# Patient Record
Sex: MALE | Race: WHITE | Employment: OTHER | URBAN - METROPOLITAN AREA
[De-identification: names, ages, dates, MRNs, and addresses within clinical notes are randomized per-mention and may not be internally consistent; named-entity substitution may affect disease eponyms.]

---

## 2017-06-12 ENCOUNTER — OFFICE VISIT (OUTPATIENT)
Dept: ORTHOPEDIC SURGERY | Age: 67
End: 2017-06-12

## 2017-06-12 VITALS
DIASTOLIC BLOOD PRESSURE: 81 MMHG | BODY MASS INDEX: 37.11 KG/M2 | SYSTOLIC BLOOD PRESSURE: 138 MMHG | HEART RATE: 74 BPM | HEIGHT: 73 IN | WEIGHT: 279.98 LBS

## 2017-06-12 DIAGNOSIS — M25.551 HIP PAIN, RIGHT: Primary | ICD-10-CM

## 2017-06-12 DIAGNOSIS — M76.31 ILIOTIBIAL BAND SYNDROME OF RIGHT SIDE: ICD-10-CM

## 2017-06-12 PROCEDURE — 99213 OFFICE O/P EST LOW 20 MIN: CPT | Performed by: ORTHOPAEDIC SURGERY

## 2017-06-12 PROCEDURE — 73502 X-RAY EXAM HIP UNI 2-3 VIEWS: CPT | Performed by: ORTHOPAEDIC SURGERY

## 2017-06-12 RX ORDER — MELOXICAM 15 MG/1
15 TABLET ORAL DAILY
Qty: 30 TABLET | Refills: 3 | Status: SHIPPED | OUTPATIENT
Start: 2017-06-12 | End: 2017-06-12 | Stop reason: SDUPTHER

## 2017-06-12 RX ORDER — MELOXICAM 15 MG/1
15 TABLET ORAL DAILY
Qty: 90 TABLET | Refills: 3 | Status: ON HOLD | OUTPATIENT
Start: 2017-06-12 | End: 2018-06-06 | Stop reason: ALTCHOICE

## 2018-04-10 ENCOUNTER — OFFICE VISIT (OUTPATIENT)
Dept: ORTHOPEDIC SURGERY | Age: 68
End: 2018-04-10

## 2018-04-10 VITALS
HEIGHT: 73 IN | SYSTOLIC BLOOD PRESSURE: 127 MMHG | DIASTOLIC BLOOD PRESSURE: 70 MMHG | WEIGHT: 279.98 LBS | HEART RATE: 69 BPM | BODY MASS INDEX: 37.11 KG/M2

## 2018-04-10 DIAGNOSIS — M54.16 LUMBAR RADICULITIS: ICD-10-CM

## 2018-04-10 DIAGNOSIS — R29.898 WEAKNESS OF BOTH LOWER EXTREMITIES: ICD-10-CM

## 2018-04-10 DIAGNOSIS — G95.9 MYELOPATHY (HCC): ICD-10-CM

## 2018-04-10 DIAGNOSIS — M51.36 DDD (DEGENERATIVE DISC DISEASE), LUMBAR: Primary | ICD-10-CM

## 2018-04-10 PROCEDURE — G8427 DOCREV CUR MEDS BY ELIG CLIN: HCPCS | Performed by: PHYSICIAN ASSISTANT

## 2018-04-10 PROCEDURE — G8417 CALC BMI ABV UP PARAM F/U: HCPCS | Performed by: PHYSICIAN ASSISTANT

## 2018-04-10 PROCEDURE — 1036F TOBACCO NON-USER: CPT | Performed by: PHYSICIAN ASSISTANT

## 2018-04-10 PROCEDURE — 1123F ACP DISCUSS/DSCN MKR DOCD: CPT | Performed by: PHYSICIAN ASSISTANT

## 2018-04-10 PROCEDURE — 3017F COLORECTAL CA SCREEN DOC REV: CPT | Performed by: PHYSICIAN ASSISTANT

## 2018-04-10 PROCEDURE — 99215 OFFICE O/P EST HI 40 MIN: CPT | Performed by: PHYSICIAN ASSISTANT

## 2018-04-10 PROCEDURE — 4040F PNEUMOC VAC/ADMIN/RCVD: CPT | Performed by: PHYSICIAN ASSISTANT

## 2018-04-10 RX ORDER — METHYLPREDNISOLONE 4 MG/1
TABLET ORAL
Qty: 1 KIT | Refills: 0 | Status: SHIPPED | OUTPATIENT
Start: 2018-04-10 | End: 2018-04-16

## 2018-04-10 RX ORDER — GABAPENTIN 300 MG/1
CAPSULE ORAL
Qty: 90 CAPSULE | Refills: 0 | Status: SHIPPED | OUTPATIENT
Start: 2018-04-10 | End: 2018-05-24

## 2018-04-11 ENCOUNTER — TELEPHONE (OUTPATIENT)
Dept: ORTHOPEDIC SURGERY | Age: 68
End: 2018-04-11

## 2018-04-17 ENCOUNTER — HOSPITAL ENCOUNTER (OUTPATIENT)
Dept: MRI IMAGING | Age: 68
Discharge: HOME | End: 2018-04-18

## 2018-04-17 ENCOUNTER — HOSPITAL ENCOUNTER (OUTPATIENT)
Dept: MRI IMAGING | Age: 68
Discharge: HOME | End: 2018-04-17

## 2018-04-17 DIAGNOSIS — G95.9 MYELOPATHY (HCC): ICD-10-CM

## 2018-04-17 DIAGNOSIS — R29.898 WEAKNESS OF BOTH LOWER EXTREMITIES: ICD-10-CM

## 2018-04-17 DIAGNOSIS — M51.36 DDD (DEGENERATIVE DISC DISEASE), LUMBAR: ICD-10-CM

## 2018-04-17 DIAGNOSIS — M54.16 LUMBAR RADICULITIS: ICD-10-CM

## 2018-04-19 ENCOUNTER — HOSPITAL ENCOUNTER (OUTPATIENT)
Dept: MRI IMAGING | Age: 68
Discharge: HOME | End: 2018-04-19

## 2018-04-19 DIAGNOSIS — G95.9 MYELOPATHY (HCC): ICD-10-CM

## 2018-04-19 DIAGNOSIS — R29.898 WEAKNESS OF BOTH LOWER EXTREMITIES: ICD-10-CM

## 2018-04-19 DIAGNOSIS — M51.36 DDD (DEGENERATIVE DISC DISEASE), LUMBAR: ICD-10-CM

## 2018-04-19 DIAGNOSIS — M54.16 LUMBAR RADICULITIS: ICD-10-CM

## 2018-04-23 ENCOUNTER — OFFICE VISIT (OUTPATIENT)
Dept: ORTHOPEDIC SURGERY | Age: 68
End: 2018-04-23

## 2018-04-23 VITALS
DIASTOLIC BLOOD PRESSURE: 76 MMHG | HEART RATE: 77 BPM | HEIGHT: 73 IN | BODY MASS INDEX: 37.11 KG/M2 | SYSTOLIC BLOOD PRESSURE: 140 MMHG | WEIGHT: 279.98 LBS

## 2018-04-23 DIAGNOSIS — M51.36 DEGENERATION OF LUMBAR INTERVERTEBRAL DISC: Primary | ICD-10-CM

## 2018-04-23 PROCEDURE — 1036F TOBACCO NON-USER: CPT | Performed by: PHYSICAL MEDICINE & REHABILITATION

## 2018-04-23 PROCEDURE — 4040F PNEUMOC VAC/ADMIN/RCVD: CPT | Performed by: PHYSICAL MEDICINE & REHABILITATION

## 2018-04-23 PROCEDURE — G8427 DOCREV CUR MEDS BY ELIG CLIN: HCPCS | Performed by: PHYSICAL MEDICINE & REHABILITATION

## 2018-04-23 PROCEDURE — 99214 OFFICE O/P EST MOD 30 MIN: CPT | Performed by: PHYSICAL MEDICINE & REHABILITATION

## 2018-04-23 PROCEDURE — 1123F ACP DISCUSS/DSCN MKR DOCD: CPT | Performed by: PHYSICAL MEDICINE & REHABILITATION

## 2018-04-23 PROCEDURE — G8417 CALC BMI ABV UP PARAM F/U: HCPCS | Performed by: PHYSICAL MEDICINE & REHABILITATION

## 2018-04-23 PROCEDURE — 3017F COLORECTAL CA SCREEN DOC REV: CPT | Performed by: PHYSICAL MEDICINE & REHABILITATION

## 2018-04-23 RX ORDER — HYDROCODONE BITARTRATE AND ACETAMINOPHEN 5; 325 MG/1; MG/1
TABLET ORAL
Qty: 20 TABLET | Refills: 0 | Status: SHIPPED | OUTPATIENT
Start: 2018-04-23 | End: 2018-04-30

## 2018-04-25 ENCOUNTER — TELEPHONE (OUTPATIENT)
Dept: ORTHOPEDIC SURGERY | Age: 68
End: 2018-04-25

## 2018-05-01 ENCOUNTER — HOSPITAL ENCOUNTER (OUTPATIENT)
Dept: PAIN MANAGEMENT | Age: 68
Discharge: HOME | End: 2018-05-01
Attending: PHYSICAL MEDICINE & REHABILITATION | Admitting: PHYSICAL MEDICINE & REHABILITATION

## 2018-05-01 ENCOUNTER — HOSPITAL ENCOUNTER (OUTPATIENT)
Dept: PHYSICAL THERAPY | Age: 68
Discharge: HOME | End: 2018-05-31
Attending: PHYSICIAN ASSISTANT | Admitting: PHYSICIAN ASSISTANT

## 2018-05-01 VITALS
HEART RATE: 60 BPM | SYSTOLIC BLOOD PRESSURE: 151 MMHG | TEMPERATURE: 97.1 F | BODY MASS INDEX: 34.33 KG/M2 | OXYGEN SATURATION: 99 % | HEIGHT: 73 IN | DIASTOLIC BLOOD PRESSURE: 68 MMHG | WEIGHT: 259 LBS | RESPIRATION RATE: 15 BRPM

## 2018-05-01 LAB
GLUCOSE BLD-MCNC: 184 MG/DL (ref 70–99)
PERFORMED ON: ABNORMAL

## 2018-05-01 ASSESSMENT — ACTIVITIES OF DAILY LIVING (ADL): EFFECT OF PAIN ON DAILY ACTIVITIES: STANDING UP AND WALKING

## 2018-05-01 ASSESSMENT — PAIN DESCRIPTION - DESCRIPTORS: DESCRIPTORS: SHARP

## 2018-05-01 ASSESSMENT — PAIN - FUNCTIONAL ASSESSMENT
PAIN_FUNCTIONAL_ASSESSMENT: 0-10
PAIN_FUNCTIONAL_ASSESSMENT: 0-10

## 2018-05-14 ENCOUNTER — OFFICE VISIT (OUTPATIENT)
Dept: ORTHOPEDIC SURGERY | Age: 68
End: 2018-05-14

## 2018-05-14 VITALS
WEIGHT: 259.04 LBS | BODY MASS INDEX: 34.33 KG/M2 | HEART RATE: 74 BPM | HEIGHT: 73 IN | DIASTOLIC BLOOD PRESSURE: 89 MMHG | SYSTOLIC BLOOD PRESSURE: 145 MMHG

## 2018-05-14 DIAGNOSIS — M51.36 DDD (DEGENERATIVE DISC DISEASE), LUMBAR: ICD-10-CM

## 2018-05-14 DIAGNOSIS — R29.898 WEAKNESS OF BOTH LOWER EXTREMITIES: ICD-10-CM

## 2018-05-14 DIAGNOSIS — M48.062 SPINAL STENOSIS OF LUMBAR REGION WITH NEUROGENIC CLAUDICATION: Primary | ICD-10-CM

## 2018-05-14 DIAGNOSIS — M54.16 LUMBAR RADICULITIS: ICD-10-CM

## 2018-05-14 PROCEDURE — 1123F ACP DISCUSS/DSCN MKR DOCD: CPT | Performed by: PHYSICIAN ASSISTANT

## 2018-05-14 PROCEDURE — 99214 OFFICE O/P EST MOD 30 MIN: CPT | Performed by: PHYSICIAN ASSISTANT

## 2018-05-14 PROCEDURE — 4040F PNEUMOC VAC/ADMIN/RCVD: CPT | Performed by: PHYSICIAN ASSISTANT

## 2018-05-14 PROCEDURE — 3017F COLORECTAL CA SCREEN DOC REV: CPT | Performed by: PHYSICIAN ASSISTANT

## 2018-05-14 PROCEDURE — G8417 CALC BMI ABV UP PARAM F/U: HCPCS | Performed by: PHYSICIAN ASSISTANT

## 2018-05-14 PROCEDURE — 1036F TOBACCO NON-USER: CPT | Performed by: PHYSICIAN ASSISTANT

## 2018-05-14 PROCEDURE — G8427 DOCREV CUR MEDS BY ELIG CLIN: HCPCS | Performed by: PHYSICIAN ASSISTANT

## 2018-05-14 RX ORDER — HYDROCODONE BITARTRATE AND ACETAMINOPHEN 7.5; 325 MG/1; MG/1
TABLET ORAL
Qty: 21 TABLET | Refills: 0 | Status: SHIPPED | OUTPATIENT
Start: 2018-05-14 | End: 2018-05-21

## 2018-05-14 RX ORDER — PREGABALIN 75 MG/1
CAPSULE ORAL
Qty: 60 CAPSULE | Refills: 0 | Status: SHIPPED | OUTPATIENT
Start: 2018-05-14 | End: 2018-05-24

## 2018-05-21 ENCOUNTER — TELEPHONE (OUTPATIENT)
Dept: ORTHOPEDIC SURGERY | Age: 68
End: 2018-05-21

## 2018-05-22 ENCOUNTER — PRE-EVALUATION (OUTPATIENT)
Dept: ORTHOPEDIC SURGERY | Age: 68
End: 2018-05-22

## 2018-05-22 ENCOUNTER — OFFICE VISIT (OUTPATIENT)
Dept: ORTHOPEDIC SURGERY | Age: 68
End: 2018-05-22

## 2018-05-22 VITALS
HEIGHT: 73 IN | HEART RATE: 78 BPM | BODY MASS INDEX: 34.33 KG/M2 | WEIGHT: 259.04 LBS | SYSTOLIC BLOOD PRESSURE: 132 MMHG | DIASTOLIC BLOOD PRESSURE: 88 MMHG

## 2018-05-22 DIAGNOSIS — M48.062 SPINAL STENOSIS, LUMBAR REGION, WITH NEUROGENIC CLAUDICATION: Primary | ICD-10-CM

## 2018-05-22 DIAGNOSIS — M48.062 LUMBAR STENOSIS WITH NEUROGENIC CLAUDICATION: ICD-10-CM

## 2018-05-22 DIAGNOSIS — M54.16 LUMBAR RADICULOPATHY: ICD-10-CM

## 2018-05-22 DIAGNOSIS — R29.898 LEG WEAKNESS, BILATERAL: ICD-10-CM

## 2018-05-22 DIAGNOSIS — M48.062 SPINAL STENOSIS OF LUMBAR REGION WITH NEUROGENIC CLAUDICATION: Primary | ICD-10-CM

## 2018-05-22 PROCEDURE — 3017F COLORECTAL CA SCREEN DOC REV: CPT | Performed by: ORTHOPAEDIC SURGERY

## 2018-05-22 PROCEDURE — G8427 DOCREV CUR MEDS BY ELIG CLIN: HCPCS | Performed by: ORTHOPAEDIC SURGERY

## 2018-05-22 PROCEDURE — APPNB30 APP NON BILLABLE TIME 0-30 MINS: Performed by: PHYSICIAN ASSISTANT

## 2018-05-22 PROCEDURE — 99214 OFFICE O/P EST MOD 30 MIN: CPT | Performed by: ORTHOPAEDIC SURGERY

## 2018-05-22 PROCEDURE — G8417 CALC BMI ABV UP PARAM F/U: HCPCS | Performed by: ORTHOPAEDIC SURGERY

## 2018-05-24 ENCOUNTER — PAT TELEPHONE (OUTPATIENT)
Dept: PREADMISSION TESTING | Age: 68
End: 2018-05-24

## 2018-05-24 VITALS — WEIGHT: 259.04 LBS | HEIGHT: 72 IN | BODY MASS INDEX: 35.09 KG/M2

## 2018-05-25 ENCOUNTER — TELEPHONE (OUTPATIENT)
Dept: ORTHOPEDIC SURGERY | Age: 68
End: 2018-05-25

## 2018-06-06 PROBLEM — M48.00 SPINAL STENOSIS: Status: ACTIVE | Noted: 2018-06-06

## 2018-06-07 ENCOUNTER — TELEPHONE (OUTPATIENT)
Dept: ORTHOPEDIC SURGERY | Age: 68
End: 2018-06-07

## 2018-06-11 ENCOUNTER — HOSPITAL ENCOUNTER (OUTPATIENT)
Dept: VASCULAR LAB | Age: 68
Discharge: HOME | End: 2018-06-12
Attending: ORTHOPAEDIC SURGERY | Admitting: NURSE PRACTITIONER

## 2018-06-11 ENCOUNTER — TELEPHONE (OUTPATIENT)
Dept: ORTHOPEDIC SURGERY | Age: 68
End: 2018-06-11

## 2018-06-13 ENCOUNTER — TELEPHONE (OUTPATIENT)
Dept: ORTHOPEDIC SURGERY | Age: 68
End: 2018-06-13

## 2018-06-19 ENCOUNTER — OFFICE VISIT (OUTPATIENT)
Dept: ORTHOPEDIC SURGERY | Age: 68
End: 2018-06-19

## 2018-06-19 VITALS
HEIGHT: 73 IN | SYSTOLIC BLOOD PRESSURE: 128 MMHG | HEART RATE: 76 BPM | BODY MASS INDEX: 33.66 KG/M2 | WEIGHT: 253.97 LBS | DIASTOLIC BLOOD PRESSURE: 80 MMHG

## 2018-06-19 DIAGNOSIS — M48.062 SPINAL STENOSIS OF LUMBAR REGION WITH NEUROGENIC CLAUDICATION: Primary | ICD-10-CM

## 2018-06-19 PROCEDURE — 99024 POSTOP FOLLOW-UP VISIT: CPT | Performed by: ORTHOPAEDIC SURGERY

## 2018-07-16 ENCOUNTER — TELEPHONE (OUTPATIENT)
Dept: ORTHOPEDIC SURGERY | Age: 68
End: 2018-07-16

## 2018-07-17 ENCOUNTER — OFFICE VISIT (OUTPATIENT)
Dept: ORTHOPEDIC SURGERY | Age: 68
End: 2018-07-17

## 2018-07-17 VITALS
SYSTOLIC BLOOD PRESSURE: 169 MMHG | HEART RATE: 79 BPM | DIASTOLIC BLOOD PRESSURE: 85 MMHG | BODY MASS INDEX: 34.46 KG/M2 | WEIGHT: 260 LBS | HEIGHT: 73 IN

## 2018-07-17 DIAGNOSIS — M48.062 SPINAL STENOSIS OF LUMBAR REGION WITH NEUROGENIC CLAUDICATION: Primary | ICD-10-CM

## 2018-07-17 PROCEDURE — 99024 POSTOP FOLLOW-UP VISIT: CPT | Performed by: ORTHOPAEDIC SURGERY

## 2018-07-17 NOTE — TELEPHONE ENCOUNTER
Patient called upset and demanding to speak to Dr Emily Wong or Azucena Du now regarding his increased pain    He states no one called him back yesterday  Azucena Du is going to call patient back- but states she was in clinic all day yesterday and today-will call him after clinic    Patient can be reached at [phone]

## 2018-08-13 ENCOUNTER — TELEPHONE (OUTPATIENT)
Dept: ORTHOPEDIC SURGERY | Age: 68
End: 2018-08-13

## 2018-08-13 DIAGNOSIS — Z98.890 S/P LUMBAR LAMINECTOMY: Primary | ICD-10-CM

## 2018-08-13 NOTE — TELEPHONE ENCOUNTER
Patient calling stating that he needs to be seen in the office for increased pain. HE states that he would like tomorrow at 2:40. I advised that the schedule was full and I could schedule the next available appointment. Patient declined to schedule stating we \"have open spots that are held for things like this and for people like him\". I again advised I could schedule next available and I would put on wait-list and send a message to see if anything sooner that I may have missed. Patient does not wish to schedule with Brianda Car either. Patient asked that a message be sent to Dr Chaitanya Feliz without any interference from \"the staff\". He does not want anyone making decisions for the doctor. Dr Chaitanya Feliz knows the patient and his case and knows that he will see him tomorrow at 2:40. Patient advised no appointment was made and to wait for a call from the staff.      Please call  [phone]

## 2018-08-13 NOTE — TELEPHONE ENCOUNTER
Spoke with patient and he states that his pain is a little different from surgery. He is now having lbp and right leg pain. He said that they pain is different than before surgery. This is more nerve pain. He checked with his PCP and he does not think that it is blood clot related. I let him know that we can have him see Colleen Mccainaugusto on Wednesday if needed. He said you mentioned a new MRI when he was seen in clinic but I did not see this in the notes. Please advise. He continues on his Eloquis for bloodclot.

## 2018-08-14 NOTE — TELEPHONE ENCOUNTER
Spoke with patient and let him know that Dr. Dominguez  would like to order a MRI. I put in the orders for [facility]. I will call him once the MRI is approved.

## 2018-08-20 ENCOUNTER — TELEPHONE (OUTPATIENT)
Dept: ORTHOPEDIC SURGERY | Age: 68
End: 2018-08-20

## 2018-08-20 NOTE — TELEPHONE ENCOUNTER
Called patient to let them know of MRI/CT SCAN approval. Micha Dumont has been faxed auth# and demographic information. Patient was instructed to call the central scheduling department for a follow-up appointment after test is scheduled.

## 2018-08-27 ENCOUNTER — TELEPHONE (OUTPATIENT)
Dept: ORTHOPEDIC SURGERY | Age: 68
End: 2018-08-27

## 2018-08-28 ENCOUNTER — TELEPHONE (OUTPATIENT)
Dept: ORTHOPEDIC SURGERY | Age: 68
End: 2018-08-28

## 2018-08-28 DIAGNOSIS — Z98.890 S/P LUMBAR LAMINECTOMY: Primary | ICD-10-CM

## 2018-09-05 ENCOUNTER — HOSPITAL ENCOUNTER (OUTPATIENT)
Dept: MRI IMAGING | Age: 68
Discharge: HOME | End: 2018-09-05
Payer: MEDICARE

## 2018-09-05 DIAGNOSIS — Z98.890 S/P LUMBAR LAMINECTOMY: ICD-10-CM

## 2018-09-05 PROCEDURE — 72158 MRI LUMBAR SPINE W/O & W/DYE: CPT

## 2018-09-05 PROCEDURE — 6360000004 HC RX CONTRAST MEDICATION: Performed by: ORTHOPAEDIC SURGERY

## 2018-09-05 PROCEDURE — A9579 GAD-BASE MR CONTRAST NOS,1ML: HCPCS | Performed by: ORTHOPAEDIC SURGERY

## 2018-09-05 RX ADMIN — GADOTERIDOL 23 ML: 279.3 INJECTION, SOLUTION INTRAVENOUS at 16:15

## 2018-09-06 ENCOUNTER — TELEPHONE (OUTPATIENT)
Dept: ORTHOPEDIC SURGERY | Age: 68
End: 2018-09-06

## 2018-09-06 ENCOUNTER — HOSPITAL ENCOUNTER (OUTPATIENT)
Age: 68
Discharge: HOME | End: 2018-09-06
Payer: MEDICARE

## 2018-09-06 DIAGNOSIS — Z98.890 S/P LUMBAR LAMINECTOMY: Primary | ICD-10-CM

## 2018-09-06 DIAGNOSIS — Z98.890 S/P LUMBAR LAMINECTOMY: ICD-10-CM

## 2018-09-06 LAB
C-REACTIVE PROTEIN: 2.4 MG/L (ref 0–5.1)
SEDIMENTATION RATE, ERYTHROCYTE: 10 MM/HR (ref 0–20)

## 2018-09-06 PROCEDURE — 85652 RBC SED RATE AUTOMATED: CPT

## 2018-09-06 PROCEDURE — 36415 COLL VENOUS BLD VENIPUNCTURE: CPT

## 2018-09-06 PROCEDURE — 86140 C-REACTIVE PROTEIN: CPT

## 2018-09-06 NOTE — TELEPHONE ENCOUNTER
PATIENT CALLED IN TO INFORM AMANUEL HE HAD HIS LAB WORK DONE AT [facility] - HE STATED HE DID NOT NEED A CALL BACK BUT JUST WANTED TO LET AMANUEL KNOW.

## 2018-09-07 ENCOUNTER — TELEPHONE (OUTPATIENT)
Dept: ORTHOPEDIC SURGERY | Age: 68
End: 2018-09-07

## 2018-09-07 NOTE — TELEPHONE ENCOUNTER
----- Message from Jaimie Meredith MD sent at 9/7/2018  9:17 AM EDT -----  Blood tests are normal. Infection very unlikely. See us in office in 1 to 2 weeks.

## 2018-09-12 ENCOUNTER — HOSPITAL ENCOUNTER (OUTPATIENT)
Dept: VASCULAR LAB | Age: 68
Discharge: HOME | End: 2018-09-12
Payer: MEDICARE

## 2018-09-12 PROCEDURE — 93971 EXTREMITY STUDY: CPT

## 2018-09-13 ENCOUNTER — TELEPHONE (OUTPATIENT)
Dept: ORTHOPEDIC SURGERY | Age: 68
End: 2018-09-13

## 2018-09-13 ENCOUNTER — HOSPITAL ENCOUNTER (OUTPATIENT)
Age: 68
Discharge: HOME | End: 2018-09-13
Payer: MEDICARE

## 2018-09-13 DIAGNOSIS — I82.409 POSTOPERATIVE ACUTE DEEP VEIN THROMBOSIS (DVT) OF LOWER EXTREMITY, SUBSEQUENT ENCOUNTER (HCC): ICD-10-CM

## 2018-09-13 DIAGNOSIS — Z98.890 S/P LAMINECTOMY: ICD-10-CM

## 2018-09-13 DIAGNOSIS — Z98.890 S/P LAMINECTOMY: Primary | ICD-10-CM

## 2018-09-13 DIAGNOSIS — T81.72XD POSTOPERATIVE ACUTE DEEP VEIN THROMBOSIS (DVT) OF LOWER EXTREMITY, SUBSEQUENT ENCOUNTER (HCC): ICD-10-CM

## 2018-09-13 LAB
C-REACTIVE PROTEIN: 2.5 MG/L (ref 0–5.1)
SEDIMENTATION RATE, ERYTHROCYTE: 6 MM/HR (ref 0–20)

## 2018-09-13 PROCEDURE — 36415 COLL VENOUS BLD VENIPUNCTURE: CPT

## 2018-09-13 PROCEDURE — 85652 RBC SED RATE AUTOMATED: CPT

## 2018-09-13 PROCEDURE — 86140 C-REACTIVE PROTEIN: CPT

## 2018-09-14 ENCOUNTER — TELEPHONE (OUTPATIENT)
Dept: ORTHOPEDIC SURGERY | Age: 68
End: 2018-09-14

## 2018-09-14 NOTE — TELEPHONE ENCOUNTER
----- Message from Kimberlee Moser MD sent at 9/14/2018 10:03 AM EDT -----  Blood tests normal. INfection very unlikely. See me in the office in 1-3 weeks.  thanks

## 2018-09-17 ENCOUNTER — OFFICE VISIT (OUTPATIENT)
Dept: ORTHOPEDIC SURGERY | Age: 68
End: 2018-09-17

## 2018-09-17 VITALS — WEIGHT: 260 LBS | BODY MASS INDEX: 34.46 KG/M2 | HEIGHT: 73 IN

## 2018-09-17 DIAGNOSIS — M48.062 SPINAL STENOSIS OF LUMBAR REGION WITH NEUROGENIC CLAUDICATION: Primary | ICD-10-CM

## 2018-09-17 PROCEDURE — 3017F COLORECTAL CA SCREEN DOC REV: CPT | Performed by: ORTHOPAEDIC SURGERY

## 2018-09-17 PROCEDURE — 99213 OFFICE O/P EST LOW 20 MIN: CPT | Performed by: ORTHOPAEDIC SURGERY

## 2018-09-17 PROCEDURE — 1123F ACP DISCUSS/DSCN MKR DOCD: CPT | Performed by: ORTHOPAEDIC SURGERY

## 2018-09-17 PROCEDURE — G8427 DOCREV CUR MEDS BY ELIG CLIN: HCPCS | Performed by: ORTHOPAEDIC SURGERY

## 2018-09-17 PROCEDURE — 4040F PNEUMOC VAC/ADMIN/RCVD: CPT | Performed by: ORTHOPAEDIC SURGERY

## 2018-09-17 PROCEDURE — G8417 CALC BMI ABV UP PARAM F/U: HCPCS | Performed by: ORTHOPAEDIC SURGERY

## 2018-09-17 PROCEDURE — 1101F PT FALLS ASSESS-DOCD LE1/YR: CPT | Performed by: ORTHOPAEDIC SURGERY

## 2018-09-17 PROCEDURE — 1036F TOBACCO NON-USER: CPT | Performed by: ORTHOPAEDIC SURGERY

## 2018-09-18 ENCOUNTER — TELEPHONE (OUTPATIENT)
Dept: ORTHOPEDIC SURGERY | Age: 68
End: 2018-09-18

## 2018-09-24 NOTE — H&P
HISTORY AND PHYSICAL/PRE-SEDATION ASSESSMENT    Patient:  Jane Saleem   :  1950  Medical Record No.:  [id number]   Date:  18  Physician:  Xiao Mcclellan M.D. Facility: [facility]     Nursing History and Physical reviewed and agreed upon. Additional findings:    Allergies:  No known allergies    Home Medications:    Prior to Admission medications    Medication Sig Start Date End Date Taking? Authorizing Provider   docusate sodium (COLACE) 100 MG capsule Take 1 capsule by mouth 2 times daily as needed for Constipation 18   Eboni Alcazar MD   Mirabegron ER (MYRBETRIQ) 50 MG TB24 Take 1 tablet by mouth daily    Historical Provider, MD Davis-Mihir Afri-Nettle-Saw Palmet (SAW PALMETTO COMPLEX PO) Take 2 tablets by mouth 2 times daily     Historical Provider, MD   aspirin 81 MG tablet Take 81 mg by mouth daily. Historical Provider, MD   lithium 300 MG tablet Take 900 mg by mouth nightly     Historical Provider, MD   metFORMIN (GLUCOPHAGE) 500 MG tablet Take 1,000 mg by mouth 2 times daily (with meals)     Historical Provider, MD   traZODone (DESYREL) 100 MG tablet Take 300 mg by mouth nightly     Historical Provider, MD   doxazosin (CARDURA) 2 MG tablet Take 2 mg by mouth Takes 3 times weekly    Historical Provider, MD       Vitals: Stable     PHYSICAL EXAM:  HENT: Airway patent and reviewed  Cardiovascular: Normal rate, regular rhythm, normal heart sounds. Pulmonary/Chest: No wheezes. No rhonchi. No rales. Abdominal: Soft. Bowel sounds are normal. No distension. MALLAMPATI:           []   I. Complete visualization of the soft palate           [x]   II. Complete visualization of the uvula            []   III. Visualization of only the base of the uvula           []   IV. Soft palate is not visible     ASA CLASS:         []   I. Normal, healthy adult           [x]   II.  Mild systemic disease            []   III.   Severe systemic disease      Sedation

## 2018-09-25 ENCOUNTER — TELEPHONE (OUTPATIENT)
Dept: ORTHOPEDIC SURGERY | Age: 68
End: 2018-09-25

## 2018-09-25 ENCOUNTER — HOSPITAL ENCOUNTER (OUTPATIENT)
Age: 68
Setting detail: OUTPATIENT SURGERY
Discharge: HOME | End: 2018-09-25
Attending: PHYSICAL MEDICINE & REHABILITATION | Admitting: PHYSICAL MEDICINE & REHABILITATION
Payer: MEDICARE

## 2018-09-25 VITALS
TEMPERATURE: 98 F | HEART RATE: 64 BPM | OXYGEN SATURATION: 97 % | RESPIRATION RATE: 16 BRPM | SYSTOLIC BLOOD PRESSURE: 147 MMHG | WEIGHT: 250 LBS | BODY MASS INDEX: 33.13 KG/M2 | DIASTOLIC BLOOD PRESSURE: 82 MMHG | HEIGHT: 73 IN

## 2018-09-25 LAB
GLUCOSE BLD-MCNC: 146 MG/DL (ref 70–99)
PERFORMED ON: ABNORMAL

## 2018-09-25 PROCEDURE — 7100000010 HC PHASE II RECOVERY - FIRST 15 MIN: Performed by: PHYSICAL MEDICINE & REHABILITATION

## 2018-09-25 PROCEDURE — 3600000002 HC SURGERY LEVEL 2 BASE: Performed by: PHYSICAL MEDICINE & REHABILITATION

## 2018-09-25 PROCEDURE — 2709999900 HC NON-CHARGEABLE SUPPLY: Performed by: PHYSICAL MEDICINE & REHABILITATION

## 2018-09-25 PROCEDURE — 2500000003 HC RX 250 WO HCPCS: Performed by: PHYSICAL MEDICINE & REHABILITATION

## 2018-09-25 RX ORDER — MULTIVIT WITH MINERALS/LUTEIN
1000 TABLET ORAL DAILY
COMMUNITY
End: 2020-06-01

## 2018-09-25 RX ORDER — BUPIVACAINE HYDROCHLORIDE 7.5 MG/ML
INJECTION, SOLUTION EPIDURAL; RETROBULBAR PRN
Status: DISCONTINUED | OUTPATIENT
Start: 2018-09-25 | End: 2018-09-25 | Stop reason: HOSPADM

## 2018-09-25 RX ORDER — GLIMEPIRIDE 2 MG/1
2 TABLET ORAL
Status: ON HOLD | COMMUNITY
End: 2022-06-08 | Stop reason: CLARIF

## 2018-09-25 RX ORDER — THIAMINE MONONITRATE (VIT B1) 100 MG
100 TABLET ORAL DAILY
COMMUNITY
End: 2020-06-01

## 2018-09-25 RX ORDER — VITAMIN E 268 MG
400 CAPSULE ORAL DAILY
COMMUNITY
End: 2020-07-11

## 2018-09-25 ASSESSMENT — PAIN - FUNCTIONAL ASSESSMENT: PAIN_FUNCTIONAL_ASSESSMENT: 0-10

## 2018-09-25 ASSESSMENT — PAIN DESCRIPTION - DESCRIPTORS: DESCRIPTORS: SHARP

## 2018-09-25 ASSESSMENT — PAIN SCALES - GENERAL: PAINLEVEL_OUTOF10: 0

## 2018-09-26 NOTE — OP NOTE
Patient:  Connor Buckner Record #:  [id number]   Date:    9-25-18  Physician:  Farrah Shane M.D. Facility: [facility]     Pre-op diagnosis:  Lumbar facet pain syndrome, facet arthropathy, lumbar spondylosis  Post-op diagnosis:  same  Procedure: Bilateral L3, L4, and L5 medial branch #1 blocks with fluoroscopic guidance  Anesthesia: none  Procedure Note:    The patient was admitted through pre-op and written consent was obtained. The patient was advised of the risks and benefits of the procedure, including but not limited to the following: bleeding, pain, infection, temporary paralysis, nerve damage and spinal headache. The patient was given the opportunity to ask questions. There were no contraindications for this procedure. The appropriate area was prepped and draped in a sterile fashion. Landmarks were identified and marked. Three 25G spinal needle were directed to the right L3, L4, and L5 medial medial branches using fluoroscopic guidance with ideal needle tip confirmed by multiple views. There were no signs of intravascular or intrathecal injection. 0.5cc of 0.75% marcaine were injected at each site. There were no complications and the patient tolerated the procedure well. The patient was transferred to the recovery area and monitored. Discharge instructions were given. The patient is to contact me for any post-procedure concerns. The patient is to follow up as scheduled. The same procedure was repeated on the contralateral side. Pain diary was discussed and provided.     Farrah Shane MD

## 2018-10-01 ENCOUNTER — OFFICE VISIT (OUTPATIENT)
Dept: ORTHOPEDIC SURGERY | Age: 68
End: 2018-10-01
Payer: MEDICARE

## 2018-10-01 VITALS
SYSTOLIC BLOOD PRESSURE: 144 MMHG | DIASTOLIC BLOOD PRESSURE: 75 MMHG | HEART RATE: 74 BPM | WEIGHT: 260 LBS | BODY MASS INDEX: 34.46 KG/M2 | HEIGHT: 73 IN

## 2018-10-01 DIAGNOSIS — Z98.890 S/P LUMBAR LAMINECTOMY: ICD-10-CM

## 2018-10-01 DIAGNOSIS — M48.062 LUMBAR STENOSIS WITH NEUROGENIC CLAUDICATION: ICD-10-CM

## 2018-10-01 DIAGNOSIS — G95.9 MYELOPATHY (HCC): ICD-10-CM

## 2018-10-01 DIAGNOSIS — M48.062 SPINAL STENOSIS OF LUMBAR REGION WITH NEUROGENIC CLAUDICATION: Primary | ICD-10-CM

## 2018-10-01 DIAGNOSIS — M51.36 DEGENERATION OF LUMBAR INTERVERTEBRAL DISC: ICD-10-CM

## 2018-10-01 PROCEDURE — G8427 DOCREV CUR MEDS BY ELIG CLIN: HCPCS | Performed by: PHYSICAL MEDICINE & REHABILITATION

## 2018-10-01 PROCEDURE — 1036F TOBACCO NON-USER: CPT | Performed by: PHYSICAL MEDICINE & REHABILITATION

## 2018-10-01 PROCEDURE — 1123F ACP DISCUSS/DSCN MKR DOCD: CPT | Performed by: PHYSICAL MEDICINE & REHABILITATION

## 2018-10-01 PROCEDURE — 3017F COLORECTAL CA SCREEN DOC REV: CPT | Performed by: PHYSICAL MEDICINE & REHABILITATION

## 2018-10-01 PROCEDURE — G8417 CALC BMI ABV UP PARAM F/U: HCPCS | Performed by: PHYSICAL MEDICINE & REHABILITATION

## 2018-10-01 PROCEDURE — 99213 OFFICE O/P EST LOW 20 MIN: CPT | Performed by: PHYSICAL MEDICINE & REHABILITATION

## 2018-10-01 PROCEDURE — G8484 FLU IMMUNIZE NO ADMIN: HCPCS | Performed by: PHYSICAL MEDICINE & REHABILITATION

## 2018-10-01 PROCEDURE — 1101F PT FALLS ASSESS-DOCD LE1/YR: CPT | Performed by: PHYSICAL MEDICINE & REHABILITATION

## 2018-10-01 PROCEDURE — 4040F PNEUMOC VAC/ADMIN/RCVD: CPT | Performed by: PHYSICAL MEDICINE & REHABILITATION

## 2018-10-10 ENCOUNTER — TELEPHONE (OUTPATIENT)
Dept: ORTHOPEDIC SURGERY | Age: 68
End: 2018-10-10

## 2018-10-16 ENCOUNTER — HOSPITAL ENCOUNTER (OUTPATIENT)
Age: 68
Setting detail: OUTPATIENT SURGERY
Discharge: HOME | End: 2018-10-16
Attending: PHYSICAL MEDICINE & REHABILITATION | Admitting: PHYSICAL MEDICINE & REHABILITATION
Payer: MEDICARE

## 2018-10-16 VITALS
BODY MASS INDEX: 34.46 KG/M2 | OXYGEN SATURATION: 100 % | HEIGHT: 73 IN | TEMPERATURE: 97.1 F | DIASTOLIC BLOOD PRESSURE: 79 MMHG | RESPIRATION RATE: 16 BRPM | HEART RATE: 58 BPM | SYSTOLIC BLOOD PRESSURE: 139 MMHG | WEIGHT: 260 LBS

## 2018-10-16 LAB
GLUCOSE BLD-MCNC: 137 MG/DL (ref 70–99)
PERFORMED ON: ABNORMAL

## 2018-10-16 PROCEDURE — 2709999900 HC NON-CHARGEABLE SUPPLY: Performed by: PHYSICAL MEDICINE & REHABILITATION

## 2018-10-16 PROCEDURE — 3600000002 HC SURGERY LEVEL 2 BASE: Performed by: PHYSICAL MEDICINE & REHABILITATION

## 2018-10-16 PROCEDURE — 7100000010 HC PHASE II RECOVERY - FIRST 15 MIN: Performed by: PHYSICAL MEDICINE & REHABILITATION

## 2018-10-16 PROCEDURE — 2500000003 HC RX 250 WO HCPCS: Performed by: PHYSICAL MEDICINE & REHABILITATION

## 2018-10-16 RX ORDER — BUPIVACAINE HYDROCHLORIDE 7.5 MG/ML
INJECTION, SOLUTION EPIDURAL; RETROBULBAR PRN
Status: DISCONTINUED | OUTPATIENT
Start: 2018-10-16 | End: 2018-10-16 | Stop reason: HOSPADM

## 2018-10-16 ASSESSMENT — PAIN DESCRIPTION - DESCRIPTORS: DESCRIPTORS: ACHING;SHARP;NUMBNESS

## 2018-10-16 ASSESSMENT — PAIN SCALES - GENERAL: PAINLEVEL_OUTOF10: 0

## 2018-10-16 ASSESSMENT — PAIN - FUNCTIONAL ASSESSMENT: PAIN_FUNCTIONAL_ASSESSMENT: 0-10

## 2018-10-24 ENCOUNTER — OFFICE VISIT (OUTPATIENT)
Dept: ORTHOPEDIC SURGERY | Age: 68
End: 2018-10-24
Payer: MEDICARE

## 2018-10-24 VITALS
HEART RATE: 76 BPM | WEIGHT: 259.92 LBS | HEIGHT: 73 IN | DIASTOLIC BLOOD PRESSURE: 80 MMHG | SYSTOLIC BLOOD PRESSURE: 128 MMHG | BODY MASS INDEX: 34.45 KG/M2

## 2018-10-24 DIAGNOSIS — Z98.890 S/P LUMBAR LAMINECTOMY: ICD-10-CM

## 2018-10-24 DIAGNOSIS — M48.062 SPINAL STENOSIS OF LUMBAR REGION WITH NEUROGENIC CLAUDICATION: Primary | ICD-10-CM

## 2018-10-24 DIAGNOSIS — G95.9 MYELOPATHY (HCC): ICD-10-CM

## 2018-10-24 DIAGNOSIS — R29.898 WEAKNESS OF BOTH LOWER EXTREMITIES: ICD-10-CM

## 2018-10-24 DIAGNOSIS — M51.36 DDD (DEGENERATIVE DISC DISEASE), LUMBAR: ICD-10-CM

## 2018-10-24 PROCEDURE — 1036F TOBACCO NON-USER: CPT | Performed by: PHYSICAL MEDICINE & REHABILITATION

## 2018-10-24 PROCEDURE — 1101F PT FALLS ASSESS-DOCD LE1/YR: CPT | Performed by: PHYSICAL MEDICINE & REHABILITATION

## 2018-10-24 PROCEDURE — 3017F COLORECTAL CA SCREEN DOC REV: CPT | Performed by: PHYSICAL MEDICINE & REHABILITATION

## 2018-10-24 PROCEDURE — 99212 OFFICE O/P EST SF 10 MIN: CPT | Performed by: PHYSICAL MEDICINE & REHABILITATION

## 2018-10-24 PROCEDURE — G8427 DOCREV CUR MEDS BY ELIG CLIN: HCPCS | Performed by: PHYSICAL MEDICINE & REHABILITATION

## 2018-10-24 PROCEDURE — 1123F ACP DISCUSS/DSCN MKR DOCD: CPT | Performed by: PHYSICAL MEDICINE & REHABILITATION

## 2018-10-24 PROCEDURE — G8484 FLU IMMUNIZE NO ADMIN: HCPCS | Performed by: PHYSICAL MEDICINE & REHABILITATION

## 2018-10-24 PROCEDURE — G8417 CALC BMI ABV UP PARAM F/U: HCPCS | Performed by: PHYSICAL MEDICINE & REHABILITATION

## 2018-10-24 PROCEDURE — 4040F PNEUMOC VAC/ADMIN/RCVD: CPT | Performed by: PHYSICAL MEDICINE & REHABILITATION

## 2018-10-24 NOTE — LETTER
[address] [facility] [address] and Sports Medicine    Please Schedule the following with: Dr. Ge Farrar    Date:  10/24/18     Patient: Suly Chavis     YOB: 1950    Patient Home Phone: [phone] (home)    Diagnosis: Lumbar spondylosis without radiculopathy M47.816    []LT     []RT     [x]MALLORY     []Midline    Levels: Bilateral L3, L4, L5 radiofrequency neurotomy  []Cervical YONAS 93447, 27968  []L-MBB 45911, 85190  []SI Joint 57934   []C-FACET 19630, 93759, 55890  []L-FACET K0991090, 58542  []Interlaminar YONAS 37085     []HIP 51870    []C-MBB 89705, 30310, 20091  []Transforaminal YONAS [id number]  [x]Neurotomy 68405, [id number], 52057    Attending Physician: Suly Truong    Injection Schedule for: At: [facility] #:  Second Insurance:                 Pre-cert #:    Comments: IV sedation  5/1/18 Bilateral L3-4 TX YONAS  9/25/18 Bilateral L3, L4, and L5 MBB #1  10/16/18 Bilateral L3, L4, and L5 MBB #2    [] Blood Thinner:                 []Diabetic           []Antibiotic:               []Glaucoma:    [] Current Open Wounds, Lacerations or Sores     Allergies:    Allergies   Allergen Reactions    No Known Allergies        Past Medical History:   Diagnosis Date    Anxiety     Arthritis     Asthma     [facility]     Colitis     Depression     Diabetes ([facility]     Diabetes mellitus ([facility]     Glaucoma     H/O bladder problems     High blood pressure     History of kidney problems     IBS (irritable bowel syndrome)     Liver disease     hx of elevated LFT's    Obesity     Psychiatric problem           Current Outpatient Prescriptions:     Omega-3 Fatty Acids (FISH OIL PO), Take by mouth, Disp: , Rfl:     verapamil (CALAN SR) 120 MG extended release tablet, Take 120 mg by mouth nightly, Disp: , Rfl:     vitamin B-1 (THIAMINE) 100 MG tablet, Take 100 mg by mouth daily, Disp: , Rfl:

## 2018-10-24 NOTE — PROGRESS NOTES
9/5/18 lumbar MRI    Impression   1. 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 2. Residual spinal canal stenosis, moderate at L2-3 and L3-4 and mild at L1-2   and L4-5.   3. Multilevel neural foraminal narrowing as detailed above and greatest   involving the L4 neural foramina where it is severe bilaterally.      Recent normal serial sed rate and CRP    Impression:    Lumbar spondylosis with positive lumbar medial branch blocks ×    Plan:    Bilateral L3 4 5 SANJEEV De Anda

## 2018-11-07 ENCOUNTER — TELEPHONE (OUTPATIENT)
Dept: ORTHOPEDIC SURGERY | Age: 68
End: 2018-11-07

## 2018-11-07 NOTE — TELEPHONE ENCOUNTER
DOS   11/12/2018  CPT   12704  83634     [id number]    NPR  DX   M47.816  OP SX AUTH  [id number]  VALID   10/31/2018 - 12/15/2018  BILATERAL  LEVELS   L3  L4   L5   PROCEDURE   RFA NEUROTOMY  DR. Negin MARCUS  [facility]  INSURANCE:   [facility]

## 2018-11-12 ENCOUNTER — HOSPITAL ENCOUNTER (OUTPATIENT)
Age: 68
Setting detail: OUTPATIENT SURGERY
Discharge: HOME | End: 2018-11-12
Attending: PHYSICAL MEDICINE & REHABILITATION | Admitting: PHYSICAL MEDICINE & REHABILITATION
Payer: MEDICARE

## 2018-11-12 VITALS
DIASTOLIC BLOOD PRESSURE: 76 MMHG | HEIGHT: 73 IN | RESPIRATION RATE: 16 BRPM | OXYGEN SATURATION: 97 % | HEART RATE: 61 BPM | WEIGHT: 260 LBS | BODY MASS INDEX: 34.46 KG/M2 | TEMPERATURE: 97.4 F | SYSTOLIC BLOOD PRESSURE: 128 MMHG

## 2018-11-12 LAB
GLUCOSE BLD-MCNC: 159 MG/DL (ref 70–99)
PERFORMED ON: ABNORMAL

## 2018-11-12 PROCEDURE — 6360000002 HC RX W HCPCS: Performed by: PHYSICAL MEDICINE & REHABILITATION

## 2018-11-12 PROCEDURE — 7100000010 HC PHASE II RECOVERY - FIRST 15 MIN: Performed by: PHYSICAL MEDICINE & REHABILITATION

## 2018-11-12 PROCEDURE — 99152 MOD SED SAME PHYS/QHP 5/>YRS: CPT | Performed by: PHYSICAL MEDICINE & REHABILITATION

## 2018-11-12 PROCEDURE — 7100000011 HC PHASE II RECOVERY - ADDTL 15 MIN: Performed by: PHYSICAL MEDICINE & REHABILITATION

## 2018-11-12 PROCEDURE — 2709999900 HC NON-CHARGEABLE SUPPLY: Performed by: PHYSICAL MEDICINE & REHABILITATION

## 2018-11-12 PROCEDURE — 3600000002 HC SURGERY LEVEL 2 BASE: Performed by: PHYSICAL MEDICINE & REHABILITATION

## 2018-11-12 PROCEDURE — 2500000003 HC RX 250 WO HCPCS: Performed by: PHYSICAL MEDICINE & REHABILITATION

## 2018-11-12 PROCEDURE — 2580000003 HC RX 258: Performed by: PHYSICAL MEDICINE & REHABILITATION

## 2018-11-12 PROCEDURE — 3600000012 HC SURGERY LEVEL 2 ADDTL 15MIN: Performed by: PHYSICAL MEDICINE & REHABILITATION

## 2018-11-12 RX ORDER — MIDAZOLAM HYDROCHLORIDE 1 MG/ML
INJECTION INTRAMUSCULAR; INTRAVENOUS PRN
Status: DISCONTINUED | OUTPATIENT
Start: 2018-11-12 | End: 2018-11-12 | Stop reason: HOSPADM

## 2018-11-12 RX ORDER — SODIUM CHLORIDE, SODIUM LACTATE, POTASSIUM CHLORIDE, CALCIUM CHLORIDE 600; 310; 30; 20 MG/100ML; MG/100ML; MG/100ML; MG/100ML
INJECTION, SOLUTION INTRAVENOUS CONTINUOUS
Status: DISCONTINUED | OUTPATIENT
Start: 2018-11-12 | End: 2018-11-12 | Stop reason: HOSPADM

## 2018-11-12 RX ORDER — FENTANYL CITRATE 50 UG/ML
INJECTION, SOLUTION INTRAMUSCULAR; INTRAVENOUS PRN
Status: DISCONTINUED | OUTPATIENT
Start: 2018-11-12 | End: 2018-11-12 | Stop reason: HOSPADM

## 2018-11-12 RX ORDER — LIDOCAINE HYDROCHLORIDE 20 MG/ML
INJECTION, SOLUTION EPIDURAL; INFILTRATION; INTRACAUDAL; PERINEURAL PRN
Status: DISCONTINUED | OUTPATIENT
Start: 2018-11-12 | End: 2018-11-12 | Stop reason: HOSPADM

## 2018-11-12 RX ORDER — MIDAZOLAM HYDROCHLORIDE 1 MG/ML
INJECTION INTRAMUSCULAR; INTRAVENOUS
Status: DISCONTINUED
Start: 2018-11-12 | End: 2018-11-12 | Stop reason: HOSPADM

## 2018-11-12 RX ADMIN — SODIUM CHLORIDE, POTASSIUM CHLORIDE, SODIUM LACTATE AND CALCIUM CHLORIDE: 600; 310; 30; 20 INJECTION, SOLUTION INTRAVENOUS at 09:28

## 2018-11-12 RX ADMIN — LIDOCAINE HYDROCHLORIDE 0.1 ML: 10 INJECTION, SOLUTION EPIDURAL; INFILTRATION; INTRACAUDAL; PERINEURAL at 09:27

## 2018-11-12 ASSESSMENT — PAIN - FUNCTIONAL ASSESSMENT: PAIN_FUNCTIONAL_ASSESSMENT: 0-10

## 2018-11-12 ASSESSMENT — PAIN SCALES - GENERAL
PAINLEVEL_OUTOF10: 0
PAINLEVEL_OUTOF10: 0

## 2018-11-12 ASSESSMENT — PAIN DESCRIPTION - DESCRIPTORS: DESCRIPTORS: SHARP;CONSTANT

## 2018-11-12 NOTE — H&P
HISTORY AND PHYSICAL/PRE-SEDATION ASSESSMENT    Patient:  Rekha Grissom   :  1950  Medical Record No.:  [id number]   Date:  2018  Physician:  Tiny Manuel M.D. Facility: [facility]     Nursing History and Physical reviewed and agreed upon. Additional findings:    Allergies:  No known allergies    Home Medications:    Prior to Admission medications    Medication Sig Start Date End Date Taking? Authorizing Provider   verapamil (CALAN SR) 120 MG extended release tablet Take 120 mg by mouth nightly   Yes Historical Provider, MD   vitamin B-1 (THIAMINE) 100 MG tablet Take 100 mg by mouth daily   Yes Historical Provider, MD   Ascorbic Acid (VITAMIN C) 1000 MG tablet Take 1,000 mg by mouth daily   Yes Historical Provider, MD   glimepiride (AMARYL) 2 MG tablet Take 2 mg by mouth every morning (before breakfast)   Yes Historical Provider, MD   Mirabegron ER (MYRBETRIQ) 50 MG TB24 Take 1 tablet by mouth daily   Yes Historical Provider, MD   Zn-Pyg Afri-Nettle-Saw Palmet (SAW PALMETTO COMPLEX PO) Take 2 tablets by mouth 2 times daily    Yes Historical Provider, MD   lithium 300 MG tablet Take 900 mg by mouth nightly    Yes Historical Provider, MD   metFORMIN (GLUCOPHAGE) 500 MG tablet Take 1,000 mg by mouth 2 times daily (with meals)    Yes Historical Provider, MD   traZODone (DESYREL) 100 MG tablet Take 300 mg by mouth nightly    Yes Historical Provider, MD   doxazosin (CARDURA) 2 MG tablet Take 2 mg by mouth Takes 3 times weekly   Yes Historical Provider, MD   Omega-3 Fatty Acids (FISH OIL PO) Take by mouth    Historical Provider, MD   vitamin E 400 UNIT capsule Take 400 Units by mouth daily    Historical Provider, MD   aspirin 81 MG tablet Take 81 mg by mouth daily. Historical Provider, MD       Vitals: Stable     PHYSICAL EXAM:  HENT: Airway patent and reviewed  Cardiovascular: Normal rate, regular rhythm, normal heart sounds. Pulmonary/Chest: No wheezes.  No

## 2018-11-12 NOTE — POST SEDATION
Sedation Post Procedure Note    Patient Name: Alec Abdi   YOB: 1950  Room/Bed: EG OR/NONE  Medical Record Number: [id number]  Date: 2018   Time: 10:01 AM         POST SEDATION ASSESSMENT      Patient:  Refugio Huddleston   :  1950  Medical Record No.:  [id number]   Date:  2018  Physician:  Chelsie Leyva M.D.       Patient location: Recovery  Level of consciousness: Awake, Alert, Oriented  Pain Control: Good  Respiration: Adequate  Post-op assessment: No sedation complications    Last Vitals:   Vitals:    18 0953   BP: 139/74   Pulse: 62   Resp: 14   Temp:    SpO2: 99%     Post-op Vitals: Stable    F Eli De Anda  10:01 AM

## 2018-12-03 ENCOUNTER — OFFICE VISIT (OUTPATIENT)
Dept: ORTHOPEDIC SURGERY | Age: 68
End: 2018-12-03
Payer: MEDICARE

## 2018-12-03 VITALS
HEIGHT: 73 IN | BODY MASS INDEX: 34.45 KG/M2 | HEART RATE: 73 BPM | SYSTOLIC BLOOD PRESSURE: 131 MMHG | DIASTOLIC BLOOD PRESSURE: 74 MMHG | WEIGHT: 259.92 LBS

## 2018-12-03 DIAGNOSIS — G95.9 MYELOPATHY (HCC): ICD-10-CM

## 2018-12-03 DIAGNOSIS — M51.36 DDD (DEGENERATIVE DISC DISEASE), LUMBAR: ICD-10-CM

## 2018-12-03 DIAGNOSIS — Z98.890 S/P LUMBAR LAMINECTOMY: ICD-10-CM

## 2018-12-03 DIAGNOSIS — R29.898 WEAKNESS OF BOTH LOWER EXTREMITIES: ICD-10-CM

## 2018-12-03 DIAGNOSIS — M48.062 SPINAL STENOSIS OF LUMBAR REGION WITH NEUROGENIC CLAUDICATION: Primary | ICD-10-CM

## 2018-12-03 PROCEDURE — G8417 CALC BMI ABV UP PARAM F/U: HCPCS | Performed by: PHYSICAL MEDICINE & REHABILITATION

## 2018-12-03 PROCEDURE — 1123F ACP DISCUSS/DSCN MKR DOCD: CPT | Performed by: PHYSICAL MEDICINE & REHABILITATION

## 2018-12-03 PROCEDURE — 1101F PT FALLS ASSESS-DOCD LE1/YR: CPT | Performed by: PHYSICAL MEDICINE & REHABILITATION

## 2018-12-03 PROCEDURE — 96372 THER/PROPH/DIAG INJ SC/IM: CPT | Performed by: PHYSICAL MEDICINE & REHABILITATION

## 2018-12-03 PROCEDURE — 4040F PNEUMOC VAC/ADMIN/RCVD: CPT | Performed by: PHYSICAL MEDICINE & REHABILITATION

## 2018-12-03 PROCEDURE — 3017F COLORECTAL CA SCREEN DOC REV: CPT | Performed by: PHYSICAL MEDICINE & REHABILITATION

## 2018-12-03 PROCEDURE — G8484 FLU IMMUNIZE NO ADMIN: HCPCS | Performed by: PHYSICAL MEDICINE & REHABILITATION

## 2018-12-03 PROCEDURE — 1036F TOBACCO NON-USER: CPT | Performed by: PHYSICAL MEDICINE & REHABILITATION

## 2018-12-03 PROCEDURE — G8427 DOCREV CUR MEDS BY ELIG CLIN: HCPCS | Performed by: PHYSICAL MEDICINE & REHABILITATION

## 2018-12-03 PROCEDURE — 99213 OFFICE O/P EST LOW 20 MIN: CPT | Performed by: PHYSICAL MEDICINE & REHABILITATION

## 2018-12-03 RX ORDER — METHYLPREDNISOLONE 4 MG/1
TABLET ORAL
Qty: 1 KIT | Refills: 0 | Status: SHIPPED | OUTPATIENT
Start: 2018-12-03 | End: 2018-12-09

## 2018-12-03 RX ORDER — KETOROLAC TROMETHAMINE 10 MG/1
10 TABLET, FILM COATED ORAL EVERY 6 HOURS PRN
Qty: 20 TABLET | Refills: 0 | Status: SHIPPED | OUTPATIENT
Start: 2018-12-03 | End: 2020-06-01

## 2018-12-03 NOTE — PROGRESS NOTES
Toradol 60 mg IM (NDC# 84495-938-88) given into the Left gluteus pam, patient tolerated procedure well.    Lot [id number]  Exp 6/20

## 2018-12-10 PROBLEM — M47.817 LUMBOSACRAL SPONDYLOSIS WITHOUT MYELOPATHY: Status: ACTIVE | Noted: 2018-12-10

## 2018-12-10 PROBLEM — M51.37 DEGENERATION OF LUMBAR OR LUMBOSACRAL INTERVERTEBRAL DISC: Status: ACTIVE | Noted: 2018-12-10

## 2018-12-10 PROBLEM — M51.37 DEGENERATION OF LUMBAR OR LUMBOSACRAL INTERVERTEBRAL DISC: Chronic | Status: ACTIVE | Noted: 2018-12-10

## 2018-12-10 PROBLEM — M51.26 DISPLACEMENT OF LUMBAR INTERVERTEBRAL DISC WITHOUT MYELOPATHY: Chronic | Status: ACTIVE | Noted: 2018-12-10

## 2018-12-10 PROBLEM — M47.817 LUMBOSACRAL SPONDYLOSIS WITHOUT MYELOPATHY: Chronic | Status: ACTIVE | Noted: 2018-12-10

## 2018-12-10 PROBLEM — M51.26 DISPLACEMENT OF LUMBAR INTERVERTEBRAL DISC WITHOUT MYELOPATHY: Status: ACTIVE | Noted: 2018-12-10

## 2020-03-11 ENCOUNTER — TELEPHONE (OUTPATIENT)
Dept: ORTHOPEDIC SURGERY | Age: 70
End: 2020-03-11

## 2020-06-01 NOTE — PROGRESS NOTES
Obstructive Sleep Apnea (WALKER) Screening     Patient:  Braden Parish    YOB: 1950      Medical Record #:  [id number]                     Date:  6/1/2020     1. Are you a loud and/or regular snorer? []  Yes       [x] No    2. Have you been observed to gasp or stop breathing during sleep? []  Yes       [x] No    3. Do you feel tired or groggy upon awakening or do you awaken with a headache?           []  Yes       [x] No    4. Are you often tired or fatigued during the wake time hours? []  Yes       [x] No    5. Do you fall asleep sitting, reading, watching TV or driving? []  Yes       [x] No    6. Do you often have problems with memory or concentration? []  Yes       [x] No    If patient's WALKER score if greater than or equal to 3, they are considered high risk for WALKER. An anesthesia provider will evaluate the patient and develop a plan of care the day of surgery. Note:  If the patient's BMI is more than 35 kg m¯² , has neck circumference > 40 cm, and/or high blood pressure the risk is greater (© American Sleep Apnea Association, 2006).

## 2020-06-03 ENCOUNTER — OFFICE VISIT (OUTPATIENT)
Dept: PRIMARY CARE CLINIC | Age: 70
End: 2020-06-03

## 2020-06-05 LAB
SARS-COV-2: NOT DETECTED
SOURCE: NORMAL

## 2020-06-09 ENCOUNTER — ANESTHESIA EVENT (OUTPATIENT)
Dept: OPERATING ROOM | Age: 70
End: 2020-06-09
Payer: MEDICARE

## 2020-06-09 ENCOUNTER — ANESTHESIA (OUTPATIENT)
Dept: OPERATING ROOM | Age: 70
End: 2020-06-09
Payer: MEDICARE

## 2020-06-09 ENCOUNTER — HOSPITAL ENCOUNTER (OUTPATIENT)
Age: 70
Setting detail: OBSERVATION
Discharge: HOME | End: 2020-06-10
Attending: UROLOGY | Admitting: UROLOGY
Payer: MEDICARE

## 2020-06-09 VITALS
SYSTOLIC BLOOD PRESSURE: 87 MMHG | RESPIRATION RATE: 29 BRPM | OXYGEN SATURATION: 100 % | TEMPERATURE: 98.6 F | DIASTOLIC BLOOD PRESSURE: 59 MMHG

## 2020-06-09 PROBLEM — R33.9 URINARY RETENTION: Status: ACTIVE | Noted: 2020-06-09

## 2020-06-09 LAB
BACTERIA: ABNORMAL /HPF
BILIRUBIN URINE: NEGATIVE
BLOOD, URINE: ABNORMAL
CLARITY: ABNORMAL
COLOR: YELLOW
GLUCOSE BLD-MCNC: 226 MG/DL (ref 70–99)
GLUCOSE BLD-MCNC: 246 MG/DL (ref 70–99)
GLUCOSE BLD-MCNC: 370 MG/DL (ref 70–99)
GLUCOSE URINE: NEGATIVE MG/DL
HCT VFR BLD CALC: 40.9 % (ref 40.5–52.5)
HEMOGLOBIN: 13.8 G/DL (ref 13.5–17.5)
KETONES, URINE: NEGATIVE MG/DL
LEUKOCYTE ESTERASE, URINE: ABNORMAL
MCH RBC QN AUTO: 32.4 PG (ref 26–34)
MCHC RBC AUTO-ENTMCNC: 33.8 G/DL (ref 31–36)
MCV RBC AUTO: 95.7 FL (ref 80–100)
MICROSCOPIC EXAMINATION: YES
NITRITE, URINE: NEGATIVE
PDW BLD-RTO: 12.6 % (ref 12.4–15.4)
PERFORMED ON: ABNORMAL
PH UA: 6.5 (ref 5–8)
PLATELET # BLD: 299 K/UL (ref 135–450)
PMV BLD AUTO: 7.5 FL (ref 5–10.5)
PROTEIN UA: ABNORMAL MG/DL
RBC # BLD: 4.28 M/UL (ref 4.2–5.9)
RBC UA: ABNORMAL /HPF (ref 0–4)
SPECIFIC GRAVITY UA: 1.01 (ref 1–1.03)
URINE TYPE: ABNORMAL
UROBILINOGEN, URINE: 0.2 E.U./DL
WBC # BLD: 9.5 K/UL (ref 4–11)
WBC UA: >100 /HPF (ref 0–5)

## 2020-06-09 PROCEDURE — 3700000000 HC ANESTHESIA ATTENDED CARE: Performed by: UROLOGY

## 2020-06-09 PROCEDURE — 87186 SC STD MICRODIL/AGAR DIL: CPT

## 2020-06-09 PROCEDURE — 6360000002 HC RX W HCPCS: Performed by: ANESTHESIOLOGY

## 2020-06-09 PROCEDURE — 88342 IMHCHEM/IMCYTCHM 1ST ANTB: CPT

## 2020-06-09 PROCEDURE — 83036 HEMOGLOBIN GLYCOSYLATED A1C: CPT

## 2020-06-09 PROCEDURE — 2580000003 HC RX 258: Performed by: UROLOGY

## 2020-06-09 PROCEDURE — 87086 URINE CULTURE/COLONY COUNT: CPT

## 2020-06-09 PROCEDURE — G0378 HOSPITAL OBSERVATION PER HR: HCPCS

## 2020-06-09 PROCEDURE — 6360000002 HC RX W HCPCS: Performed by: NURSE ANESTHETIST, CERTIFIED REGISTERED

## 2020-06-09 PROCEDURE — 87077 CULTURE AEROBIC IDENTIFY: CPT

## 2020-06-09 PROCEDURE — 6370000000 HC RX 637 (ALT 250 FOR IP): Performed by: UROLOGY

## 2020-06-09 PROCEDURE — 6360000002 HC RX W HCPCS: Performed by: UROLOGY

## 2020-06-09 PROCEDURE — 85027 COMPLETE CBC AUTOMATED: CPT

## 2020-06-09 PROCEDURE — 3600000014 HC SURGERY LEVEL 4 ADDTL 15MIN: Performed by: UROLOGY

## 2020-06-09 PROCEDURE — 3700000001 HC ADD 15 MINUTES (ANESTHESIA): Performed by: UROLOGY

## 2020-06-09 PROCEDURE — 2720000010 HC SURG SUPPLY STERILE: Performed by: UROLOGY

## 2020-06-09 PROCEDURE — 88305 TISSUE EXAM BY PATHOLOGIST: CPT

## 2020-06-09 PROCEDURE — 81001 URINALYSIS AUTO W/SCOPE: CPT

## 2020-06-09 PROCEDURE — 2709999900 HC NON-CHARGEABLE SUPPLY: Performed by: UROLOGY

## 2020-06-09 PROCEDURE — 2500000003 HC RX 250 WO HCPCS: Performed by: NURSE ANESTHETIST, CERTIFIED REGISTERED

## 2020-06-09 PROCEDURE — 3600000004 HC SURGERY LEVEL 4 BASE: Performed by: UROLOGY

## 2020-06-09 PROCEDURE — 7100000000 HC PACU RECOVERY - FIRST 15 MIN: Performed by: UROLOGY

## 2020-06-09 PROCEDURE — 7100000001 HC PACU RECOVERY - ADDTL 15 MIN: Performed by: UROLOGY

## 2020-06-09 PROCEDURE — 2580000003 HC RX 258: Performed by: ANESTHESIOLOGY

## 2020-06-09 RX ORDER — SODIUM CHLORIDE 9 MG/ML
INJECTION, SOLUTION INTRAVENOUS CONTINUOUS
Status: DISCONTINUED | OUTPATIENT
Start: 2020-06-09 | End: 2020-06-10 | Stop reason: HOSPADM

## 2020-06-09 RX ORDER — NICOTINE POLACRILEX 4 MG
15 LOZENGE BUCCAL PRN
Status: DISCONTINUED | OUTPATIENT
Start: 2020-06-09 | End: 2020-06-10 | Stop reason: HOSPADM

## 2020-06-09 RX ORDER — LIDOCAINE HYDROCHLORIDE 10 MG/ML
0.3 INJECTION, SOLUTION EPIDURAL; INFILTRATION; INTRACAUDAL; PERINEURAL
Status: DISCONTINUED | OUTPATIENT
Start: 2020-06-09 | End: 2020-06-09 | Stop reason: HOSPADM

## 2020-06-09 RX ORDER — MORPHINE SULFATE 4 MG/ML
4 INJECTION, SOLUTION INTRAMUSCULAR; INTRAVENOUS
Status: DISCONTINUED | OUTPATIENT
Start: 2020-06-09 | End: 2020-06-10 | Stop reason: HOSPADM

## 2020-06-09 RX ORDER — DEXTROSE MONOHYDRATE 50 MG/ML
100 INJECTION, SOLUTION INTRAVENOUS PRN
Status: DISCONTINUED | OUTPATIENT
Start: 2020-06-09 | End: 2020-06-10 | Stop reason: HOSPADM

## 2020-06-09 RX ORDER — MORPHINE SULFATE 2 MG/ML
2 INJECTION, SOLUTION INTRAMUSCULAR; INTRAVENOUS EVERY 5 MIN PRN
Status: DISCONTINUED | OUTPATIENT
Start: 2020-06-09 | End: 2020-06-09 | Stop reason: HOSPADM

## 2020-06-09 RX ORDER — HYDRALAZINE HYDROCHLORIDE 20 MG/ML
5 INJECTION INTRAMUSCULAR; INTRAVENOUS EVERY 10 MIN PRN
Status: DISCONTINUED | OUTPATIENT
Start: 2020-06-09 | End: 2020-06-09 | Stop reason: HOSPADM

## 2020-06-09 RX ORDER — VERAPAMIL HYDROCHLORIDE 240 MG/1
120 TABLET, FILM COATED, EXTENDED RELEASE ORAL NIGHTLY
Status: DISCONTINUED | OUTPATIENT
Start: 2020-06-09 | End: 2020-06-10 | Stop reason: HOSPADM

## 2020-06-09 RX ORDER — LITHIUM CARBONATE 150 MG/1
900 CAPSULE ORAL NIGHTLY
Status: DISCONTINUED | OUTPATIENT
Start: 2020-06-09 | End: 2020-06-10 | Stop reason: HOSPADM

## 2020-06-09 RX ORDER — FENTANYL CITRATE 50 UG/ML
INJECTION, SOLUTION INTRAMUSCULAR; INTRAVENOUS PRN
Status: DISCONTINUED | OUTPATIENT
Start: 2020-06-09 | End: 2020-06-09 | Stop reason: SDUPTHER

## 2020-06-09 RX ORDER — DEXAMETHASONE SODIUM PHOSPHATE 10 MG/ML
INJECTION INTRAMUSCULAR; INTRAVENOUS PRN
Status: DISCONTINUED | OUTPATIENT
Start: 2020-06-09 | End: 2020-06-09 | Stop reason: SDUPTHER

## 2020-06-09 RX ORDER — ONDANSETRON 2 MG/ML
4 INJECTION INTRAMUSCULAR; INTRAVENOUS
Status: COMPLETED | OUTPATIENT
Start: 2020-06-09 | End: 2020-06-09

## 2020-06-09 RX ORDER — MAGNESIUM HYDROXIDE 1200 MG/15ML
LIQUID ORAL CONTINUOUS PRN
Status: COMPLETED | OUTPATIENT
Start: 2020-06-09 | End: 2020-06-09

## 2020-06-09 RX ORDER — DEXTROSE MONOHYDRATE 25 G/50ML
12.5 INJECTION, SOLUTION INTRAVENOUS PRN
Status: DISCONTINUED | OUTPATIENT
Start: 2020-06-09 | End: 2020-06-10 | Stop reason: HOSPADM

## 2020-06-09 RX ORDER — OXYCODONE HYDROCHLORIDE AND ACETAMINOPHEN 5; 325 MG/1; MG/1
1 TABLET ORAL PRN
Status: DISCONTINUED | OUTPATIENT
Start: 2020-06-09 | End: 2020-06-09 | Stop reason: HOSPADM

## 2020-06-09 RX ORDER — POLYETHYLENE GLYCOL 3350 17 G/17G
17 POWDER, FOR SOLUTION ORAL DAILY PRN
Status: DISCONTINUED | OUTPATIENT
Start: 2020-06-09 | End: 2020-06-10 | Stop reason: HOSPADM

## 2020-06-09 RX ORDER — PROPOFOL 10 MG/ML
INJECTION, EMULSION INTRAVENOUS PRN
Status: DISCONTINUED | OUTPATIENT
Start: 2020-06-09 | End: 2020-06-09 | Stop reason: SDUPTHER

## 2020-06-09 RX ORDER — OXYCODONE HYDROCHLORIDE AND ACETAMINOPHEN 5; 325 MG/1; MG/1
2 TABLET ORAL PRN
Status: DISCONTINUED | OUTPATIENT
Start: 2020-06-09 | End: 2020-06-09 | Stop reason: HOSPADM

## 2020-06-09 RX ORDER — SODIUM CHLORIDE 0.9 % (FLUSH) 0.9 %
10 SYRINGE (ML) INJECTION EVERY 12 HOURS SCHEDULED
Status: DISCONTINUED | OUTPATIENT
Start: 2020-06-09 | End: 2020-06-10 | Stop reason: HOSPADM

## 2020-06-09 RX ORDER — SODIUM CHLORIDE 0.9 % (FLUSH) 0.9 %
10 SYRINGE (ML) INJECTION PRN
Status: DISCONTINUED | OUTPATIENT
Start: 2020-06-09 | End: 2020-06-10 | Stop reason: HOSPADM

## 2020-06-09 RX ORDER — PROMETHAZINE HYDROCHLORIDE 25 MG/ML
6.25 INJECTION, SOLUTION INTRAMUSCULAR; INTRAVENOUS
Status: DISCONTINUED | OUTPATIENT
Start: 2020-06-09 | End: 2020-06-09 | Stop reason: HOSPADM

## 2020-06-09 RX ORDER — TRAZODONE HYDROCHLORIDE 50 MG/1
300 TABLET ORAL NIGHTLY
Status: DISCONTINUED | OUTPATIENT
Start: 2020-06-09 | End: 2020-06-10 | Stop reason: HOSPADM

## 2020-06-09 RX ORDER — DOXAZOSIN 2 MG/1
2 TABLET ORAL DAILY
Status: DISCONTINUED | OUTPATIENT
Start: 2020-06-09 | End: 2020-06-10 | Stop reason: HOSPADM

## 2020-06-09 RX ORDER — ASPIRIN 81 MG/1
81 TABLET ORAL DAILY
Status: DISCONTINUED | OUTPATIENT
Start: 2020-06-10 | End: 2020-06-10 | Stop reason: HOSPADM

## 2020-06-09 RX ORDER — MIDAZOLAM HYDROCHLORIDE 1 MG/ML
INJECTION INTRAMUSCULAR; INTRAVENOUS PRN
Status: DISCONTINUED | OUTPATIENT
Start: 2020-06-09 | End: 2020-06-09 | Stop reason: SDUPTHER

## 2020-06-09 RX ORDER — ROCURONIUM BROMIDE 10 MG/ML
INJECTION, SOLUTION INTRAVENOUS PRN
Status: DISCONTINUED | OUTPATIENT
Start: 2020-06-09 | End: 2020-06-09 | Stop reason: SDUPTHER

## 2020-06-09 RX ORDER — MORPHINE SULFATE 2 MG/ML
2 INJECTION, SOLUTION INTRAMUSCULAR; INTRAVENOUS
Status: DISCONTINUED | OUTPATIENT
Start: 2020-06-09 | End: 2020-06-10 | Stop reason: HOSPADM

## 2020-06-09 RX ORDER — DIPHENHYDRAMINE HYDROCHLORIDE 50 MG/ML
12.5 INJECTION INTRAMUSCULAR; INTRAVENOUS
Status: DISCONTINUED | OUTPATIENT
Start: 2020-06-09 | End: 2020-06-09 | Stop reason: HOSPADM

## 2020-06-09 RX ORDER — LABETALOL HYDROCHLORIDE 5 MG/ML
5 INJECTION, SOLUTION INTRAVENOUS EVERY 10 MIN PRN
Status: DISCONTINUED | OUTPATIENT
Start: 2020-06-09 | End: 2020-06-09 | Stop reason: HOSPADM

## 2020-06-09 RX ORDER — SODIUM CHLORIDE, SODIUM LACTATE, POTASSIUM CHLORIDE, CALCIUM CHLORIDE 600; 310; 30; 20 MG/100ML; MG/100ML; MG/100ML; MG/100ML
INJECTION, SOLUTION INTRAVENOUS CONTINUOUS
Status: DISCONTINUED | OUTPATIENT
Start: 2020-06-09 | End: 2020-06-09

## 2020-06-09 RX ORDER — OXYCODONE HYDROCHLORIDE 5 MG/1
5 TABLET ORAL EVERY 4 HOURS PRN
Status: DISCONTINUED | OUTPATIENT
Start: 2020-06-09 | End: 2020-06-10 | Stop reason: HOSPADM

## 2020-06-09 RX ORDER — LIDOCAINE HYDROCHLORIDE 20 MG/ML
INJECTION, SOLUTION INFILTRATION; PERINEURAL PRN
Status: DISCONTINUED | OUTPATIENT
Start: 2020-06-09 | End: 2020-06-09 | Stop reason: SDUPTHER

## 2020-06-09 RX ORDER — NICOTINE POLACRILEX 4 MG
15 LOZENGE BUCCAL PRN
Status: DISCONTINUED | OUTPATIENT
Start: 2020-06-09 | End: 2020-06-10 | Stop reason: SDUPTHER

## 2020-06-09 RX ORDER — ACETAMINOPHEN 325 MG/1
650 TABLET ORAL EVERY 4 HOURS PRN
Status: DISCONTINUED | OUTPATIENT
Start: 2020-06-09 | End: 2020-06-10 | Stop reason: HOSPADM

## 2020-06-09 RX ORDER — MORPHINE SULFATE 2 MG/ML
1 INJECTION, SOLUTION INTRAMUSCULAR; INTRAVENOUS EVERY 5 MIN PRN
Status: DISCONTINUED | OUTPATIENT
Start: 2020-06-09 | End: 2020-06-09 | Stop reason: HOSPADM

## 2020-06-09 RX ORDER — ATROPA BELLADONNA AND OPIUM 16.2; 6 MG/1; MG/1
SUPPOSITORY RECTAL
Status: DISPENSED
Start: 2020-06-09 | End: 2020-06-10

## 2020-06-09 RX ORDER — SODIUM CHLORIDE 0.9 % (FLUSH) 0.9 %
10 SYRINGE (ML) INJECTION PRN
Status: DISCONTINUED | OUTPATIENT
Start: 2020-06-09 | End: 2020-06-09 | Stop reason: HOSPADM

## 2020-06-09 RX ORDER — ATROPA BELLADONNA AND OPIUM 16.2; 3 MG/1; MG/1
30 SUPPOSITORY RECTAL EVERY 8 HOURS PRN
Status: DISCONTINUED | OUTPATIENT
Start: 2020-06-09 | End: 2020-06-10 | Stop reason: HOSPADM

## 2020-06-09 RX ORDER — DEXTROSE MONOHYDRATE 25 G/50ML
12.5 INJECTION, SOLUTION INTRAVENOUS PRN
Status: DISCONTINUED | OUTPATIENT
Start: 2020-06-09 | End: 2020-06-10 | Stop reason: SDUPTHER

## 2020-06-09 RX ORDER — ATROPA BELLADONNA AND OPIUM 16.2; 6 MG/1; MG/1
SUPPOSITORY RECTAL PRN
Status: DISCONTINUED | OUTPATIENT
Start: 2020-06-09 | End: 2020-06-09 | Stop reason: HOSPADM

## 2020-06-09 RX ORDER — GLIMEPIRIDE 2 MG/1
2 TABLET ORAL
Status: DISCONTINUED | OUTPATIENT
Start: 2020-06-10 | End: 2020-06-10 | Stop reason: HOSPADM

## 2020-06-09 RX ORDER — SODIUM CHLORIDE 0.9 % (FLUSH) 0.9 %
10 SYRINGE (ML) INJECTION EVERY 12 HOURS SCHEDULED
Status: DISCONTINUED | OUTPATIENT
Start: 2020-06-09 | End: 2020-06-09 | Stop reason: HOSPADM

## 2020-06-09 RX ORDER — DEXTROSE MONOHYDRATE 50 MG/ML
100 INJECTION, SOLUTION INTRAVENOUS PRN
Status: DISCONTINUED | OUTPATIENT
Start: 2020-06-09 | End: 2020-06-10 | Stop reason: SDUPTHER

## 2020-06-09 RX ADMIN — METFORMIN HYDROCHLORIDE 1000 MG: 500 TABLET ORAL at 18:27

## 2020-06-09 RX ADMIN — ROCURONIUM BROMIDE 20 MG: 10 SOLUTION INTRAVENOUS at 12:51

## 2020-06-09 RX ADMIN — INSULIN LISPRO 5 UNITS: 100 INJECTION, SOLUTION INTRAVENOUS; SUBCUTANEOUS at 21:00

## 2020-06-09 RX ADMIN — TRAZODONE HYDROCHLORIDE 300 MG: 50 TABLET ORAL at 20:55

## 2020-06-09 RX ADMIN — DEXAMETHASONE SODIUM PHOSPHATE 10 MG: 10 INJECTION INTRAMUSCULAR; INTRAVENOUS at 13:12

## 2020-06-09 RX ADMIN — VERAPAMIL HYDROCHLORIDE 120 MG: 240 TABLET, FILM COATED, EXTENDED RELEASE ORAL at 20:55

## 2020-06-09 RX ADMIN — SODIUM CHLORIDE, POTASSIUM CHLORIDE, SODIUM LACTATE AND CALCIUM CHLORIDE: 600; 310; 30; 20 INJECTION, SOLUTION INTRAVENOUS at 11:39

## 2020-06-09 RX ADMIN — DOXAZOSIN 2 MG: 2 TABLET ORAL at 18:27

## 2020-06-09 RX ADMIN — FENTANYL CITRATE 50 MCG: 50 INJECTION, SOLUTION INTRAMUSCULAR; INTRAVENOUS at 12:51

## 2020-06-09 RX ADMIN — SODIUM CHLORIDE, POTASSIUM CHLORIDE, SODIUM LACTATE AND CALCIUM CHLORIDE: 600; 310; 30; 20 INJECTION, SOLUTION INTRAVENOUS at 13:28

## 2020-06-09 RX ADMIN — FENTANYL CITRATE 100 MCG: 50 INJECTION, SOLUTION INTRAMUSCULAR; INTRAVENOUS at 12:49

## 2020-06-09 RX ADMIN — MIDAZOLAM HYDROCHLORIDE 2 MG: 2 INJECTION, SOLUTION INTRAMUSCULAR; INTRAVENOUS at 12:49

## 2020-06-09 RX ADMIN — FENTANYL CITRATE 100 MCG: 50 INJECTION, SOLUTION INTRAMUSCULAR; INTRAVENOUS at 12:47

## 2020-06-09 RX ADMIN — CEFAZOLIN SODIUM 2 G: 10 INJECTION, POWDER, FOR SOLUTION INTRAVENOUS at 20:55

## 2020-06-09 RX ADMIN — CEFAZOLIN SODIUM 2 G: 10 INJECTION, POWDER, FOR SOLUTION INTRAVENOUS at 12:47

## 2020-06-09 RX ADMIN — SODIUM CHLORIDE: 9 INJECTION, SOLUTION INTRAVENOUS at 17:20

## 2020-06-09 RX ADMIN — PROPOFOL 200 MG: 10 INJECTION, EMULSION INTRAVENOUS at 12:51

## 2020-06-09 RX ADMIN — ONDANSETRON 4 MG: 2 INJECTION INTRAMUSCULAR; INTRAVENOUS at 13:12

## 2020-06-09 RX ADMIN — HYDROMORPHONE HYDROCHLORIDE 0.5 MG: 1 INJECTION, SOLUTION INTRAMUSCULAR; INTRAVENOUS; SUBCUTANEOUS at 14:25

## 2020-06-09 RX ADMIN — LITHIUM CARBONATE 900 MG: 150 CAPSULE, GELATIN COATED ORAL at 20:55

## 2020-06-09 RX ADMIN — LIDOCAINE HYDROCHLORIDE 3 ML: 20 INJECTION, SOLUTION INFILTRATION; PERINEURAL at 12:51

## 2020-06-09 RX ADMIN — SODIUM CHLORIDE: 9 INJECTION, SOLUTION INTRAVENOUS at 20:55

## 2020-06-09 RX ADMIN — MIDAZOLAM HYDROCHLORIDE 2 MG: 2 INJECTION, SOLUTION INTRAMUSCULAR; INTRAVENOUS at 12:44

## 2020-06-09 ASSESSMENT — PULMONARY FUNCTION TESTS
PIF_VALUE: 14
PIF_VALUE: 14
PIF_VALUE: 4
PIF_VALUE: 14
PIF_VALUE: 14
PIF_VALUE: 1
PIF_VALUE: 15
PIF_VALUE: 14
PIF_VALUE: 15
PIF_VALUE: 1
PIF_VALUE: 14
PIF_VALUE: 15
PIF_VALUE: 14
PIF_VALUE: 14
PIF_VALUE: 1
PIF_VALUE: 13
PIF_VALUE: 13
PIF_VALUE: 14
PIF_VALUE: 8
PIF_VALUE: 14
PIF_VALUE: 12
PIF_VALUE: 15
PIF_VALUE: 0
PIF_VALUE: 0
PIF_VALUE: 14
PIF_VALUE: 2
PIF_VALUE: 15
PIF_VALUE: 12
PIF_VALUE: 0
PIF_VALUE: 14
PIF_VALUE: 14
PIF_VALUE: 13
PIF_VALUE: 14
PIF_VALUE: 15
PIF_VALUE: 0
PIF_VALUE: 0
PIF_VALUE: 13
PIF_VALUE: 14
PIF_VALUE: 13
PIF_VALUE: 14
PIF_VALUE: 15
PIF_VALUE: 22
PIF_VALUE: 14
PIF_VALUE: 1
PIF_VALUE: 7
PIF_VALUE: 21

## 2020-06-09 ASSESSMENT — PAIN DESCRIPTION - DESCRIPTORS: DESCRIPTORS: CONSTANT;DISCOMFORT

## 2020-06-09 ASSESSMENT — PAIN SCALES - GENERAL
PAINLEVEL_OUTOF10: 7
PAINLEVEL_OUTOF10: 4

## 2020-06-09 ASSESSMENT — PAIN - FUNCTIONAL ASSESSMENT: PAIN_FUNCTIONAL_ASSESSMENT: 0-10

## 2020-06-09 NOTE — ANESTHESIA PRE PROCEDURE
Department of Anesthesiology  Preprocedure Note       Name:  Marine Lopez   Age:  71 y.o.  :  1950                                          MRN:  [id number]         Date:  2020      Surgeon: Ursula Petersen):  Dickson Mitchell MD    Procedure: Procedure(s):  CYSTOSCOPY, TRANSURETHRAL RESECTION OF PROSTATE    Medications prior to admission:   Prior to Admission medications    Medication Sig Start Date End Date Taking? Authorizing Provider   lithium 300 MG tablet Take 900 mg by mouth nightly    Yes Historical Provider, MD   traZODone (DESYREL) 100 MG tablet Take 300 mg by mouth nightly    Yes Historical Provider, MD   Omega-3 Fatty Acids (FISH OIL PO) Take by mouth    Historical Provider, MD   verapamil (CALAN SR) 120 MG extended release tablet Take 120 mg by mouth nightly    Historical Provider, MD   vitamin E 400 UNIT capsule Take 400 Units by mouth daily    Historical Provider, MD   glimepiride (AMARYL) 2 MG tablet Take 2 mg by mouth every morning (before breakfast)    Historical Provider, MD   aspirin 81 MG tablet Take 81 mg by mouth daily.     Historical Provider, MD   metFORMIN (GLUCOPHAGE) 500 MG tablet Take 1,000 mg by mouth 2 times daily (with meals)     Historical Provider, MD   doxazosin (CARDURA) 2 MG tablet Take 2 mg by mouth Takes 3 times weekly; takes at night    Historical Provider, MD       Current medications:    Current Facility-Administered Medications   Medication Dose Route Frequency Provider Last Rate Last Dose    ceFAZolin (ANCEF) 2 g in dextrose 5 % 100 mL IVPB  2 g Intravenous On Call to 500 Magda Fleming MD        lactated ringers infusion   Intravenous Continuous Derrick Lea MD        sodium chloride flush 0.9 % injection 10 mL  10 mL Intravenous 2 times per day Derrick Lea MD        sodium chloride flush 0.9 % injection 10 mL  10 mL Intravenous PRN Derrick Lea MD        lidocaine PF 1 % injection 0.3 mL  0.3 mL Intradermal Once PRN Derrick Lea MD ABGs: No results found for: PHART, PO2ART, HVW6JFH, XFA6VUK, BEART, Y6KEBEKF     Type & Screen (If Applicable):  Lab Results   Component Value Date    LABABO O 10/13/2010    LABRH Positive 10/13/2010       Drug/Infectious Status (If Applicable):  No results found for: HIV, HEPCAB    COVID-19 Screening (If Applicable):   Lab Results   Component Value Date    COVID19 Not Detected 06/03/2020         Anesthesia Evaluation   no history of anesthetic complications:   Airway: Mallampati: III  TM distance: <3 FB   Neck ROM: limited  Mouth opening: < 3 FB and > = 3 FB Dental: normal exam         Pulmonary:   (+) asthma:                            Cardiovascular:    (+) hypertension:,                   Neuro/Psych:   (+) neuromuscular disease:, psychiatric history:depression/anxiety             GI/Hepatic/Renal:   (+) liver disease:,           Endo/Other:    (+) Diabetes, : arthritis: OA., .                 Abdominal:           Vascular:                                        Anesthesia Plan      general     ASA 3     (Pt agrees to risks, benefits and alternatives of GETA. Questions answered. Willing to proceed with plan. Expect difficult airway)  Induction: intravenous. Anesthetic plan and risks discussed with patient and spouse.                       Terrell Osullivan MD   6/9/2020

## 2020-06-09 NOTE — OP NOTE
[address] [facility]                 EllisDaryl kelly                                OPERATIVE REPORT    PATIENT NAME: Michel Villegas                    :        1950  MED REC NO:   [id number]                          ROOM:  ACCOUNT NO:   [de-identified]                           ADMIT DATE: 2020  PROVIDER:     Francy Schaefer. Landen Ambrocio MD    DATE OF PROCEDURE:  2020    LOCATION:  [facility] 12:  1. Urinary retention. 2.  BPH. POSTOPERATIVE DIAGNOSES:  1. Urinary retention. 2.  BPH. OPERATION PERFORMED:  Cystoscopy and transurethral resection of the  prostate. SURGEON:  Francy Schaefer. Landen Ambrocio MD    INDICATION FOR THE PROCEDURE:  The patient is a 70-year-old male who had  overactive bladder and had Botox. He went into urinary retention. He  does have an enlarged prostate, bilobar hypertrophy. The risks and  benefits of cystoscopy and transurethral resection of the prostate were  discussed with the patient and he agreed to proceed. DESCRIPTION OF PROCEDURE:  The patient had preoperative antibiotics,  general anesthesia. A 21-Central African rigid cystoscope was inserted into the  patient's urethra. The patient has bilateral hypertrophy. I then  placed a 24-Central African continuous flow catheter and the bipolar loop  resectoscope. I then resected both lobes. I did not go distal of the  verumontanum. I started on the right lobe going clockwise from the 6  o'clock up to 12 o'clock position. Made sure there was good hemostasis  and then resected left lobe starting at 6 o'clock position going  counterclockwise. I resected it for an hour. Made sure there was good  hemostasis. Used an My 1% evacuator to get all the chips out. These  were sent for pathologic analysis. Placed a 24-Central African three-way  catheter and given B and O suppository. Started on continuous bladder  irrigation and he went to the recovery room.   He will

## 2020-06-10 ENCOUNTER — HOSPITAL ENCOUNTER (OUTPATIENT)
Age: 70
Discharge: HOME | End: 2020-06-10
Attending: UROLOGY
Payer: MEDICARE

## 2020-06-10 VITALS
TEMPERATURE: 97.8 F | WEIGHT: 240 LBS | RESPIRATION RATE: 16 BRPM | HEIGHT: 73 IN | HEART RATE: 74 BPM | DIASTOLIC BLOOD PRESSURE: 58 MMHG | OXYGEN SATURATION: 97 % | BODY MASS INDEX: 31.81 KG/M2 | SYSTOLIC BLOOD PRESSURE: 126 MMHG

## 2020-06-10 LAB
ANION GAP SERPL CALCULATED.3IONS-SCNC: 10 MMOL/L (ref 3–16)
BUN BLDV-MCNC: 17 MG/DL (ref 7–20)
CALCIUM SERPL-MCNC: 9.2 MG/DL (ref 8.3–10.6)
CHLORIDE BLD-SCNC: 104 MMOL/L (ref 99–110)
CO2: 22 MMOL/L (ref 21–32)
CREAT SERPL-MCNC: 0.9 MG/DL (ref 0.8–1.3)
ESTIMATED AVERAGE GLUCOSE: 151.3 MG/DL
GFR AFRICAN AMERICAN: >60
GFR NON-AFRICAN AMERICAN: >60
GLUCOSE BLD-MCNC: 269 MG/DL (ref 70–99)
GLUCOSE BLD-MCNC: 273 MG/DL (ref 70–99)
GLUCOSE BLD-MCNC: 311 MG/DL (ref 70–99)
HBA1C MFR BLD: 6.9 %
HCT VFR BLD CALC: 37.8 % (ref 40.5–52.5)
HEMOGLOBIN: 12.9 G/DL (ref 13.5–17.5)
MCH RBC QN AUTO: 32.7 PG (ref 26–34)
MCHC RBC AUTO-ENTMCNC: 34.1 G/DL (ref 31–36)
MCV RBC AUTO: 95.9 FL (ref 80–100)
PDW BLD-RTO: 12.4 % (ref 12.4–15.4)
PERFORMED ON: ABNORMAL
PERFORMED ON: ABNORMAL
PLATELET # BLD: 290 K/UL (ref 135–450)
PMV BLD AUTO: 8 FL (ref 5–10.5)
POTASSIUM REFLEX MAGNESIUM: 4.3 MMOL/L (ref 3.5–5.1)
RBC # BLD: 3.94 M/UL (ref 4.2–5.9)
SODIUM BLD-SCNC: 136 MMOL/L (ref 136–145)
WBC # BLD: 12.8 K/UL (ref 4–11)

## 2020-06-10 PROCEDURE — 80048 BASIC METABOLIC PNL TOTAL CA: CPT

## 2020-06-10 PROCEDURE — G0378 HOSPITAL OBSERVATION PER HR: HCPCS

## 2020-06-10 PROCEDURE — 36415 COLL VENOUS BLD VENIPUNCTURE: CPT

## 2020-06-10 PROCEDURE — 6370000000 HC RX 637 (ALT 250 FOR IP): Performed by: UROLOGY

## 2020-06-10 PROCEDURE — 6360000002 HC RX W HCPCS: Performed by: UROLOGY

## 2020-06-10 PROCEDURE — 85027 COMPLETE CBC AUTOMATED: CPT

## 2020-06-10 PROCEDURE — 96372 THER/PROPH/DIAG INJ SC/IM: CPT

## 2020-06-10 PROCEDURE — 2580000003 HC RX 258: Performed by: UROLOGY

## 2020-06-10 RX ORDER — CEPHALEXIN 500 MG/1
500 CAPSULE ORAL 2 TIMES DAILY
Qty: 6 CAPSULE | Refills: 0 | Status: SHIPPED | OUTPATIENT
Start: 2020-06-10 | End: 2020-06-13

## 2020-06-10 RX ORDER — TRAMADOL HYDROCHLORIDE 50 MG/1
50 TABLET ORAL EVERY 6 HOURS PRN
Qty: 12 TABLET | Refills: 0 | Status: SHIPPED | OUTPATIENT
Start: 2020-06-10 | End: 2020-06-13

## 2020-06-10 RX ADMIN — ENOXAPARIN SODIUM 40 MG: 40 INJECTION SUBCUTANEOUS at 09:08

## 2020-06-10 RX ADMIN — Medication 10 ML: at 09:09

## 2020-06-10 RX ADMIN — GLIMEPIRIDE 2 MG: 2 TABLET ORAL at 06:23

## 2020-06-10 RX ADMIN — METFORMIN HYDROCHLORIDE 1000 MG: 500 TABLET ORAL at 09:08

## 2020-06-10 RX ADMIN — ASPIRIN 81 MG: 81 TABLET ORAL at 11:32

## 2020-06-10 RX ADMIN — CEFAZOLIN SODIUM 2 G: 10 INJECTION, POWDER, FOR SOLUTION INTRAVENOUS at 04:38

## 2020-06-10 RX ADMIN — DOXAZOSIN 2 MG: 2 TABLET ORAL at 09:08

## 2020-06-10 RX ADMIN — INSULIN LISPRO 6 UNITS: 100 INJECTION, SOLUTION INTRAVENOUS; SUBCUTANEOUS at 14:34

## 2020-06-10 RX ADMIN — INSULIN LISPRO 6 UNITS: 100 INJECTION, SOLUTION INTRAVENOUS; SUBCUTANEOUS at 09:10

## 2020-06-10 NOTE — DISCHARGE INSTR - COC
Independent  [facility] Delivery   whole    Wound Care Documentation and Therapy:  Wound 06/09/20 Buttocks (Active)   Wound Pressure Stage  2 6/9/2020  8:07 PM   Dressing/Treatment Moisturizing cream 6/9/2020  8:07 PM   Wound Cleansed Rinsed/Irrigated with saline 6/9/2020  8:07 PM   Wound Length (cm) 1 cm 6/9/2020  8:07 PM   Wound Width (cm) 1 cm 6/9/2020  8:07 PM   Wound Surface Area (cm^2) 1 cm^2 6/9/2020  8:07 PM   Wound Assessment White 6/9/2020  8:07 PM   Drainage Amount Small 6/9/2020  8:07 PM   Drainage Description Serous 6/9/2020  8:07 PM   Odor None 6/9/2020  8:07 PM   Margins Defined edges; Attached edges 6/9/2020  8:07 PM   Brenda-wound Assessment Red 6/9/2020  8:07 PM   Number of days: 0       Wound 06/09/20 Buttocks Right (Active)   Wound Pressure Stage  2 6/9/2020  8:07 PM   Dressing/Treatment Moisture barrier 6/9/2020  8:07 PM   Wound Cleansed Rinsed/Irrigated with saline 6/9/2020  8:07 PM   Wound Length (cm) 0.3 cm 6/9/2020  8:07 PM   Wound Width (cm) 0.2 cm 6/9/2020  8:07 PM   Wound Surface Area (cm^2) 0.06 cm^2 6/9/2020  8:07 PM   Wound Assessment White 6/9/2020  8:07 PM   Drainage Amount Scant 6/9/2020  8:07 PM   Drainage Description Serous 6/9/2020  8:07 PM   Odor None 6/9/2020  8:07 PM   Margins Attached edges; Defined edges 6/9/2020  8:07 PM   Brenda-wound Assessment Red 6/9/2020  8:07 PM   Number of days: 0        Elimination:  Continence:   · Bowel: Yes  · Bladder: Yes  Urinary Catheter: Insertion Date: 6/10/2020   Colostomy/Ileostomy/Ileal Conduit: No       Date of Last BM:     Intake/Output Summary (Last 24 hours) at 6/10/2020 0957  Last data filed at 6/10/2020 3226  Gross per 24 hour   Intake 6247 ml   Output 7210 ml   Net -963 ml     I/O last 3 completed shifts: In: 8756 [P.O.:3680; I.V.:2167; IV Piggyback:200]  Out: 5323 [[facility]:4330; Blood:10]    Safety Concerns:      At Risk for Falls    Impairments/Disabilities:      None    Nutrition Therapy:  Current Nutrition Therapy:   - Oral Diet:  Carb Potential (if transferring to Rehab): {Prognosis:1385792943}    Recommended Labs or Other Treatments After Discharge: ***  Recommended Follow-up, Labs or Other Treatments After Discharge:    ***             Physician Certification: I certify the above information and transfer of Monda Qualia  is necessary for the continuing treatment of the diagnosis listed and that he requires {Admit to Appropriate Level of Care:87599} for {GREATER/LESS:818011921} 30 days.      Update Admission H&P: {CHP DME Changes in [facility]:61950}    PHYSICIAN SIGNATURE:  Geneva Paul

## 2020-06-10 NOTE — PROGRESS NOTES
Pt urinated <100 and bladder scanned 392mls. Pt urinated 60mls and bladder scanned 541mls. [place] SEGURA notified and new orders to place vasquez cath and pt to go home with f/c. Vasquez cath placed and tolerated well.

## 2020-06-10 NOTE — PLAN OF CARE
Problem: Nutrition  Goal: Optimal nutrition therapy  Outcome: Ongoing  Note: Nutrition Problem: Increased nutrient needs  Intervention: Food and/or Nutrient Delivery: Continue current diet  Nutritional Goals: Pt will have po intakes 50% or greater this admission

## 2020-06-10 NOTE — PROGRESS NOTES
Pt a/o. VSS. Assessment complete as charted. [place]apleton cath removed, pt has not urinated yet. Instructed pt to call after voiding for bladder scan. Repositioned for comfort. Call light in reach. Denies needs.

## 2020-06-11 LAB
ORGANISM: ABNORMAL
URINE CULTURE, ROUTINE: ABNORMAL

## 2020-07-11 ENCOUNTER — HOSPITAL ENCOUNTER (INPATIENT)
Age: 70
LOS: 6 days | Discharge: HOME HEALTH | DRG: 698 | End: 2020-07-17
Attending: INTERNAL MEDICINE | Admitting: INTERNAL MEDICINE
Payer: MEDICARE

## 2020-07-11 ENCOUNTER — APPOINTMENT (OUTPATIENT)
Dept: CT IMAGING | Age: 70
DRG: 698 | End: 2020-07-11
Payer: MEDICARE

## 2020-07-11 PROBLEM — Z90.79 S/P TURP: Chronic | Status: ACTIVE | Noted: 2020-07-11

## 2020-07-11 PROBLEM — N12 PYELONEPHRITIS: Status: ACTIVE | Noted: 2020-07-11

## 2020-07-11 PROBLEM — I10 ESSENTIAL HYPERTENSION: Chronic | Status: ACTIVE | Noted: 2020-07-11

## 2020-07-11 PROBLEM — E11.9 TYPE 2 DIABETES MELLITUS (HCC): Chronic | Status: ACTIVE | Noted: 2020-07-11

## 2020-07-11 PROBLEM — G93.41 METABOLIC ENCEPHALOPATHY: Status: ACTIVE | Noted: 2020-07-11

## 2020-07-11 PROBLEM — N40.1 PROSTATE HYPERPLASIA WITH URINARY OBSTRUCTION: Chronic | Status: ACTIVE | Noted: 2020-07-11

## 2020-07-11 PROBLEM — N13.8 PROSTATE HYPERPLASIA WITH URINARY OBSTRUCTION: Chronic | Status: ACTIVE | Noted: 2020-07-11

## 2020-07-11 LAB
A/G RATIO: 1.3 (ref 1.1–2.2)
ALBUMIN SERPL-MCNC: 4.5 G/DL (ref 3.4–5)
ALP BLD-CCNC: 93 U/L (ref 40–129)
ALT SERPL-CCNC: 28 U/L (ref 10–40)
AMMONIA: 20 UMOL/L (ref 16–60)
AMORPHOUS: ABNORMAL /HPF
ANION GAP SERPL CALCULATED.3IONS-SCNC: 14 MMOL/L (ref 3–16)
AST SERPL-CCNC: 20 U/L (ref 15–37)
BACTERIA: ABNORMAL /HPF
BASOPHILS ABSOLUTE: 0.1 K/UL (ref 0–0.2)
BASOPHILS RELATIVE PERCENT: 0.5 %
BILIRUB SERPL-MCNC: 0.6 MG/DL (ref 0–1)
BILIRUBIN URINE: NEGATIVE
BLOOD, URINE: ABNORMAL
BUN BLDV-MCNC: 11 MG/DL (ref 7–20)
CALCIUM SERPL-MCNC: 9.8 MG/DL (ref 8.3–10.6)
CHLORIDE BLD-SCNC: 99 MMOL/L (ref 99–110)
CLARITY: ABNORMAL
CO2: 24 MMOL/L (ref 21–32)
COLOR: YELLOW
CREAT SERPL-MCNC: 0.9 MG/DL (ref 0.8–1.3)
EOSINOPHILS ABSOLUTE: 0 K/UL (ref 0–0.6)
EOSINOPHILS RELATIVE PERCENT: 0.1 %
GFR AFRICAN AMERICAN: >60
GFR NON-AFRICAN AMERICAN: >60
GLOBULIN: 3.4 G/DL
GLUCOSE BLD-MCNC: 198 MG/DL (ref 70–99)
GLUCOSE BLD-MCNC: 281 MG/DL (ref 70–99)
GLUCOSE URINE: 250 MG/DL
HCT VFR BLD CALC: 40.8 % (ref 40.5–52.5)
HEMOGLOBIN: 13.8 G/DL (ref 13.5–17.5)
KETONES, URINE: ABNORMAL MG/DL
LACTIC ACID, SEPSIS: 2.8 MMOL/L (ref 0.4–1.9)
LACTIC ACID, SEPSIS: 2.9 MMOL/L (ref 0.4–1.9)
LEUKOCYTE ESTERASE, URINE: ABNORMAL
LIPASE: 34 U/L (ref 13–60)
LITHIUM DOSE AMOUNT: NORMAL
LITHIUM LEVEL: 0.7 MMOL/L (ref 0.6–1.2)
LYMPHOCYTES ABSOLUTE: 0.9 K/UL (ref 1–5.1)
LYMPHOCYTES RELATIVE PERCENT: 4.1 %
MCH RBC QN AUTO: 32.5 PG (ref 26–34)
MCHC RBC AUTO-ENTMCNC: 33.8 G/DL (ref 31–36)
MCV RBC AUTO: 96.2 FL (ref 80–100)
MICROSCOPIC EXAMINATION: YES
MONOCYTES ABSOLUTE: 0.9 K/UL (ref 0–1.3)
MONOCYTES RELATIVE PERCENT: 4.2 %
NEUTROPHILS ABSOLUTE: 20 K/UL (ref 1.7–7.7)
NEUTROPHILS RELATIVE PERCENT: 91.1 %
NITRITE, URINE: NEGATIVE
PDW BLD-RTO: 12.7 % (ref 12.4–15.4)
PERFORMED ON: ABNORMAL
PH UA: 6.5 (ref 5–8)
PLATELET # BLD: 313 K/UL (ref 135–450)
PMV BLD AUTO: 7.3 FL (ref 5–10.5)
POTASSIUM SERPL-SCNC: 4.3 MMOL/L (ref 3.5–5.1)
PROCALCITONIN: 0.24 NG/ML (ref 0–0.15)
PROTEIN UA: ABNORMAL MG/DL
RBC # BLD: 4.24 M/UL (ref 4.2–5.9)
RBC UA: ABNORMAL /HPF (ref 0–4)
REASON FOR REJECTION: NORMAL
REASON FOR REJECTION: NORMAL
REJECTED TEST: NORMAL
REJECTED TEST: NORMAL
SODIUM BLD-SCNC: 137 MMOL/L (ref 136–145)
SPECIFIC GRAVITY UA: 1.01 (ref 1–1.03)
SPECIMEN STATUS: NORMAL
TOTAL PROTEIN: 7.9 G/DL (ref 6.4–8.2)
TROPONIN: <0.01 NG/ML
URINE TYPE: ABNORMAL
UROBILINOGEN, URINE: 0.2 E.U./DL
WBC # BLD: 21.9 K/UL (ref 4–11)
WBC UA: >100 /HPF (ref 0–5)

## 2020-07-11 PROCEDURE — 82140 ASSAY OF AMMONIA: CPT

## 2020-07-11 PROCEDURE — 6370000000 HC RX 637 (ALT 250 FOR IP): Performed by: PHYSICIAN ASSISTANT

## 2020-07-11 PROCEDURE — 87077 CULTURE AEROBIC IDENTIFY: CPT

## 2020-07-11 PROCEDURE — 83605 ASSAY OF LACTIC ACID: CPT

## 2020-07-11 PROCEDURE — 6360000002 HC RX W HCPCS: Performed by: PHYSICIAN ASSISTANT

## 2020-07-11 PROCEDURE — 80053 COMPREHEN METABOLIC PANEL: CPT

## 2020-07-11 PROCEDURE — 2580000003 HC RX 258: Performed by: PHYSICIAN ASSISTANT

## 2020-07-11 PROCEDURE — 84484 ASSAY OF TROPONIN QUANT: CPT

## 2020-07-11 PROCEDURE — 93005 ELECTROCARDIOGRAM TRACING: CPT | Performed by: PHYSICIAN ASSISTANT

## 2020-07-11 PROCEDURE — 74177 CT ABD & PELVIS W/CONTRAST: CPT

## 2020-07-11 PROCEDURE — 6360000004 HC RX CONTRAST MEDICATION: Performed by: PHYSICIAN ASSISTANT

## 2020-07-11 PROCEDURE — 80175 DRUG SCREEN QUAN LAMOTRIGINE: CPT

## 2020-07-11 PROCEDURE — 80178 ASSAY OF LITHIUM: CPT

## 2020-07-11 PROCEDURE — 84145 PROCALCITONIN (PCT): CPT

## 2020-07-11 PROCEDURE — 87086 URINE CULTURE/COLONY COUNT: CPT

## 2020-07-11 PROCEDURE — 96365 THER/PROPH/DIAG IV INF INIT: CPT

## 2020-07-11 PROCEDURE — 83690 ASSAY OF LIPASE: CPT

## 2020-07-11 PROCEDURE — 36415 COLL VENOUS BLD VENIPUNCTURE: CPT

## 2020-07-11 PROCEDURE — 96375 TX/PRO/DX INJ NEW DRUG ADDON: CPT

## 2020-07-11 PROCEDURE — 70450 CT HEAD/BRAIN W/O DYE: CPT

## 2020-07-11 PROCEDURE — 94761 N-INVAS EAR/PLS OXIMETRY MLT: CPT

## 2020-07-11 PROCEDURE — 87186 SC STD MICRODIL/AGAR DIL: CPT

## 2020-07-11 PROCEDURE — 99285 EMERGENCY DEPT VISIT HI MDM: CPT

## 2020-07-11 PROCEDURE — 2580000003 HC RX 258: Performed by: INTERNAL MEDICINE

## 2020-07-11 PROCEDURE — 85025 COMPLETE CBC W/AUTO DIFF WBC: CPT

## 2020-07-11 PROCEDURE — 87040 BLOOD CULTURE FOR BACTERIA: CPT

## 2020-07-11 PROCEDURE — 2060000000 HC ICU INTERMEDIATE R&B

## 2020-07-11 PROCEDURE — 81001 URINALYSIS AUTO W/SCOPE: CPT

## 2020-07-11 RX ORDER — NICOTINE POLACRILEX 4 MG
15 LOZENGE BUCCAL PRN
Status: DISCONTINUED | OUTPATIENT
Start: 2020-07-11 | End: 2020-07-17 | Stop reason: HOSPADM

## 2020-07-11 RX ORDER — LORAZEPAM 2 MG/ML
INJECTION INTRAMUSCULAR
Status: DISPENSED
Start: 2020-07-11 | End: 2020-07-12

## 2020-07-11 RX ORDER — SODIUM CHLORIDE, SODIUM LACTATE, POTASSIUM CHLORIDE, CALCIUM CHLORIDE 600; 310; 30; 20 MG/100ML; MG/100ML; MG/100ML; MG/100ML
INJECTION, SOLUTION INTRAVENOUS CONTINUOUS
Status: DISPENSED | OUTPATIENT
Start: 2020-07-11 | End: 2020-07-12

## 2020-07-11 RX ORDER — LORAZEPAM 2 MG/ML
1 INJECTION INTRAMUSCULAR ONCE
Status: COMPLETED | OUTPATIENT
Start: 2020-07-11 | End: 2020-07-11

## 2020-07-11 RX ORDER — SODIUM CHLORIDE 0.9 % (FLUSH) 0.9 %
10 SYRINGE (ML) INJECTION PRN
Status: DISCONTINUED | OUTPATIENT
Start: 2020-07-11 | End: 2020-07-17 | Stop reason: HOSPADM

## 2020-07-11 RX ORDER — ACETAMINOPHEN 500 MG
TABLET ORAL
Status: DISPENSED
Start: 2020-07-11 | End: 2020-07-12

## 2020-07-11 RX ORDER — 0.9 % SODIUM CHLORIDE 0.9 %
1000 INTRAVENOUS SOLUTION INTRAVENOUS ONCE
Status: DISCONTINUED | OUTPATIENT
Start: 2020-07-11 | End: 2020-07-11

## 2020-07-11 RX ORDER — ACETAMINOPHEN 650 MG/1
650 SUPPOSITORY RECTAL EVERY 4 HOURS PRN
Status: DISCONTINUED | OUTPATIENT
Start: 2020-07-11 | End: 2020-07-17 | Stop reason: HOSPADM

## 2020-07-11 RX ORDER — LITHIUM CARBONATE 150 MG/1
600 CAPSULE ORAL 2 TIMES DAILY WITH MEALS
Status: DISCONTINUED | OUTPATIENT
Start: 2020-07-12 | End: 2020-07-17 | Stop reason: HOSPADM

## 2020-07-11 RX ORDER — TAMSULOSIN HYDROCHLORIDE 0.4 MG/1
0.4 CAPSULE ORAL NIGHTLY
Status: DISCONTINUED | OUTPATIENT
Start: 2020-07-12 | End: 2020-07-17 | Stop reason: HOSPADM

## 2020-07-11 RX ORDER — POTASSIUM CHLORIDE 20 MEQ/1
40 TABLET, EXTENDED RELEASE ORAL PRN
Status: DISCONTINUED | OUTPATIENT
Start: 2020-07-11 | End: 2020-07-17 | Stop reason: HOSPADM

## 2020-07-11 RX ORDER — SODIUM CHLORIDE 9 MG/ML
INJECTION, SOLUTION INTRAVENOUS
Status: DISPENSED
Start: 2020-07-11 | End: 2020-07-12

## 2020-07-11 RX ORDER — 0.9 % SODIUM CHLORIDE 0.9 %
30 INTRAVENOUS SOLUTION INTRAVENOUS ONCE
Status: COMPLETED | OUTPATIENT
Start: 2020-07-11 | End: 2020-07-11

## 2020-07-11 RX ORDER — PROMETHAZINE HYDROCHLORIDE 25 MG/1
12.5 TABLET ORAL EVERY 4 HOURS PRN
Status: DISCONTINUED | OUTPATIENT
Start: 2020-07-11 | End: 2020-07-17 | Stop reason: HOSPADM

## 2020-07-11 RX ORDER — DEXTROSE MONOHYDRATE 25 G/50ML
12.5 INJECTION, SOLUTION INTRAVENOUS PRN
Status: DISCONTINUED | OUTPATIENT
Start: 2020-07-11 | End: 2020-07-17 | Stop reason: HOSPADM

## 2020-07-11 RX ORDER — MORPHINE SULFATE 4 MG/ML
4 INJECTION, SOLUTION INTRAMUSCULAR; INTRAVENOUS ONCE
Status: COMPLETED | OUTPATIENT
Start: 2020-07-11 | End: 2020-07-11

## 2020-07-11 RX ORDER — LOSARTAN POTASSIUM 50 MG/1
TABLET ORAL
Status: ON HOLD | COMMUNITY
End: 2022-06-08 | Stop reason: CLARIF

## 2020-07-11 RX ORDER — KETOROLAC TROMETHAMINE 30 MG/ML
15 INJECTION, SOLUTION INTRAMUSCULAR; INTRAVENOUS EVERY 4 HOURS PRN
Status: DISPENSED | OUTPATIENT
Start: 2020-07-11 | End: 2020-07-16

## 2020-07-11 RX ORDER — ONDANSETRON 4 MG/1
4 TABLET, ORALLY DISINTEGRATING ORAL EVERY 4 HOURS PRN
Status: DISCONTINUED | OUTPATIENT
Start: 2020-07-11 | End: 2020-07-17 | Stop reason: HOSPADM

## 2020-07-11 RX ORDER — KETOROLAC TROMETHAMINE 30 MG/ML
30 INJECTION, SOLUTION INTRAMUSCULAR; INTRAVENOUS ONCE
Status: COMPLETED | OUTPATIENT
Start: 2020-07-11 | End: 2020-07-11

## 2020-07-11 RX ORDER — ACETAMINOPHEN 650 MG/1
650 SUPPOSITORY RECTAL ONCE
Status: COMPLETED | OUTPATIENT
Start: 2020-07-11 | End: 2020-07-11

## 2020-07-11 RX ORDER — DEXTROSE MONOHYDRATE 50 MG/ML
100 INJECTION, SOLUTION INTRAVENOUS PRN
Status: DISCONTINUED | OUTPATIENT
Start: 2020-07-11 | End: 2020-07-17 | Stop reason: HOSPADM

## 2020-07-11 RX ORDER — ACETAMINOPHEN 160 MG/5ML
650 SOLUTION ORAL EVERY 4 HOURS PRN
Status: DISCONTINUED | OUTPATIENT
Start: 2020-07-11 | End: 2020-07-17 | Stop reason: HOSPADM

## 2020-07-11 RX ORDER — ONDANSETRON 2 MG/ML
4 INJECTION INTRAMUSCULAR; INTRAVENOUS EVERY 4 HOURS PRN
Status: DISCONTINUED | OUTPATIENT
Start: 2020-07-11 | End: 2020-07-17 | Stop reason: HOSPADM

## 2020-07-11 RX ORDER — INSULIN GLARGINE 100 [IU]/ML
0.25 INJECTION, SOLUTION SUBCUTANEOUS NIGHTLY
Status: DISCONTINUED | OUTPATIENT
Start: 2020-07-11 | End: 2020-07-17 | Stop reason: HOSPADM

## 2020-07-11 RX ORDER — MAGNESIUM SULFATE 1 G/100ML
1 INJECTION INTRAVENOUS PRN
Status: DISCONTINUED | OUTPATIENT
Start: 2020-07-11 | End: 2020-07-17 | Stop reason: HOSPADM

## 2020-07-11 RX ORDER — PROMETHAZINE HYDROCHLORIDE 25 MG/ML
12.5 INJECTION, SOLUTION INTRAMUSCULAR; INTRAVENOUS EVERY 4 HOURS PRN
Status: DISCONTINUED | OUTPATIENT
Start: 2020-07-11 | End: 2020-07-17 | Stop reason: HOSPADM

## 2020-07-11 RX ORDER — OXYBUTYNIN CHLORIDE 5 MG/1
TABLET ORAL
Status: ON HOLD | COMMUNITY
Start: 2020-05-04 | End: 2022-06-08 | Stop reason: CLARIF

## 2020-07-11 RX ORDER — LITHIUM CARBONATE 300 MG/1
300 CAPSULE ORAL
Status: ON HOLD | COMMUNITY
Start: 2020-05-04 | End: 2022-06-08 | Stop reason: CLARIF

## 2020-07-11 RX ORDER — POTASSIUM CHLORIDE 7.45 MG/ML
10 INJECTION INTRAVENOUS PRN
Status: DISCONTINUED | OUTPATIENT
Start: 2020-07-11 | End: 2020-07-17 | Stop reason: HOSPADM

## 2020-07-11 RX ORDER — ONDANSETRON 2 MG/ML
4 INJECTION INTRAMUSCULAR; INTRAVENOUS ONCE
Status: COMPLETED | OUTPATIENT
Start: 2020-07-11 | End: 2020-07-11

## 2020-07-11 RX ORDER — LAMOTRIGINE 25 MG/1
TABLET ORAL
Status: ON HOLD | COMMUNITY
Start: 2020-06-03 | End: 2022-06-08 | Stop reason: CLARIF

## 2020-07-11 RX ORDER — PROMETHAZINE HYDROCHLORIDE 6.25 MG/5ML
12.5 SYRUP ORAL EVERY 4 HOURS PRN
Status: DISCONTINUED | OUTPATIENT
Start: 2020-07-11 | End: 2020-07-17 | Stop reason: HOSPADM

## 2020-07-11 RX ORDER — ACETAMINOPHEN 325 MG/1
650 TABLET ORAL EVERY 4 HOURS PRN
Status: DISCONTINUED | OUTPATIENT
Start: 2020-07-11 | End: 2020-07-17 | Stop reason: HOSPADM

## 2020-07-11 RX ORDER — TAMSULOSIN HYDROCHLORIDE 0.4 MG/1
CAPSULE ORAL
Status: ON HOLD | COMMUNITY
Start: 2020-06-01 | End: 2022-06-08 | Stop reason: CLARIF

## 2020-07-11 RX ORDER — ACETAMINOPHEN 500 MG
1000 TABLET ORAL ONCE
Status: DISCONTINUED | OUTPATIENT
Start: 2020-07-11 | End: 2020-07-11

## 2020-07-11 RX ADMIN — IOPAMIDOL 75 ML: 755 INJECTION, SOLUTION INTRAVENOUS at 19:25

## 2020-07-11 RX ADMIN — SODIUM CHLORIDE 3267 ML: 9 INJECTION, SOLUTION INTRAVENOUS at 16:46

## 2020-07-11 RX ADMIN — MORPHINE SULFATE 4 MG: 4 INJECTION, SOLUTION INTRAMUSCULAR; INTRAVENOUS at 16:47

## 2020-07-11 RX ADMIN — LORAZEPAM 1 MG: 2 INJECTION, SOLUTION INTRAMUSCULAR; INTRAVENOUS at 19:14

## 2020-07-11 RX ADMIN — ONDANSETRON 4 MG: 2 INJECTION INTRAMUSCULAR; INTRAVENOUS at 16:46

## 2020-07-11 RX ADMIN — ACETAMINOPHEN 650 MG: 650 SUPPOSITORY RECTAL at 17:20

## 2020-07-11 RX ADMIN — LORAZEPAM 1 MG: 2 INJECTION, SOLUTION INTRAMUSCULAR; INTRAVENOUS at 18:46

## 2020-07-11 RX ADMIN — KETOROLAC TROMETHAMINE 30 MG: 30 INJECTION, SOLUTION INTRAMUSCULAR at 19:27

## 2020-07-11 RX ADMIN — SODIUM CHLORIDE, POTASSIUM CHLORIDE, SODIUM LACTATE AND CALCIUM CHLORIDE: 600; 310; 30; 20 INJECTION, SOLUTION INTRAVENOUS at 22:30

## 2020-07-11 RX ADMIN — CEFTRIAXONE SODIUM 1 G: 1 INJECTION, POWDER, FOR SOLUTION INTRAMUSCULAR; INTRAVENOUS at 19:31

## 2020-07-11 ASSESSMENT — PAIN DESCRIPTION - LOCATION
LOCATION: GROIN
LOCATION: GROIN

## 2020-07-11 ASSESSMENT — PAIN SCALES - WONG BAKER: WONGBAKER_NUMERICALRESPONSE: 8

## 2020-07-11 ASSESSMENT — PAIN SCALES - GENERAL
PAINLEVEL_OUTOF10: 10
PAINLEVEL_OUTOF10: 10

## 2020-07-11 ASSESSMENT — PAIN DESCRIPTION - ORIENTATION: ORIENTATION: LEFT

## 2020-07-11 ASSESSMENT — PAIN DESCRIPTION - PAIN TYPE: TYPE: ACUTE PAIN

## 2020-07-11 NOTE — ED PROVIDER NOTES
[facility] Emergency Department    CHIEF COMPLAINT  Groin Pain (Pt reports having 3 surgeries in the last 7 weeks by urology group.  )      SHARED SERVICE VISIT  Evaluated by LUMA. My supervising physician was available for consultation. HISTORY OF PRESENT ILLNESS  Hugh Moore is a 71 y.o. male who presents to the ED complaining of groin pain and Reiger's. Patient brought in by wife today for evaluation. Has had several recent urologic process including Botox injections into the bladder as well as a recent TURP. This was done approximately a month ago. Over the past week patient has been self catheterizing. Developed some chills and Reiger's several days ago. No fevers at home. States that he is having some lower abdominal pain and pressure. Nauseous without vomiting. No diarrhea or constipation. Denies headaches, lightheadedness, dizziness or confusion. No chest pain shortness of breath. Pain appears to be more left-sided. Without obvious aggravating or relieving factors. Fairly constant. No pain in the testicle. No other complaints, modifying factors or associated symptoms. Nursing notes reviewed.    Past Medical History:   Diagnosis Date    Anxiety     Arthritis     Asthma     Bipolar 1 disorder ([facility]     Colitis     Depression     Diabetes ([facility]     Diabetes mellitus ([facility] [facility]     Glaucoma     H/O bladder problems     High blood pressure     History of kidney problems     IBS (irritable bowel syndrome)     Liver disease     hx of elevated LFT's    Obesity     Psychiatric problem      Past Surgical History:   Procedure Laterality Date    APPENDECTOMY      BACK SURGERY      CERVICAL FUSION      COLONOSCOPY      EYE SURGERY      HIP SURGERY      JOINT REPLACEMENT Right     THR    OTHER SURGICAL HISTORY  06/06/2018     right L4-5 hemilaminotomy, partial facetectomy, foraminotomy, reexploration and excision of synovial csyt,  Bilateral L2-3 Attends Scientologist service: Not on file     Active member of club or organization: Not on file     Attends meetings of clubs or organizations: Not on file     Relationship status: Not on file    Intimate partner violence     Fear of current or ex partner: Not on file     Emotionally abused: Not on file     Physically abused: Not on file     Forced sexual activity: Not on file   Other Topics Concern    Not on file   Social History Narrative    Not on file     Current Facility-Administered Medications   Medication Dose Route Frequency Provider Last Rate Last Dose    ondansetron (ZOFRAN) injection 4 mg  4 mg Intravenous Once [place], [place]        morphine (PF) injection 4 mg  4 mg Intravenous Once [place], [place]        0.9 % sodium chloride bolus  1,000 mL Intravenous Once [place], [place]         Current Outpatient Medications   Medication Sig Dispense Refill    Omega-3 Fatty Acids (FISH OIL PO) Take by mouth      verapamil (CALAN SR) 120 MG extended release tablet Take 120 mg by mouth nightly      vitamin E 400 UNIT capsule Take 400 Units by mouth daily      glimepiride (AMARYL) 2 MG tablet Take 2 mg by mouth every morning (before breakfast)      aspirin 81 MG tablet Take 81 mg by mouth daily.  lithium 300 MG tablet Take 900 mg by mouth nightly       metFORMIN (GLUCOPHAGE) 500 MG tablet Take 1,000 mg by mouth 2 times daily (with meals)       traZODone (DESYREL) 100 MG tablet Take 300 mg by mouth nightly       doxazosin (CARDURA) 2 MG tablet Take 2 mg by mouth Takes 3 times weekly; takes at night       Allergies   Allergen Reactions    No Known Allergies        REVIEW OF SYSTEMS  10 systems reviewed, pertinent positives per HPI otherwise noted to be negative    PHYSICAL EXAM  /83   Pulse 75   Temp 98.1 °F (36.7 °C) (Oral)   Resp 20   Ht 6' 1\" (1.854 m)   Wt 240 lb (108.9 kg)   SpO2 98%   BMI 31.66 kg/m²   GENERAL APPEARANCE: Awake and alert. Cooperative.   No acute distress. HEAD: Normocephalic. Atraumatic. EYES: PERRL. EOM's grossly intact. ENT: Mucous membranes are moist.   NECK: Supple. HEART: RRR. No murmurs. LUNGS: Respirations unlabored. CTAB. Good air exchange. Speaking comfortably in full sentences. ABDOMEN: Soft. Non-distended. Left-sided abdominal tenderness without rigidity, r. No guarding or rebound. Negative Balderas's, McBurney's and Rovsing's. No fluid waves or ascites. No hernias or masses. Bowel sounds normal in all quadrants. Moderate left CVA tenderness. EXTREMITIES: No peripheral edema. Moves all extremities equally. All extremities neurovascularly intact. SKIN: Warm and dry. No acute rashes. NEUROLOGICAL: Alert and oriented. CN's 2-12 intact. No gross facial drooping. Strength 5/5, sensation intact. PSYCHIATRIC: Normal mood and affect. RADIOLOGY  Ct Head Wo Contrast    Result Date: 7/11/2020  EXAMINATION: CT OF THE HEAD WITHOUT CONTRAST  7/11/2020 7:00 pm TECHNIQUE: CT of the head was performed without the administration of intravenous contrast. Dose modulation, iterative reconstruction, and/or weight based adjustment of the mA/kV was utilized to reduce the radiation dose to as low as reasonably achievable. Limited due to streak artifact from dental hardware as well as motion artifact. COMPARISON: None. HISTORY: ORDERING SYSTEM PROVIDED HISTORY: [facility] TECHNOLOGIST PROVIDED HISTORY: Reason for exam:->ams Has a \"code stroke\" or \"stroke alert\" been called? ->No Reason for Exam: ams,septic Acuity: Acute Type of Exam: Initial FINDINGS: BRAIN/VENTRICLES: There is no acute intracranial hemorrhage, mass effect or midline shift. No abnormal extra-axial fluid collection. There is subtle loss of the gray-white matter differentiation within the left frontal parietal region measuring approximately 22 x 11 mm (best seen on series 603, image 47). Gray-white matter differentiation is otherwise grossly maintained.   There is no evidence of had worsening rigors and and became confused. Repeat vitals now showing tachycardia with temperature up to 105 degrees. Was not able to swallow Tylenol so was given a suppository. Additional fluids added to total of 30 mL/kg bolus. CBC with a leukocytosis of 21.9 with left shift. No bands. Lactic acid 2.8. Blood cultures x2 pending. Stapleton catheter inserted showing urinalysis with over 100 white blood cells and 4+ bacteria. Cultures pending. Rocephin initiated. Pro-Cole 0.24. Troponin was less than 0.01. Lipase normal.  CMP without evidence for renal failure or other significant abnormalities. Ammonia level was 20. Lithium 0.7. Lamictal levels pending. Patient was given dose of Toradol. Temperature has begun to improve. Patient still remains rigorous and fidgeting. Does not appear consistent with clonus to suggest serotonin syndrome. Suspecting altered mental status from urosepsis. Head CT was slightly limited secondary to given patient's motion despite 2 mg of Ativan still slightly tremulous. CT abdomen pelvis without significant acute pathology. Findings did appear to be consistent with cystitis. Suggesting repositioning of Stapleton catheter which was completed. Is temperature continued to improve tachycardia has now resolved and altered mental status also improving. Repeat lactic now 2.9. Blood pressure has remained stable. Discussed recommendations for admission with hospital medicine and orders have been placed. A discussion was had with  and Mrs. Cruz regarding flank pain, ED findings, recommendations for admission. Risk management discussed and shared decision making had with patient and/or surrogate. All questions were answered. Patient and family in agreement. CRITICAL CARE TIME  65 minutes of critical care time spent not including separately billable procedures.     MDM  Results for orders placed or performed during the hospital encounter of 07/11/20   Urinalysis, reflex to microscopic   Result Value Ref Range    Color, UA Yellow Straw/Yellow    Clarity, UA CLOUDY (A) Clear    Glucose, Ur 250 (A) Negative mg/dL    Bilirubin Urine Negative Negative    Ketones, Urine TRACE (A) Negative mg/dL    Specific Gravity, UA 1.015 1.005 - 1.030    Blood, Urine SMALL (A) Negative    pH, UA 6.5 5.0 - 8.0    Protein, UA TRACE (A) Negative mg/dL    Urobilinogen, Urine 0.2 <2.0 E.U./dL    Nitrite, Urine Negative Negative    Leukocyte Esterase, Urine SMALL (A) Negative    Microscopic Examination YES     Urine Type NotGiven    CBC auto differential   Result Value Ref Range    WBC 21.9 (H) 4.0 - 11.0 K/uL    RBC 4.24 4.20 - 5.90 M/uL    Hemoglobin 13.8 13.5 - 17.5 g/dL    Hematocrit 40.8 40.5 - 52.5 %    MCV 96.2 80.0 - 100.0 fL    MCH 32.5 26.0 - 34.0 pg    MCHC 33.8 31.0 - 36.0 g/dL    RDW 12.7 12.4 - 15.4 %    Platelets 960 827 - 868 K/uL    MPV 7.3 5.0 - 10.5 fL    Neutrophils % 91.1 %    Lymphocytes % 4.1 %    Monocytes % 4.2 %    Eosinophils % 0.1 %    Basophils % 0.5 %    Neutrophils Absolute 20.0 (H) 1.7 - 7.7 K/uL    Lymphocytes Absolute 0.9 (L) 1.0 - 5.1 K/uL    Monocytes Absolute 0.9 0.0 - 1.3 K/uL    Eosinophils Absolute 0.0 0.0 - 0.6 K/uL    Basophils Absolute 0.1 0.0 - 0.2 K/uL   Lactate, Sepsis   Result Value Ref Range    Lactic Acid, Sepsis 2.9 (H) 0.4 - 1.9 mmol/L   Sample possible blood bank testing   Result Value Ref Range    Specimen Status POPPY    Lithium level   Result Value Ref Range    Lithium Lvl 0.7 0.6 - 1.2 mmol/L    Lithium Dose Amount Unknown    Ammonia   Result Value Ref Range    Ammonia 20 16 - 60 umol/L   SPECIMEN REJECTION   Result Value Ref Range    Rejected Test cmp lipas lacds     Reason for Rejection see below    SPECIMEN REJECTION   Result Value Ref Range    Rejected Test lacds     Reason for Rejection see below    Comprehensive Metabolic Panel   Result Value Ref Range    Sodium 137 136 - 145 mmol/L    Potassium 4.3 3.5 - 5.1 mmol/L    Chloride 99 99 - 110 mmol/L CO2 24 21 - 32 mmol/L    Anion Gap 14 3 - 16    Glucose 281 (H) 70 - 99 mg/dL    BUN 11 7 - 20 mg/dL    CREATININE 0.9 0.8 - 1.3 mg/dL    GFR Non-African American >60 >60    GFR African American >60 >60    Calcium 9.8 8.3 - 10.6 mg/dL    Total Protein 7.9 6.4 - 8.2 g/dL    Alb 4.5 3.4 - 5.0 g/dL    Albumin/Globulin Ratio 1.3 1.1 - 2.2    Total Bilirubin 0.6 0.0 - 1.0 mg/dL    Alkaline Phosphatase 93 40 - 129 U/L    ALT 28 10 - 40 U/L    AST 20 15 - 37 U/L    Globulin 3.4 g/dL   Lipase   Result Value Ref Range    Lipase 34.0 13.0 - 60.0 U/L   Lactate, Sepsis   Result Value Ref Range    Lactic Acid, Sepsis 2.8 (H) 0.4 - 1.9 mmol/L   Microscopic Urinalysis   Result Value Ref Range    WBC, UA >100 (A) 0 - 5 /HPF    RBC, UA see below (A) 0 - 4 /HPF    Bacteria, UA 4+ (A) None Seen /HPF    Amorphous, UA 2+ /HPF   Troponin   Result Value Ref Range    Troponin <0.01 <0.01 ng/mL   Procalcitonin   Result Value Ref Range    Procalcitonin 0.24 (H) 0.00 - 0.15 ng/mL   Lactic Acid, Plasma   Result Value Ref Range    Lactic Acid 2.1 (H) 0.4 - 2.0 mmol/L   Lactic Acid, Plasma   Result Value Ref Range    Lactic Acid 2.5 (H) 0.4 - 2.0 mmol/L   Lactic Acid, Plasma   Result Value Ref Range    Lactic Acid 2.5 (H) 0.4 - 2.0 mmol/L   CBC Auto Differential   Result Value Ref Range    WBC 43.4 (HH) 4.0 - 11.0 K/uL    RBC 3.99 (L) 4.20 - 5.90 M/uL    Hemoglobin 12.8 (L) 13.5 - 17.5 g/dL    Hematocrit 38.4 (L) 40.5 - 52.5 %    MCV 96.2 80.0 - 100.0 fL    MCH 32.0 26.0 - 34.0 pg    MCHC 33.2 31.0 - 36.0 g/dL    RDW 13.2 12.4 - 15.4 %    Platelets 182 341 - 617 K/uL    MPV 7.4 5.0 - 10.5 fL    PLATELET SLIDE REVIEW Adequate     SLIDE REVIEW see below     Neutrophils % 90.7 %    Lymphocytes % 2.6 %    Monocytes % 6.5 %    Eosinophils % 0.0 %    Basophils % 0.2 %    Neutrophils Absolute 39.4 (H) 1.7 - 7.7 K/uL    Lymphocytes Absolute 1.1 1.0 - 5.1 K/uL    Monocytes Absolute 2.8 (H) 0.0 - 1.3 K/uL    Eosinophils Absolute 0.0 0.0 - 0.6 K/uL Basophils Absolute 0.1 0.0 - 0.2 K/uL   Comprehensive Metabolic Panel   Result Value Ref Range    Sodium 139 136 - 145 mmol/L    Potassium 4.4 3.5 - 5.1 mmol/L    Chloride 105 99 - 110 mmol/L    CO2 23 21 - 32 mmol/L    Anion Gap 11 3 - 16    Glucose 252 (H) 70 - 99 mg/dL    BUN 12 7 - 20 mg/dL    CREATININE 0.9 0.8 - 1.3 mg/dL    GFR Non-African American >60 >60    GFR African American >60 >60    Calcium 9.3 8.3 - 10.6 mg/dL    Total Protein 6.8 6.4 - 8.2 g/dL    Alb 3.6 3.4 - 5.0 g/dL    Albumin/Globulin Ratio 1.1 1.1 - 2.2    Total Bilirubin 0.7 0.0 - 1.0 mg/dL    Alkaline Phosphatase 64 40 - 129 U/L    ALT 23 10 - 40 U/L    AST 16 15 - 37 U/L    Globulin 3.2 g/dL   Magnesium   Result Value Ref Range    Magnesium 1.60 (L) 1.80 - 2.40 mg/dL   Phosphorus   Result Value Ref Range    Phosphorus 3.1 2.5 - 4.9 mg/dL   Protime-INR   Result Value Ref Range    Protime 14.9 (H) 10.0 - 13.2 sec    INR 1.28 (H) 0.86 - 1.14   POCT Glucose   Result Value Ref Range    POC Glucose 198 (H) 70 - 99 mg/dl    Performed on [facility]    POCT Glucose   Result Value Ref Range    POC Glucose 227 (H) 70 - 99 mg/dl    Performed on [facility]    POCT Glucose   Result Value Ref Range    POC Glucose 236 (H) 70 - 99 mg/dl    Performed on [facility]    POCT Glucose   Result Value Ref Range    POC Glucose 217 (H) 70 - 99 mg/dl    Performed on [facility]    EKG 12 Lead   Result Value Ref Range    Ventricular Rate 106 BPM    Atrial Rate 106 BPM    P-R Interval 222 ms    QRS Duration 86 ms    Q-T Interval 272 ms    QTc Calculation (Bazett) 361 ms    P Axis 68 degrees    R Axis 83 degrees    T Axis 59 degrees    Diagnosis       Sinus tachycardia with 1st degree A-V blockNonspecific ST and T wave abnormalityAbnormal ECGWhen compared with ECG of 13-OCT-2010 12:28,KS interval has increasedVent. rate has increased BY  41 BPMST now depressed in Lateral leadsNonspecific T wave abnormality now evident in Lateral leads     I spoke with Dr. Sai Wei.  We thoroughly discussed the history, physical exam, laboratory and imaging studies, as well as, emergency department course. Based upon that discussion, we've decided to admit Hemal Samano to the hospital for further observation, evaluation, and treatment. Final Impression  1. Septicemia ([address]    2. Acute pyelonephritis    3. Confusion associated with infection      Blood pressure (!) 149/83, pulse 79, temperature 98.7 °F (37.1 °C), resp. rate 22, height 6' 1\" (1.854 m), weight 247 lb 2.2 oz (112.1 kg), SpO2 (!) 89 %. DISPOSITION  Patient was admitted to the hospital in stable condition.           Katina Molina  07/12/20 1115

## 2020-07-11 NOTE — ED NOTES
Pt c/o left groin pain since last night. Triage note states multiple procedures recently with [facility]. Pt unable to verbalized exactly WHEN or WHAT procedure/surgery was done. Pt alert and oriented to self, place and time. But, appears off still with mentation. Patient identified as a positive fall risk on the ED triage fall screening. Patient placed in fall precautions which includes:  yellow fall risk bracelet on wrist, yellow socks on feet, \"Be Safe\" sign placed on patient's door, and bed alarm placed under patient/alarm turned on. Patient instructed on importance of not getting out of bed or ambulating without assistance for safety.           Charu Bronson RN  07/11/20 [id number]

## 2020-07-11 NOTE — ED NOTES
Skin assessment completed with ST ELIZA GRESHAM and Yodit STOVER upon placing [address]  07/11/20 Isauro Velasco

## 2020-07-11 NOTE — ED NOTES
Johnnye Lanes ICU RN to bedside to assist in setting up [facility]. No cooling blankets available at this time.       Mikhail Johnson RN  07/11/20 [id number]

## 2020-07-12 ENCOUNTER — APPOINTMENT (OUTPATIENT)
Dept: ULTRASOUND IMAGING | Age: 70
DRG: 698 | End: 2020-07-12
Payer: MEDICARE

## 2020-07-12 ENCOUNTER — APPOINTMENT (OUTPATIENT)
Dept: GENERAL RADIOLOGY | Age: 70
DRG: 698 | End: 2020-07-12
Payer: MEDICARE

## 2020-07-12 ENCOUNTER — APPOINTMENT (OUTPATIENT)
Dept: CT IMAGING | Age: 70
DRG: 698 | End: 2020-07-12
Payer: MEDICARE

## 2020-07-12 PROBLEM — N45.3 ACUTE EPIDIDYMO-ORCHITIS: Status: ACTIVE | Noted: 2020-07-12

## 2020-07-12 PROBLEM — A41.9 SEPSIS (HCC): Status: ACTIVE | Noted: 2020-07-12

## 2020-07-12 LAB
A/G RATIO: 1.1 (ref 1.1–2.2)
ALBUMIN SERPL-MCNC: 3.6 G/DL (ref 3.4–5)
ALP BLD-CCNC: 64 U/L (ref 40–129)
ALT SERPL-CCNC: 23 U/L (ref 10–40)
ANION GAP SERPL CALCULATED.3IONS-SCNC: 11 MMOL/L (ref 3–16)
AST SERPL-CCNC: 16 U/L (ref 15–37)
BASOPHILS ABSOLUTE: 0.1 K/UL (ref 0–0.2)
BASOPHILS RELATIVE PERCENT: 0.2 %
BILIRUB SERPL-MCNC: 0.7 MG/DL (ref 0–1)
BUN BLDV-MCNC: 12 MG/DL (ref 7–20)
CALCIUM SERPL-MCNC: 9.3 MG/DL (ref 8.3–10.6)
CHLORIDE BLD-SCNC: 105 MMOL/L (ref 99–110)
CO2: 23 MMOL/L (ref 21–32)
CREAT SERPL-MCNC: 0.9 MG/DL (ref 0.8–1.3)
EKG ATRIAL RATE: 106 BPM
EKG DIAGNOSIS: NORMAL
EKG P AXIS: 68 DEGREES
EKG P-R INTERVAL: 222 MS
EKG Q-T INTERVAL: 272 MS
EKG QRS DURATION: 86 MS
EKG QTC CALCULATION (BAZETT): 361 MS
EKG R AXIS: 83 DEGREES
EKG T AXIS: 59 DEGREES
EKG VENTRICULAR RATE: 106 BPM
EOSINOPHILS ABSOLUTE: 0 K/UL (ref 0–0.6)
EOSINOPHILS RELATIVE PERCENT: 0 %
GFR AFRICAN AMERICAN: >60
GFR NON-AFRICAN AMERICAN: >60
GLOBULIN: 3.2 G/DL
GLUCOSE BLD-MCNC: 175 MG/DL (ref 70–99)
GLUCOSE BLD-MCNC: 191 MG/DL (ref 70–99)
GLUCOSE BLD-MCNC: 217 MG/DL (ref 70–99)
GLUCOSE BLD-MCNC: 227 MG/DL (ref 70–99)
GLUCOSE BLD-MCNC: 236 MG/DL (ref 70–99)
GLUCOSE BLD-MCNC: 252 MG/DL (ref 70–99)
GLUCOSE BLD-MCNC: 266 MG/DL (ref 70–99)
HCT VFR BLD CALC: 38.4 % (ref 40.5–52.5)
HEMOGLOBIN: 12.8 G/DL (ref 13.5–17.5)
INR BLD: 1.28 (ref 0.86–1.14)
LACTIC ACID: 2.1 MMOL/L (ref 0.4–2)
LACTIC ACID: 2.5 MMOL/L (ref 0.4–2)
LACTIC ACID: 2.5 MMOL/L (ref 0.4–2)
LYMPHOCYTES ABSOLUTE: 1.1 K/UL (ref 1–5.1)
LYMPHOCYTES RELATIVE PERCENT: 2.6 %
MAGNESIUM: 1.6 MG/DL (ref 1.8–2.4)
MCH RBC QN AUTO: 32 PG (ref 26–34)
MCHC RBC AUTO-ENTMCNC: 33.2 G/DL (ref 31–36)
MCV RBC AUTO: 96.2 FL (ref 80–100)
MONOCYTES ABSOLUTE: 2.8 K/UL (ref 0–1.3)
MONOCYTES RELATIVE PERCENT: 6.5 %
NEUTROPHILS ABSOLUTE: 39.4 K/UL (ref 1.7–7.7)
NEUTROPHILS RELATIVE PERCENT: 90.7 %
PDW BLD-RTO: 13.2 % (ref 12.4–15.4)
PERFORMED ON: ABNORMAL
PHOSPHORUS: 3.1 MG/DL (ref 2.5–4.9)
PLATELET # BLD: 282 K/UL (ref 135–450)
PLATELET SLIDE REVIEW: ADEQUATE
PMV BLD AUTO: 7.4 FL (ref 5–10.5)
POTASSIUM SERPL-SCNC: 4.4 MMOL/L (ref 3.5–5.1)
PROTHROMBIN TIME: 14.9 SEC (ref 10–13.2)
RBC # BLD: 3.99 M/UL (ref 4.2–5.9)
SLIDE REVIEW: ABNORMAL
SODIUM BLD-SCNC: 139 MMOL/L (ref 136–145)
TOTAL PROTEIN: 6.8 G/DL (ref 6.4–8.2)
WBC # BLD: 43.4 K/UL (ref 4–11)

## 2020-07-12 PROCEDURE — 85610 PROTHROMBIN TIME: CPT

## 2020-07-12 PROCEDURE — 2580000003 HC RX 258: Performed by: INTERNAL MEDICINE

## 2020-07-12 PROCEDURE — 72192 CT PELVIS W/O DYE: CPT

## 2020-07-12 PROCEDURE — 71045 X-RAY EXAM CHEST 1 VIEW: CPT

## 2020-07-12 PROCEDURE — 93010 ELECTROCARDIOGRAM REPORT: CPT | Performed by: INTERNAL MEDICINE

## 2020-07-12 PROCEDURE — 2060000000 HC ICU INTERMEDIATE R&B

## 2020-07-12 PROCEDURE — 83735 ASSAY OF MAGNESIUM: CPT

## 2020-07-12 PROCEDURE — 6360000002 HC RX W HCPCS: Performed by: INTERNAL MEDICINE

## 2020-07-12 PROCEDURE — 36415 COLL VENOUS BLD VENIPUNCTURE: CPT

## 2020-07-12 PROCEDURE — 99223 1ST HOSP IP/OBS HIGH 75: CPT | Performed by: INTERNAL MEDICINE

## 2020-07-12 PROCEDURE — 85025 COMPLETE CBC W/AUTO DIFF WBC: CPT

## 2020-07-12 PROCEDURE — 6370000000 HC RX 637 (ALT 250 FOR IP): Performed by: INTERNAL MEDICINE

## 2020-07-12 PROCEDURE — 76870 US EXAM SCROTUM: CPT

## 2020-07-12 PROCEDURE — 80053 COMPREHEN METABOLIC PANEL: CPT

## 2020-07-12 PROCEDURE — 84100 ASSAY OF PHOSPHORUS: CPT

## 2020-07-12 PROCEDURE — 83605 ASSAY OF LACTIC ACID: CPT

## 2020-07-12 PROCEDURE — 83036 HEMOGLOBIN GLYCOSYLATED A1C: CPT

## 2020-07-12 PROCEDURE — 87040 BLOOD CULTURE FOR BACTERIA: CPT

## 2020-07-12 PROCEDURE — 2580000003 HC RX 258

## 2020-07-12 PROCEDURE — 87449 NOS EACH ORGANISM AG IA: CPT

## 2020-07-12 RX ORDER — OXYCODONE HYDROCHLORIDE AND ACETAMINOPHEN 5; 325 MG/1; MG/1
1 TABLET ORAL EVERY 6 HOURS PRN
Status: DISCONTINUED | OUTPATIENT
Start: 2020-07-12 | End: 2020-07-17 | Stop reason: HOSPADM

## 2020-07-12 RX ORDER — MIDAZOLAM HYDROCHLORIDE 5 MG/ML
2 INJECTION INTRAMUSCULAR; INTRAVENOUS ONCE
Status: DISCONTINUED | OUTPATIENT
Start: 2020-07-12 | End: 2020-07-17 | Stop reason: HOSPADM

## 2020-07-12 RX ORDER — HALOPERIDOL 5 MG/ML
10 INJECTION INTRAMUSCULAR ONCE
Status: COMPLETED | OUTPATIENT
Start: 2020-07-12 | End: 2020-07-12

## 2020-07-12 RX ORDER — 0.9 % SODIUM CHLORIDE 0.9 %
30 INTRAVENOUS SOLUTION INTRAVENOUS ONCE
Status: COMPLETED | OUTPATIENT
Start: 2020-07-12 | End: 2020-07-12

## 2020-07-12 RX ORDER — SODIUM CHLORIDE 9 MG/ML
INJECTION, SOLUTION INTRAVENOUS
Status: DISPENSED
Start: 2020-07-12 | End: 2020-07-12

## 2020-07-12 RX ORDER — OXYCODONE HYDROCHLORIDE AND ACETAMINOPHEN 5; 325 MG/1; MG/1
2 TABLET ORAL EVERY 4 HOURS PRN
Status: DISCONTINUED | OUTPATIENT
Start: 2020-07-12 | End: 2020-07-17 | Stop reason: HOSPADM

## 2020-07-12 RX ORDER — SODIUM CHLORIDE 9 MG/ML
INJECTION, SOLUTION INTRAVENOUS
Status: COMPLETED
Start: 2020-07-12 | End: 2020-07-12

## 2020-07-12 RX ORDER — SODIUM CHLORIDE 9 MG/ML
INJECTION, SOLUTION INTRAVENOUS CONTINUOUS
Status: DISCONTINUED | OUTPATIENT
Start: 2020-07-12 | End: 2020-07-15

## 2020-07-12 RX ADMIN — VANCOMYCIN HYDROCHLORIDE 1.5 G: 10 INJECTION, POWDER, LYOPHILIZED, FOR SOLUTION INTRAVENOUS at 15:08

## 2020-07-12 RX ADMIN — INSULIN LISPRO 4 UNITS: 100 INJECTION, SOLUTION INTRAVENOUS; SUBCUTANEOUS at 10:09

## 2020-07-12 RX ADMIN — LITHIUM CARBONATE 600 MG: 150 CAPSULE, GELATIN COATED ORAL at 10:08

## 2020-07-12 RX ADMIN — SODIUM CHLORIDE: 9 INJECTION, SOLUTION INTRAVENOUS at 18:22

## 2020-07-12 RX ADMIN — HALOPERIDOL LACTATE 10 MG: 5 INJECTION, SOLUTION INTRAMUSCULAR at 12:44

## 2020-07-12 RX ADMIN — CEFTRIAXONE SODIUM 1 G: 1 INJECTION, POWDER, FOR SOLUTION INTRAMUSCULAR; INTRAVENOUS at 05:24

## 2020-07-12 RX ADMIN — TAMSULOSIN HYDROCHLORIDE 0.4 MG: 0.4 CAPSULE ORAL at 20:23

## 2020-07-12 RX ADMIN — PIPERACILLIN AND TAZOBACTAM 3.38 G: 3; .375 INJECTION, POWDER, LYOPHILIZED, FOR SOLUTION INTRAVENOUS at 22:58

## 2020-07-12 RX ADMIN — ACETAMINOPHEN 650 MG: 650 SUPPOSITORY RECTAL at 23:53

## 2020-07-12 RX ADMIN — PIPERACILLIN AND TAZOBACTAM 3.38 G: 3; .375 INJECTION, POWDER, LYOPHILIZED, FOR SOLUTION INTRAVENOUS at 15:09

## 2020-07-12 RX ADMIN — OXYCODONE HYDROCHLORIDE AND ACETAMINOPHEN 2 TABLET: 5; 325 TABLET ORAL at 22:58

## 2020-07-12 RX ADMIN — INSULIN GLARGINE 27 UNITS: 100 INJECTION, SOLUTION SUBCUTANEOUS at 00:53

## 2020-07-12 RX ADMIN — SODIUM CHLORIDE 3363 ML: 9 INJECTION, SOLUTION INTRAVENOUS at 12:51

## 2020-07-12 RX ADMIN — INSULIN LISPRO 2 UNITS: 100 INJECTION, SOLUTION INTRAVENOUS; SUBCUTANEOUS at 12:26

## 2020-07-12 RX ADMIN — INSULIN LISPRO 4 UNITS: 100 INJECTION, SOLUTION INTRAVENOUS; SUBCUTANEOUS at 05:16

## 2020-07-12 RX ADMIN — SODIUM CHLORIDE, POTASSIUM CHLORIDE, SODIUM LACTATE AND CALCIUM CHLORIDE: 600; 310; 30; 20 INJECTION, SOLUTION INTRAVENOUS at 04:38

## 2020-07-12 RX ADMIN — ENOXAPARIN SODIUM 40 MG: 40 INJECTION SUBCUTANEOUS at 10:08

## 2020-07-12 RX ADMIN — INSULIN GLARGINE 27 UNITS: 100 INJECTION, SOLUTION SUBCUTANEOUS at 20:19

## 2020-07-12 RX ADMIN — SODIUM CHLORIDE 250 ML: 9 INJECTION, SOLUTION INTRAVENOUS at 15:10

## 2020-07-12 RX ADMIN — Medication 10 ML: at 10:08

## 2020-07-12 RX ADMIN — MAGNESIUM SULFATE HEPTAHYDRATE 1 G: 1 INJECTION, SOLUTION INTRAVENOUS at 05:16

## 2020-07-12 RX ADMIN — INSULIN LISPRO 6 UNITS: 100 INJECTION, SOLUTION INTRAVENOUS; SUBCUTANEOUS at 20:23

## 2020-07-12 RX ADMIN — LITHIUM CARBONATE 600 MG: 150 CAPSULE, GELATIN COATED ORAL at 21:09

## 2020-07-12 RX ADMIN — MAGNESIUM SULFATE HEPTAHYDRATE 1 G: 1 INJECTION, SOLUTION INTRAVENOUS at 06:17

## 2020-07-12 RX ADMIN — KETOROLAC TROMETHAMINE 15 MG: 30 INJECTION, SOLUTION INTRAMUSCULAR at 12:49

## 2020-07-12 ASSESSMENT — PAIN SCALES - GENERAL
PAINLEVEL_OUTOF10: 10
PAINLEVEL_OUTOF10: 9
PAINLEVEL_OUTOF10: 10
PAINLEVEL_OUTOF10: 9
PAINLEVEL_OUTOF10: 10
PAINLEVEL_OUTOF10: 0

## 2020-07-12 ASSESSMENT — PAIN DESCRIPTION - LOCATION: LOCATION: GROIN

## 2020-07-12 ASSESSMENT — PAIN DESCRIPTION - PAIN TYPE: TYPE: ACUTE PAIN

## 2020-07-12 ASSESSMENT — PAIN SCALES - WONG BAKER: WONGBAKER_NUMERICALRESPONSE: 0

## 2020-07-12 ASSESSMENT — PAIN DESCRIPTION - ORIENTATION: ORIENTATION: LEFT

## 2020-07-12 NOTE — PROGRESS NOTES
Patient admitted to room 440 from ED. Patient oriented to room, bed alarm in place, patient aware of placement and reason. Telemetry box  in place. Bed locked, in lowest position, side rails up 2/4, call light within reach. Will continue to monitor.

## 2020-07-12 NOTE — ED NOTES
to bedside to assess patient. Wishes to remove patient from [facility]. Pt removed.       Jean Marie Garcia RN  07/11/20 2034

## 2020-07-12 NOTE — CONSULTS
Pharmacy to Dose Vancomycin    Dx: sepsis  Goal trough = > 10 mcg/mL  adjusted dosing wt = 92.7 kg  Estimated Creatinine Clearance: 102 mL/min (based on SCr of 0.9 mg/dL). Vancomycin 1500mg IVPB q12h    Vancomycin trough prior to 4th dose (7/14 0100).     Yue IbarraD 07/12/20 1:10 PM

## 2020-07-12 NOTE — PROGRESS NOTES
4 Eyes Skin Assessment     The patient is being assess for  Admission    I agree that 2 RN's have performed a thorough Head to Toe Skin Assessment on the patient. ALL assessment sites listed below have been assessed. Areas assessed by both nurses:   [x]   Head, Face, and Ears   [x]   Shoulders, Back, and Chest  [x]   Arms, Elbows, and Hands   [x]   Coccyx, Sacrum, and Ischum  [x]   Legs, Feet, and Heels        Does the Patient have Skin Breakdown?   Yes a wound was noted on the Admission Assessment and an WOUND LDA was Initiated documentation include the Brenda-wound, Wound Assessment, Measurements, Dressing Treatment, Drainage, and Color\",         Dano Prevention initiated:  Yes   Wound Care Orders initiated:  NA      WOC nurse consulted for Pressure Injury (Stage 3,4, Unstageable, DTI, NWPT, and Complex wounds):  NA      Primary Nurse eSignature: Electronically signed by Leslie Weeks RN on 7/12/2020 at 1:31 AM      **SHARE this note so that the co-signing nurse is able to place an eSignature**    Co-signer eSignature: Electronically signed by Abdoul Avilez RN on 7/12/20 at 2:49 AM EDT

## 2020-07-12 NOTE — ED NOTES

## 2020-07-12 NOTE — PROGRESS NOTES
Cirilo Ward MD   Patient: Miguel A Lopez   0'\"   7/12/20 12:05 PM   [phone] Hospital or Facility: [facility] From: [facility], [facility] RE: Marcos Rolle 1950 RM: 440 please round or call about 440 soon. pt is hollering out. disoriented. silly. chronic back pain and left groin pain. wife has called multiple times. pts lactic is 2.5 and ivf ordered to stop at 0900. pt is ordered npo and bedrest. pt wants up and is a diabetic. please review and advise.  ty Need Callback: NEEDS CALLBACK  Unread

## 2020-07-12 NOTE — PLAN OF CARE
Problem: Pain:  Goal: Pain level will decrease  Description: Pain level will decrease  7/12/2020 0846 by Mannie Dennis RN  Outcome: Ongoing  7/12/2020 0846 by Mannie Dennis RN  Outcome: Ongoing  Goal: Control of acute pain  Description: Control of acute pain  7/12/2020 0846 by Mannie Dennis RN  Outcome: Ongoing  7/12/2020 0846 by Mannie Dennis RN  Outcome: Ongoing  Goal: Control of chronic pain  Description: Control of chronic pain  7/12/2020 0846 by Mannie Dennis RN  Outcome: Ongoing  7/12/2020 0846 by Mannie Dennis RN  Outcome: Ongoing     Problem: Pain:  Goal: Control of acute pain  Description: Control of acute pain  7/12/2020 0846 by Mannie Dennis RN  Outcome: Ongoing  7/12/2020 0846 by Mannie Dennis RN  Outcome: Ongoing     Problem: Pain:  Goal: Control of chronic pain  Description: Control of chronic pain  7/12/2020 0846 by Mannie Dennis RN  Outcome: Ongoing  7/12/2020 0846 by Mannie Dennis RN  Outcome: Ongoing     Problem: Pain:  Goal: Pain level will decrease  Description: Pain level will decrease  7/12/2020 0846 by Mannie Dennis RN  Outcome: Ongoing  7/12/2020 0846 by Mannie Dennis RN  Outcome: Ongoing  Goal: Control of acute pain  Description: Control of acute pain  7/12/2020 0846 by Mannie Dennis RN  Outcome: Ongoing  7/12/2020 0846 by Mannie Dennis RN  Outcome: Ongoing  Goal: Control of chronic pain  Description: Control of chronic pain  7/12/2020 0846 by Mannie Dennis RN  Outcome: Ongoing  7/12/2020 0846 by Mannie Dennis RN  Outcome: Ongoing

## 2020-07-12 NOTE — CONSULTS
Infectious Diseases Inpatient Consult Note      Reason for Consult:  High fevers, sepsis     Requesting Physician:  Jese Aldana     Primary Care Physician:  Yolanda Vega MD    History Obtained From:  Pt and Medical records     CHIEF COMPLAINT:     Chief Complaint   Patient presents with    Groin Pain     Pt reports having 3 surgeries in the last 7 weeks by urology group. Fevers chills and urinary retention     HISTORY OF PRESENT ILLNESS:  71 y.o. man underwent TURP by Dr. Luann Gonzalez on 6/9 for urinary retention and last week had to self catheterize and prior to the surgery had botox injection to the bladder as well. Now presents with groin pain and fevers and chills T max at 105 on admit with encephalopathy and WBC elevation with lactic acidosis urine cx with Enterococcus already. We are consulted for recommendations. Urine cx from  6/9 positive for Serratia noted.          Past Medical History:    Past Medical History:   Diagnosis Date    Anxiety     Arthritis     Asthma     Bipolar 1 disorder ([facility] [address] [address]     Colitis     Depression     Diabetes ([facility]     Diabetes mellitus ([facility] [address] [facility]     Glaucoma     H/O bladder problems     High blood pressure     History of kidney problems     IBS (irritable bowel syndrome)     Liver disease     hx of elevated LFT's    Obesity     Psychiatric problem        Past Surgical History:    Past Surgical History:   Procedure Laterality Date    APPENDECTOMY      BACK SURGERY      CERVICAL FUSION      COLONOSCOPY      EYE SURGERY      HIP SURGERY      JOINT REPLACEMENT Right     THR    OTHER SURGICAL HISTORY  06/06/2018     right L4-5 hemilaminotomy, partial facetectomy, foraminotomy, reexploration and excision of synovial csyt,  Bilateral L2-3 and L3-4 hemilaminotomy, partial facetectomy, foraminotomy    [place] NERVOUS SYSTEM SURGERY UNLISTED Bilateral 11/12/2018    BILATERAL LUMBAR THREE, LUMBAR FOUR, LUMBAR FIVE RADIOFREQUENCY ABLATION SITE CONFIRMED BY FLUOROSCOPY performed by Lynda Mota MD at [facility] DX/THER SBST INTRLMNR CRV/THRC W/IMG GDN Bilateral 9/25/2018    BILATERAL LUMBAR THREE, LUMBAR FOUR, LUMBAR FIVE MEDIAL BRANCH BLOCK SITE CONFIRMED BY FLUOROSCOPY performed by Lynda Mota MD at [facility] DX/THER SBST INTRLMNR CRV/THRC W/IMG GDN Bilateral 10/16/2018    BILATERAL LUMBAR THREE, FOUR, FIVE MEDIAL BRANCH BLOCK SITE CONFIRMED BY FLUOROSCOPY performed by Lynda Mota MD at [address] TURP N/A 6/9/2020    CYSTOSCOPY, TRANSURETHRAL RESECTION OF PROSTATE performed by Roe Kendrick MD at [facility] AT [place] [facility]       Current Medications:    Outpatient Medications Marked as Taking for the 7/11/20 encounter [facility] Encounter)   Medication Sig Dispense Refill    lithium 300 MG capsule TK 2 CS PO BID      lamoTRIgine (LAMICTAL) 25 MG tablet       tamsulosin (FLOMAX) 0.4 MG capsule       oxybutynin (DITROPAN) 5 MG tablet TK 1 T PO  BID      Insulin Degludec 100 UNIT/ML SOPN Inject 10 Units into the skin nightly      verapamil (CALAN SR) 120 MG extended release tablet Take 120 mg by mouth nightly      glimepiride (AMARYL) 2 MG tablet Take 2 mg by mouth every morning (before breakfast)      metFORMIN (GLUCOPHAGE) 500 MG tablet Take 1,000 mg by mouth 2 times daily (with meals)       traZODone (DESYREL) 100 MG tablet Take 300 mg by mouth nightly       doxazosin (CARDURA) 2 MG tablet Take 2 mg by mouth Takes 3 times weekly; takes at night         Allergies:  No known allergies    Immunizations :   Immunization History   Administered Date(s) Administered    Influenza Vaccine, unspecified formulation 10/31/2014, 10/28/2015, 10/04/2016, 10/12/2017    Pneumococcal Conjugate 13-valent ([id number]) 10/28/2015    Zoster Live (Zostavax) 11/01/2014         Social History:    Social History     Tobacco Use    Smoking status: Never Smoker    Smokeless tobacco: Never Used   Substance Use Topics    Alcohol use: No    Drug use: No     Social History     Tobacco Use   Smoking Status Never Smoker   Smokeless Tobacco Never Used      Family History   Problem Relation Age of Onset    Alcohol Abuse Sister     Coronary Art Dis Mother     Obesity Mother     Osteoarthritis Mother     Parkinsonism Mother     Coronary Art Dis Father     Obesity Father          REVIEW OF SYSTEMS:    No fever / chills / sweats. No weight loss. No visual change, eye pain, eye discharge. No oral lesion, sore throat, dysphagia. Denies cough / sputum/Sob   Denies chest pain, palpitations/ dizziness  Denies nausea/ vomiting/abdominal pain/diarrhea. Denies dysuria or change in urinary function. Denies joint swelling or pain. No myalgia, arthralgia. No rashes, skin lesions   Denies focal weakness, sensory change or other neurologic symptoms  No lymph node swelling or tenderness.     Fevers chills and confusion     PHYSICAL EXAM:      Vitals:  T max  105   /72   Pulse 86   Temp 99.6 °F (37.6 °C)   Resp 18   Ht 6' 1\" (1.854 m)   Wt 247 lb 2.2 oz (112.1 kg)   SpO2 97%   BMI 32.61 kg/m²     General Appearance: alert,in some acute distress, + pallor, no icterus slow to respond, Toxic++  Skin: warm and dry, no rash or erythema  Head: normocephalic and atraumatic  Eyes: pupils equal, round, and reactive to light, conjunctivae normal  ENT: tympanic membrane, external ear and ear canal normal bilaterally, nose without deformity, nasal mucosa and turbinates normal without polyps  Neck: supple and non-tender without mass, no thyromegaly  no cervical lymphadenopathy  Pulmonary/Chest: clear to auscultation bilaterally- no wheezes, rales or rhonchi, normal air movement, no respiratory distress  Cardiovascular: Tachy+  normal S1 and S2, no murmurs, rubs, clicks, or gallops, no carotid bruits  Abdomen: soft, non-tender, non-distended, normal bowel sounds, no masses or organomegaly  Extremities: no cyanosis, clubbing or edema  Musculoskeletal: normal range of motion, no joint swelling, deformity or tenderness  Neurologic: reflexes normal and symmetric, no cranial nerve deficitl  Lines:   Stap[place] in place        DATA:    Lab Results   Component Value Date    WBC 43.4 (HH) 07/12/2020    HGB 12.8 (L) 07/12/2020    HCT 38.4 (L) 07/12/2020    MCV 96.2 07/12/2020     07/12/2020     Lab Results   Component Value Date    CREATININE 0.9 07/12/2020    BUN 12 07/12/2020     07/12/2020    K 4.4 07/12/2020     07/12/2020    CO2 23 07/12/2020       Hepatic Function Panel:   Lab Results   Component Value Date    ALKPHOS 64 07/12/2020    ALT 23 07/12/2020    AST 16 07/12/2020    PROT 6.8 07/12/2020    BILITOT 0.7 07/12/2020    LABALBU 3.6 07/12/2020     UA:  Lab Results   Component Value Date    COLORU Yellow 07/11/2020    CLARITYU CLOUDY 07/11/2020    GLUCOSEU 250 07/11/2020    GLUCOSEU NEGATIVE 10/13/2010    BILIRUBINUR Negative 07/11/2020    BILIRUBINUR NEGATIVE 10/13/2010    KETUA TRACE 07/11/2020    SPECGRAV 1.015 07/11/2020    BLOODU SMALL 07/11/2020    PHUR 6.5 07/11/2020    PROTEINU TRACE 07/11/2020    UROBILINOGEN 0.2 07/11/2020    NITRU Negative 07/11/2020    LEUKOCYTESUR SMALL 07/11/2020    LABMICR YES 07/11/2020    URINETYPE NotGiven 07/11/2020      Urine Microscopic:   Lab Results   Component Value Date    LABCAST 1-3 Hyaline 10/13/2010    BACTERIA 4+ 07/11/2020    WBCUA >100 07/11/2020    RBCUA see below 07/11/2020    EPIU 3-5 10/13/2010         Scheduled Meds:   piperacillin-tazobactam  3.375 g Intravenous Q8H    midazolam  2 mg Intramuscular Once    vancomycin (VANCOCIN) intermittent dosing (placeholder)   Other RX Placeholder    vancomycin  1,500 mg Intravenous Q12H    lithium  600 mg Oral BID WC    tamsulosin  0.4 mg Oral Nightly    enoxaparin  40 mg Subcutaneous Daily    insulin glargine  0.25 Units/kg Subcutaneous Nightly    insulin lispro  0.08 Units/kg Subcutaneous TID WC    insulin lispro  0-12 Suggest correlation with urinalysis. 3. No acute intrathoracic findings. 4. Mild cardiomegaly and coronary artery disease. CT HEAD WO CONTRAST   Preliminary Result   Overall exam is limited due to significant motion artifact. There is a   subtle 22 x 11 mm area of gray-white matter differentiation in the left   frontal parietal region raising suspicion for an age-indeterminate infarct. No prior exams are available for comparison. If the patient is able to lie   still, a repeat CT of the head should be considered. Results were called by Dr. Onur Sampson. Aaliyah Noe MD to Jarret Adam on 7/11/2020   at 19:51.         [address]    (Results Pending)       All pertinent images and reports for the current Hospitalization were reviewed by me. IMPRESSION:    Patient Active Problem List   Diagnosis    Enlarged lymph nodes    Spinal stenosis    Degeneration of lumbar or lumbosacral intervertebral disc    Lumbosacral spondylosis without myelopathy    Displacement of lumbar intervertebral disc without myelopathy    Urinary retention    Pyelonephritis    S/P TURP    Metabolic encephalopathy    Type 2 diabetes mellitus ([address]    Essential hypertension    Prostate hyperplasia with urinary obstruction    Sepsis ([address]    Bipolar 1 disorder (HCC)    Asthma    Acute epididymo-orchitis     Sepsis  High fevers  WBC elevation  Lactic acidosis    TRACT infection   Recent TURP and now with urinary issues  UA abnormal   Urine cx Enterococcus likely the culprit   Blood cx in process   Bi polar disorder     Presentation concerning for bacteremia and and await cx and await sensitvities on the Enterococcus     Labs, Microbiology, Radiology and pertinent results from current hospitalization and care every where were reviewed by me as a part of the consultation. PLAN :  1. Cont IV Vancomycin x 1.5 gm Q 12 HRS will cover Enterococcus well   2. D/c Zosyn combination risk for NEVIN   3.  Will add IV Cefepime pending full Blood cx and urine cx results to cover GNR   4. If only Enterococcus this would still be the primary culprit for the fevers  5. Watch for complications   6. Prostate bIopsy now indicating cancer on the path       Discussed with patient/Family and Nursing   Risk of Complications/Morbidity: High      · Illness(es)/ Infection present that pose threat to bodily function. · There is potential for severe exacerbation of infection/side effects of treatment. · Therapy requires intensive monitoring for antimicrobial agent toxicity. Thanks for allowing me to participate in your patient's care please call me with any questions or concerns.     Dr. Jon Pereira MD  [address] [place] [address] Physician  Phone: [phone]   Fax : [phone]

## 2020-07-12 NOTE — PROGRESS NOTES
86   Temp 99.6 °F (37.6 °C)   Resp 18   Ht 6' 1\" (1.854 m)   Wt 247 lb 2.2 oz (112.1 kg)   SpO2 97%   BMI 32.61 kg/m²   General appearance: awake and interactive, no distress, obese body habitus  Eyes: negative findings: conjunctivae and sclerae normal, corneas clear, PERTL  Throat: buccal mucosa pink and moist, oropharynx patent and clear  Neck: No JVD, no carotid bruit, trachea midline, and thyroid not enlarged,   Lungs: BS symmetrically equal, vesicular, no wheezes or rales  Heart: regular rate and rhythm, S1, S2 normal, no murmur, click, rub or gallop  Abdomen: soft, non-tender; bowel sounds normal; no masses, no organomegaly  Extremities: atraumatic, no cyanosis or peripheral edema  MSK: no major deformities of the spine or major joints, no effusions or tenderness,   Skin: warm, dry, no jaundice, no rash, no wounds or ulcers  Lymph nodes: no Cervical, supraclavicular, and axillary adenopathy  Neurologic: Cranial nerves 3-12: normal; no dysarthria sensory: normal; Motor:grossly normal; Coordination: grossly normal  Psych -alert but confused, his speech is impoverished and incoherent, recent memory and insight are significantly impaired      LABS:  Recent Labs     07/11/20  1646 07/12/20 0416   WBC 21.9* 43.4*   HGB 13.8 12.8*   HCT 40.8 38.4*    282                                                                    Recent Labs     07/11/20  1745 07/12/20 0416    139   K 4.3 4.4   CL 99 105   CO2 24 23   BUN 11 12   CREATININE 0.9 0.9   GLUCOSE 281* 252*     Recent Labs     07/11/20  1745 07/12/20  0416   AST 20 16   ALT 28 23   BILITOT 0.6 0.7   ALKPHOS 93 64     Recent Labs     07/11/20 1745   TROPONINI <0.01     No results for input(s): BNP in the last 72 hours. No results for input(s): CHOL, HDL in the last 72 hours.     Invalid input(s): LDLCALCU  Recent Labs     07/12/20 0416   INR 1.28*       Assessment & Plan:    Patient Active Problem List:     Enlarged lymph nodes     Spinal stenosis     Degeneration of lumbar or lumbosacral intervertebral disc     Lumbosacral spondylosis without myelopathy     Displacement of lumbar intervertebral disc without myelopathy     Urinary retention     Pyelonephritis     S/P TURP     Metabolic encephalopathy     Type 2 diabetes mellitus (HCC)     Essential hypertension     Prostate hyperplasia with urinary obstruction     Sepsis ([facility] [address]     Bipolar 1 disorder ([facility] [address]     Asthma        The patient and / or the family were informed of the results of any tests, a time was given to answer questions, a plan was proposed and they agreed with plan. Disposition: Home in 2 to 3 days  Full Code    1. Repeat IV fluid bolus and blood culture, broaden antibiotic coverage to Zosyn and vancomycin, stat chest x-ray, urine antigen test,   2. Haldol 10 mg IM stat, PRN IM midazolam if emergent sedation is deemed necessary, continue the lithium  3. Advance to ADA diet as tolerated subcutaneous insulin regimen for blood sugar target 140-180  4. Permissive hypertension pending MRI brain to better elucidate possibility of acute CVA  5.  Stat testicular US, ID  and urology consult invited    Samaria Kim MD  7/12/2020

## 2020-07-12 NOTE — H&P
Hospital Medicine History & Physical      Patient:  Jacob Bueno  :   1950  MRN:   [id number]  Date of Service: 20    CHIEF COMPLAINT:  Generally ill    HISTORY OF PRESENT ILLNESS:    Jacob Bueno is a 71 y.o. male. He underwent TURP 20 and cystoscopic botox injections of the bladder for spasticity. Follows with Dr. Loida Gutierrez. Has been generally doing well except has had to self-catheterize at home for bladder retention for roughly the past week. Last night he began to feel vaguely ill. Throughout the day today he felt increasingly ill and developed focal pain in the left groin. This prompted his trip to the ER. In the ER he spiked a significant fever to 105 F. He abruptly became quite confused and encephalopathic. Review of Systems:  All pertinent positives and negatives are as noted in the HPI section. All other systems were reviewed and are negative.     Past Medical History:   Diagnosis Date    Anxiety     Arthritis     Asthma     Bipolar 1 disorder ([facility]     Colitis     Depression     Diabetes ([facility]     Diabetes mellitus ([facility]     Glaucoma     H/O bladder problems     High blood pressure     History of kidney problems     IBS (irritable bowel syndrome)     Liver disease     hx of elevated LFT's    Obesity     Psychiatric problem        Past Surgical History:   Procedure Laterality Date    APPENDECTOMY      BACK SURGERY      CERVICAL FUSION      COLONOSCOPY      EYE SURGERY      HIP SURGERY      JOINT REPLACEMENT Right     THR    OTHER SURGICAL HISTORY  2018     right L4-5 hemilaminotomy, partial facetectomy, foraminotomy, reexploration and excision of synovial csyt,  Bilateral L2-3 and L3-4 hemilaminotomy, partial facetectomy, foraminotomy    [facility] UNLIST[facility] Bilateral 2018    BILATERAL LUMBAR THREE, LUMBAR FOUR, LUMBAR FIVE RADIOFREQUENCY ABLATION SITE CONFIRMED BY FLUOROSCOPY performed by Gita Chanel MD at [facility]    MN NJX DX/THER SBST INTRLMNR CRV/THRC W/IMG GDN Bilateral 9/25/2018    BILATERAL LUMBAR THREE, LUMBAR FOUR, LUMBAR FIVE MEDIAL BRANCH BLOCK SITE CONFIRMED BY FLUOROSCOPY performed by Conrado Haines MD at [facility] DX/THER SBST INTRLMNR CRV/THRC W/IMG GDN Bilateral 10/16/2018    BILATERAL LUMBAR THREE, FOUR, FIVE MEDIAL BRANCH BLOCK SITE CONFIRMED BY FLUOROSCOPY performed by Conrado Haines MD at [address] TURP N/A 6/9/2020    CYSTOSCOPY, TRANSURETHRAL RESECTION OF PROSTATE performed by Estefania Dias MD at [facility]         Prior to Admission medications    Medication Sig Start Date End Date Taking? Authorizing Provider   lithium 300 MG capsule TK 2 CS PO BID 5/4/20  Yes Historical Provider, MD   lamoTRIgine (LAMICTAL) 25 MG tablet  6/3/20  Yes Historical Provider, MD   tamsulosin (FLOMAX) 0.4 MG capsule  6/1/20  Yes Historical Provider, MD   oxybutynin (DITROPAN) 5 MG tablet TK 1 T PO  BID 5/4/20  Yes Historical Provider, MD   Insulin Degludec 100 UNIT/ML SOPN Inject 10 Units into the skin nightly 10/31/19  Yes Historical Provider, MD   verapamil (CALAN SR) 120 MG extended release tablet Take 120 mg by mouth nightly   Yes Historical Provider, MD   glimepiride (AMARYL) 2 MG tablet Take 2 mg by mouth every morning (before breakfast)   Yes Historical Provider, MD   metFORMIN (GLUCOPHAGE) 500 MG tablet Take 1,000 mg by mouth 2 times daily (with meals)    Yes Historical Provider, MD   traZODone (DESYREL) 100 MG tablet Take 300 mg by mouth nightly    Yes Historical Provider, MD   doxazosin (CARDURA) 2 MG tablet Take 2 mg by mouth Takes 3 times weekly; takes at night   Yes Historical Provider, MD   losartan (COZAAR) 50 MG tablet Take by mouth    Historical Provider, MD       Allergies:   No known allergies    Social:   reports that he has never smoked. He has never used smokeless tobacco.   reports no history of alcohol use.   Social History     Substance and Sexual Activity   Drug Use No       Family History   Problem Relation Age of Onset    Alcohol Abuse Sister     Coronary Art Dis Mother     Obesity Mother     Osteoarthritis Mother     Parkinsonism Mother     Coronary Art Dis Father     Obesity Father        PHYSICAL EXAM:  I performed this physical examination. Vitals:  Patient Vitals for the past 24 hrs:   BP Temp Temp src Pulse Resp SpO2 Height Weight   07/11/20 2023 (!) 145/64 101.9 °F (38.8 °C) CORE 101 (!) 32 99 % -- --   07/1950 (!) 149/79 102.8 °F (39.3 °C) CORE 106 (!) 36 97 % -- --   07/11/20 1931 (!) 149/77 -- -- 104 (!) 37 95 % -- --   07/11/20 1929 (!) 143/99 -- -- 108 29 97 % -- --   07/11/20 1832 (!) 144/65 -- -- 103 23 95 % -- --   07/11/20 1817 (!) 177/91 -- -- 106 24 94 % -- --   07/11/20 1725 (!) 114/92 105 °F (40.6 °C) CORE 105 26 96 % -- --   07/11/20 1643 -- 103.2 °F (39.6 °C) Oral -- -- -- -- --   07/11/20 1512 138/83 98.1 °F (36.7 °C) Oral 75 20 98 % 6' 1\" (1.854 m) 240 lb (108.9 kg)       Intake/Output Summary (Last 24 hours) at 7/11/2020 2051  Last data filed at 7/11/2020 2008  Gross per 24 hour   Intake 3050 ml   Output --   Net 3050 ml     Room air    GEN:  Appearance:  Age appropriate male. He does appear acutely ill. Having rigors. .    Level of Consciousness:  Alert . Orientation:  Self only. Attempts to answer other orientation questions but gets stuck. HEENT: Sclera anicteric.  no conjunctival chemosis. moist mucus membranes. no specific or diagnostic oral lesions. Mallampati 3 with crowded oropharynx    NECK:  no signs of meningismus. Jugular veins not discernible. Carotid pulses  2+.  no cervical lymphadenopathy. no thyromegaly. CV:  regular rhythm. normal S1 & S2.    no murmur. no rub.  no gallop. PULM:  Chest excursion is symmetric. Breath sounds are vesicular. Adventitious sounds:  none    AB:  Abdominal shape is obese. Bowel sounds are absent.     Generally soft to palpation  Suprapubic tenderness is present. No involuntary guarding. Suprapubic rebound guarding. RECTAL:   :  New vasquez in situ.  10mL balloon. EXTR:  Skin is warm. Capillary refill brisk. no specific or pathognomic rash. no clubbing. no pitting edema. no active wound or ulcer. Arctic sun cooling device has been applied. I requested that this be removed. LABS:  Lab Results   Component Value Date    WBC 21.9 (H) 07/11/2020    HGB 13.8 07/11/2020    HCT 40.8 07/11/2020    MCV 96.2 07/11/2020     07/11/2020     Lab Results   Component Value Date    CREATININE 0.9 07/11/2020    BUN 11 07/11/2020     07/11/2020    K 4.3 07/11/2020    CL 99 07/11/2020    CO2 24 07/11/2020     Lab Results   Component Value Date    ALT 28 07/11/2020    AST 20 07/11/2020    ALKPHOS 93 07/11/2020    BILITOT 0.6 07/11/2020     Lab Results   Component Value Date    TROPONINI <0.01 07/11/2020     No results for input(s): PHART, RSV1SGD, PO2ART in the last 72 hours. IMAGING:  Ct Head Wo Contrast    Result Date: 7/11/2020  EXAMINATION: CT OF THE HEAD WITHOUT CONTRAST  7/11/2020 7:00 pm TECHNIQUE: CT of the head was performed without the administration of intravenous contrast. Dose modulation, iterative reconstruction, and/or weight based adjustment of the mA/kV was utilized to reduce the radiation dose to as low as reasonably achievable. Limited due to streak artifact from dental hardware as well as motion artifact. COMPARISON: None. HISTORY: ORDERING SYSTEM PROVIDED HISTORY: ams TECHNOLOGIST PROVIDED HISTORY: Reason for exam:->ams Has a \"code stroke\" or \"stroke alert\" been called? ->No Reason for Exam: ams,septic Acuity: Acute Type of Exam: Initial FINDINGS: BRAIN/VENTRICLES: There is no acute intracranial hemorrhage, mass effect or midline shift. No abnormal extra-axial fluid collection.   There is subtle loss of the gray-white matter differentiation within the left frontal parietal region measuring approximately 22 x 11 mm (best seen on series 603, image 47). Gray-white matter differentiation is otherwise grossly maintained. There is no evidence of hydrocephalus. ORBITS: The visualized portion of the orbits demonstrate no acute abnormality. SINUSES: The visualized paranasal sinuses and mastoid air cells demonstrate no acute abnormality. SOFT TISSUES/SKULL:  No acute abnormality of the visualized skull or soft tissues. Overall exam is limited due to significant motion artifact. There is a subtle 22 x 11 mm area of gray-white matter differentiation in the left frontal parietal region raising suspicion for an age-indeterminate infarct. No prior exams are available for comparison. If the patient is able to lie still, a repeat CT of the head should be considered. Results were called by Dr. Jimbo Carnes. Sravanthi Alvarado MD to Abdiel Thompson on 7/11/2020 at 19:51. Ct Chest Abdomen Pelvis W Contrast    Result Date: 7/11/2020  EXAMINATION: CT OF THE CHEST, ABDOMEN, AND PELVIS WITH CONTRAST 7/11/2020 7:11 pm TECHNIQUE: CT of the chest, abdomen and pelvis was performed with the administration of intravenous contrast. Multiplanar reformatted images are provided for review. Dose modulation, iterative reconstruction, and/or weight based adjustment of the mA/kV was utilized to reduce the radiation dose to as low as reasonably achievable. COMPARISON: 11/27/2012 HISTORY: ORDERING SYSTEM PROVIDED HISTORY: abd pain, ams, TECHNOLOGIST PROVIDED HISTORY: Reason for exam:->abd pain, ams, Additional Contrast?->None Reason for Exam: patient was unresponsive Acuity: Unknown Type of Exam: Unknown FINDINGS: Chest: Mediastinum: There is no mediastinal adenopathy. There are coronary artery calcifications. The heart is mildly enlarged. The thoracic aorta is normal in caliber. Moderate atherosclerotic plaque. Lungs/pleura: Motion limited evaluation of the lung parenchyma. No evidence of focal consolidation. No pleural effusion or pneumothorax.  Soft Tissues/Bones: No acute or aggressive osseous lesions. Flowing anterior disc osteophyte complexes in the thoracic spine suggests diffuse idiopathic skeletal hyperostosis. Abdomen/Pelvis: Organs: Evaluation of the upper abdomen is limited secondary to metallic streak artifact and respiratory motion. The liver, spleen, pancreas, kidneys and adrenal glands are without acute findings. The gallbladder is present without radiopaque cholelithiasis. There is excreted contrast in the renal collecting systems. Moderate bilateral perinephric stranding is nonspecific. GI/Bowel: There is no mechanical bowel obstruction. The appendix is not definitely identified, but there are no secondary signs of appendicitis in the right lower quadrant. Pelvis: The urinary bladder is nondistended and the bladder wall is thickened. A Stapleton catheter is present. The Stapleton catheter bulb is seen in the region of the prostate. This is of uncertain clinical significance as the patient is status post recent TURP. No abnormal pelvic fluid collection or lymphadenopathy. There is a small fat-containing left inguinal hernia. Unchanged prominence of the seminal vesicles. Peritoneum/Retroperitoneum: Moderate atherosclerotic plaque. No retroperitoneal or mesenteric lymphadenopathy. Bones/Soft Tissues: No acute or aggressive osseous lesions. Status post right total hip arthroplasty. Intrathecal electrodes are noted in the region of the lower thoracic spine. 1. Stapleton catheter bulb is seen in the region of the prostate. As the patient is status post recent TURP, this is of uncertain clinical significance. Clinical correlation is recommended with repositioning as indicated. 2. Circumferential bladder wall thickening. This may be related to nondistention, inflammation, bacterial cystitis or an infiltrative process. Suggest correlation with urinalysis. 3. No acute intrathoracic findings. 4. Mild cardiomegaly and coronary artery disease.        I directly reviewed all recent imaging studies as well as pertinent prior studies. EKG:  New and pertinent prior tracings were directly reviewed. My interpretation is as follows:  Sinus tachycardia. Mild 1st degree AV block. Active Hospital Problems    Diagnosis Date Noted    Pyelonephritis [N12] 07/11/2020    S/P TURP [Z90.79] 30/48/4478    Metabolic encephalopathy [N03.71] 07/11/2020    Type 2 diabetes mellitus ([facility] [E11.9] 07/11/2020    Essential hypertension [I10] 07/11/2020    Prostate hyperplasia with urinary obstruction [N40.1, N13.8] 07/11/2020       ASSESSMENT & PLAN  Acute cystitis and pyelonephritis, borderline sepsis  -  Received ~3300 mL NS as a bolus in the ER. Continue with LR @ 150mL/hr overnight.  -  Received ceftriaxone 1g IV. Loaded with an additional 1g now (total of 2g) and continue 1g q12h. -  Most recent urine cultures grew serratia. On the basis of the resistance pattern, if the patient deteriorates, a change to cefepime, zosyn, a carbapenem, or fluoroquinolone are options. -  Avoid external and internal cooling of endogenous hyperthermia (fever). Multiple antipyretics made available. Likely WALKER  -  Keep HOB elevated. O2 prn. Type 2 DM  -  Hold all oral antidiabetic agents. Start s.c. Insulin regimen based on weight. DVT prophylaxis: SCDs, lovenox  Code Status:  Full  Disposition:  Inpatient.     Wilder Gottlieb MD

## 2020-07-13 LAB
A/G RATIO: 1 (ref 1.1–2.2)
ALBUMIN SERPL-MCNC: 2.9 G/DL (ref 3.4–5)
ALP BLD-CCNC: 75 U/L (ref 40–129)
ALT SERPL-CCNC: 28 U/L (ref 10–40)
ANION GAP SERPL CALCULATED.3IONS-SCNC: 9 MMOL/L (ref 3–16)
AST SERPL-CCNC: 25 U/L (ref 15–37)
BASOPHILS ABSOLUTE: 0.1 K/UL (ref 0–0.2)
BASOPHILS RELATIVE PERCENT: 0.2 %
BILIRUB SERPL-MCNC: 0.7 MG/DL (ref 0–1)
BUN BLDV-MCNC: 9 MG/DL (ref 7–20)
CALCIUM SERPL-MCNC: 8.5 MG/DL (ref 8.3–10.6)
CHLORIDE BLD-SCNC: 108 MMOL/L (ref 99–110)
CO2: 21 MMOL/L (ref 21–32)
CREAT SERPL-MCNC: 0.8 MG/DL (ref 0.8–1.3)
EOSINOPHILS ABSOLUTE: 0.1 K/UL (ref 0–0.6)
EOSINOPHILS RELATIVE PERCENT: 0.2 %
ESTIMATED AVERAGE GLUCOSE: 154.2 MG/DL
GFR AFRICAN AMERICAN: >60
GFR NON-AFRICAN AMERICAN: >60
GLOBULIN: 2.8 G/DL
GLUCOSE BLD-MCNC: 184 MG/DL (ref 70–99)
GLUCOSE BLD-MCNC: 197 MG/DL (ref 70–99)
GLUCOSE BLD-MCNC: 216 MG/DL (ref 70–99)
GLUCOSE BLD-MCNC: 219 MG/DL (ref 70–99)
GLUCOSE BLD-MCNC: 255 MG/DL (ref 70–99)
HBA1C MFR BLD: 7 %
HCT VFR BLD CALC: 33.4 % (ref 40.5–52.5)
HEMOGLOBIN: 11.1 G/DL (ref 13.5–17.5)
INR BLD: 1.41 (ref 0.86–1.14)
L. PNEUMOPHILA SEROGP 1 UR AG: NORMAL
LACTIC ACID: 1 MMOL/L (ref 0.4–2)
LYMPHOCYTES ABSOLUTE: 1.2 K/UL (ref 1–5.1)
LYMPHOCYTES RELATIVE PERCENT: 4.3 %
MAGNESIUM: 2 MG/DL (ref 1.8–2.4)
MCH RBC QN AUTO: 31.9 PG (ref 26–34)
MCHC RBC AUTO-ENTMCNC: 33.3 G/DL (ref 31–36)
MCV RBC AUTO: 96 FL (ref 80–100)
MONOCYTES ABSOLUTE: 1.1 K/UL (ref 0–1.3)
MONOCYTES RELATIVE PERCENT: 4 %
NEUTROPHILS ABSOLUTE: 26.2 K/UL (ref 1.7–7.7)
NEUTROPHILS RELATIVE PERCENT: 91.3 %
ORGANISM: ABNORMAL
PDW BLD-RTO: 13.4 % (ref 12.4–15.4)
PERFORMED ON: ABNORMAL
PHOSPHORUS: 2.2 MG/DL (ref 2.5–4.9)
PLATELET # BLD: 233 K/UL (ref 135–450)
PMV BLD AUTO: 7.5 FL (ref 5–10.5)
POTASSIUM SERPL-SCNC: 3.9 MMOL/L (ref 3.5–5.1)
PROTHROMBIN TIME: 16.4 SEC (ref 10–13.2)
RBC # BLD: 3.48 M/UL (ref 4.2–5.9)
SODIUM BLD-SCNC: 138 MMOL/L (ref 136–145)
STREP PNEUMONIAE ANTIGEN, URINE: NORMAL
TOTAL PROTEIN: 5.7 G/DL (ref 6.4–8.2)
TSH SERPL DL<=0.05 MIU/L-ACNC: 1.64 UIU/ML (ref 0.27–4.2)
URINE CULTURE, ROUTINE: ABNORMAL
VITAMIN B-12: 737 PG/ML (ref 211–911)
WBC # BLD: 28.6 K/UL (ref 4–11)

## 2020-07-13 PROCEDURE — 86780 TREPONEMA PALLIDUM: CPT

## 2020-07-13 PROCEDURE — 6360000002 HC RX W HCPCS: Performed by: INTERNAL MEDICINE

## 2020-07-13 PROCEDURE — 2060000000 HC ICU INTERMEDIATE R&B

## 2020-07-13 PROCEDURE — 85610 PROTHROMBIN TIME: CPT

## 2020-07-13 PROCEDURE — 84443 ASSAY THYROID STIM HORMONE: CPT

## 2020-07-13 PROCEDURE — 87390 HIV-1 AG IA: CPT

## 2020-07-13 PROCEDURE — 80053 COMPREHEN METABOLIC PANEL: CPT

## 2020-07-13 PROCEDURE — 6370000000 HC RX 637 (ALT 250 FOR IP): Performed by: INTERNAL MEDICINE

## 2020-07-13 PROCEDURE — 36415 COLL VENOUS BLD VENIPUNCTURE: CPT

## 2020-07-13 PROCEDURE — 2580000003 HC RX 258: Performed by: INTERNAL MEDICINE

## 2020-07-13 PROCEDURE — 85025 COMPLETE CBC W/AUTO DIFF WBC: CPT

## 2020-07-13 PROCEDURE — 82607 VITAMIN B-12: CPT

## 2020-07-13 PROCEDURE — 86702 HIV-2 ANTIBODY: CPT

## 2020-07-13 PROCEDURE — 83605 ASSAY OF LACTIC ACID: CPT

## 2020-07-13 PROCEDURE — 86701 HIV-1ANTIBODY: CPT

## 2020-07-13 PROCEDURE — 84100 ASSAY OF PHOSPHORUS: CPT

## 2020-07-13 PROCEDURE — 83735 ASSAY OF MAGNESIUM: CPT

## 2020-07-13 RX ORDER — OXYCODONE HCL 10 MG/1
10 TABLET, FILM COATED, EXTENDED RELEASE ORAL EVERY 12 HOURS SCHEDULED
Status: DISCONTINUED | OUTPATIENT
Start: 2020-07-13 | End: 2020-07-15

## 2020-07-13 RX ORDER — THIAMINE HYDROCHLORIDE 100 MG/ML
200 INJECTION, SOLUTION INTRAMUSCULAR; INTRAVENOUS EVERY 8 HOURS
Status: DISCONTINUED | OUTPATIENT
Start: 2020-07-13 | End: 2020-07-13 | Stop reason: SDUPTHER

## 2020-07-13 RX ADMIN — CEFEPIME 2 G: 2 INJECTION, POWDER, FOR SOLUTION INTRAVENOUS at 13:21

## 2020-07-13 RX ADMIN — KETOROLAC TROMETHAMINE 15 MG: 30 INJECTION, SOLUTION INTRAMUSCULAR at 11:45

## 2020-07-13 RX ADMIN — THIAMINE HYDROCHLORIDE 200 MG: 100 INJECTION, SOLUTION INTRAMUSCULAR; INTRAVENOUS at 22:05

## 2020-07-13 RX ADMIN — THIAMINE HYDROCHLORIDE 200 MG: 100 INJECTION, SOLUTION INTRAMUSCULAR; INTRAVENOUS at 15:23

## 2020-07-13 RX ADMIN — VANCOMYCIN HYDROCHLORIDE 1.5 G: 10 INJECTION, POWDER, LYOPHILIZED, FOR SOLUTION INTRAVENOUS at 03:11

## 2020-07-13 RX ADMIN — LITHIUM CARBONATE 600 MG: 150 CAPSULE, GELATIN COATED ORAL at 17:40

## 2020-07-13 RX ADMIN — PIPERACILLIN AND TAZOBACTAM 3.38 G: 3; .375 INJECTION, POWDER, LYOPHILIZED, FOR SOLUTION INTRAVENOUS at 06:17

## 2020-07-13 RX ADMIN — INSULIN LISPRO 2 UNITS: 100 INJECTION, SOLUTION INTRAVENOUS; SUBCUTANEOUS at 08:59

## 2020-07-13 RX ADMIN — INSULIN GLARGINE 27 UNITS: 100 INJECTION, SOLUTION SUBCUTANEOUS at 20:48

## 2020-07-13 RX ADMIN — SODIUM CHLORIDE: 9 INJECTION, SOLUTION INTRAVENOUS at 08:58

## 2020-07-13 RX ADMIN — INSULIN LISPRO 6 UNITS: 100 INJECTION, SOLUTION INTRAVENOUS; SUBCUTANEOUS at 12:28

## 2020-07-13 RX ADMIN — INSULIN LISPRO 9 UNITS: 100 INJECTION, SOLUTION INTRAVENOUS; SUBCUTANEOUS at 12:28

## 2020-07-13 RX ADMIN — INSULIN LISPRO 9 UNITS: 100 INJECTION, SOLUTION INTRAVENOUS; SUBCUTANEOUS at 17:40

## 2020-07-13 RX ADMIN — SODIUM CHLORIDE: 9 INJECTION, SOLUTION INTRAVENOUS at 17:39

## 2020-07-13 RX ADMIN — ENOXAPARIN SODIUM 40 MG: 40 INJECTION SUBCUTANEOUS at 08:59

## 2020-07-13 RX ADMIN — INSULIN LISPRO 1 UNITS: 100 INJECTION, SOLUTION INTRAVENOUS; SUBCUTANEOUS at 20:49

## 2020-07-13 RX ADMIN — OXYCODONE HYDROCHLORIDE 10 MG: 10 TABLET, FILM COATED, EXTENDED RELEASE ORAL at 11:45

## 2020-07-13 RX ADMIN — TAMSULOSIN HYDROCHLORIDE 0.4 MG: 0.4 CAPSULE ORAL at 20:48

## 2020-07-13 RX ADMIN — VANCOMYCIN HYDROCHLORIDE 1.5 G: 10 INJECTION, POWDER, LYOPHILIZED, FOR SOLUTION INTRAVENOUS at 16:08

## 2020-07-13 RX ADMIN — KETOROLAC TROMETHAMINE 15 MG: 30 INJECTION, SOLUTION INTRAMUSCULAR at 20:48

## 2020-07-13 RX ADMIN — CEFEPIME 2 G: 2 INJECTION, POWDER, FOR SOLUTION INTRAVENOUS at 23:31

## 2020-07-13 RX ADMIN — INSULIN LISPRO 4 UNITS: 100 INJECTION, SOLUTION INTRAVENOUS; SUBCUTANEOUS at 17:40

## 2020-07-13 RX ADMIN — SODIUM CHLORIDE: 9 INJECTION, SOLUTION INTRAVENOUS at 01:43

## 2020-07-13 RX ADMIN — OXYCODONE HYDROCHLORIDE 10 MG: 10 TABLET, FILM COATED, EXTENDED RELEASE ORAL at 20:48

## 2020-07-13 RX ADMIN — LITHIUM CARBONATE 600 MG: 150 CAPSULE, GELATIN COATED ORAL at 08:58

## 2020-07-13 ASSESSMENT — PAIN SCALES - GENERAL
PAINLEVEL_OUTOF10: 3
PAINLEVEL_OUTOF10: 0
PAINLEVEL_OUTOF10: 8
PAINLEVEL_OUTOF10: 7
PAINLEVEL_OUTOF10: 7

## 2020-07-13 NOTE — PROGRESS NOTES
7/12/20 @ 20:15 left msg for Urology consult with [facility]. Dr. Emilee khalil.  Giancarlo Masters

## 2020-07-13 NOTE — PROGRESS NOTES
2105: Ultrasound completed. 2200: Radiologist requested to talk to MD about results. 2212: Dr. Wesley Pritchett spoke to radiologist. Concerns for small gas pockets in the scrotal skin. Order for stat CT pelvis. 2244: Dr. Wesley Pritchett reviewed CT and did not see \"anything indicative of kaleb gangrene\" No new orders at this time.

## 2020-07-13 NOTE — CONSULTS
Urology Consult Note      Reason for Consult:  orchitis    History:   Pt is a 72 yo male admitted for orchitis. He is s/p TURP last month and was discharged home with his vasquez after failed voiding trial.  He presented with high fevers and left testicle pain. Yesterday he developed altered mental status. WBC had jumped from 21 to 43. He was changed to vancomycin and zosyn. WBC down this morning to 28. Scrotal u/s showed extensive left sided scrotal edema and a question of gas in the scrotum. CT pelvis showed no evidence of necrotizing infection, but suggested the vasquez balloon was in the prostatic urethra. His bladder was not distended. Vasquez was changed on admission. He is shaky this morning and c/o left sided testicular pain. Meds: see med rec  Family History, Social History, Review of Systems:  Reviewed and agreed to as per chart    Exam:    Vitals:  /71   Pulse 79   Temp 98.2 °F (36.8 °C) (Oral)   Resp 18   Ht 6' 1\" (1.854 m)   Wt 261 lb 11 oz (118.7 kg)   SpO2 95%   BMI 34.53 kg/m²   Temp  Av °F (37.8 °C)  Min: 98.2 °F (36.8 °C)  Max: 103.1 °F (39.5 °C)    Intake/Output Summary (Last 24 hours) at 2020 0854  Last data filed at 2020 0510  Gross per 24 hour   Intake 4653 ml   Output 1210 ml   Net 3443 ml       Physical:    Well developed, well nourished in no acute distress   Mood indicates no abnormalities. Pt doesnt appear depressed   Orientated to time and place   Neck is supple, trachea is midline   Respiratory effort is normal   Cardiovascular show no extremity swelling   Abdomen no masses or hernias are palpated, there is no tenderness. Liver and Spleen appear normal.   Skin show no abnormal lesions   Lymph nodes are not palpated in the inguinal, neck, or axillary area.      Male :   Penis appears mildly swollen normal and circumcised   Urethral meatus is normal in size and location   16F temperature probe vasquez in place   I manipulated his vasquez and pushed it further back in   Clear yellow urine in bag   Right testicle appear normal in size and location   Left side of testicle is tender, swollen, and firm.   No fluctuant area or crepitus   Skin is erythematous      Labs:  WBC:    Lab Results   Component Value Date    WBC 28.6 07/13/2020     Hemoglobin/Hematocrit:    Lab Results   Component Value Date    HGB 11.1 07/13/2020    HCT 33.4 07/13/2020     BMP:    Lab Results   Component Value Date     07/13/2020    K 3.9 07/13/2020    K 4.3 06/10/2020     07/13/2020    CO2 21 07/13/2020    BUN 9 07/13/2020    LABALBU 2.9 07/13/2020    CREATININE 0.8 07/13/2020    CALCIUM 8.5 07/13/2020    GFRAA >60 07/13/2020    GFRAA >60 10/21/2010    LABGLOM >60 07/13/2020     PT/INR:    Lab Results   Component Value Date    PROTIME 16.4 07/13/2020    INR 1.41 07/13/2020     PTT:    Lab Results   Component Value Date    APTT 29.6 10/13/2010   [APTT    Urinalysis:     Imaging:    EXAMINATION:    ULTRASOUND OF THE SCROTUM/TESTICLES WITH COLOR DOPPLER FLOW EVALUATION         7/12/2020         COMPARISON:    None.         HISTORY:    ORDERING SYSTEM PROVIDED HISTORY: pain and swelling    TECHNOLOGIST PROVIDED HISTORY:    Reason for exam:->pain and swelling         Initial encounter         FINDINGS:         Measurements:         Right testicle: 2.6 x 2.3 x 2.6 cm         Left testicle: 4.5 x 2.8 x 2.2 cm              Right:         Grey scale:  The right testicle demonstrates normal homogeneous echotexture    without focal lesion.  No evidence of testicular microlithiasis.         Doppler Evaluation:  There is normal arterial and venous Doppler flow within    the testicle.         Scrotal Sac:  Extensive subcutaneous edema and skin thickening.  Small    hydrocele.         Epididymis:  No definite acute abnormality but otherwise is suboptimally    visualized.              Left:         Grey scale:  The left testicle demonstrates normal homogeneous echotexture without focal lesion.  No evidence of testicular microlithiasis.         Doppler Evaluation:  There is likely normal arterial and venous Doppler flow    within the testicle.  However, Doppler images are slightly suboptimal due to    overlying edema and positioning of the left testis.         Scrotal Sac:  Moderate complex hydrocele with internal septations.  Again    noted is extensive subcutaneous edema and skin thickening.  There is    shadowing on a few of the images of the left scrotal sac.         Epididymis:  No acute abnormality.              Impression    1. Extensive subcutaneous edema and skin thickening involving the scrotum. Questionable shadowing is noted on a couple of the images of the left scrotal    sac and gas cannot be excluded.  Recommend dedicated CT of the pelvis for    further evaluation. 2. There is probable normal Doppler flow within left testis but difficult to    appreciate on the images provided due to positioning of the left testis and    overlying edema.  If there is concern for torsion recommend a follow-up    ultrasound if and when acute issues arise. 3. Small right hydrocele with a moderate complex left hydrocele with    septations. Critical results were called by Dr. Solomon Delarosa. Qi Degroot MD to Dr. Villavicencio Adjutant on    7/12/2020 at 22:11.                EXAMINATION:    CT OF THE PELVIS WITHOUT CONTRAST 7/12/2020 10:36 pm         TECHNIQUE:    CT of the pelvis was performed without the administration of intravenous    contrast.  Multiplanar reformatted images are provided for review.  Dose    modulation, iterative reconstruction, and/or weight based adjustment of the    mA/kV was utilized to reduce the radiation dose to as low as reasonably    achievable.         COMPARISON:    CT chest/abdomen/pelvis 7/11/2020         HISTORY:    ORDERING SYSTEM PROVIDED HISTORY: Sepsis thought to originate from urinary    tract.  Does have left groin pain and scrotal edema.  U/S concerning for possible small gas pockets in the scrotal skin.  Possible Charanjit    TECHNOLOGIST PROVIDED HISTORY:    Reason for exam:->Sepsis thought to originate from urinary tract.  Does have    left groin pain and scrotal edema.  U/S concerning for possible small gas    pockets in the scrotal skin.  Possible Charanjit    Additional Contrast?->None    Reason for Exam: Sepsis thought to originate from urinary tract.  Does have    left groin pain and scrotal edema.  U/S concerning for possible small gas    pockets in the scrotal skin.  Possible Charanjit    Acuity: Acute    Type of Exam: Initial         FINDINGS:    INTRAPELVIC CONTENTS: Distal abdominal aortic and iliac atherosclerosis with    no aneurysm formation.  There is mild circumferential thickening of the    distal sigmoid colon and rectum.  Mild retroperitoneal edema.  No ascites. No free peritoneal/retroperitoneal air.  No abscess.  No significant enlarged    iliac chain lymph nodes.  A Stapleton catheter is present at the base of the    bladder with decompression.  There is a small internal gas focus. Nonspecific bladder wall thickening.  The Stapleton catheter balloon may be    partially inflated within the proximal prosthetic urethra.         BODY WALL/MUSCULATURE:  No significant enlarged inguinal lymph nodes.  The    scrotum is not completely imaged on the exam.  There is no perineal gas foci    or air-fluid level.  There are edematous changes present.  The body wall    musculature demonstrates no acute abnormality.         OSSEOUS STRUCTURES: Lower lumbar degenerative disc and joint disease.  The    bilateral sacroiliac joints demonstrate osteoarthritis and partial ankylosis. The left hip demonstrates normal alignment with mild osteoarthritis.  Prior    right hip arthroplasty.  No acute fracture.              Impression    1. Stapleton catheter in place.  The proximal portion of the balloon may be    inflated within the proximal portion of the prostatic urethra.

## 2020-07-14 PROBLEM — T40.2X5A CONSTIPATION DUE TO OPIOID THERAPY: Status: ACTIVE | Noted: 2020-07-14

## 2020-07-14 PROBLEM — K59.03 CONSTIPATION DUE TO OPIOID THERAPY: Status: ACTIVE | Noted: 2020-07-14

## 2020-07-14 LAB
A/G RATIO: 0.9 (ref 1.1–2.2)
ALBUMIN SERPL-MCNC: 2.9 G/DL (ref 3.4–5)
ALP BLD-CCNC: 96 U/L (ref 40–129)
ALT SERPL-CCNC: 22 U/L (ref 10–40)
ANION GAP SERPL CALCULATED.3IONS-SCNC: 11 MMOL/L (ref 3–16)
AST SERPL-CCNC: 16 U/L (ref 15–37)
BASOPHILS ABSOLUTE: 0.1 K/UL (ref 0–0.2)
BASOPHILS RELATIVE PERCENT: 0.5 %
BILIRUB SERPL-MCNC: 0.7 MG/DL (ref 0–1)
BUN BLDV-MCNC: 7 MG/DL (ref 7–20)
CALCIUM SERPL-MCNC: 9 MG/DL (ref 8.3–10.6)
CHLORIDE BLD-SCNC: 109 MMOL/L (ref 99–110)
CO2: 18 MMOL/L (ref 21–32)
CREAT SERPL-MCNC: 0.6 MG/DL (ref 0.8–1.3)
EOSINOPHILS ABSOLUTE: 0.2 K/UL (ref 0–0.6)
EOSINOPHILS RELATIVE PERCENT: 0.8 %
GFR AFRICAN AMERICAN: >60
GFR NON-AFRICAN AMERICAN: >60
GLOBULIN: 3.4 G/DL
GLUCOSE BLD-MCNC: 172 MG/DL (ref 70–99)
GLUCOSE BLD-MCNC: 172 MG/DL (ref 70–99)
GLUCOSE BLD-MCNC: 180 MG/DL (ref 70–99)
GLUCOSE BLD-MCNC: 183 MG/DL (ref 70–99)
GLUCOSE BLD-MCNC: 191 MG/DL (ref 70–99)
HCT VFR BLD CALC: 35.2 % (ref 40.5–52.5)
HEMOGLOBIN: 11.3 G/DL (ref 13.5–17.5)
INR BLD: 1.21 (ref 0.86–1.14)
LAMOTRIGINE LEVEL: <0.9 UG/ML (ref 2.5–15)
LITHIUM DOSE AMOUNT: NORMAL
LITHIUM LEVEL: 0.7 MMOL/L (ref 0.6–1.2)
LYMPHOCYTES ABSOLUTE: 1.5 K/UL (ref 1–5.1)
LYMPHOCYTES RELATIVE PERCENT: 5.3 %
MAGNESIUM: 2.1 MG/DL (ref 1.8–2.4)
MCH RBC QN AUTO: 31 PG (ref 26–34)
MCHC RBC AUTO-ENTMCNC: 32.2 G/DL (ref 31–36)
MCV RBC AUTO: 96.3 FL (ref 80–100)
MONOCYTES ABSOLUTE: 0.8 K/UL (ref 0–1.3)
MONOCYTES RELATIVE PERCENT: 3 %
NEUTROPHILS ABSOLUTE: 25.1 K/UL (ref 1.7–7.7)
NEUTROPHILS RELATIVE PERCENT: 90.4 %
PDW BLD-RTO: 13.4 % (ref 12.4–15.4)
PERFORMED ON: ABNORMAL
PHOSPHORUS: 1.9 MG/DL (ref 2.5–4.9)
PLATELET # BLD: 243 K/UL (ref 135–450)
PMV BLD AUTO: 7.7 FL (ref 5–10.5)
POTASSIUM SERPL-SCNC: 4 MMOL/L (ref 3.5–5.1)
PROTHROMBIN TIME: 14.1 SEC (ref 10–13.2)
RBC # BLD: 3.65 M/UL (ref 4.2–5.9)
SODIUM BLD-SCNC: 138 MMOL/L (ref 136–145)
TOTAL PROTEIN: 6.3 G/DL (ref 6.4–8.2)
VANCOMYCIN TROUGH: 8 UG/ML (ref 10–20)
WBC # BLD: 27.8 K/UL (ref 4–11)

## 2020-07-14 PROCEDURE — 97110 THERAPEUTIC EXERCISES: CPT

## 2020-07-14 PROCEDURE — 80178 ASSAY OF LITHIUM: CPT

## 2020-07-14 PROCEDURE — 80053 COMPREHEN METABOLIC PANEL: CPT

## 2020-07-14 PROCEDURE — 85610 PROTHROMBIN TIME: CPT

## 2020-07-14 PROCEDURE — 2060000000 HC ICU INTERMEDIATE R&B

## 2020-07-14 PROCEDURE — 97166 OT EVAL MOD COMPLEX 45 MIN: CPT

## 2020-07-14 PROCEDURE — 2580000003 HC RX 258: Performed by: INTERNAL MEDICINE

## 2020-07-14 PROCEDURE — 84100 ASSAY OF PHOSPHORUS: CPT

## 2020-07-14 PROCEDURE — 99232 SBSQ HOSP IP/OBS MODERATE 35: CPT | Performed by: INTERNAL MEDICINE

## 2020-07-14 PROCEDURE — 80202 ASSAY OF VANCOMYCIN: CPT

## 2020-07-14 PROCEDURE — 6360000002 HC RX W HCPCS: Performed by: INTERNAL MEDICINE

## 2020-07-14 PROCEDURE — 6370000000 HC RX 637 (ALT 250 FOR IP): Performed by: INTERNAL MEDICINE

## 2020-07-14 PROCEDURE — 97116 GAIT TRAINING THERAPY: CPT

## 2020-07-14 PROCEDURE — 85025 COMPLETE CBC W/AUTO DIFF WBC: CPT

## 2020-07-14 PROCEDURE — 97162 PT EVAL MOD COMPLEX 30 MIN: CPT

## 2020-07-14 PROCEDURE — 83735 ASSAY OF MAGNESIUM: CPT

## 2020-07-14 RX ORDER — POLYETHYLENE GLYCOL 3350 17 G/17G
17 POWDER, FOR SOLUTION ORAL 2 TIMES DAILY
Status: DISCONTINUED | OUTPATIENT
Start: 2020-07-14 | End: 2020-07-17 | Stop reason: HOSPADM

## 2020-07-14 RX ORDER — MAGNESIUM CARB/ALUMINUM HYDROX 105-160MG
30 TABLET,CHEWABLE ORAL DAILY
Status: DISPENSED | OUTPATIENT
Start: 2020-07-14 | End: 2020-07-17

## 2020-07-14 RX ADMIN — OXYCODONE HYDROCHLORIDE 10 MG: 10 TABLET, FILM COATED, EXTENDED RELEASE ORAL at 08:57

## 2020-07-14 RX ADMIN — OXYCODONE HYDROCHLORIDE 10 MG: 10 TABLET, FILM COATED, EXTENDED RELEASE ORAL at 21:53

## 2020-07-14 RX ADMIN — AMPICILLIN SODIUM 2 G: 2 INJECTION, POWDER, FOR SOLUTION INTRAMUSCULAR; INTRAVENOUS at 17:44

## 2020-07-14 RX ADMIN — LITHIUM CARBONATE 600 MG: 150 CAPSULE, GELATIN COATED ORAL at 08:57

## 2020-07-14 RX ADMIN — THIAMINE HYDROCHLORIDE 200 MG: 100 INJECTION, SOLUTION INTRAMUSCULAR; INTRAVENOUS at 21:53

## 2020-07-14 RX ADMIN — INSULIN LISPRO 2 UNITS: 100 INJECTION, SOLUTION INTRAVENOUS; SUBCUTANEOUS at 17:52

## 2020-07-14 RX ADMIN — MAGNESIUM CITRATE 296 ML: 1.75 LIQUID ORAL at 16:06

## 2020-07-14 RX ADMIN — INSULIN LISPRO 9 UNITS: 100 INJECTION, SOLUTION INTRAVENOUS; SUBCUTANEOUS at 17:52

## 2020-07-14 RX ADMIN — INSULIN LISPRO 2 UNITS: 100 INJECTION, SOLUTION INTRAVENOUS; SUBCUTANEOUS at 11:57

## 2020-07-14 RX ADMIN — VANCOMYCIN HYDROCHLORIDE 1.5 G: 10 INJECTION, POWDER, LYOPHILIZED, FOR SOLUTION INTRAVENOUS at 03:37

## 2020-07-14 RX ADMIN — POLYETHYLENE GLYCOL 3350 17 G: 17 POWDER, FOR SOLUTION ORAL at 16:07

## 2020-07-14 RX ADMIN — CEFEPIME 2 G: 2 INJECTION, POWDER, FOR SOLUTION INTRAVENOUS at 11:45

## 2020-07-14 RX ADMIN — INSULIN LISPRO 9 UNITS: 100 INJECTION, SOLUTION INTRAVENOUS; SUBCUTANEOUS at 09:05

## 2020-07-14 RX ADMIN — METHYLNALTREXONE BROMIDE 12 MG: 12 INJECTION, SOLUTION SUBCUTANEOUS at 16:06

## 2020-07-14 RX ADMIN — LITHIUM CARBONATE 600 MG: 150 CAPSULE, GELATIN COATED ORAL at 17:51

## 2020-07-14 RX ADMIN — ENOXAPARIN SODIUM 40 MG: 40 INJECTION SUBCUTANEOUS at 08:57

## 2020-07-14 RX ADMIN — MINERAL OIL 30 ML: 471.95 OIL ORAL at 16:07

## 2020-07-14 RX ADMIN — INSULIN LISPRO 2 UNITS: 100 INJECTION, SOLUTION INTRAVENOUS; SUBCUTANEOUS at 09:05

## 2020-07-14 RX ADMIN — THIAMINE HYDROCHLORIDE 200 MG: 100 INJECTION, SOLUTION INTRAMUSCULAR; INTRAVENOUS at 05:40

## 2020-07-14 RX ADMIN — SODIUM CHLORIDE: 9 INJECTION, SOLUTION INTRAVENOUS at 21:54

## 2020-07-14 RX ADMIN — TAMSULOSIN HYDROCHLORIDE 0.4 MG: 0.4 CAPSULE ORAL at 21:53

## 2020-07-14 RX ADMIN — SODIUM CHLORIDE: 9 INJECTION, SOLUTION INTRAVENOUS at 08:57

## 2020-07-14 RX ADMIN — INSULIN GLARGINE 27 UNITS: 100 INJECTION, SOLUTION SUBCUTANEOUS at 21:54

## 2020-07-14 RX ADMIN — INSULIN LISPRO 9 UNITS: 100 INJECTION, SOLUTION INTRAVENOUS; SUBCUTANEOUS at 11:57

## 2020-07-14 RX ADMIN — SODIUM CHLORIDE: 9 INJECTION, SOLUTION INTRAVENOUS at 01:55

## 2020-07-14 RX ADMIN — INSULIN LISPRO 1 UNITS: 100 INJECTION, SOLUTION INTRAVENOUS; SUBCUTANEOUS at 21:54

## 2020-07-14 ASSESSMENT — PAIN SCALES - GENERAL: PAINLEVEL_OUTOF10: 5

## 2020-07-14 NOTE — FLOWSHEET NOTE
07/14/20 0853   Vital Signs   Temp 97.9 °F (36.6 °C)   Temp Source Oral   Pulse 80   Heart Rate Source Monitor   Resp 20   BP (!) 149/80   BP Location Left upper arm   BP Upper/Lower Upper   MAP (mmHg) 103   Patient Position Semi fowlers   Level of Consciousness 0   MEWS Score 1   Patient Currently in Pain Denies   Pain Assessment   Pain Assessment 0-10   Oxygen Therapy   SpO2 96 %   O2 Device None (Room air)

## 2020-07-14 NOTE — DISCHARGE INSTR - COC
RESECTION OF PROSTATE performed by Moira Cowan MD at [facility] 1       Immunization History:   Immunization History   Administered Date(s) Administered    Influenza Vaccine, unspecified formulation 10/31/2014, 10/28/2015, 10/04/2016, 10/12/2017    Pneumococcal Conjugate 13-valent (Eryn Doheny) 10/28/2015    Zoster Live (Zostavax) 11/01/2014       Active Problems:  Patient Active Problem List   Diagnosis Code    Enlarged lymph nodes R59.9    Spinal stenosis M48.00    Degeneration of lumbar or lumbosacral intervertebral disc M51.37    Lumbosacral spondylosis without myelopathy M47.817    Displacement of lumbar intervertebral disc without myelopathy M51.26    Urinary retention R33.9    Pyelonephritis N12    S/P TURP X51.15    Metabolic encephalopathy Q97.50    Type 2 diabetes mellitus ([facility] [address] E11.9    Essential hypertension I10    Prostate hyperplasia with urinary obstruction N40.1, N13.8    Sepsis ([facility] [address] A41.9    Bipolar 1 disorder ([facility] [address] F31.9    Asthma J45.909    Acute epididymo-orchitis N45.3    Constipation due to opioid therapy K59.03, T40.2X5A       Isolation/Infection:   Isolation          No Isolation        Patient Infection Status     None to display          Nurse Assessment:  Last Vital Signs: BP (!) 163/80   Pulse 76   Temp 99.1 °F (37.3 °C) (Oral)   Resp 20   Ht 6' 1\" (1.854 m)   Wt 269 lb 13.5 oz (122.4 kg)   SpO2 97%   BMI 35.60 kg/m²     Last documented pain score (0-10 scale): Pain Level: 0  Last Weight:   Wt Readings from Last 1 Encounters:   07/14/20 269 lb 13.5 oz (122.4 kg)     Mental Status:  {IP PT MENTAL STATUS:20030}    IV Access:  {[facility] IV ACCESS:265314674}    Nursing Mobility/ADLs:  Walking   {[facility] DME XHVX:357864792}  Transfer  {[facility] DME UIJN:321586618}  Bathing  {[facility] DME WNKT:319982605}  Dressing  {[facility] DME EPIP:565473700}  Toileting  {[facility] DME WMDA:954679248}  Feeding  {[facility] DME EQOC:431531022}  Med Admin  {ANGEL PHIPPS NQDK:442703459}  Med Delivery   {[facility] MED Good    Condition at Discharge: Stable    Rehab Potential (if transferring to Rehab): Good    Recommended Labs or Other Treatments After Discharge:   Recommended Follow-up, Labs or Other Treatments After Discharge:     F/u with PCP after discharge from SNF   Skilled Nursing/PT/OT/Nurse Aide            Physician Certification: I certify the above information and transfer of Ty Olsen  is necessary for the continuing treatment of the diagnosis listed and that he requires Home Care  for less 30 days.      Update Admission H&P: No change in H&P    PHYSICIAN SIGNATURE:  Electronically signed by Navid Lord MD on 7/17/20 at 3:16 PM EDT

## 2020-07-14 NOTE — PROGRESS NOTES
ID Follow-up NOTE    CC:   Complicated UTI  Antibiotics: Vancomycin, Cefepime    Admit Date: 2020  Hospital Day: 4    Subjective:     Patient feels good, no complaint. Taking po     Objective:     Patient Vitals for the past 8 hrs:   BP Temp Temp src Pulse Resp SpO2 Weight   20 1150 (!) 158/83 -- -- 77 20 96 % --   20 0853 (!) 149/80 97.9 °F (36.6 °C) Oral 80 20 96 % --   20 0359 -- -- -- -- -- -- 269 lb 13.5 oz (122.4 kg)     I/O last 3 completed shifts: In: 7954.2 [P.O.:5180; I.V.:2574.2; IV Piggyback:200]  Out: 9250 [Urine:9250]  I/O this shift:  In: 150 [P.O.:150]  Out: 1600 [Urine:1600]    EXAM:  GENERAL: No apparent distress.   RA  HEENT: Membranes moist, no oral lesion  NECK:  Supple, no lymphadenopathy  LUNGS: Clear b/l, no rales, no dullness  CARDIAC: RRR, no murmur appreciated  ABD:  + BS, soft / NT - no suprapubic tenderness, no CVAT  EXT:  No rash, no edema, no lesions  NEURO: No focal neurologic findings  PSYCH: Orientation, sensorium, mood normal  LINES:  Peripheral iv       Data Review:  Lab Results   Component Value Date    WBC 27.8 (H) 2020    HGB 11.3 (L) 2020    HCT 35.2 (L) 2020    MCV 96.3 2020     2020     Lab Results   Component Value Date    CREATININE 0.6 (L) 2020    BUN 7 2020     2020    K 4.0 2020     2020    CO2 18 (L) 2020       Hepatic Function Panel:   Lab Results   Component Value Date    ALKPHOS 96 2020    ALT 22 2020    AST 16 2020    PROT 6.3 2020    BILITOT 0.7 2020    LABALBU 2.9 2020       MICRO:  7/11,12 BC no growth to date   Urine cult E faecalis   Antibiotic  Interpretation  RODRÍGUEZ    ampicillin  Sensitive  <=2  mcg/mL    ciprofloxacin  Sensitive  1  mcg/mL    levofloxacin  Sensitive  1  mcg/mL    nitrofurantoin  Sensitive  <=16  mcg/mL    tetracycline  Sensitive  <=1  mcg/mL    vancomycin  Sensitive  1  mcg/mL      IMAGIN/12 Pelvic CT:  1. Stapleton catheter in place.  The proximal portion of the balloon may be   inflated within the proximal portion of the prostatic urethra.  This could be   repositioned to see if this improves the patient's pain. 2. The entire scrotum is not included on the imaging. Daina Eric is no evidence   of peritoneal necrotizing infection or abscess.    3. Stable mild rectosigmoid wall thickening which may relate to edema or   acute inflammation.  No obstruction, perforation, or abscess      Scheduled Meds:   oxyCODONE  10 mg Oral 2 times per day    cefepime  2 g Intravenous Q12H    thiamine (VITAMIN B1) IVPB  200 mg Intravenous Q8H    midazolam  2 mg Intramuscular Once    vancomycin (VANCOCIN) intermittent dosing (placeholder)   Other RX Placeholder    vancomycin  1,500 mg Intravenous Q12H    lithium  600 mg Oral BID WC    tamsulosin  0.4 mg Oral Nightly    enoxaparin  40 mg Subcutaneous Daily    insulin glargine  0.25 Units/kg Subcutaneous Nightly    insulin lispro  0.08 Units/kg Subcutaneous TID WC    insulin lispro  0-12 Units Subcutaneous TID WC    insulin lispro  0-6 Units Subcutaneous Nightly       Continuous Infusions:   sodium chloride 175 mL/hr at 07/14/20 0857    dextrose         PRN Meds:  oxyCODONE-acetaminophen **OR** oxyCODONE-acetaminophen, acetaminophen, acetaminophen, acetaminophen, ondansetron, ondansetron, promethazine, promethazine, promethazine, sodium chloride flush, potassium chloride **OR** potassium alternative oral replacement **OR** potassium chloride, magnesium sulfate, ketorolac, glucose, dextrose, glucagon (rDNA), dextrose      Assessment:     Obesity   BPH / urinary retention - s/p TURP 6/9    Fever - resolved   Leukocytosis - still significantly elevated, WBC down from admission  Encephalopathy - resolved  Enterococcal UTI    Plan:     Change to ampicillin  D/c vancomycin, zosyn    At discharge on po amoxacillin 500 tid x 10 days    f/u (Dr Yasemin Bonilla in 2 weeks)    Discussed

## 2020-07-14 NOTE — CARE COORDINATION
CASE MANAGEMENT INITIAL ASSESSMENT      Reviewed chart and completed assessment  With: patient and wife   Explained Case Management role/services. Primary contact information: Regina Hernandez [phone]    Admit date/status: 7/11/20 Inpatient  Diagnosis: pyelonephritis   Is this a Readmission?: no    Insurance: [employer]  Precert required for SNF - Y        3 night stay required -  N    Living arrangements, Adls, care needs, prior to admission: lives in a house with his wife     Transportation: patient     Durable Medical Equipment at home: Walker_X_Cane_X_RTS__ BSC__Shower Chair__  02__ HHN__ CPAP__  BiPap__  Hospital Bed__ W/C___ Other__________    Services in the home and/or outpatient, prior to admission: none    Dialysis Facility (if applicable)   · Name:  · Address:  · Dialysis Schedule:  · Phone:  · Fax:    PT/OT recs: [facility] Exemption Notification (HEN): not initiated     Barriers to discharge: none    Plan/comments: Patient is normally independent at home. Discussed rec's of IPR. Patient and wife are agreeable with this and would like a referral to be made to ARU. Placed call to CATY ALCOCER [facility] with ARU and message left with referral and patient info.     ECOC on chart for MD signature

## 2020-07-14 NOTE — PROGRESS NOTES
Urology Progress Note      Subjective:   Pt states he is feeling somewhat better  Asking to go home    Vitals:  BP (!) 149/80   Pulse 80   Temp 97.9 °F (36.6 °C) (Oral)   Resp 20   Ht 6' 1\" (1.854 m)   Wt 269 lb 13.5 oz (122.4 kg)   SpO2 96%   BMI 35.60 kg/m²   Temp  Av.2 °F (37.3 °C)  Min: 97.9 °F (36.6 °C)  Max: 100.9 °F (38.3 °C)    Intake/Output Summary (Last 24 hours) at 2020 0959  Last data filed at 2020 0831  Gross per 24 hour   Intake 7504.17 ml   Output 7750 ml   Net -245.83 ml       Exam:   Left testicle still firm  Erythema improved  Vasquez with clear urine    Labs:  WBC:    Lab Results   Component Value Date    WBC 27.8 2020     Hemoglobin/Hematocrit:    Lab Results   Component Value Date    HGB 11.3 2020    HCT 35.2 2020     BMP:    Lab Results   Component Value Date     2020    K 4.0 2020    K 4.3 06/10/2020     2020    CO2 18 2020    BUN 7 2020    LABALBU 2.9 2020    CREATININE 0.6 2020    CALCIUM 9.0 2020    GFRAA >60 2020    GFRAA >60 10/21/2010    LABGLOM >60 2020     PT/INR:    Lab Results   Component Value Date    PROTIME 14.1 2020    INR 1.21 2020     PTT:    Lab Results   Component Value Date    APTT 29.6 10/13/2010   [APTT    Urinalysis:     Urine Culture:  enterococcus    Blood Culture:  NGTD    Antibiotic Therapy:  vanc/zosyn    Imaging:       Impression/Plan:   Cultures as above  Pt to be discharged with vasquez in place  F/u with Dr. Shira Connelly in 2 weeks       Andreas MARSHALL Silva

## 2020-07-14 NOTE — PROGRESS NOTES
Physical Therapy    Facility/Department: [facility] C4 PCU  Initial Assessment and treatment    NAME: To Calderon  : 1950  MRN: [id number]    Date of Service: 2020    Discharge Recommendations:  IP Rehab   PT Equipment Recommendations  Equipment Needed: (defer)    Assessment   Body structures, Functions, Activity limitations: Decreased functional mobility ; Decreased endurance;Decreased ROM; Decreased balance;Decreased strength;Decreased posture;Decreased cognition  Assessment: Pt referred for PT evalaution during current hospital stay with a diagnosis of pyelonephritis. Pt has a history of falls and is currently functioning bleow baseline, CGA for bed mobility and transfers, Ros for gait with RW and guidance of walker x 35 ft. Pt also with poor safety awareness and poor cognition, and would benefit from skilled acute PT to address deficits. Recommend IP rehab at [place] from acute setting to regain strngth and function. Treatment Diagnosis: decreased indep with mobility  Prognosis: Good  Decision Making: Medium Complexity  PT Education: Goals;Gait Training;PT Role;Plan of Care;Home Exercise Program;Transfer Training;Functional Mobility Training;Disease Specific Education  Patient Education: pt needs reinforcement  Barriers to Learning: no  REQUIRES PT FOLLOW UP: Yes  Activity Tolerance  Activity Tolerance: Patient limited by fatigue;Patient limited by cognitive status; Patient limited by endurance       Patient Diagnosis(es): The primary encounter diagnosis was Septicemia ([address] Diagnoses of Acute pyelonephritis and Confusion associated with infection were also pertinent to this visit. has a past medical history of Anxiety, Arthritis, Asthma, Bipolar 1 disorder ([address] Colitis, Depression, Diabetes ([address] Diabetes mellitus ([address] Glaucoma, H/O bladder problems, High blood pressure, History of kidney problems, IBS (irritable bowel syndrome), Liver disease, Obesity, and Psychiatric problem.    has a past surgical history that includes back surgery; hip surgery; Eye surgery; Colonoscopy; cervical fusion; joint replacement (Right); other surgical history (06/06/2018); pr njx dx/ther sbst intrlmnr crv/thrc w/img gdn (Bilateral, 9/25/2018); pr njx dx/ther sbst intrlmnr crv/thrc w/img gdn (Bilateral, 10/16/2018); pr nervous system surgery unlisted (Bilateral, 11/12/2018); Appendectomy; and TURP (N/A, 6/9/2020). Restrictions  Restrictions/Precautions  Restrictions/Precautions: General Precautions, Fall Risk  Position Activity Restriction  Other position/activity restrictions: Up with assist, vasquez catheter,  Vision/Hearing  Vision: Impaired  Vision Exceptions: Wears glasses at all times  Hearing: Within functional limits     Subjective  General  Chart Reviewed: Yes  Patient assessed for rehabilitation services?: Yes  Response To Previous Treatment: Not applicable  Family / Caregiver Present: Yes(wife)  Referring Practitioner: Zena Macias MD  Referral Date : 07/14/20  Follows Commands: Within Functional Limits(pt very lethargic, falling asleep intermittently)  General Comment  Comments: Pt resting in bed upon entry, RN cleared pt for therapy  Subjective  Subjective: Pt agreeable to PT  Pain Screening  Patient Currently in Pain: Denies  Vital Signs  Patient Currently in Pain: Denies  Pre Treatment Pain Screening  Intervention List: Patient able to continue with treatment    Orientation  Orientation  Overall Orientation Status: Impaired  Orientation Level: Oriented to person;Disoriented to place; Disoriented to situation;Disoriented to time  Social/Functional History  Social/Functional History  Lives With: Spouse(works part-time)  Type of Home: House  Home Layout: One level  Home Access: Stairs to enter without rails  Entrance Stairs - Number of Steps: 1  Bathroom Shower/Tub: Tub/Shower unit, Curtain  Bathroom Toilet: Handicap height  Bathroom Accessibility: Not accessible  Home Equipment: Lift chair, 4 wheeled walker, Chris Cane(adjustable bed)  Receives Help From: Family(RN visits after surgery June 9, 2020)  ADL Assistance: Needs assistance  Bath: Minimal assistance  Dressing: Minimal assistance  Ambulation Assistance: Independent(with cane)  Transfer Assistance: Independent  Active : Yes  Occupation: Retired  Type of occupation: [employer]  Additional Comments: per wife h/o falls, has to call \"[phone]\" to get pt up, pt supposed to use BiPAP for sleep apnea, but poor tolerance for face mask         Objective          AROM RLE (degrees)  RLE AROM: WFL  AROM LLE (degrees)  LLE AROM : WFL  Strength RLE  Strength RLE: WFL  Comment: grossly4/5 throughout  Strength LLE  Strength LLE: WFL  Comment: grossly4/5 throughout        Bed mobility  Scooting: Contact guard assistance  Transfers  Sit to Stand: Contact guard assistance  Stand to sit: Contact guard assistance  Bed to Chair: Contact guard assistance;Minimal assistance  Ambulation  Ambulation?: Yes  Ambulation 1  Surface: level tile  Device: Rolling Walker  Assistance: Minimal assistance  Quality of Gait: shuffling steps, very unsteady, decreased knee extension with weight bearing, forward flexed, assist needed to maneuver walker, slow jeanne and pace  Distance: 35 ft     Balance  Posture: Fair  Sitting - Static: Fair  Sitting - Dynamic: Fair  Standing - Static: Fair;-  Standing - Dynamic: Fair;-  Exercises  Quad Sets: x 10 B  Heelslides: x 10 B  Gluteal Sets: x 10 B  Hip Flexion: x 10 B  Knee Long Arc Quad: x 10 B  Ankle Pumps: x 20 B     Plan   Plan  Times per week: 3-5  Current Treatment Recommendations: Strengthening, Home Exercise Program, ROM, Balance Training, Endurance Training, Functional Mobility Training, Transfer Training, Gait Training  Safety Devices  Type of devices:  All fall risk precautions in place, Left in chair, Call light within reach, Chair alarm in place, Gait belt, Patient at risk for falls, Nurse notified      [address] without Stair Climbing Raw Score : 15 (07/14/20 1639)  AM-PAC Inpatient without Stair Climbing T-Scale Score : 43.03 (07/14/20 1639)  Mobility Inpatient CMS 0-100% Score: 47.43 (07/14/20 1639)  Mobility Inpatient without Stair CMS G-Code Modifier : CK (07/14/20 1639)       Goals  Short term goals  Time Frame for Short term goals: 7/20/20 unless noted  Short term goal 1: Pt will perform bed mobility with supervision by 7/19/20  Short term goal 2: Pt will perform transfers with SBA  Short term goal 3: Pt will ambulate 60 ft with RW and CGA  Patient Goals   Patient goals : \"to go home\"       Therapy Time   Individual Concurrent Group Co-treatment   Time In 1438         Time Out 1518         Minutes 40         Timed Code Treatment Minutes: 30 Minutes    If pt is discharged prior to next therapy session, this note will serve as discharge summary.     Missy Martini, PT

## 2020-07-14 NOTE — PROGRESS NOTES
Occupational Therapy   Occupational Therapy Initial Assessment  Date: 2020   Patient Name: To Calderon  MRN: [id number]     : 1950    Date of Service: 2020    Discharge Recommendations:  IP Rehab   Barriers to home discharge:   [x] Steps to access home entry or bed/bath:   [x] No rail with steps to access home entry or bed/bath   [x] Reported available assist at home upon discharge limited   [x] Other: Frequent falls         Assessment   Performance deficits / Impairments: Decreased functional mobility ; Decreased cognition;Decreased ADL status; Decreased strength;Decreased safe awareness  Assessment: pt normally independent with basic ADL's & functional mobility with cane or 4 WW, now requiring min/mod assist to safely manage RW in room & max assist with LE self care, falls asleep mid conversation & AROM exercises; pt to benefit from skilled OT services while in acute care setting  OT Education: OT Role;Plan of Care;Precautions  Patient Education: AROM exercises for edema reduction, importance of being up OOB for increasing strength & better sleep  REQUIRES OT FOLLOW UP: Yes  Activity Tolerance  Activity Tolerance: Patient Tolerated treatment well  Safety Devices  Safety Devices in place: Yes  Type of devices: Call light within reach; Chair alarm in place; Left in chair;Nurse notified           Patient Diagnosis(es): The primary encounter diagnosis was Septicemia ([facility] [address] Diagnoses of Acute pyelonephritis and Confusion associated with infection were also pertinent to this visit. has a past medical history of Anxiety, Arthritis, Asthma, Bipolar 1 disorder ([facility] [address] Colitis, Depression, Diabetes ([facility] [address] Diabetes mellitus ([facility] [address] Glaucoma, H/O bladder problems, High blood pressure, History of kidney problems, IBS (irritable bowel syndrome), Liver disease, Obesity, and Psychiatric problem. has a past surgical history that includes back surgery; hip surgery; Eye surgery;  Colonoscopy; cervical fusion; joint replacement (Right); other surgical history (06/06/2018); pr njx dx/ther sbst intrlmnr crv/thrc w/img gdn (Bilateral, 9/25/2018); pr njx dx/ther sbst intrlmnr crv/thrc w/img gdn (Bilateral, 10/16/2018); pr nervous system surgery unlisted (Bilateral, 11/12/2018); Appendectomy; and TURP (N/A, 6/9/2020). Restrictions  Restrictions/Precautions  Restrictions/Precautions: General Precautions, Fall Risk  Position Activity Restriction  Other position/activity restrictions:  Up with assist, vasquez catheter,    Subjective   General  Chart Reviewed: Yes  Patient assessed for rehabilitation services?: Yes  Additional Pertinent Hx: back surgery with falls,  Family / Caregiver Present: Yes(wife)  Referring Practitioner: Dr. Julian Alexander  Diagnosis: pyelonephritis; s/p TURP 6-09-20  General Comment  Comments: RN cleared pt for OT eval; pt resting in bed, pt & wife agreeable to therapy  Patient Currently in Pain: Denies    Social/Functional History  Social/Functional History  Lives With: Spouse(works part-time)  Type of Home: House  Home Layout: One level  Home Access: Stairs to enter without rails  Entrance Stairs - Number of Steps: 1  Bathroom Shower/Tub: Tub/Shower unit, Curtain  Bathroom Toilet: Handicap height  Bathroom Accessibility: Not accessible  Home Equipment: Lift chair, 4 wheeled walker, Reacher, Cane(adjustable bed)  Receives Help From: Family(RN visits after surgery June 9, 2020)  ADL Assistance: Needs assistance  Bath: Minimal assistance  Dressing: Minimal assistance  Ambulation Assistance: Independent(with cane)  Transfer Assistance: Independent  Active : Yes  Occupation: Retired  Type of occupation: sales  Additional Comments: per wife h/o falls, has to call \"911\" to get pt up, pt supposed to use BiPAP for sleep apnea, but poor tolerance for face mask       Objective   Vision: Impaired  Vision Exceptions: Wears glasses at all times  Hearing: Within functional limits    Orientation  Overall Orientation Status: Impaired(word finding deficits)  Orientation Level: Oriented to time;Disoriented to place; Disoriented to situation  Observation/Palpation  Edema: mild edema BUE  Balance  Sitting Balance: Supervision  Standing Balance: Minimal assistance(with RW)  Standing Balance  Activity: walking in room with RW  Comment: mod cues & assist at times to manage RW  ADL  LE Dressing: Maximum assistance  Toileting: Dependent/Total(vasquez catheter)    RUE Tone: Normotonic  LUE Tone: Normotonic     Bed mobility  Supine to Sit: Minimal assistance(to Left)  Sit to Supine: Unable to assess(Left up in chair upon exiting, pt & wife agreeable)  Transfers  Sit to stand: Contact guard assistance  Stand to sit: Minimal assistance  Transfer Comments: mod cues for safety     Vision - Basic Assessment  Prior Vision: Wears glasses all the time     Cognition  Overall Cognitive Status: Exceptions  Arousal/Alertness: Inconsistent responses to stimuli(pt with significant changes in level of alertness (lethargic, with poor short term memory to euphoric)  Following Commands:  Follows one step commands with repetition  Attention Span: Difficulty attending to directions  Memory: Decreased short term memory;Decreased recall of recent events;Decreased recall of precautions  Safety Judgement: Decreased awareness of need for safety  Insights: Not aware of deficits  Initiation: Requires cues for some  Sequencing: Requires cues for some       Type of ROM/Therapeutic Exercise: AROM(seated in chair) (Max verbal cues to stay on task)  Hand flex/ext: x 15   Reps  Wrist flex/ext:  X 10 Reps  Elbow flex/ext:  x   10 Reps  Forearm sup/pron:  x  10  Reps  Shld flex/ext:  x  10  Reps    LUE General AROM: limited at end ranges due to edema digits  RUE General AROM: limited at end ranges due to edema digits     Plan   Plan  Times per week: 3-5x/ week  Current Treatment Recommendations: Strengthening, Functional Mobility Training, Safety Education & Training, Positioning, Endurance Training, Self-Care / ADL           AM-PAC Score        AM-PAC Inpatient Daily Activity Raw Score: 13 (07/14/20 1615)  AM-PAC Inpatient ADL T-Scale Score : 32.03 (07/14/20 1615)  ADL Inpatient CMS 0-100% Score: 63.03 (07/14/20 1615)  ADL Inpatient CMS G-Code Modifier : CL (07/14/20 1615)    Goals  Short term goals  Time Frame for Short term goals: 1 week(7-21-20)  Short term goal 1: SBA with bathroom mobility with LRAD (Least Restrictive Assistive Device) by 7-21-20  Short term goal 2: SBA standing ADL's for 5 minutes  Short term goal 3: Modified Independent with functional/toilet transfers by 7-21-20  Short term goal 4: tolerate 15-20 reps BUE strengthening exercises  Short term goal 5: min assist with LE dressing with AE  Patient Goals   Patient goals : go home       Therapy Time   Individual Concurrent Group Co-treatment   Time In [address]         Time Out 1540         Minutes [address] [facility], [place]

## 2020-07-14 NOTE — CARE COORDINATION
[facility]    Active with [facility]3 Manuel Edward RN, BSN CTN  [facility] [phone]

## 2020-07-15 LAB
A/G RATIO: 0.8 (ref 1.1–2.2)
ALBUMIN SERPL-MCNC: 2.9 G/DL (ref 3.4–5)
ALP BLD-CCNC: 157 U/L (ref 40–129)
ALT SERPL-CCNC: 19 U/L (ref 10–40)
ANION GAP SERPL CALCULATED.3IONS-SCNC: 10 MMOL/L (ref 3–16)
AST SERPL-CCNC: 14 U/L (ref 15–37)
BASOPHILS ABSOLUTE: 0.1 K/UL (ref 0–0.2)
BASOPHILS RELATIVE PERCENT: 0.3 %
BILIRUB SERPL-MCNC: 0.7 MG/DL (ref 0–1)
BLOOD CULTURE, ROUTINE: NORMAL
BUN BLDV-MCNC: 4 MG/DL (ref 7–20)
CALCIUM SERPL-MCNC: 9.4 MG/DL (ref 8.3–10.6)
CHLORIDE BLD-SCNC: 111 MMOL/L (ref 99–110)
CO2: 22 MMOL/L (ref 21–32)
CREAT SERPL-MCNC: 0.6 MG/DL (ref 0.8–1.3)
CULTURE, BLOOD 2: NORMAL
EOSINOPHILS ABSOLUTE: 0.3 K/UL (ref 0–0.6)
EOSINOPHILS RELATIVE PERCENT: 1.5 %
GFR AFRICAN AMERICAN: >60
GFR NON-AFRICAN AMERICAN: >60
GLOBULIN: 3.7 G/DL
GLUCOSE BLD-MCNC: 121 MG/DL (ref 70–99)
GLUCOSE BLD-MCNC: 129 MG/DL (ref 70–99)
GLUCOSE BLD-MCNC: 210 MG/DL (ref 70–99)
GLUCOSE BLD-MCNC: 219 MG/DL (ref 70–99)
GLUCOSE BLD-MCNC: 249 MG/DL (ref 70–99)
HCT VFR BLD CALC: 36.3 % (ref 40.5–52.5)
HEMOGLOBIN: 11.9 G/DL (ref 13.5–17.5)
INR BLD: 1.2 (ref 0.86–1.14)
LYMPHOCYTES ABSOLUTE: 1.2 K/UL (ref 1–5.1)
LYMPHOCYTES RELATIVE PERCENT: 5.5 %
MAGNESIUM: 2.1 MG/DL (ref 1.8–2.4)
MCH RBC QN AUTO: 31.2 PG (ref 26–34)
MCHC RBC AUTO-ENTMCNC: 32.9 G/DL (ref 31–36)
MCV RBC AUTO: 95 FL (ref 80–100)
MONOCYTES ABSOLUTE: 1.1 K/UL (ref 0–1.3)
MONOCYTES RELATIVE PERCENT: 5 %
NEUTROPHILS ABSOLUTE: 18.7 K/UL (ref 1.7–7.7)
NEUTROPHILS RELATIVE PERCENT: 87.7 %
PDW BLD-RTO: 13.2 % (ref 12.4–15.4)
PERFORMED ON: ABNORMAL
PHOSPHORUS: 3 MG/DL (ref 2.5–4.9)
PLATELET # BLD: 280 K/UL (ref 135–450)
PMV BLD AUTO: 7.9 FL (ref 5–10.5)
POTASSIUM SERPL-SCNC: 3.8 MMOL/L (ref 3.5–5.1)
PROTHROMBIN TIME: 14 SEC (ref 10–13.2)
RBC # BLD: 3.83 M/UL (ref 4.2–5.9)
SODIUM BLD-SCNC: 143 MMOL/L (ref 136–145)
TOTAL PROTEIN: 6.6 G/DL (ref 6.4–8.2)
WBC # BLD: 21.3 K/UL (ref 4–11)

## 2020-07-15 PROCEDURE — 6360000002 HC RX W HCPCS: Performed by: INTERNAL MEDICINE

## 2020-07-15 PROCEDURE — 6370000000 HC RX 637 (ALT 250 FOR IP): Performed by: INTERNAL MEDICINE

## 2020-07-15 PROCEDURE — 85610 PROTHROMBIN TIME: CPT

## 2020-07-15 PROCEDURE — 2700000000 HC OXYGEN THERAPY PER DAY

## 2020-07-15 PROCEDURE — 2580000003 HC RX 258: Performed by: INTERNAL MEDICINE

## 2020-07-15 PROCEDURE — 6370000000 HC RX 637 (ALT 250 FOR IP): Performed by: NURSE PRACTITIONER

## 2020-07-15 PROCEDURE — 2060000000 HC ICU INTERMEDIATE R&B

## 2020-07-15 PROCEDURE — 80053 COMPREHEN METABOLIC PANEL: CPT

## 2020-07-15 PROCEDURE — 83735 ASSAY OF MAGNESIUM: CPT

## 2020-07-15 PROCEDURE — 97116 GAIT TRAINING THERAPY: CPT

## 2020-07-15 PROCEDURE — 94761 N-INVAS EAR/PLS OXIMETRY MLT: CPT

## 2020-07-15 PROCEDURE — 85025 COMPLETE CBC W/AUTO DIFF WBC: CPT

## 2020-07-15 PROCEDURE — 97530 THERAPEUTIC ACTIVITIES: CPT

## 2020-07-15 PROCEDURE — 97110 THERAPEUTIC EXERCISES: CPT

## 2020-07-15 PROCEDURE — 84100 ASSAY OF PHOSPHORUS: CPT

## 2020-07-15 PROCEDURE — 36415 COLL VENOUS BLD VENIPUNCTURE: CPT

## 2020-07-15 RX ORDER — TRAZODONE HYDROCHLORIDE 50 MG/1
300 TABLET ORAL NIGHTLY
Status: DISCONTINUED | OUTPATIENT
Start: 2020-07-15 | End: 2020-07-17 | Stop reason: HOSPADM

## 2020-07-15 RX ADMIN — AMPICILLIN SODIUM 2 G: 2 INJECTION, POWDER, FOR SOLUTION INTRAMUSCULAR; INTRAVENOUS at 23:29

## 2020-07-15 RX ADMIN — AMPICILLIN SODIUM 2 G: 2 INJECTION, POWDER, FOR SOLUTION INTRAMUSCULAR; INTRAVENOUS at 05:49

## 2020-07-15 RX ADMIN — TRAZODONE HYDROCHLORIDE 300 MG: 50 TABLET ORAL at 22:32

## 2020-07-15 RX ADMIN — THIAMINE HYDROCHLORIDE 200 MG: 100 INJECTION, SOLUTION INTRAMUSCULAR; INTRAVENOUS at 05:15

## 2020-07-15 RX ADMIN — OXYCODONE HYDROCHLORIDE 10 MG: 10 TABLET, FILM COATED, EXTENDED RELEASE ORAL at 08:47

## 2020-07-15 RX ADMIN — Medication 10 ML: at 20:25

## 2020-07-15 RX ADMIN — MAGNESIUM HYDROXIDE 15 ML: 2400 SUSPENSION ORAL at 20:24

## 2020-07-15 RX ADMIN — AMPICILLIN SODIUM 2 G: 2 INJECTION, POWDER, FOR SOLUTION INTRAMUSCULAR; INTRAVENOUS at 18:17

## 2020-07-15 RX ADMIN — INSULIN LISPRO 4 UNITS: 100 INJECTION, SOLUTION INTRAVENOUS; SUBCUTANEOUS at 13:12

## 2020-07-15 RX ADMIN — LITHIUM CARBONATE 600 MG: 150 CAPSULE, GELATIN COATED ORAL at 18:16

## 2020-07-15 RX ADMIN — THIAMINE HYDROCHLORIDE 200 MG: 100 INJECTION, SOLUTION INTRAMUSCULAR; INTRAVENOUS at 20:26

## 2020-07-15 RX ADMIN — MINERAL OIL 30 ML: 471.95 OIL ORAL at 08:47

## 2020-07-15 RX ADMIN — INSULIN LISPRO 4 UNITS: 100 INJECTION, SOLUTION INTRAVENOUS; SUBCUTANEOUS at 08:50

## 2020-07-15 RX ADMIN — INSULIN LISPRO 9 UNITS: 100 INJECTION, SOLUTION INTRAVENOUS; SUBCUTANEOUS at 08:50

## 2020-07-15 RX ADMIN — TAMSULOSIN HYDROCHLORIDE 0.4 MG: 0.4 CAPSULE ORAL at 20:25

## 2020-07-15 RX ADMIN — POLYETHYLENE GLYCOL 3350 17 G: 17 POWDER, FOR SOLUTION ORAL at 20:24

## 2020-07-15 RX ADMIN — INSULIN GLARGINE 27 UNITS: 100 INJECTION, SOLUTION SUBCUTANEOUS at 20:26

## 2020-07-15 RX ADMIN — SODIUM CHLORIDE: 9 INJECTION, SOLUTION INTRAVENOUS at 04:25

## 2020-07-15 RX ADMIN — INSULIN LISPRO 9 UNITS: 100 INJECTION, SOLUTION INTRAVENOUS; SUBCUTANEOUS at 13:12

## 2020-07-15 RX ADMIN — ENOXAPARIN SODIUM 40 MG: 40 INJECTION SUBCUTANEOUS at 08:47

## 2020-07-15 RX ADMIN — AMPICILLIN SODIUM 2 G: 2 INJECTION, POWDER, FOR SOLUTION INTRAMUSCULAR; INTRAVENOUS at 00:06

## 2020-07-15 RX ADMIN — AMPICILLIN SODIUM 2 G: 2 INJECTION, POWDER, FOR SOLUTION INTRAMUSCULAR; INTRAVENOUS at 13:03

## 2020-07-15 ASSESSMENT — PAIN SCALES - GENERAL
PAINLEVEL_OUTOF10: 0
PAINLEVEL_OUTOF10: 0
PAINLEVEL_OUTOF10: 7
PAINLEVEL_OUTOF10: 0

## 2020-07-15 ASSESSMENT — PAIN SCALES - WONG BAKER
WONGBAKER_NUMERICALRESPONSE: 0
WONGBAKER_NUMERICALRESPONSE: 2
WONGBAKER_NUMERICALRESPONSE: 0

## 2020-07-15 NOTE — CONSULTS
Patient: Fatuma Mclain  [id number]  Date: 7/15/2020      Chief Complaint: weakness    History of Present Illness/Hospital Course:  Mr. Vahid Pelaez is a 71year old male admitted 7/11/2020 after developing groin pain and malaise after in the context of relatively recent TURP and cystoscopic botox injections. He became febrile and encephalopathic in the ED. Evaluation revealed left sided scrotal edema with possible gas. Urology and ID were consulted. He is on IV ampicillin. With therapy he generally requires CGA-Ros.      has a past medical history of Anxiety, Arthritis, Asthma, Bipolar 1 disorder ([address] Colitis, Depression, Diabetes ([address] Diabetes mellitus ([address] Glaucoma, H/O bladder problems, High blood pressure, History of kidney problems, IBS (irritable bowel syndrome), Liver disease, Obesity, and Psychiatric problem. has a past surgical history that includes back surgery; hip surgery; Eye surgery; Colonoscopy; cervical fusion; joint replacement (Right); other surgical history (06/06/2018); pr njx dx/ther sbst intrlmnr crv/thrc w/img gdn (Bilateral, 9/25/2018); pr njx dx/ther sbst intrlmnr crv/thrc w/img gdn (Bilateral, 10/16/2018); pr nervous system surgery unlisted (Bilateral, 11/12/2018); Appendectomy; and TURP (N/A, 6/9/2020). reports that he has never smoked. He has never used smokeless tobacco. He reports that he does not drink alcohol or use drugs. family history includes Alcohol Abuse in his sister; Coronary Art Dis in his father and mother; Obesity in his father and mother; Osteoarthritis in his mother; Parkinsonism in his mother. REVIEW OF SYSTEMS:   CONSTITUTIONAL: negative for fevers, chills, diaphoresis, activity change, appetite change, fatigue, night sweats and unexpected weight change.    EYES: negative for blurred vision, eye discharge, visual disturbance and icterus  HEENT: negative for hearing loss, tinnitus, ear drainage, sinus pressure, nasal congestion, epistaxis and snoring  RESPIRATORY: Negative for hemoptysis, cough, sputum production  CARDIOVASCULAR: negative for chest pain, palpitations, exertional chest pressure/discomfort, edema, syncope  GASTROINTESTINAL: negative for nausea, vomiting, diarrhea, constipation, blood in stool and abdominal pain  GENITOURINARY: negative for frequency, dysuria, urinary incontinence, decreased urine volume, and hematuria  HEMATOLOGIC/LYMPHATIC: negative for easy bruising, bleeding and lymphadenopathy  ALLERGIC/IMMUNOLOGIC: negative for recurrent infections, angioedema, anaphylaxis and drug reactions  ENDOCRINE: negative for weight changes and diabetic symptoms including polyuria, polydipsia and polyphagia  MUSCULOSKELETAL: negative for pain, joint swelling, decreased range of motion and muscle weakness  NEUROLOGICAL: negative for headaches, slurred speech, unilateral weakness  PSYCHIATRIC/BEHAVIORAL: negative for hallucinations, behavioral problems, confusion and agitation. Physical Examination:  Vitals:   Patient Vitals for the past 24 hrs:   BP Temp Temp src Pulse Resp SpO2 Weight   07/15/20 0951 (!) 155/92 98.2 °F (36.8 °C) Oral 92 24 97 % --   07/15/20 0550 -- -- -- -- -- -- 260 lb 12.9 oz (118.3 kg)   07/15/20 0521 (!) 162/81 99.1 °F (37.3 °C) Oral 79 20 96 % --   07/15/20 0014 (!) 162/83 98 °F (36.7 °C) Oral 64 20 96 % --   07/14/20 2037 (!) 177/83 100 °F (37.8 °C) Oral 79 20 97 % --   07/14/20 1613 (!) 163/80 99.1 °F (37.3 °C) Oral 76 20 97 % --   07/14/20 1150 (!) 158/83 -- -- 77 20 96 % --     Mood: Labile  Const: Tearful  ENT: Oral mucosa moist  Eyes: No discharge or injection  CV: Regular rate and rhythm, no murmur rub or gallop noted  Resp: Lungs clear to auscultation bilaterally, no rales wheezes or ronchi  GI: Soft, nontender, nondistended. Normal bowel sounds. Neuro: Alert, oriented  Skin: No lesions or rashes noted. MSK: No joint abnormalities noted.        Lab Results   Component Value Date    WBC 21.3 (H) 07/15/2020 HGB 11.9 (L) 07/15/2020    HCT 36.3 (L) 07/15/2020    MCV 95.0 07/15/2020     07/15/2020     Lab Results   Component Value Date    INR 1.20 (H) 07/15/2020    INR 1.21 (H) 07/14/2020    INR 1.41 (H) 07/13/2020    PROTIME 14.0 (H) 07/15/2020    PROTIME 14.1 (H) 07/14/2020    PROTIME 16.4 (H) 07/13/2020     Lab Results   Component Value Date    CREATININE 0.6 (L) 07/15/2020    BUN 4 (L) 07/15/2020     07/15/2020    K 3.8 07/15/2020     (H) 07/15/2020    CO2 22 07/15/2020     Lab Results   Component Value Date    ALT 19 07/15/2020    AST 14 (L) 07/15/2020    ALKPHOS 157 (H) 07/15/2020    BILITOT 0.7 07/15/2020     Impression    1. Stapleton catheter in place.  The proximal portion of the balloon may be    inflated within the proximal portion of the prostatic urethra.  This could be    repositioned to see if this improves the patient's pain. 2. The entire scrotum is not included on the imaging. Charlene Kail is no evidence    of peritoneal necrotizing infection or abscess. 3. Stable mild rectosigmoid wall thickening which may relate to edema or    acute inflammation.  No obstruction, perforation, or abscess. Assessment:  1. Sepsis   2. Metabolic encephalopathy   3. Pyelonephritis   4. Epididymo-orchitis     Recommendations:  Patient with new functional deficits and ongoing medical complexity. Demonstrates ability to tolerate 3 hours therapy/day. Discussed w/ spouse who is relatively insistent on inpatient rehab as opposed to skilled facility. I mentioned that insurance approval was not guaranteed; she reports that she spoke to [place]" at [employer] and was assured that inpatient rehab would be approved. Will start precert and follow. Thank you for this consult. Please contact me with any questions or concerns. Taras Harvey MD 7/15/2020, 10:21 AM

## 2020-07-15 NOTE — PLAN OF CARE
Problem: Falls - Risk of:  Goal: Will remain free from falls  Description: Will remain free from falls  Outcome: Ongoing     Problem: Falls - Risk of:  Goal: Absence of physical injury  Description: Absence of physical injury  Outcome: Ongoing     Problem: Pain:  Goal: Pain level will decrease  Description: Pain level will decrease  Outcome: Ongoing     Problem: Injury - Risk of, Physical Injury:  Goal: Will remain free from falls  Description: Will remain free from falls  Outcome: Ongoing

## 2020-07-15 NOTE — PROGRESS NOTES
Physical Therapy  Facility/Department: [facility] C4 PCU  Daily Treatment Note  NAME: Michel Haines  : 1950  MRN: [id number]    Date of Service: 7/15/2020    Discharge Recommendations:  IP Rehab   PT Equipment Recommendations  Equipment Needed: No(defer to facility; if d/c home, pt should be encouraged to use his 4WW for household and community distances)    Assessment   Assessment: Pt is able to ambulate 120' CGA with RW today showing improved activity tolerance, however he remains with gait deviations related to groin pain and generalized weakness which put him at risk for future falls. Pt appears to be somewhat self-limiting with regards to bed mobility and ADL's. Pt repeatedly states that he wants to go home, however he is receptive to this therapist's education regarding benefits of IPR to improve strength and reduce fall risk. Continue to recommend IPR given pt's demonstrated gait deviations, lack of 24/7 assist at home, and history of falls. PT Education: Goals;Gait Training;PT Role;Plan of Care;Home Exercise Program;Transfer Training;Functional Mobility Training;Disease Specific Education  Patient Education: Pt verbalizes understanding. Therapist discussed basic format and benefits of IPR; pt states he is \"open to discussion\" about placement. REQUIRES PT FOLLOW UP: Yes  Activity Tolerance  Activity Tolerance: Patient Tolerated treatment well;Patient limited by pain; Patient limited by endurance     Patient Diagnosis(es): The primary encounter diagnosis was Septicemia ([facility] [address] Diagnoses of Acute pyelonephritis and Confusion associated with infection were also pertinent to this visit. has a past medical history of Anxiety, Arthritis, Asthma, Bipolar 1 disorder ([address] Colitis, Depression, Diabetes ([address] Diabetes mellitus ([facility] [address] Glaucoma, H/O bladder problems, High blood pressure, History of kidney problems, IBS (irritable bowel syndrome), Liver disease, Obesity, and Psychiatric problem.    has a past surgical history that includes back surgery; hip surgery; Eye surgery; Colonoscopy; cervical fusion; joint replacement (Right); other surgical history (06/06/2018); pr njx dx/ther sbst intrlmnr crv/thrc w/img gdn (Bilateral, 9/25/2018); pr njx dx/ther sbst intrlmnr crv/thrc w/img gdn (Bilateral, 10/16/2018); pr nervous system surgery unlisted (Bilateral, 11/12/2018); Appendectomy; and TURP (N/A, 6/9/2020). Restrictions  Restrictions/Precautions  Restrictions/Precautions: General Precautions, Fall Risk  Position Activity Restriction  Other position/activity restrictions: Up with assist, vasquez catheter, IV     Subjective   General  Chart Reviewed: Yes  Response To Previous Treatment: Patient with no complaints from previous session. Family / Caregiver Present: No  Subjective  Subjective: Pt agreeable to PT  General Comment  Comments: Pt resting in bed upon entry, RN cleared pt for therapy  Pain Screening  Patient Currently in Pain: Denies  Vital Signs  Patient Currently in Pain: Denies            Objective   Bed mobility  Supine to Sit: Minimal assistance(Pt with difficulty rolling, HHA provided)  Sit to Supine: Stand by assistance  Comment: Sup<>sit with HOB elevated, use of HR, and additional time to complete     Transfers  Sit to Stand: Stand by assistance  Stand to sit: Stand by assistance  Comment: sit<>stand at EOB with walker. Cues needed for hand placement. Ambulation  Ambulation?: Yes  Ambulation 1  Surface: level tile  Device: Rolling Walker  Assistance: Contact guard assistance  Quality of Gait: Pt amb with rapid jeanne, using short shuffling steps (significantly reduced step length/height). Pt amb behind walker LIZETT and requires regular cuing to assume upright posture. Gait Deviations: Decreased step length;Decreased step height  Distance: 120'  Comments: Pt responds to cuing for increased foot clearance and step height, however jeanne remained fast resulting in unsafe ambulation speed. Exercises  Hip Flexion: x10 bilat with education to complete full ROM  Knee Long Arc Quad: x10 bilat  Ankle Pumps: x15 bilat                        AM-PAC Score  AM-PAC Inpatient Mobility Raw Score : 17 (07/15/20 1248)  AM-PAC Inpatient T-Scale Score : 42.13 (07/15/20 1248)  Mobility Inpatient CMS 0-100% Score: 50.57 (07/15/20 1248)  Mobility Inpatient CMS G-Code Modifier : CK (07/15/20 1248)          Goals  Short term goals  Time Frame for Short term goals: 7/20/20 unless noted  Short term goal 1: Pt will perform bed mobility with supervision by 7/19/20  Short term goal 2: Pt will perform transfers with SBA - GOAL MET 7/15, Upgraded: transfer with Supervision  Short term goal 3: Pt will ambulate 60 ft with RW and CGA - GOAL MET 7/15; Upgraded: Amb 150' with RW and Supervision  Short term goal 4: New goal added 7/15: Pt will asc/desc 1 step with RW Supervision. Goal added because pt expresses desire to d/c home. Patient Goals   Patient goals : \"to go home\"    Plan    Plan  Times per week: 3-5  Current Treatment Recommendations: Strengthening, Home Exercise Program, ROM, Balance Training, Endurance Training, Functional Mobility Training, Transfer Training, Gait Training  Safety Devices  Type of devices:  All fall risk precautions in place, Call light within reach, Gait belt, Patient at risk for falls, Left in bed, Bed alarm in place     Therapy Time   Individual Concurrent Group Co-treatment   Time In 1021         Time Out 1051         Minutes 30         Timed Code Treatment Minutes: 30 Minutes       Heriberto Sanchez, PT, DPT

## 2020-07-15 NOTE — CARE COORDINATION
Care Coordination, Acute Rehab     After review, this patient is felt to be:      []  Appropriate for Acute Inpatient Rehab    [x]  Appropriate for Acute Inpatient Rehab Pending Insurance Authorization    []  Not appropriate for Acute Inpatient Rehab    []  Not appropriate at this time, however evaluation ongoing        Precert initiated with [facility] at this time for ARU admission. Ref#: [id number]. Will notify DCP with further updates.  Thank you for the referral. Electronically signed by Tom Freeman RN on 7/15/2020 at 12:26 PM

## 2020-07-15 NOTE — PROGRESS NOTES
Occupational Therapy  Facility/Department: [facility] C4 PCU  Daily Treatment Note  NAME: Erik Plunkett  : 1950  MRN: [id number]    Date of Service: 7/15/2020    Discharge Recommendations:  IP Rehab       Assessment   Performance deficits / Impairments: Decreased functional mobility ; Decreased cognition;Decreased ADL status; Decreased strength;Decreased safe awareness    Assessment: Pt agreeable to therapy. Pt continues to be confused and demo'd change in affect throughout session. Pt required min-CGA for transfers. Pt continues to need encouragement to complete all mobility and transfers. pt required cues to complete BUE exercises for increased endurance and strength. Cont with POC. Prognosis: Good  OT Education: OT Role;Plan of Care;Precautions  Patient Education: AROM exercises for edema reduction, importance of being up OOB for increasing strength & better sleep  Activity Tolerance  Activity Tolerance: Patient Tolerated treatment well;Patient limited by fatigue  Safety Devices  Safety Devices in place: Yes  Type of devices: Call light within reach; Chair alarm in place; Left in chair;Nurse notified         Patient Diagnosis(es): The primary encounter diagnosis was Septicemia ([address] Diagnoses of Acute pyelonephritis and Confusion associated with infection were also pertinent to this visit. has a past medical history of Anxiety, Arthritis, Asthma, Bipolar 1 disorder ([address] Colitis, Depression, Diabetes ([address] Diabetes mellitus ([address] Glaucoma, H/O bladder problems, High blood pressure, History of kidney problems, IBS (irritable bowel syndrome), Liver disease, Obesity, and Psychiatric problem. has a past surgical history that includes back surgery; hip surgery; Eye surgery;  Colonoscopy; cervical fusion; joint replacement (Right); other surgical history (2018); pr njx dx/ther sbst intrlmnr crv/thrc w/img gdn (Bilateral, 2018); pr njx dx/ther sbst intrlmnr crv/thrc w/img gdn (Bilateral, 10/16/2018); pr nervous system surgery unlisted (Bilateral, 11/12/2018); Appendectomy; and TURP (N/A, 6/9/2020). Restrictions  Restrictions/Precautions  Restrictions/Precautions: General Precautions, Fall Risk  Position Activity Restriction  Other position/activity restrictions: Up with assist, vasquez catheter, IV     Subjective   General  Chart Reviewed: Yes  Patient assessed for rehabilitation services?: Yes  Additional Pertinent Hx: back surgery with falls,  Family / Caregiver Present: Yes  Referring Practitioner: Dr. Aly Michaud  Diagnosis: pyelonephritis; s/p TURP 6-09-20  Subjective  Subjective: Pt agreeable to therapy, tearful throughout session but easily redirected  General Comment  Comments: RN approved therapy  Vital Signs  Patient Currently in Pain: Denies     Orientation   within functional limits    Objective                   Transfers  Stand Pivot Transfers: Stand by assistance  Sit to stand: Contact guard assistance  Stand to sit: Minimal assistance                       Cognition  Overall Cognitive Status: Exceptions  Arousal/Alertness: Inconsistent responses to stimuli  Following Commands:  Follows one step commands with repetition  Attention Span: Difficulty attending to directions  Memory: Decreased short term memory;Decreased recall of recent events;Decreased recall of precautions  Safety Judgement: Decreased awareness of need for safety  Insights: Not aware of deficits  Initiation: Requires cues for some  Sequencing: Requires cues for some                    Type of ROM/Therapeutic Exercise  Type of ROM/Therapeutic Exercise: AROM  Exercises  Shoulder Flexion: x10  Elbow Flexion: x10  Elbow Extension: x10  Supination: x10  Pronation: x10  Wrist Flexion: x10  Wrist Extension: x10  Grasp/Release: x10                    Plan   Plan  Times per week: 3-5x/ week  Current Treatment Recommendations: Strengthening, Functional Mobility Training, Safety Education & Training, Positioning, Endurance Training, Self-Care / ADL    AM-PAC Score        AM-PAC Inpatient Daily Activity Raw Score: 13 (07/15/20 1507)  AM-PAC Inpatient ADL T-Scale Score : 32.03 (07/15/20 1507)  ADL Inpatient CMS 0-100% Score: 63.03 (07/15/20 1507)  ADL Inpatient CMS G-Code Modifier : CL (07/15/20 1507)    Goals  Short term goals  Time Frame for Short term goals: 1 week(7-21-20)  Short term goal 1: SBA with bathroom mobility with LRAD (Least Restrictive Assistive Device) by 7-21-20  Short term goal 2: SBA standing ADL's for 5 minutes  Short term goal 3: Modified Independent with functional/toilet transfers by 7-21-20  Short term goal 4: tolerate 15-20 reps BUE strengthening exercises  Short term goal 5: min assist with LE dressing with AE  Patient Goals   Patient goals : go home       Therapy Time   Individual Concurrent Group Co-treatment   Time In 1135         Time Out 1200         Minutes 25         Timed Code Treatment Minutes: 25 Minutes       Jeferson Rajan, OTR/L    If pt is unable to be seen after this session, please let this note serve as discharge summary. Please see case management note for discharge disposition. Thank you.

## 2020-07-15 NOTE — PROGRESS NOTES
Hospitalist Progress Note  Admit Date: 7/11/2020       Overnight Events: None    CC: F/U for sepsis, pyelonephritis, orchitis    Interval History: No recurrence of fever, he is less confused but he is still not eating well, no torsion evident on US, enterococci on urine culture    Review of Systems - he is feeling better and his pain is adequately controlled, he denies shortness of breath, cough, chest pain, hemoptysis    Scheduled Medications:    ampicillin IV  2 g Intravenous 4 times per day    magnesium hydroxide  15 mL Oral BID    polyethylene glycol  17 g Oral BID    mineral oil  30 mL Oral Daily    oxyCODONE  10 mg Oral 2 times per day    thiamine (VITAMIN B1) IVPB  200 mg Intravenous Q8H    midazolam  2 mg Intramuscular Once    lithium  600 mg Oral BID WC    tamsulosin  0.4 mg Oral Nightly    enoxaparin  40 mg Subcutaneous Daily    insulin glargine  0.25 Units/kg Subcutaneous Nightly    insulin lispro  0.08 Units/kg Subcutaneous TID WC    insulin lispro  0-12 Units Subcutaneous TID WC    insulin lispro  0-6 Units Subcutaneous Nightly      PRN Medications: oxyCODONE-acetaminophen **OR** oxyCODONE-acetaminophen, acetaminophen, acetaminophen, acetaminophen, ondansetron, ondansetron, promethazine, promethazine, promethazine, sodium chloride flush, potassium chloride **OR** potassium alternative oral replacement **OR** potassium chloride, magnesium sulfate, ketorolac, glucose, dextrose, glucagon (rDNA), dextrose  Diet: DIET CARB CONTROL;     Continuous Infusions:   sodium chloride 175 mL/hr at 07/14/20 2154    dextrose         PHYSICAL EXAM:  BP (!) 163/80   Pulse 76   Temp 99.1 °F (37.3 °C) (Oral)   Resp 20   Ht 6' 1\" (1.854 m)   Wt 269 lb 13.5 oz (122.4 kg)   SpO2 97%   BMI 35.60 kg/m²   Recent Labs     07/13/20  2005 07/14/20  0733 07/14/20  1109 07/14/20  1625 07/14/20 2043   POCGLU 197* 191* 172* 180* 183*       Intake/Output Summary (Last 24 hours) at 7/15/2020 0004  Last data filed at 7/14/2020 2359  Gross per 24 hour   Intake 1110 ml   Output 9650 ml   Net -8540 ml       BP (!) 163/80   Pulse 76   Temp 99.1 °F (37.3 °C) (Oral)   Resp 20   Ht 6' 1\" (1.854 m)   Wt 269 lb 13.5 oz (122.4 kg)   SpO2 97%   BMI 35.60 kg/m²   General appearance: awake and interactive, no distress, obese body habitus  Eyes: negative findings: conjunctivae and sclerae normal, corneas clear, PERTL  Throat: buccal mucosa pink and moist, oropharynx patent and clear  Neck: No JVD, no carotid bruit, trachea midline, and thyroid not enlarged,   Lungs: BS symmetrically equal, vesicular but shallow, crackles heard form left lung base, no wheezes  Heart: regular rate and rhythm, S1, S2 normal, no murmur, click, rub or gallop  Abdomen: soft, non-tender; bowel sounds normal; no masses, no organomegaly  Extremities: atraumatic, no cyanosis or peripheral edema  MSK: no major deformities of the spine or major joints, no effusions or tenderness,   Skin: warm, dry, no jaundice, no rash, no wounds or ulcers  Lymph nodes: no Cervical, supraclavicular, and axillary adenopathy  Neurologic: Cranial nerves 3-12: normal; no dysarthria sensory: normal; Motor:grossly normal; Coordination: grossly normal  Psych - alert and oriented, his speech is coherent but his recent memory is significantly impaired, his insight is adequate, no active hallucinations      LABS:  Recent Labs     07/12/20 0416 07/13/20 0447 07/14/20  0245   WBC 43.4* 28.6* 27.8*   HGB 12.8* 11.1* 11.3*   HCT 38.4* 33.4* 35.2*    233 243                                                                    Recent Labs     07/12/20 0416 07/13/20 0447 07/14/20  0245    138 138   K 4.4 3.9 4.0    108 109   CO2 23 21 18*   BUN 12 9 7   CREATININE 0.9 0.8 0.6*   GLUCOSE 252* 216* 172*     Recent Labs     07/12/20 0416 07/13/20 0447 07/14/20  0245   AST 16 25 16   ALT 23 28 22   BILITOT 0.7 0.7 0.7   ALKPHOS 64 75 96     No results for input(s): TROPONINI

## 2020-07-15 NOTE — PROGRESS NOTES
Physician Progress Note      Carlo Murillo  CSN #:                  [id number]  :                       1950  ADMIT DATE:       2020 3:42 PM  [address] [place] DATE:  RESPONDING  PROVIDER #:        Layton Dos Santos MD          QUERY TEXT:    Pt admitted with UTI. Pt noted to have chronic indwelling urinary catheter   and recent TURP. If possible, please document in the progress notes and   discharge summary if you are evaluating and/or treating any of the following: The medical record reflects the following:  Risk Factors: recent TURP (2020), chronic vasquez after failed voiding   trials  Clinical Indicators: WBC 43, 4 + bacteria and positive leuko esterase per UA  Treatment: IV Vancomycin and Zosyn, ID Consult, Urology Consult, UA and urine   culture    Thank you,  Estephania Nicole RN, CDS  [phone]  Options provided:  -- UTI due to chronic indwelling urinary catheter  -- UTI due to recent TURP (2020)  -- UTI not due to indwelling urinary catheter  -- Refer to Clinical Documentation Reviewer    PROVIDER RESPONSE TEXT:    This patient?s UTI is due to the chronic Vasquez catheter.     Query created by: Hector Moyer on 2020 1:23 PM      Electronically signed by:  Layton Dos Santos MD 7/15/2020 10:22 AM

## 2020-07-16 LAB
A/G RATIO: 1 (ref 1.1–2.2)
ALBUMIN SERPL-MCNC: 3.4 G/DL (ref 3.4–5)
ALP BLD-CCNC: 138 U/L (ref 40–129)
ALT SERPL-CCNC: 16 U/L (ref 10–40)
ANION GAP SERPL CALCULATED.3IONS-SCNC: 11 MMOL/L (ref 3–16)
AST SERPL-CCNC: 13 U/L (ref 15–37)
BASOPHILS ABSOLUTE: 0.1 K/UL (ref 0–0.2)
BASOPHILS RELATIVE PERCENT: 0.4 %
BILIRUB SERPL-MCNC: 0.8 MG/DL (ref 0–1)
BLOOD CULTURE, ROUTINE: NORMAL
BUN BLDV-MCNC: 6 MG/DL (ref 7–20)
CALCIUM SERPL-MCNC: 9.7 MG/DL (ref 8.3–10.6)
CHLORIDE BLD-SCNC: 109 MMOL/L (ref 99–110)
CO2: 24 MMOL/L (ref 21–32)
CREAT SERPL-MCNC: 0.7 MG/DL (ref 0.8–1.3)
CULTURE, BLOOD 2: NORMAL
EOSINOPHILS ABSOLUTE: 0.4 K/UL (ref 0–0.6)
EOSINOPHILS RELATIVE PERCENT: 2.7 %
GFR AFRICAN AMERICAN: >60
GFR NON-AFRICAN AMERICAN: >60
GLOBULIN: 3.4 G/DL
GLUCOSE BLD-MCNC: 164 MG/DL (ref 70–99)
GLUCOSE BLD-MCNC: 166 MG/DL (ref 70–99)
GLUCOSE BLD-MCNC: 176 MG/DL (ref 70–99)
GLUCOSE BLD-MCNC: 177 MG/DL (ref 70–99)
GLUCOSE BLD-MCNC: 204 MG/DL (ref 70–99)
HCT VFR BLD CALC: 37.7 % (ref 40.5–52.5)
HEMOGLOBIN: 12.4 G/DL (ref 13.5–17.5)
HIV AG/AB: NORMAL
HIV ANTIGEN: NORMAL
HIV-1 ANTIBODY: NORMAL
HIV-2 AB: NORMAL
INR BLD: 1.31 (ref 0.86–1.14)
LYMPHOCYTES ABSOLUTE: 1.2 K/UL (ref 1–5.1)
LYMPHOCYTES RELATIVE PERCENT: 7.5 %
MAGNESIUM: 2.3 MG/DL (ref 1.8–2.4)
MCH RBC QN AUTO: 31.4 PG (ref 26–34)
MCHC RBC AUTO-ENTMCNC: 32.9 G/DL (ref 31–36)
MCV RBC AUTO: 95.2 FL (ref 80–100)
MONOCYTES ABSOLUTE: 1.1 K/UL (ref 0–1.3)
MONOCYTES RELATIVE PERCENT: 7.3 %
NEUTROPHILS ABSOLUTE: 12.7 K/UL (ref 1.7–7.7)
NEUTROPHILS RELATIVE PERCENT: 82.1 %
PDW BLD-RTO: 13.5 % (ref 12.4–15.4)
PERFORMED ON: ABNORMAL
PHOSPHORUS: 2.7 MG/DL (ref 2.5–4.9)
PLATELET # BLD: 304 K/UL (ref 135–450)
PMV BLD AUTO: 7.8 FL (ref 5–10.5)
POTASSIUM SERPL-SCNC: 3.6 MMOL/L (ref 3.5–5.1)
PROTHROMBIN TIME: 15.2 SEC (ref 10–13.2)
RBC # BLD: 3.96 M/UL (ref 4.2–5.9)
SODIUM BLD-SCNC: 144 MMOL/L (ref 136–145)
TOTAL PROTEIN: 6.8 G/DL (ref 6.4–8.2)
TOTAL SYPHILLIS IGG/IGM: NORMAL
WBC # BLD: 15.5 K/UL (ref 4–11)

## 2020-07-16 PROCEDURE — 6370000000 HC RX 637 (ALT 250 FOR IP): Performed by: NURSE PRACTITIONER

## 2020-07-16 PROCEDURE — 97110 THERAPEUTIC EXERCISES: CPT

## 2020-07-16 PROCEDURE — 6360000002 HC RX W HCPCS: Performed by: INTERNAL MEDICINE

## 2020-07-16 PROCEDURE — 97530 THERAPEUTIC ACTIVITIES: CPT

## 2020-07-16 PROCEDURE — U0003 INFECTIOUS AGENT DETECTION BY NUCLEIC ACID (DNA OR RNA); SEVERE ACUTE RESPIRATORY SYNDROME CORONAVIRUS 2 (SARS-COV-2) (CORONAVIRUS DISEASE [COVID-19]), AMPLIFIED PROBE TECHNIQUE, MAKING USE OF HIGH THROUGHPUT TECHNOLOGIES AS DESCRIBED BY CMS-2020-01-R: HCPCS

## 2020-07-16 PROCEDURE — 97535 SELF CARE MNGMENT TRAINING: CPT

## 2020-07-16 PROCEDURE — 36415 COLL VENOUS BLD VENIPUNCTURE: CPT

## 2020-07-16 PROCEDURE — 2580000003 HC RX 258: Performed by: INTERNAL MEDICINE

## 2020-07-16 PROCEDURE — 80053 COMPREHEN METABOLIC PANEL: CPT

## 2020-07-16 PROCEDURE — 85610 PROTHROMBIN TIME: CPT

## 2020-07-16 PROCEDURE — 6370000000 HC RX 637 (ALT 250 FOR IP): Performed by: INTERNAL MEDICINE

## 2020-07-16 PROCEDURE — 84100 ASSAY OF PHOSPHORUS: CPT

## 2020-07-16 PROCEDURE — 85025 COMPLETE CBC W/AUTO DIFF WBC: CPT

## 2020-07-16 PROCEDURE — 2060000000 HC ICU INTERMEDIATE R&B

## 2020-07-16 PROCEDURE — 83735 ASSAY OF MAGNESIUM: CPT

## 2020-07-16 PROCEDURE — 97116 GAIT TRAINING THERAPY: CPT

## 2020-07-16 RX ORDER — DOXAZOSIN 2 MG/1
2 TABLET ORAL DAILY
Status: DISCONTINUED | OUTPATIENT
Start: 2020-07-16 | End: 2020-07-17 | Stop reason: HOSPADM

## 2020-07-16 RX ADMIN — Medication 10 ML: at 19:59

## 2020-07-16 RX ADMIN — INSULIN LISPRO 2 UNITS: 100 INJECTION, SOLUTION INTRAVENOUS; SUBCUTANEOUS at 12:09

## 2020-07-16 RX ADMIN — THIAMINE HYDROCHLORIDE 200 MG: 100 INJECTION, SOLUTION INTRAMUSCULAR; INTRAVENOUS at 15:55

## 2020-07-16 RX ADMIN — AMPICILLIN SODIUM 2 G: 2 INJECTION, POWDER, FOR SOLUTION INTRAMUSCULAR; INTRAVENOUS at 17:12

## 2020-07-16 RX ADMIN — INSULIN LISPRO 9 UNITS: 100 INJECTION, SOLUTION INTRAVENOUS; SUBCUTANEOUS at 12:10

## 2020-07-16 RX ADMIN — INSULIN LISPRO 4 UNITS: 100 INJECTION, SOLUTION INTRAVENOUS; SUBCUTANEOUS at 07:59

## 2020-07-16 RX ADMIN — AMPICILLIN SODIUM 2 G: 2 INJECTION, POWDER, FOR SOLUTION INTRAMUSCULAR; INTRAVENOUS at 05:02

## 2020-07-16 RX ADMIN — THIAMINE HYDROCHLORIDE 200 MG: 100 INJECTION, SOLUTION INTRAMUSCULAR; INTRAVENOUS at 06:17

## 2020-07-16 RX ADMIN — ENOXAPARIN SODIUM 40 MG: 40 INJECTION SUBCUTANEOUS at 07:58

## 2020-07-16 RX ADMIN — DOXAZOSIN 2 MG: 2 TABLET ORAL at 16:02

## 2020-07-16 RX ADMIN — AMPICILLIN SODIUM 2 G: 2 INJECTION, POWDER, FOR SOLUTION INTRAMUSCULAR; INTRAVENOUS at 12:31

## 2020-07-16 RX ADMIN — INSULIN LISPRO 2 UNITS: 100 INJECTION, SOLUTION INTRAVENOUS; SUBCUTANEOUS at 17:12

## 2020-07-16 RX ADMIN — ONDANSETRON 4 MG: 2 INJECTION INTRAMUSCULAR; INTRAVENOUS at 19:59

## 2020-07-16 RX ADMIN — INSULIN LISPRO 9 UNITS: 100 INJECTION, SOLUTION INTRAVENOUS; SUBCUTANEOUS at 08:00

## 2020-07-16 RX ADMIN — TRAZODONE HYDROCHLORIDE 300 MG: 50 TABLET ORAL at 19:59

## 2020-07-16 RX ADMIN — INSULIN GLARGINE 27 UNITS: 100 INJECTION, SOLUTION SUBCUTANEOUS at 20:43

## 2020-07-16 RX ADMIN — LITHIUM CARBONATE 600 MG: 150 CAPSULE, GELATIN COATED ORAL at 07:58

## 2020-07-16 RX ADMIN — THIAMINE HYDROCHLORIDE 200 MG: 100 INJECTION, SOLUTION INTRAMUSCULAR; INTRAVENOUS at 23:46

## 2020-07-16 RX ADMIN — LITHIUM CARBONATE 600 MG: 150 CAPSULE, GELATIN COATED ORAL at 17:11

## 2020-07-16 RX ADMIN — INSULIN LISPRO 1 UNITS: 100 INJECTION, SOLUTION INTRAVENOUS; SUBCUTANEOUS at 20:44

## 2020-07-16 RX ADMIN — TAMSULOSIN HYDROCHLORIDE 0.4 MG: 0.4 CAPSULE ORAL at 19:59

## 2020-07-16 RX ADMIN — INSULIN LISPRO 9 UNITS: 100 INJECTION, SOLUTION INTRAVENOUS; SUBCUTANEOUS at 17:13

## 2020-07-16 ASSESSMENT — PAIN SCALES - GENERAL
PAINLEVEL_OUTOF10: 0

## 2020-07-16 ASSESSMENT — PAIN SCALES - WONG BAKER
WONGBAKER_NUMERICALRESPONSE: 0

## 2020-07-16 NOTE — PROGRESS NOTES
Hospitalist Progress Note  Admit Date: 7/11/2020       Overnight Events: None    CC: F/U for sepsis, pyelonephritis, orchitis    Interval History: Fever again overnight, no recurrence of confusion,  no torsion evident on US, enterococci on urine culture    Review of Systems - he is feeling better and his pain is adequately controlled, his bowels moved and his appetite is now improving, he denies shortness of breath, cough, chest pain, hemoptysis    Scheduled Medications:    traZODone  300 mg Oral Nightly    ampicillin IV  2 g Intravenous 4 times per day    magnesium hydroxide  15 mL Oral BID    polyethylene glycol  17 g Oral BID    mineral oil  30 mL Oral Daily    thiamine (VITAMIN B1) IVPB  200 mg Intravenous Q8H    midazolam  2 mg Intramuscular Once    lithium  600 mg Oral BID WC    tamsulosin  0.4 mg Oral Nightly    enoxaparin  40 mg Subcutaneous Daily    insulin glargine  0.25 Units/kg Subcutaneous Nightly    insulin lispro  0.08 Units/kg Subcutaneous TID WC    insulin lispro  0-12 Units Subcutaneous TID WC    insulin lispro  0-6 Units Subcutaneous Nightly      PRN Medications: oxyCODONE-acetaminophen **OR** oxyCODONE-acetaminophen, acetaminophen, acetaminophen, acetaminophen, ondansetron, ondansetron, promethazine, promethazine, promethazine, sodium chloride flush, potassium chloride **OR** potassium alternative oral replacement **OR** potassium chloride, magnesium sulfate, ketorolac, glucose, dextrose, glucagon (rDNA), dextrose  Diet: DIET CARB CONTROL;     Continuous Infusions:   dextrose         PHYSICAL EXAM:  BP (!) 162/108   Pulse 83   Temp 98.7 °F (37.1 °C) (Oral)   Resp 18   Ht 6' 1\" (1.854 m)   Wt 260 lb 12.9 oz (118.3 kg)   SpO2 96%   BMI 34.41 kg/m²   Recent Labs     07/14/20  2043 07/15/20  0744 07/15/20  1100 07/15/20  1603 07/15/20  2001   POCGLU 183* 210* 219* 121* 129*       Intake/Output Summary (Last 24 hours) at 7/15/2020 2316  Last data filed at 7/15/2020 2200  Gross per 24 hour   Intake 1330 ml   Output 28044 ml   Net -97679 ml       BP (!) 162/108   Pulse 83   Temp 98.7 °F (37.1 °C) (Oral)   Resp 18   Ht 6' 1\" (1.854 m)   Wt 260 lb 12.9 oz (118.3 kg)   SpO2 96%   BMI 34.41 kg/m²   General appearance: awake and interactive, no distress, obese body habitus  Eyes: negative findings: conjunctivae and sclerae normal, corneas clear, PERTL  Throat: buccal mucosa pink and moist, oropharynx patent and clear  Neck: No JVD, no carotid bruit, trachea midline, and thyroid not enlarged,   Lungs: BS symmetrically equal, vesicular but shallow, crackles heard form left lung base, no wheezes  Heart: regular rate and rhythm, S1, S2 normal, no murmur, click, rub or gallop  Abdomen: soft, non-tender; bowel sounds normal; no masses, no organomegaly  Extremities: atraumatic, no cyanosis or peripheral edema  MSK: no major deformities of the spine or major joints, no effusions or tenderness,   Skin: warm, dry, no jaundice, no rash, no wounds or ulcers  Lymph nodes: no Cervical, supraclavicular, and axillary adenopathy  Neurologic: Cranial nerves 3-12: normal; no dysarthria sensory: normal; Motor:grossly normal; Coordination: grossly normal  Psych - alert and oriented, his speech is coherent but his recent memory is significantly impaired, his insight is adequate, no active hallucinations      LABS:  Recent Labs     07/13/20  0447 07/14/20  0245 07/15/20  0448   WBC 28.6* 27.8* 21.3*   HGB 11.1* 11.3* 11.9*   HCT 33.4* 35.2* 36.3*    243 280                                                                    Recent Labs     07/13/20 0447 07/14/20  0245 07/15/20  0448    138 143   K 3.9 4.0 3.8    109 111*   CO2 21 18* 22   BUN 9 7 4*   CREATININE 0.8 0.6* 0.6*   GLUCOSE 216* 172* 249*     Recent Labs     07/13/20 0447 07/14/20 0245 07/15/20  0448   AST 25 16 14*   ALT 28 22 19   BILITOT 0.7 0.7 0.7   ALKPHOS 75 96 157*     No results for input(s): TROPONINI in the last 72 hours. No results for input(s): BNP in the last 72 hours. No results for input(s): CHOL, HDL in the last 72 hours. Invalid input(s): LDLCALCU  Recent Labs     07/13/20  0447 07/14/20  0245 07/15/20  0448   INR 1.41* 1.21* 1.20*       Assessment & Plan:    Patient Active Problem List:     Principal Problem:    Sepsis ([facility]  Active Problems:    Metabolic encephalopathy    Acute epididymo-orchitis    Pyelonephritis    S/P TURP    Type 2 diabetes mellitus ([facility] [address]    Essential hypertension    Prostate hyperplasia with urinary obstruction    Bipolar 1 disorder (HCC)    Asthma    Constipation due to opioid therapy  Resolved Problems:    * No resolved hospital problems. *            The patient and / or the family were informed of the results of any tests, a time was given to answer questions, a plan was proposed and they agreed with plan. Disposition: Home in 2 to 3 days  Full Code    1. D/c iv fluids, continue iv antibiotics, ampicillin per ID recommendations  2. Prn haldol, continue the lithium, start supplemental iv thiamine,   3. Advance to ADA diet as tolerated subcutaneous insulin regimen for blood sugar target 140-180  4.  Relaxed systolic blood pressure target, oral antihypertensive meds  5. continue analgesic regimen      D/w RN and patient's wife at the bedside    Reji Richards MD  7/15/2020

## 2020-07-16 NOTE — CARE COORDINATION
Received call from [facility] with ARU and she states patient was denied by insurance for ARU. Rec's are still for ARU. Spoke with patient and wife and they are very adamant about trying to get to the ARU. They would like to appeal and try to overturn the denial.  Notified MD who is agreeable with doing a peer to peer. Called the 800 number provided by [facility] and spoke with [employer]. She has set up time for peer to peer at 2:45. Provided [employer] with Dr. Lorenzo Lopez phone number. Will wait to see what the result of the peer to peer will be.       1600 addendum:  Received message from MD that peer to peer did not overturn the denial but that they would approve for SNF. Discussed this with patient and wife and they state their next choice would be [facility] as patient has been there in the past and had a good experience there. Placed call to [facility] with EGS and notified of referral. She states they will be able to accept due to she is familiar with patient. Will initate precert tomorrow as turn around time is same day. COVIDD test ordered and charge RN aware.

## 2020-07-16 NOTE — PROGRESS NOTES
Urology Progress Note      Subjective:   Pt anxious for discharge    Vitals:  BP (!) 144/76   Pulse 81   Temp 98.2 °F (36.8 °C) (Oral)   Resp 16   Ht 6' 1\" (1.854 m)   Wt 245 lb 3.2 oz (111.2 kg)   SpO2 97%   BMI 32.35 kg/m²   Temp  Av.4 °F (36.9 °C)  Min: 97.8 °F (36.6 °C)  Max: 99.1 °F (37.3 °C)    Intake/Output Summary (Last 24 hours) at 2020 1028  Last data filed at 2020 1366  Gross per 24 hour   Intake 2760 ml   Output 9600 ml   Net -6840 ml       Exam:   Stable left epdidymo-orchitis  Vasquez with clear urine    Labs:  WBC:    Lab Results   Component Value Date    WBC 15.5 2020     Hemoglobin/Hematocrit:    Lab Results   Component Value Date    HGB 12.4 2020    HCT 37.7 2020     BMP:    Lab Results   Component Value Date     2020    K 3.6 2020    K 4.3 06/10/2020     2020    CO2 24 2020    BUN 6 2020    LABALBU 3.4 2020    CREATININE 0.7 2020    CALCIUM 9.7 2020    GFRAA >60 2020    GFRAA >60 10/21/2010    LABGLOM >60 2020     PT/INR:    Lab Results   Component Value Date    PROTIME 15.2 2020    INR 1.31 2020     PTT:    Lab Results   Component Value Date    APTT 29.6 10/13/2010   [APTT    Urinalysis:     Urine Culture:      Blood Culture:      Antibiotic Therapy:      Imaging:       Impression/Plan:   Left ochitis-improving  WBC improved  Continue vasquez at DC, f/u in a few weeks in the office    Brandy Salamanca PA-C

## 2020-07-16 NOTE — FLOWSHEET NOTE
07/15/20 2000   Assessment   Charting Type Shift assessment   Neurological   Neuro (WDL) WDL   Level of Consciousness 0   Orientation Level Oriented X4   Cognition Appropriate judgement; Appropriate safety awareness   Language Clear   NIH/MNIHSS Stroke Scale Assessed No   [place] Coma Scale   Eye Opening 4   Best Verbal Response 5   Best Motor Response 6   [place] Coma Scale Score 15   HEENT   HEENT (WDL) WDL   Respiratory   Respiratory (WDL) X   Respiratory Pattern Regular   Respiratory Depth Normal   Respiratory Quality/Effort Unlabored;Dyspnea with exertion   Chest Assessment Chest expansion symmetrical;Trachea midline   L Breath Sounds Clear;Diminished   R Breath Sounds Clear;Diminished   Cardiac   Cardiac (WDL) WDL   Cardiac Regularity Regular   Heart Sounds S1, S2   Cardiac Rhythm NSR   Rhythm Interpretation   Pulse 83   Cardiac Monitor   Telemetry Monitor On Yes   Telemetry Audible Yes   Telemetry Alarms Set Yes   Telemetry Box Number 112   Gastrointestinal   Abdominal (WDL) WDL   RUQ Bowel Sounds Active   LUQ Bowel Sounds Active   RLQ Bowel Sounds Active   LLQ Bowel Sounds Active   Passing Flatus Y   Abdomen Inspection Non-distended;Rounded   Tenderness Soft;Nontender   Peripheral Vascular   Peripheral Vascular (WDL) WDL   Edema None   Skin Color/Condition   Skin Color/Condition (WDL) WDL   Skin Color Appropriate for ethnicity   Skin Integrity   Skin Integrity (WDL) X   Skin Integrity Redness   Location buttocks, coccyx   Musculoskeletal   Musculoskeletal (WDL) X   RUE Full movement;Weakness   LUE Full movement;Weakness   RL Extremity Weakness; Unsteady   LL Extremity Weakness; Unsteady   Genitourinary   Genitourinary (WDL) X   Flank Tenderness No   Suprapubic Tenderness No   Dysuria ILIANA   Anus/Rectum   Anus/Rectum (WDL) WDL   Urethral Catheter Straight-tip; Non-latex; Temperature probe   Placement Date/Time: 07/11/20 1704   Urethral Catheter Timeout: Patient; Appropriate Equipment;Procedure;Sterile technique; Correct Position;Site/Side; Availability of Implant  Catheter Type: Straight-tip; Non-latex; Temperature probe  Catheter Balloon Siz. .. Catheter Indications Need for fluid management in critically ill patients in a critical care setting not able to be managed by other means such as BSC with hat, bedpan, urinal, condom catheter, or short term intermittent urethral catherization   Site Assessment No urethral drainage   Urine Color Yellow   Urine Appearance Clear   Urine Odor Malodorous   Psychosocial   Psychosocial (WDL) WDL   Patient Behaviors Calm; Cooperative

## 2020-07-16 NOTE — PROGRESS NOTES
Occupational Therapy  Facility/Department: [facility]  Daily Treatment Note  NAME: Zuly Deshpande  : 1950  MRN: [id number]    Date of Service: 2020    Discharge Recommendations:  IP Rehab       Assessment   Performance deficits / Impairments: Decreased functional mobility ; Decreased cognition;Decreased ADL status; Decreased strength;Decreased safe awareness  Assessment: Pt pleasant and agreeable to therapy. Pt demo'd quick changes to emotions througout session. Pt required increased cues and encouragement for all activities. Pt required total a for BM after incontinent episode in bed. Pt required min A for transfer. Pt completed BUE exercises for increased endurance and strength. Cont with POC. Prognosis: Good  OT Education: OT Role;Plan of Care;Precautions  REQUIRES OT FOLLOW UP: Yes  Activity Tolerance  Activity Tolerance: Patient Tolerated treatment well;Patient limited by fatigue;Treatment limited secondary to decreased cognition  Safety Devices  Safety Devices in place: Yes  Type of devices: Call light within reach; Chair alarm in place; Left in chair;Nurse notified         Patient Diagnosis(es): The primary encounter diagnosis was Septicemia ([facility] [address] Diagnoses of Acute pyelonephritis and Confusion associated with infection were also pertinent to this visit. has a past medical history of Anxiety, Arthritis, Asthma, Bipolar 1 disorder ([address] Colitis, Depression, Diabetes ([address] Diabetes mellitus ([address] Glaucoma, H/O bladder problems, High blood pressure, History of kidney problems, IBS (irritable bowel syndrome), Liver disease, Obesity, and Psychiatric problem. has a past surgical history that includes back surgery; hip surgery; Eye surgery;  Colonoscopy; cervical fusion; joint replacement (Right); other surgical history (2018); pr njx dx/ther sbst intrlmnr crv/thrc w/img gdn (Bilateral, 2018); pr njx dx/ther sbst intrlmnr crv/thrc w/img gdn (Bilateral, 10/16/2018); pr nervous system surgery unlisted (Bilateral, 11/12/2018); Appendectomy; and TURP (N/A, 6/9/2020). Restrictions  Restrictions/Precautions  Restrictions/Precautions: General Precautions, Fall Risk  Position Activity Restriction  Other position/activity restrictions: Up with assist, vasquez catheter     Subjective   General  Chart Reviewed: Yes  Patient assessed for rehabilitation services?: Yes  Additional Pertinent Hx: back surgery with falls,  Family / Caregiver Present: Yes  Referring Practitioner: Dr. Lauren Rogers  Diagnosis: pyelonephritis; s/p TURP 6-09-20  Subjective  Subjective: Pt states \"I'm bipolar and this place is making me crazy\"  General Comment  Comments: RN approved therapy  Vital Signs  Patient Currently in Pain: Denies     Orientation  Orientation  Overall Orientation Status: Impaired  Orientation Level: Oriented to time;Disoriented to place; Disoriented to situation     Objective    ADL  LE Dressing: Maximum assistance  Toileting: Dependent/Total(max a for pericare after BM)        Balance  Sitting Balance: Supervision  Standing Balance: Minimal assistance(RW)  Bed mobility  Supine to Sit: Stand by assistance(HOB elevated increased time)  Sit to Supine: Unable to assess(up in chair)  Scooting: Contact guard assistance  Transfers  Stand Pivot Transfers: Stand by assistance  Sit to stand: Contact guard assistance  Stand to sit: Minimal assistance                       Cognition  Overall Cognitive Status: Exceptions  Arousal/Alertness: Inconsistent responses to stimuli  Following Commands:  Follows one step commands with repetition  Attention Span: Difficulty attending to directions  Memory: Decreased short term memory;Decreased recall of recent events;Decreased recall of precautions  Safety Judgement: Decreased awareness of need for safety  Insights: Not aware of deficits  Initiation: Requires cues for some  Sequencing: Requires cues for some                    Type of ROM/Therapeutic Exercise  Type of ROM/Therapeutic Exercise: AROM  Exercises  Shoulder Flexion: x10  Elbow Flexion: x10  Elbow Extension: x10  Supination: x10  Pronation: x10  Wrist Flexion: x10  Wrist Extension: x10  Grasp/Release: x10                    Plan   Plan  Times per week: 3-5x/ week  Current Treatment Recommendations: Strengthening, Functional Mobility Training, Safety Education & Training, Positioning, Endurance Training, Self-Care / ADL    AM-PAC Score        AM-PAC Inpatient Daily Activity Raw Score: 13 (07/16/20 1535)  AM-PAC Inpatient ADL T-Scale Score : 32.03 (07/16/20 1535)  ADL Inpatient CMS 0-100% Score: 63.03 (07/16/20 1535)  ADL Inpatient CMS G-Code Modifier : CL (07/16/20 1535)    Goals  Short term goals  Time Frame for Short term goals: 1 week(7-21-20)  Short term goal 1: SBA with bathroom mobility with LRAD (Least Restrictive Assistive Device) by 7-21-20  Short term goal 2: SBA standing ADL's for 5 minutes  Short term goal 3: Modified Independent with functional/toilet transfers by 7-21-20  Short term goal 4: tolerate 15-20 reps BUE strengthening exercises  Short term goal 5: min assist with LE dressing with AE  Patient Goals   Patient goals : go home       Therapy Time   Individual Concurrent Group Co-treatment   Time In 1594         Time Out 1120         Minutes 28         Timed Code Treatment Minutes: Nicole Garay 5701, OTR/L    If pt is unable to be seen after this session, please let this note serve as discharge summary. Please see case management note for discharge disposition. Thank you.

## 2020-07-16 NOTE — PROGRESS NOTES
Physical Therapy  Facility/Department: [address] [place] [address] PCU  Daily Treatment Note  NAME: Hemanth Goyal  : 1950  MRN: [id number]    Date of Service: 2020    Discharge Recommendations:  IP Rehab   PT Equipment Recommendations  Equipment Needed: No    Assessment   Body structures, Functions, Activity limitations: Decreased functional mobility ; Decreased endurance;Decreased ROM; Decreased balance;Decreased strength;Decreased posture;Decreased cognition  Assessment: Pt tolerated therapy well. Gait and posture deviations remain making ambulation unsafe with RW d/t significant forward lean. However, pt unreceptive to cues to correct posture and safety with use of RW. Pt would benefit from continued skilled therapy to address deficits. Recommend IP rehab at d/c. Treatment Diagnosis: decreased indep with mobility  Prognosis: Good  Decision Making: Medium Complexity  PT Education: Goals;Gait Training;PT Role;Plan of Care;Home Exercise Program;Transfer Training;Functional Mobility Training;Disease Specific Education  Patient Education: Pt verbalizes understanding but would benefit from reinforcement d/t decreased receptiveness to education. Barriers to Learning: no  REQUIRES PT FOLLOW UP: Yes  Activity Tolerance  Activity Tolerance: Patient Tolerated treatment well;Patient limited by endurance     Patient Diagnosis(es): The primary encounter diagnosis was Septicemia ([facility] [address] Diagnoses of Acute pyelonephritis and Confusion associated with infection were also pertinent to this visit. has a past medical history of Anxiety, Arthritis, Asthma, Bipolar 1 disorder ([facility] [address] Colitis, Depression, Diabetes ([facility] [address] Diabetes mellitus ([facility] [address] Glaucoma, H/O bladder problems, High blood pressure, History of kidney problems, IBS (irritable bowel syndrome), Liver disease, Obesity, and Psychiatric problem. has a past surgical history that includes back surgery; hip surgery; Eye surgery;  Colonoscopy; cervical fusion; joint replacement (Right); other surgical history (06/06/2018); pr njx dx/ther sbst intrlmnr crv/thrc w/img gdn (Bilateral, 9/25/2018); pr njx dx/ther sbst intrlmnr crv/thrc w/img gdn (Bilateral, 10/16/2018); pr nervous system surgery unlisted (Bilateral, 11/12/2018); Appendectomy; and TURP (N/A, 6/9/2020). Restrictions  Restrictions/Precautions  Restrictions/Precautions: General Precautions, Fall Risk  Position Activity Restriction  Other position/activity restrictions: Up with assist, vasquez catheter  Subjective   General  Chart Reviewed: Yes  Response To Previous Treatment: Patient with no complaints from previous session. Family / Caregiver Present: No  Referring Practitioner: Chanel Marsh MD  Subjective  Subjective: Pt agreeable to PT  General Comment  Comments: Pt resting in bed upon entry, RN cleared pt for therapy  Pain Screening  Patient Currently in Pain: Denies        Objective   Bed mobility  Supine to Sit: Stand by assistance(Cues for technique, HOB elevated, use of rails, increased time)  Sit to Supine: Unable to assess(pt up in chair at end of session)     Transfers  Sit to Stand: Stand by assistance(increased time to complete with cues for set up and hand placement when using RW)  Stand to sit: Stand by assistance     Ambulation  Ambulation?: Yes  Ambulation 1  Surface: level tile  Device: Rolling Walker  Assistance: Contact guard assistance  Quality of Gait: Pt amb with rapid jeanne, using short shuffling steps (significantly reduced step length/height). Max cues to maintain LIZETT within RW and for upright posture in order to improve safety and balance with ambulation. Limited receptiveness to cues.  Lacks TKE bilaterally during stance and ambulates in crouched position despite  cues to correct  Gait Deviations: Decreased step length;Decreased step height  Distance: 160 ft                      AM-PAC Score     AM-PAC Inpatient Mobility without Stair Climbing Raw Score : 15 (07/16/20 0934)  AM-PAC Inpatient without Stair Climbing T-Scale Score : 43.03 (07/16/20 0934)  Mobility Inpatient CMS 0-100% Score: 47.43 (07/16/20 0934)  Mobility Inpatient without Stair CMS G-Code Modifier : CK (07/16/20 0934)       Goals  Short term goals  Time Frame for Short term goals: 7/20/20 unless noted  Short term goal 1: Pt will perform bed mobility with supervision by 7/19/20 -- PROGRESSING, SBA 7/16  Short term goal 2: Pt will perform transfers with SBA - GOAL MET 7/15, Upgraded: transfer with Supervision -- PROGRESSING, SBA 7/16  Short term goal 3: Pt will ambulate 60 ft with RW and CGA - GOAL MET 7/15; Upgraded: Amb 150' with RW and Supervision -- PROGRESSING, CGA 160ft 7/16  Short term goal 4: New goal added 7/15: Pt will asc/desc 1 step with RW Supervision. Goal added because pt expresses desire to d/c home. -- Not addressed 7/16  Patient Goals   Patient goals : \"to go home\"    Plan    Plan  Times per week: 3-5x/wk  Current Treatment Recommendations: Strengthening, Home Exercise Program, ROM, Balance Training, Endurance Training, Functional Mobility Training, Transfer Training, Gait Training, Neuromuscular Re-education, Patient/Caregiver Education & Training, Equipment Evaluation, Education, & procurement, Stair training  Safety Devices  Type of devices: Call light within reach, Chair alarm in place, All fall risk precautions in place, Gait belt, Patient at risk for falls, Nurse notified, Left in chair     Therapy Time   Individual Concurrent Group Co-treatment   Time In 0825         Time Out 0850         Minutes 25         Timed Code Treatment Minutes: [address] Payton, PT, DPT #[id number]  If pt is unable to be seen after this session, please let this note serve as discharge summary. Please see case management note for discharge disposition. Thank you.

## 2020-07-16 NOTE — PROGRESS NOTES
Nia Alvarez  7/16/2020  [id number]    Chief Complaint: Sepsis ([address]    Subjective:   No new issues. ROS: no n/v, cp, sob, f/c  Objective:  Patient Vitals for the past 24 hrs:   BP Temp Temp src Pulse Resp SpO2 Weight   07/16/20 0754 (!) 144/76 98.2 °F (36.8 °C) Oral 81 16 97 % --   07/16/20 0426 -- -- -- -- -- -- 245 lb 3.2 oz (111.2 kg)   07/16/20 0343 (!) 166/83 98 °F (36.7 °C) Oral 87 16 95 % --   07/15/20 2329 (!) 141/79 97.8 °F (36.6 °C) Oral 72 16 96 % --   07/15/20 2000 (!) 162/108 98.7 °F (37.1 °C) Oral 83 18 96 % --   07/15/20 1400 (!) 146/79 99.1 °F (37.3 °C) Oral 81 20 98 % --   07/15/20 0951 (!) 155/92 98.2 °F (36.8 °C) Oral 92 24 97 % --     Gen: No distress, pleasant. HEENT: Normocephalic, atraumatic. CV: Regular rate and rhythm. Resp: No respiratory distress. Abd: Soft, nontender   Ext: No edema. Neuro: Alert, oriented, appropriately interactive. Wt Readings from Last 3 Encounters:   07/16/20 245 lb 3.2 oz (111.2 kg)   06/09/20 240 lb (108.9 kg)   12/21/18 250 lb (113.4 kg)       Laboratory data:   Lab Results   Component Value Date    WBC 15.5 (H) 07/16/2020    HGB 12.4 (L) 07/16/2020    HCT 37.7 (L) 07/16/2020    MCV 95.2 07/16/2020     07/16/2020       Lab Results   Component Value Date     07/16/2020    K 3.6 07/16/2020    K 4.3 06/10/2020     07/16/2020    CO2 24 07/16/2020    BUN 6 07/16/2020    CREATININE 0.7 07/16/2020    GLUCOSE 164 07/16/2020    CALCIUM 9.7 07/16/2020        Therapy progress:  PT  Position Activity Restriction  Other position/activity restrictions:  Up with assist, vasquez catheter, IV  Objective     Sit to Stand: Stand by assistance  Stand to sit: Stand by assistance  Bed to Chair: Contact guard assistance, Minimal assistance  Device: Rolling Walker  Assistance: Contact guard assistance  Distance: 120'  OT  PT Equipment Recommendations  Equipment Needed: No(defer to facility; if d/c home, pt should be encouraged to use his 4WW for

## 2020-07-16 NOTE — PROGRESS NOTES
4 Eyes Skin Assessment     The patient is being assess for   Shift Handoff    I agree that 2 RN's have performed a thorough Head to Toe Skin Assessment on the patient. ALL assessment sites listed below have been assessed. Areas assessed by both nurses:   [x]   Head, Face, and Ears   [x]   Shoulders, Back, and Chest, Abdomen  [x]   Arms, Elbows, and Hands   [x]   Coccyx, Sacrum, and Ischium  [x]   Legs, Feet, and Heels            **SHARE this note so that the co-signing nurse is able to place an eSignature**    Co-signer eSignature: Electronically signed by Cristhian Iqbal RN on 7/16/20 at 6:37 AM EDT    Does the Patient have Skin Breakdown?   No          Dano Prevention initiated:  Yes   Wound Care Orders initiated:  No      C nurse consulted for Pressure Injury (Stage 3,4, Unstageable, DTI, NWPT, Complex wounds)and New or Established Ostomies:  No      Primary Nurse eSignature: Electronically signed by Rupesh Jarrett RN on 7/16/20 at 6:35 AM EDT

## 2020-07-16 NOTE — PROGRESS NOTES
4 Eyes Skin Assessment     The patient is being assess for   Shift Handoff    I agree that 2 RN's have performed a thorough Head to Toe Skin Assessment on the patient. ALL assessment sites listed below have been assessed. Areas assessed by both nurses:   [x]   Head, Face, and Ears   [x]   Shoulders, Back, and Chest, Abdomen  [x]   Arms, Elbows, and Hands   [x]   Coccyx, Sacrum, and Ischium  [x]   Legs, Feet, and Heels    No new skin breakdown noted. **SHARE this note so that the co-signing nurse is able to place an eSignature**    Co-signer eSignature: Electronically signed by Lane Ellis RN on 7/16/20 at 9:04 PM EDT    Does the Patient have Skin Breakdown?   No          Dano Prevention initiated:  Yes   Wound Care Orders initiated:  NA      [facility] nurse consulted for Pressure Injury (Stage 3,4, Unstageable, DTI, NWPT, Complex wounds)and New or Established Ostomies:  NA      Primary Nurse eSignature: Electronically signed by Karime Monaco RN on 7/16/20 at 8:43 AM EDT

## 2020-07-16 NOTE — PLAN OF CARE
Problem: Falls - Risk of:  Goal: Will remain free from falls  Description: Will remain free from falls  Outcome: Ongoing  Goal: Absence of physical injury  Description: Absence of physical injury  Outcome: Ongoing     Problem: Pain:  Goal: Pain level will decrease  Description: Pain level will decrease  Outcome: Ongoing  Goal: Control of acute pain  Description: Control of acute pain  Outcome: Ongoing  Goal: Control of chronic pain  Description: Control of chronic pain  Outcome: Ongoing     Problem: Skin Integrity:  Goal: Will show no infection signs and symptoms  Description: Will show no infection signs and symptoms  Outcome: Ongoing  Goal: Absence of new skin breakdown  Description: Absence of new skin breakdown  Outcome: Ongoing     Problem: Confusion - Acute:  Goal: Absence of continued neurological deterioration signs and symptoms  Description: Absence of continued neurological deterioration signs and symptoms  Outcome: Ongoing  Goal: Mental status will be restored to baseline  Description: Mental status will be restored to baseline  Outcome: Ongoing     Problem: Discharge Planning:  Goal: Ability to perform activities of daily living will improve  Description: Ability to perform activities of daily living will improve  Outcome: Ongoing  Goal: Participates in care planning  Description: Participates in care planning  Outcome: Ongoing     Problem: Injury - Risk of, Physical Injury:  Goal: Will remain free from falls  Description: Will remain free from falls  Outcome: Ongoing  Goal: Absence of physical injury  Description: Absence of physical injury  Outcome: Ongoing     Problem: Mood - Altered:  Goal: Mood stable  Description: Mood stable  Outcome: Ongoing  Goal: Absence of abusive behavior  Description: Absence of abusive behavior  Outcome: Ongoing  Goal: Verbalizations of feeling emotionally comfortable while being cared for will increase  Description: Verbalizations of feeling emotionally comfortable while being cared for will increase  Outcome: Ongoing     Problem: Psychomotor Activity - Altered:  Goal: Absence of psychomotor disturbance signs and symptoms  Description: Absence of psychomotor disturbance signs and symptoms  Outcome: Ongoing     Problem: Sensory Perception - Impaired:  Goal: Demonstrations of improved sensory functioning will increase  Description: Demonstrations of improved sensory functioning will increase  Outcome: Ongoing  Goal: Decrease in sensory misperception frequency  Description: Decrease in sensory misperception frequency  Outcome: Ongoing  Goal: Able to refrain from responding to false sensory perceptions  Description: Able to refrain from responding to false sensory perceptions  Outcome: Ongoing  Goal: Demonstrates accurate environmental perceptions  Description: Demonstrates accurate environmental perceptions  Outcome: Ongoing  Goal: Able to distinguish between reality-based and nonreality-based thinking  Description: Able to distinguish between reality-based and nonreality-based thinking  Outcome: Ongoing  Goal: Able to interrupt nonreality-based thinking  Description: Able to interrupt nonreality-based thinking  Outcome: Ongoing     Problem: Sleep Pattern Disturbance:  Goal: Appears well-rested  Description: Appears well-rested  Outcome: Ongoing

## 2020-07-16 NOTE — PROGRESS NOTES
Hospitalist Progress Note      PCP: Yolanda Vega MD    Date of Admission: 7/11/2020    Chief Complaint: sepsis     Hospital Course:     70 yo M hx bipolar do, dm2 p/w sepsis 2/2 enterococcal UTI/orchitis     Subjective:     Feeling better but still very weak, needing a lot of support to get up and around. Afebrile. Wbc downtrends. Medications:  Reviewed    Infusion Medications    dextrose       Scheduled Medications    traZODone  300 mg Oral Nightly    ampicillin IV  2 g Intravenous 4 times per day    magnesium hydroxide  15 mL Oral BID    polyethylene glycol  17 g Oral BID    mineral oil  30 mL Oral Daily    thiamine (VITAMIN B1) IVPB  200 mg Intravenous Q8H    midazolam  2 mg Intramuscular Once    lithium  600 mg Oral BID WC    tamsulosin  0.4 mg Oral Nightly    enoxaparin  40 mg Subcutaneous Daily    insulin glargine  0.25 Units/kg Subcutaneous Nightly    insulin lispro  0.08 Units/kg Subcutaneous TID WC    insulin lispro  0-12 Units Subcutaneous TID WC    insulin lispro  0-6 Units Subcutaneous Nightly     PRN Meds: oxyCODONE-acetaminophen **OR** oxyCODONE-acetaminophen, acetaminophen, acetaminophen, acetaminophen, ondansetron, ondansetron, promethazine, promethazine, promethazine, sodium chloride flush, potassium chloride **OR** potassium alternative oral replacement **OR** potassium chloride, magnesium sulfate, ketorolac, glucose, dextrose, glucagon (rDNA), dextrose      Intake/Output Summary (Last 24 hours) at 7/16/2020 1438  Last data filed at 7/16/2020 1216  Gross per 24 hour   Intake 3160 ml   Output 23058 ml   Net -7090 ml       Physical Exam Performed:    BP (!) 152/75   Pulse 83   Temp 98.3 °F (36.8 °C) (Oral)   Resp 16   Ht 6' 1\" (1.854 m)   Wt 245 lb 3.2 oz (111.2 kg)   SpO2 98%   BMI 32.35 kg/m²     General appearance: No apparent distress, appears stated age and cooperative. HEENT: Pupils equal, round, and reactive to light.  Conjunctivae/corneas clear.  Neck: Supple, with full range of motion. No jugular venous distention. Respiratory:  Normal respiratory effort. Clear to auscultation, bilaterally without Rales/Wheezes/Rhonchi. Cardiovascular: Regular rate and rhythm with normal S1/S2 without murmurs, rubs or gallops. Abdomen: Soft, mildly tender, non-distended with normal bowel sounds. Musculoskeletal: No clubbing, cyanosis or edema bilaterally. Full range of motion without deformity. Skin: Skin color, texture, turgor normal.  No rashes or lesions. Neurologic:  Neurovascularly intact without any focal sensory/motor deficits. Cranial nerves: II-XII intact, grossly non-focal.  Psychiatric: Alert and oriented, thought content appropriate, normal insight  Capillary Refill: Brisk,< 3 seconds   Peripheral Pulses: +2 palpable, equal bilaterally       Labs:   Recent Labs     07/14/20  0245 07/15/20  0448 07/16/20  0500   WBC 27.8* 21.3* 15.5*   HGB 11.3* 11.9* 12.4*   HCT 35.2* 36.3* 37.7*    280 304     Recent Labs     07/14/20  0245 07/15/20  0448 07/16/20  0500    143 144   K 4.0 3.8 3.6    111* 109   CO2 18* 22 24   BUN 7 4* 6*   CREATININE 0.6* 0.6* 0.7*   CALCIUM 9.0 9.4 9.7   PHOS 1.9* 3.0 2.7     Recent Labs     07/14/20  0245 07/15/20  0448 07/16/20  0500   AST 16 14* 13*   ALT 22 19 16   BILITOT 0.7 0.7 0.8   ALKPHOS 96 157* 138*     Recent Labs     07/14/20  0245 07/15/20  0448 07/16/20  0500   INR 1.21* 1.20* 1.31*     No results for input(s): CKTOTAL, TROPONINI in the last 72 hours. Urinalysis:      Lab Results   Component Value Date    NITRU Negative 07/11/2020    WBCUA >100 07/11/2020    BACTERIA 4+ 07/11/2020    RBCUA see below 07/11/2020    BLOODU SMALL 07/11/2020    SPECGRAV 1.015 07/11/2020    GLUCOSEU 250 07/11/2020    GLUCOSEU NEGATIVE 10/13/2010       Radiology:  CT PELVIS WO CONTRAST Additional Contrast? None   Final Result   1. Stapleton catheter in place.   The proximal portion of the balloon may be   inflated

## 2020-07-16 NOTE — PLAN OF CARE
Planning:  Goal: Ability to perform activities of daily living will improve  Description: Ability to perform activities of daily living will improve  7/16/2020 0841 by Marta Romo RN  Outcome: Ongoing  7/15/2020 2316 by Armani Payne RN  Outcome: Ongoing  Goal: Participates in care planning  Description: Participates in care planning  7/16/2020 0841 by Marta Romo RN  Outcome: Ongoing  7/15/2020 2316 by Armani Payne RN  Outcome: Ongoing     Problem: Injury - Risk of, Physical Injury:  Goal: Will remain free from falls  Description: Will remain free from falls  7/16/2020 0841 by Marta Romo RN  Outcome: Ongoing  7/15/2020 2316 by Armani Payne RN  Outcome: Ongoing  Goal: Absence of physical injury  Description: Absence of physical injury  7/16/2020 0841 by Marta Romo RN  Outcome: Ongoing  7/15/2020 2316 by Armani Payne RN  Outcome: Ongoing     Problem: Mood - Altered:  Goal: Mood stable  Description: Mood stable  7/16/2020 0841 by Marta Romo RN  Outcome: Ongoing  7/15/2020 2316 by Armani Payne RN  Outcome: Ongoing  Goal: Absence of abusive behavior  Description: Absence of abusive behavior  7/16/2020 0841 by Marta Romo RN  Outcome: Ongoing  7/15/2020 2316 by Armani Payne RN  Outcome: Ongoing  Goal: Verbalizations of feeling emotionally comfortable while being cared for will increase  Description: Verbalizations of feeling emotionally comfortable while being cared for will increase  7/16/2020 0841 by Marta Romo RN  Outcome: Ongoing  7/15/2020 2316 by Armani Payne RN  Outcome: Ongoing     Problem: Psychomotor Activity - Altered:  Goal: Absence of psychomotor disturbance signs and symptoms  Description: Absence of psychomotor disturbance signs and symptoms  7/16/2020 0841 by Marta Romo RN  Outcome: Ongoing  7/15/2020 2316 by Armani Payne RN  Outcome: Ongoing     Problem: Sensory Perception - Impaired:  Goal: Demonstrations of improved sensory functioning will increase  Description: Demonstrations of improved sensory functioning will increase  7/16/2020 0841 by Kelly Newton RN  Outcome: Ongoing  7/15/2020 2316 by Maria R Carrasco RN  Outcome: Ongoing  Goal: Decrease in sensory misperception frequency  Description: Decrease in sensory misperception frequency  7/16/2020 0841 by Kelly Newton RN  Outcome: Ongoing  7/15/2020 2316 by Maria R Carrasco RN  Outcome: Ongoing  Goal: Able to refrain from responding to false sensory perceptions  Description: Able to refrain from responding to false sensory perceptions  7/16/2020 0841 by Kelly Newton RN  Outcome: Ongoing  7/15/2020 2316 by Maria R Carrasco RN  Outcome: Ongoing  Goal: Demonstrates accurate environmental perceptions  Description: Demonstrates accurate environmental perceptions  7/16/2020 0841 by Kelly Newton RN  Outcome: Ongoing  7/15/2020 2316 by Maria R Carrasco RN  Outcome: Ongoing  Goal: Able to distinguish between reality-based and nonreality-based thinking  Description: Able to distinguish between reality-based and nonreality-based thinking  7/16/2020 0841 by Kelly Newton RN  Outcome: Ongoing  7/15/2020 2316 by Maria R Carrasco RN  Outcome: Ongoing  Goal: Able to interrupt nonreality-based thinking  Description: Able to interrupt nonreality-based thinking  7/16/2020 0841 by Kelly Newton RN  Outcome: Ongoing  7/15/2020 2316 by Maria R Carrasco RN  Outcome: Ongoing     Problem: Sleep Pattern Disturbance:  Goal: Appears well-rested  Description: Appears well-rested  7/16/2020 0841 by Kelly Newton RN  Outcome: Ongoing  7/15/2020 2316 by Maria R Carrasco RN  Outcome: Ongoing

## 2020-07-17 VITALS
RESPIRATION RATE: 18 BRPM | WEIGHT: 248.46 LBS | BODY MASS INDEX: 32.93 KG/M2 | OXYGEN SATURATION: 97 % | DIASTOLIC BLOOD PRESSURE: 72 MMHG | HEART RATE: 82 BPM | SYSTOLIC BLOOD PRESSURE: 148 MMHG | TEMPERATURE: 98.8 F | HEIGHT: 73 IN

## 2020-07-17 LAB
A/G RATIO: 0.9 (ref 1.1–2.2)
ALBUMIN SERPL-MCNC: 3.1 G/DL (ref 3.4–5)
ALP BLD-CCNC: 150 U/L (ref 40–129)
ALT SERPL-CCNC: 21 U/L (ref 10–40)
ANION GAP SERPL CALCULATED.3IONS-SCNC: 10 MMOL/L (ref 3–16)
AST SERPL-CCNC: 21 U/L (ref 15–37)
BASOPHILS ABSOLUTE: 0 K/UL (ref 0–0.2)
BASOPHILS RELATIVE PERCENT: 0.4 %
BILIRUB SERPL-MCNC: 0.6 MG/DL (ref 0–1)
BUN BLDV-MCNC: 6 MG/DL (ref 7–20)
CALCIUM SERPL-MCNC: 9.1 MG/DL (ref 8.3–10.6)
CHLORIDE BLD-SCNC: 106 MMOL/L (ref 99–110)
CO2: 25 MMOL/L (ref 21–32)
CREAT SERPL-MCNC: 0.7 MG/DL (ref 0.8–1.3)
EOSINOPHILS ABSOLUTE: 0.3 K/UL (ref 0–0.6)
EOSINOPHILS RELATIVE PERCENT: 2.5 %
GFR AFRICAN AMERICAN: >60
GFR NON-AFRICAN AMERICAN: >60
GLOBULIN: 3.4 G/DL
GLUCOSE BLD-MCNC: 111 MG/DL (ref 70–99)
GLUCOSE BLD-MCNC: 159 MG/DL (ref 70–99)
GLUCOSE BLD-MCNC: 168 MG/DL (ref 70–99)
GLUCOSE BLD-MCNC: 288 MG/DL (ref 70–99)
HCT VFR BLD CALC: 34.4 % (ref 40.5–52.5)
HEMOGLOBIN: 11.4 G/DL (ref 13.5–17.5)
INR BLD: 1.32 (ref 0.86–1.14)
LYMPHOCYTES ABSOLUTE: 1.4 K/UL (ref 1–5.1)
LYMPHOCYTES RELATIVE PERCENT: 10.8 %
MAGNESIUM: 2.4 MG/DL (ref 1.8–2.4)
MCH RBC QN AUTO: 31.9 PG (ref 26–34)
MCHC RBC AUTO-ENTMCNC: 33.3 G/DL (ref 31–36)
MCV RBC AUTO: 96 FL (ref 80–100)
MONOCYTES ABSOLUTE: 1.1 K/UL (ref 0–1.3)
MONOCYTES RELATIVE PERCENT: 8.3 %
NEUTROPHILS ABSOLUTE: 10 K/UL (ref 1.7–7.7)
NEUTROPHILS RELATIVE PERCENT: 78 %
PDW BLD-RTO: 13.2 % (ref 12.4–15.4)
PERFORMED ON: ABNORMAL
PHOSPHORUS: 3.2 MG/DL (ref 2.5–4.9)
PLATELET # BLD: 291 K/UL (ref 135–450)
PMV BLD AUTO: 7.9 FL (ref 5–10.5)
POTASSIUM SERPL-SCNC: 3.5 MMOL/L (ref 3.5–5.1)
PROTHROMBIN TIME: 15.4 SEC (ref 10–13.2)
RBC # BLD: 3.58 M/UL (ref 4.2–5.9)
SARS-COV-2, NAAT: NOT DETECTED
SODIUM BLD-SCNC: 141 MMOL/L (ref 136–145)
TOTAL PROTEIN: 6.5 G/DL (ref 6.4–8.2)
WBC # BLD: 12.8 K/UL (ref 4–11)

## 2020-07-17 PROCEDURE — 80053 COMPREHEN METABOLIC PANEL: CPT

## 2020-07-17 PROCEDURE — 36415 COLL VENOUS BLD VENIPUNCTURE: CPT

## 2020-07-17 PROCEDURE — 6370000000 HC RX 637 (ALT 250 FOR IP): Performed by: INTERNAL MEDICINE

## 2020-07-17 PROCEDURE — 6360000002 HC RX W HCPCS: Performed by: INTERNAL MEDICINE

## 2020-07-17 PROCEDURE — U0002 COVID-19 LAB TEST NON-CDC: HCPCS

## 2020-07-17 PROCEDURE — 83735 ASSAY OF MAGNESIUM: CPT

## 2020-07-17 PROCEDURE — 85025 COMPLETE CBC W/AUTO DIFF WBC: CPT

## 2020-07-17 PROCEDURE — 84100 ASSAY OF PHOSPHORUS: CPT

## 2020-07-17 PROCEDURE — 85610 PROTHROMBIN TIME: CPT

## 2020-07-17 PROCEDURE — 2580000003 HC RX 258: Performed by: INTERNAL MEDICINE

## 2020-07-17 RX ORDER — AMOXICILLIN 500 MG/1
500 CAPSULE ORAL 2 TIMES DAILY
Qty: 12 CAPSULE | Refills: 0 | Status: SHIPPED | OUTPATIENT
Start: 2020-07-17 | End: 2020-07-23

## 2020-07-17 RX ADMIN — INSULIN LISPRO 2 UNITS: 100 INJECTION, SOLUTION INTRAVENOUS; SUBCUTANEOUS at 09:32

## 2020-07-17 RX ADMIN — POLYETHYLENE GLYCOL 3350 17 G: 17 POWDER, FOR SOLUTION ORAL at 09:31

## 2020-07-17 RX ADMIN — MAGNESIUM HYDROXIDE 15 ML: 2400 SUSPENSION ORAL at 09:31

## 2020-07-17 RX ADMIN — AMPICILLIN SODIUM 2 G: 2 INJECTION, POWDER, FOR SOLUTION INTRAMUSCULAR; INTRAVENOUS at 11:52

## 2020-07-17 RX ADMIN — AMPICILLIN SODIUM 2 G: 2 INJECTION, POWDER, FOR SOLUTION INTRAMUSCULAR; INTRAVENOUS at 00:26

## 2020-07-17 RX ADMIN — AMPICILLIN SODIUM 2 G: 2 INJECTION, POWDER, FOR SOLUTION INTRAMUSCULAR; INTRAVENOUS at 05:46

## 2020-07-17 RX ADMIN — INSULIN LISPRO 9 UNITS: 100 INJECTION, SOLUTION INTRAVENOUS; SUBCUTANEOUS at 09:32

## 2020-07-17 RX ADMIN — INSULIN LISPRO 6 UNITS: 100 INJECTION, SOLUTION INTRAVENOUS; SUBCUTANEOUS at 11:52

## 2020-07-17 RX ADMIN — PROMETHAZINE HYDROCHLORIDE 12.5 MG: 25 TABLET ORAL at 15:07

## 2020-07-17 RX ADMIN — THIAMINE HYDROCHLORIDE 200 MG: 100 INJECTION, SOLUTION INTRAMUSCULAR; INTRAVENOUS at 09:32

## 2020-07-17 RX ADMIN — INSULIN LISPRO 9 UNITS: 100 INJECTION, SOLUTION INTRAVENOUS; SUBCUTANEOUS at 11:53

## 2020-07-17 RX ADMIN — ENOXAPARIN SODIUM 40 MG: 40 INJECTION SUBCUTANEOUS at 09:31

## 2020-07-17 RX ADMIN — LITHIUM CARBONATE 600 MG: 150 CAPSULE, GELATIN COATED ORAL at 17:04

## 2020-07-17 RX ADMIN — LITHIUM CARBONATE 600 MG: 150 CAPSULE, GELATIN COATED ORAL at 09:31

## 2020-07-17 RX ADMIN — DOXAZOSIN 2 MG: 2 TABLET ORAL at 09:31

## 2020-07-17 ASSESSMENT — PAIN SCALES - GENERAL
PAINLEVEL_OUTOF10: 0

## 2020-07-17 ASSESSMENT — PAIN SCALES - WONG BAKER
WONGBAKER_NUMERICALRESPONSE: 0

## 2020-07-17 NOTE — PLAN OF CARE
Problem: Falls - Risk of:  Goal: Will remain free from falls  Description: Will remain free from falls  7/16/2020 2110 by Ira Vernon RN  Outcome: Ongoing  7/16/2020 0841 by Becca Gorman RN  Outcome: Ongoing  Goal: Absence of physical injury  Description: Absence of physical injury  7/16/2020 2110 by Ira Vernon RN  Outcome: Ongoing  7/16/2020 0841 by Becca Gorman RN  Outcome: Ongoing     Problem: Pain:  Goal: Pain level will decrease  Description: Pain level will decrease  7/16/2020 2110 by Ira Vernon RN  Outcome: Ongoing  7/16/2020 0841 by Becca Gorman RN  Outcome: Ongoing  Goal: Control of acute pain  Description: Control of acute pain  7/16/2020 2110 by Ira Vernon RN  Outcome: Ongoing  7/16/2020 0841 by Becca Gorman RN  Outcome: Ongoing  Goal: Control of chronic pain  Description: Control of chronic pain  7/16/2020 2110 by Ira Vernon RN  Outcome: Ongoing  7/16/2020 0841 by Becca Gorman RN  Outcome: Ongoing     Problem: Skin Integrity:  Goal: Will show no infection signs and symptoms  Description: Will show no infection signs and symptoms  7/16/2020 2110 by Ira Vernon RN  Outcome: Ongoing  7/16/2020 0841 by Becca Gorman RN  Outcome: Ongoing  Goal: Absence of new skin breakdown  Description: Absence of new skin breakdown  7/16/2020 2110 by Ira Vernon RN  Outcome: Ongoing  7/16/2020 0841 by Bceca Gorman RN  Outcome: Ongoing     Problem: Confusion - Acute:  Goal: Absence of continued neurological deterioration signs and symptoms  Description: Absence of continued neurological deterioration signs and symptoms  7/16/2020 2110 by Ira Vernon RN  Outcome: Ongoing  7/16/2020 0841 by Becca Gorman RN  Outcome: Ongoing  Goal: Mental status will be restored to baseline  Description: Mental status will be restored to baseline  7/16/2020 2110 by Ira Vernon RN  Outcome: Ongoing  7/16/2020 0841 by Becca Gorman RN  Outcome: Ongoing     Problem: Discharge Planning:  Goal: Ability to perform activities of daily living will improve  Description: Ability to perform activities of daily living will improve  7/16/2020 2110 by Silvia Bonner RN  Outcome: Ongoing  7/16/2020 0841 by John Nelson RN  Outcome: Ongoing  Goal: Participates in care planning  Description: Participates in care planning  7/16/2020 2110 by Silvia Bonner RN  Outcome: Ongoing  7/16/2020 0841 by John Nelson RN  Outcome: Ongoing     Problem: Injury - Risk of, Physical Injury:  Goal: Will remain free from falls  Description: Will remain free from falls  7/16/2020 2110 by Silvia Bonner RN  Outcome: Ongoing  7/16/2020 0841 by John Nelson RN  Outcome: Ongoing  Goal: Absence of physical injury  Description: Absence of physical injury  7/16/2020 2110 by Silvia Bonner RN  Outcome: Ongoing  7/16/2020 0841 by John Nelson RN  Outcome: Ongoing     Problem: Mood - Altered:  Goal: Mood stable  Description: Mood stable  7/16/2020 2110 by Silvia Bonner RN  Outcome: Ongoing  7/16/2020 0841 by John Nelson RN  Outcome: Ongoing  Goal: Absence of abusive behavior  Description: Absence of abusive behavior  7/16/2020 2110 by Silvia Bonner RN  Outcome: Ongoing  7/16/2020 0841 by John Nelson RN  Outcome: Ongoing  Goal: Verbalizations of feeling emotionally comfortable while being cared for will increase  Description: Verbalizations of feeling emotionally comfortable while being cared for will increase  7/16/2020 2110 by Silvia Bonner RN  Outcome: Ongoing  7/16/2020 0841 by John Nelson RN  Outcome: Ongoing     Problem: Psychomotor Activity - Altered:  Goal: Absence of psychomotor disturbance signs and symptoms  Description: Absence of psychomotor disturbance signs and symptoms  7/16/2020 2110 by Silvia Bonner RN  Outcome: Ongoing  7/16/2020 0841 by John Nelson RN  Outcome: Ongoing     Problem: Sensory Perception - Impaired:  Goal: Demonstrations of improved sensory functioning will

## 2020-07-17 NOTE — PROGRESS NOTES
Pt d/c'd home. Removed  IV's and stopped bleeding. Catheters intact. Pt tolerated well. No redness noted at site. Notified [facility] and removed tele box. Reviewed d/c instructions, home meds, and  f/u information utilizing teach-back method. Scripts sent to patient's pharmacy. Patient and wife verbalized understanding. Wheeled down by PCA and driven home by wife's sister.

## 2020-07-17 NOTE — CARE COORDINATION
Updated therapy rec's noted. Precert to [facility] initiated. Faxed all required clinicals to [facility] [facility] at [phone]. Placed call to [facility] with [facility] to update her on the above. After discussing the length of time of getting COVID results back, she states they are fine with a rapid COVID test.  Rapid COVID test ordered and RN and charge RN notified. 1510 addendum:  Placed call to [facility] to confirm that they received all faxed clinicals. After extensive hold time, message left for call back. 1634 addendum:  Received call from Alexander  with [facility] and she states they are denying patient for SNF due to his improved level of functioning. Discussed this with patient and wife and they agree that he has improved and feel more confident about him going home. They would like to discharge with [facility]. CASE MANAGEMENT DISCHARGE SUMMARY      Discharge to: home with [facility]    Transportation: friend will pick them up     Confirmed discharge plan with: patient/wife/RN/Mackenzie with [facility]      Patient: yes     Family, name and contact number: Sánchez Elizondo [phone]     Facility/Agency, name:  [facility] faxed n/a   Phone number for report to facility: [phone]     RN, name: Merlene Lucas     Note: Discharging nurse to complete [facility], reconcile [facility], and place final copy with patient's discharge packet.

## 2020-07-17 NOTE — PROGRESS NOTES
Urology Progress Note      Subjective:   Pt has no c/o    Vitals:  BP (!) 144/68   Pulse 79   Temp 98.8 °F (37.1 °C) (Oral)   Resp 18   Ht 6' 1\" (1.854 m)   Wt 248 lb 7.3 oz (112.7 kg)   SpO2 98%   BMI 32.78 kg/m²   Temp  Av.6 °F (37 °C)  Min: 98.2 °F (36.8 °C)  Max: 99.1 °F (37.3 °C)    Intake/Output Summary (Last 24 hours) at 2020 1041  Last data filed at 2020 0957  Gross per 24 hour   Intake 3840 ml   Output 8150 ml   Net -4310 ml       Exam:   Stable left testicle    Labs:  WBC:    Lab Results   Component Value Date    WBC 12.8 2020     Hemoglobin/Hematocrit:    Lab Results   Component Value Date    HGB 11.4 2020    HCT 34.4 2020     BMP:    Lab Results   Component Value Date     2020    K 3.5 2020    K 4.3 06/10/2020     2020    CO2 25 2020    BUN 6 2020    LABALBU 3.1 2020    CREATININE 0.7 2020    CALCIUM 9.1 2020    GFRAA >60 2020    GFRAA >60 10/21/2010    LABGLOM >60 2020     PT/INR:    Lab Results   Component Value Date    PROTIME 15.4 2020    INR 1.32 2020     PTT:    Lab Results   Component Value Date    APTT 29.6 10/13/2010   [APTT    Urinalysis:     Urine Culture:      Blood Culture:      Antibiotic Therapy:      Imaging:       Impression/Plan:   DC with vasquez  F/u in the office in a few weeks    Brandy Salamanca PA-C

## 2020-07-17 NOTE — FLOWSHEET NOTE
Indications Need for fluid management in critically ill patients in a critical care setting not able to be managed by other means such as BSC with hat, bedpan, urinal, condom catheter, or short term intermittent urethral catherization   Site Assessment No urethral drainage   Urine Color Yellow   Urine Appearance Clear   Urine Odor Malodorous   Psychosocial   Psychosocial (WDL) WDL

## 2020-07-17 NOTE — FLOWSHEET NOTE
07/16/20 2003   Assessment   Charting Type Shift assessment   Neurological   Neuro (WDL) WDL   Level of Consciousness 0   Orientation Level Oriented X4   Cognition Appropriate judgement; Appropriate safety awareness   Language Clear   Juanita Coma Scale   Eye Opening 4   Best Verbal Response 5   Best Motor Response 6   Juanita Coma Scale Score 15   HEENT   HEENT (WDL) WDL   Respiratory   Respiratory (WDL) X   Respiratory Pattern Regular   Respiratory Depth Normal   Respiratory Quality/Effort Unlabored;Dyspnea with exertion   Chest Assessment Chest expansion symmetrical;Trachea midline   L Breath Sounds Clear;Diminished   R Breath Sounds Clear;Diminished   Cardiac   Cardiac (WDL) WDL   Cardiac Regularity Regular   Heart Sounds S1, S2   Cardiac Rhythm NSR   Rhythm Interpretation   Pulse 75   Cardiac Monitor   Telemetry Monitor On Yes   Telemetry Audible Yes   Telemetry Alarms Set Yes   Telemetry Box Number 112   Gastrointestinal   Abdominal (WDL) WDL   RUQ Bowel Sounds Active   LUQ Bowel Sounds Active   RLQ Bowel Sounds Active   LLQ Bowel Sounds Active   Abdomen Inspection Non-distended;Rounded   Tenderness Soft;Nontender   Peripheral Vascular   Peripheral Vascular (WDL) WDL   Edema None   Skin Color/Condition   Skin Color/Condition (WDL) WDL   Skin Integrity   Skin Integrity (WDL) X   Skin Integrity Redness   Location buttocks, coccyx   Musculoskeletal   Musculoskeletal (WDL) X   RUE Full movement;Weakness   LUE Full movement;Weakness   RL Extremity Weakness; Unsteady   LL Extremity Weakness; Unsteady   Genitourinary   Genitourinary (WDL) X   Flank Tenderness No   Suprapubic Tenderness No   Dysuria ILIANA   Anus/Rectum   Anus/Rectum (WDL) WDL   Urethral Catheter Straight-tip; Non-latex; Temperature probe   Placement Date/Time: 07/11/20 1704   Urethral Catheter Timeout: Patient; Appropriate Equipment;Procedure;Sterile technique; Correct Position;Site/Side; Availability of Implant  Catheter Type: Straight-tip; Non-latex; Temperature probe  Catheter Balloon Siz. ..    Catheter Indications Need for fluid management in critically ill patients in a critical care setting not able to be managed by other means such as BSC with hat, bedpan, urinal, condom catheter, or short term intermittent urethral catherization   Site Assessment No urethral drainage   Urine Color Yellow   Urine Appearance Clear   Urine Odor Malodorous   Output (mL) 1900 mL   Psychosocial   Psychosocial (WDL) WDL

## 2020-07-17 NOTE — FLOWSHEET NOTE
Patient was very conversational and appreciates connecting with people through events in ourl  Lives. In the past, he lived in [place] and was a big basketball fan ([address] 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 From here, he will go to [facility] for rehab. He was having  some memory issues, but seems to press on, if allowed the space to do so. He has a great  sense of humor, which will carry him a long way. When asked what he specifically would like  To pray for, he didn't hesitate to say:  \"a miracle\". So, we had prayer and blessing. He became  A little tearful at the end.     07/17/20 1355   Encounter Summary   Services provided to: Patient   Referral/Consult From: [address] [place] [address] Visiting   (7/17 Tel. -welcoming & humorous;willl do prayer; see notes)   Complexity of Encounter Low   Length of Encounter 30 minutes   Spiritual Assessment Completed Yes   Routine   Type Initial   Assessment Approachable;Coping   Intervention Discussed belief system/Denominational practices/lia;Discussed illness/injury and it's impact;Empowerment; Active listening;Explored feelings, thoughts, concerns;Explored coping resources;Nurtured hope;Prayer;[place]

## 2020-07-17 NOTE — CARE COORDINATION
[facility]    DC order noted, all docs needed have been faxed to [facility] for home care services.     Home care to see patient within 24-48 hrs    Yolanda De La Fuente RN, BSN CTN  [facility] [phone]

## 2020-07-17 NOTE — PROGRESS NOTES
Patient has climbed out of bed multiple times without calling out. He has almost ripped out both his IV and his vasquez catheter. When asked why he keeps getting up he says \"I don't know why. \" Placed an Avasys in the patient's room for his safety. Will continue to monitor the patient.

## 2020-07-18 LAB
REPORT: NORMAL
SARS-COV-2: NOT DETECTED
THIS TEST SENT TO: NORMAL

## 2020-08-03 ENCOUNTER — HOSPITAL ENCOUNTER (EMERGENCY)
Age: 70
Discharge: HOME | End: 2020-08-03
Payer: MEDICARE

## 2020-08-03 ENCOUNTER — APPOINTMENT (OUTPATIENT)
Dept: CT IMAGING | Age: 70
End: 2020-08-03
Payer: MEDICARE

## 2020-08-03 VITALS
DIASTOLIC BLOOD PRESSURE: 74 MMHG | WEIGHT: 235 LBS | HEIGHT: 73 IN | TEMPERATURE: 98 F | SYSTOLIC BLOOD PRESSURE: 133 MMHG | BODY MASS INDEX: 31.14 KG/M2 | RESPIRATION RATE: 16 BRPM | HEART RATE: 55 BPM | OXYGEN SATURATION: 97 %

## 2020-08-03 PROCEDURE — 74176 CT ABD & PELVIS W/O CONTRAST: CPT

## 2020-08-03 PROCEDURE — 99283 EMERGENCY DEPT VISIT LOW MDM: CPT

## 2020-08-03 ASSESSMENT — PAIN SCALES - GENERAL
PAINLEVEL_OUTOF10: 0
PAINLEVEL_OUTOF10: 7

## 2020-08-03 ASSESSMENT — PAIN DESCRIPTION - PAIN TYPE: TYPE: ACUTE PAIN

## 2020-08-03 NOTE — DISCHARGE SUMMARY
Hospital Medicine Discharge Summary    Patient ID: Fatuma Mclain      Patient's PCP: Lisa Louis MD    Admit Date: 7/11/2020     Discharge Date: 7/17/2020      Admitting Physician: Surinder Cota MD     Discharge Physician: Jemal Hinds MD     Discharge Diagnoses: Active Hospital Problems    Diagnosis    Sepsis ([facility] [A41.9]     Priority: High    Acute epididymo-orchitis [N45.3]     Priority: Medium    Constipation due to opioid therapy [K59.03, T40.2X5A]    Bipolar 1 disorder ([facility] [F31.9]    Asthma [J45.909]    Pyelonephritis [N12]    S/P TURP [L48.42]    Metabolic encephalopathy [I11.03]    Type 2 diabetes mellitus ([facility] [E11.9]    Essential hypertension [I10]    Prostate hyperplasia with urinary obstruction [N40.1, N13.8]       The patient was seen and examined on day of discharge and this discharge summary is in conjunction with any daily progress note from day of discharge. HPI on 7/11/2020:   Fatuma Mclain is a 71 y.o. male. He underwent TURP 6/9/20 and cystoscopic botox injections of the bladder for spasticity. Follows with Dr. Charlene Deshpande. Has been generally doing well except has had to self-catheterize at home for bladder retention for roughly the past week.       Last night he began to feel vaguely ill. Throughout the day today he felt increasingly ill and developed focal pain in the left groin. This prompted his trip to the ER. In the ER he spiked a significant fever to 105 F. He abruptly became quite confused and encephalopathic.       Hospital Course:     Sepsis - 2/2 orchitis. Cultures positive for enterococcus. IV ampicillin switched to PO amoxicillin X 6 days at d/c.        Metabolic encephalopathy - 2/2 the above in setting of baseline bipolar disorder. Improved with supportive care. Weakness- PT/OT consulted- recommended acute rehab declined by insurance.  arranged for SNF. COVID test was negative.      HTN -controlled. Continue BP meds     dm2 -  HBA1c 7. Continue metformin and insulin degludec.        Bipolar disorder - continue lithium        Physical Exam Performed:     BP (!) 148/72   Pulse 82   Temp 98.8 °F (37.1 °C) (Oral)   Resp 18   Ht 6' 1\" (1.854 m)   Wt 248 lb 7.3 oz (112.7 kg)   SpO2 97%   BMI 32.78 kg/m²   General appearance: No apparent distress, appears stated age and cooperative. HEENT: Pupils equal, round, and reactive to light. Conjunctivae/corneas clear. Neck: Supple, with full range of motion. No jugular venous distention. Respiratory:  Normal respiratory effort. Clear to auscultation, bilaterally without Rales/Wheezes/Rhonchi. Cardiovascular: Regular rate and rhythm with normal S1/S2 without murmurs, rubs or gallops. Abdomen: Soft, nontender, non-distended with normal bowel sounds. Musculoskeletal: No clubbing, cyanosis or edema bilaterally. Skin:  Warm and dry neurologic:  Neurovascularly intact without any focal sensory/motor deficits. Cranial nerves: II-XII intact, grossly non-focal.  Psychiatric: Alert and oriented, thought content appropriate, normal insight          Labs: For convenience and continuity at follow-up the following most recent labs are provided:      CBC:    Lab Results   Component Value Date    WBC 12.8 07/17/2020    HGB 11.4 07/17/2020    HCT 34.4 07/17/2020     07/17/2020       Renal:    Lab Results   Component Value Date     07/17/2020    K 3.5 07/17/2020    K 4.3 06/10/2020     07/17/2020    CO2 25 07/17/2020    BUN 6 07/17/2020    CREATININE 0.7 07/17/2020    CALCIUM 9.1 07/17/2020    PHOS 3.2 07/17/2020         Significant Diagnostic Studies    Radiology:   CT PELVIS WO CONTRAST Additional Contrast? None   Final Result   1. Stapleton catheter in place. The proximal portion of the balloon may be   inflated within the proximal portion of the prostatic urethra. This could be   repositioned to see if this improves the patient's pain.    2. The entire scrotum is not included on the imaging. There is no evidence   of peritoneal necrotizing infection or abscess. 3. Stable mild rectosigmoid wall thickening which may relate to edema or   acute inflammation. No obstruction, perforation, or abscess. US SCROTUM AND TESTICLES   Final Result   1. Extensive subcutaneous edema and skin thickening involving the scrotum. Questionable shadowing is noted on a couple of the images of the left scrotal   sac and gas cannot be excluded. Recommend dedicated CT of the pelvis for   further evaluation. 2. There is probable normal Doppler flow within left testis but difficult to   appreciate on the images provided due to positioning of the left testis and   overlying edema. If there is concern for torsion recommend a follow-up   ultrasound if and when acute issues arise. 3. Small right hydrocele with a moderate complex left hydrocele with   septations. Critical results were called by Dr. Fannie Hewitt. Josep Cota MD to Dr. Daniel Diallo on   7/12/2020 at 22:11. XR CHEST PORTABLE   Final Result   Nodular density within the lower right lung, potentially focal airspace   disease, pulmonary nodule, or artifact related to nipple shadow. Chest CT   could be performed for further characterization. CT CHEST ABDOMEN PELVIS W CONTRAST   Final Result   1. Stapleton catheter bulb is seen in the region of the prostate. As the patient   is status post recent TURP, this is of uncertain clinical significance. Clinical correlation is recommended with repositioning as indicated. 2. Circumferential bladder wall thickening. This may be related to   nondistention, inflammation, bacterial cystitis or an infiltrative process. Suggest correlation with urinalysis. 3. No acute intrathoracic findings. 4. Mild cardiomegaly and coronary artery disease. CT HEAD WO CONTRAST   Final Result   Overall exam is limited due to significant motion artifact.   There is a   subtle 22 x 11 mm area of gray-white matter differentiation in the left   frontal parietal region raising suspicion for an age-indeterminate infarct. No prior exams are available for comparison. If the patient is able to lie   still, a repeat CT of the head should be considered. Results were called by Dr. Migdalia Sidhu. Nelia Wan MD to Melissa Barnes on 7/11/2020   at 19:51.                 Consults:     IP CONSULT TO HOSPITALIST  PHARMACY TO DOSE VANCOMYCIN  IP CONSULT TO UROLOGY  IP CONSULT TO INFECTIOUS DISEASES  IP CONSULT TO PHYSICAL MEDICINE REHAB  IP CONSULT TO HOME CARE NEEDS    Disposition: SNF     Condition at Discharge: Stable    Discharge Instructions/Follow-up:    As arranged by SNF physician after discharge from SNF    Code Status:  Prior    Activity: activity as tolerated    Diet: diabetic diet      Discharge Medications:     Discharge Medication List as of 7/17/2020  5:52 PM           Details   amoxicillin (AMOXIL) 500 MG capsule Take 1 capsule by mouth 2 times daily for 6 days, Disp-12 capsule,R-0Normal              Details   lithium 300 MG capsule TK 2 CS PO BIDHistorical Med      lamoTRIgine (LAMICTAL) 25 MG tablet Historical Med      tamsulosin (FLOMAX) 0.4 MG capsule Historical Med      oxybutynin (DITROPAN) 5 MG tablet TK 1 T PO  BIDHistorical Med      Insulin Degludec 100 UNIT/ML SOPN Inject 10 Units into the skin nightlyHistorical Med      verapamil (CALAN SR) 120 MG extended release tablet Take 120 mg by mouth nightlyHistorical Med      glimepiride (AMARYL) 2 MG tablet Take 2 mg by mouth every morning (before breakfast)Historical Med      metFORMIN (GLUCOPHAGE) 500 MG tablet Take 1,000 mg by mouth 2 times daily (with meals)       traZODone (DESYREL) 100 MG tablet Take 300 mg by mouth nightly Historical Med      doxazosin (CARDURA) 2 MG tablet Take 2 mg by mouth Takes 3 times weekly; takes at nightHistorical Med      losartan (COZAAR) 50 MG tablet Take by mouthHistorical Med             Time Spent on discharge is more than 30 minutes in the examination, evaluation, counseling and review of medications and discharge plan. Signed:    Zahida Al MD   8/3/2020      Thank you Glen Goncalves MD for the opportunity to be involved in this patient's care. If you have any questions or concerns please feel free to contact me at [phone].

## 2020-08-04 NOTE — ED NOTES
Discharge instructions and medications discussed with patient. Verbal understanding given. PT wheeled out of ED in stable condition.       José Miguel Salgado RN  08/03/20 [id number]

## 2020-08-05 NOTE — ED PROVIDER NOTES
[address] [place] [address]  ED  EMERGENCY DEPARTMENT ENCOUNTER      This patient was not seen and evaluated by the attending physician. Pt Name: Jacob Bueno  MRN: [id number]  [place] 1950  Date of evaluation: 8/3/2020  Provider: FRANCISCO JAVIER Rothman - CNP-C  PCP: Beverly Newman MD      History provided by the patient. CHIEFCOMPLAINT:     Chief Complaint   Patient presents with    Constipation     pt was seen at PCP for constipation, states hasn't had BM in 14 days. PCP took kub and sent here. HISTORY OF PRESENT ILLNESS:      Jacob Bueno is a 71 y.o. male who presents to [address] [place] [address]  ED with complaints of the patient. Patient states that he has not had a bowel movement and around 14 days he thinks. Patient went to the primary care and had a KUB and was subsequently referred to the ED. Patient denies any discomfort he states that he has no pain he just has not gone to the bathroom. Patient states that primary care is concerned about possible obstruction. Patient here for further evaluation. LOCATION:-  QUALITY:-  SEVERITY:-  DURATION:-  MODIFYING FACTORS:-    Nursing Notes were reviewed     REVIEW OF SYSTEMS:     Review of Systems  All systems, a total of 10, are reviewed and negative except for those that were just noted in history present illness.         PAST MEDICAL HISTORY:     Past Medical History:   Diagnosis Date    Anxiety     Arthritis     Asthma     Bipolar 1 disorder ([facility]     Colitis     Depression     Diabetes ([facility]     Diabetes mellitus ([facility]     Glaucoma     H/O bladder problems     High blood pressure     History of kidney problems     IBS (irritable bowel syndrome)     Liver disease     hx of elevated LFT's    Obesity     Psychiatric problem          SURGICAL HISTORY:      Past Surgical History:   Procedure Laterality Date    APPENDECTOMY      BACK SURGERY      CERVICAL FUSION      COLONOSCOPY      EYE Take 300 mg by mouth nightly Historical Med      doxazosin (CARDURA) 2 MG tablet Take 2 mg by mouth Takes 3 times weekly; takes at nightHistorical Med               ALLERGIES:    No known allergies    FAMILY HISTORY:       Family History   Problem Relation Age of Onset    Alcohol Abuse Sister     Coronary Art Dis Mother     Obesity Mother     Osteoarthritis Mother     Parkinsonism Mother     Coronary Art Dis Father     Obesity Father           SOCIAL HISTORY:     Social History     Socioeconomic History    Marital status:      Spouse name: None    Number of children: None    Years of education: None    Highest education level: None   Occupational History    None   Social Needs    Financial resource strain: None    Food insecurity     Worry: None     Inability: None    Transportation needs     Medical: None     Non-medical: None   Tobacco Use    Smoking status: Never Smoker    Smokeless tobacco: Never Used   Substance and Sexual Activity    Alcohol use: No    Drug use: No    Sexual activity: None   Lifestyle    Physical activity     Days per week: None     Minutes per session: None    Stress: None   Relationships    Social connections     Talks on phone: None     Gets together: None     Attends Oriental orthodox service: None     Active member of club or organization: None     Attends meetings of clubs or organizations: None     Relationship status: None    Intimate partner violence     Fear of current or ex partner: None     Emotionally abused: None     Physically abused: None     Forced sexual activity: None   Other Topics Concern    None   Social History Narrative    None       SCREENINGS:             PHYSICAL EXAM:       ED Triage Vitals   BP Temp Temp Source Pulse Resp SpO2 Height Weight   08/03/20 1728 08/03/20 1728 08/03/20 1727 08/03/20 1728 08/03/20 1728 08/03/20 1728 08/03/20 1728 08/03/20 1728   125/74 98 °F (36.7 °C) Oral 76 16 98 % 6' 1\" (1.854 m) 235 lb (106.6 kg)       Physical Exam    CONSTITUTIONAL: Awake and alert. Cooperative. Well-developed. Well-nourished. Vitals:    08/03/20 1727 08/03/20 1728 08/03/20 2026 08/03/20 2247   BP:  125/74 134/74 133/74   Pulse:  76  55   Resp:  16  16   Temp:  98 °F (36.7 °C)     TempSrc: Oral Oral     SpO2:  98% 97% 97%   Weight:  235 lb (106.6 kg)     Height:  6' 1\" (1.854 m)       HENT: Normocephalic. Atraumatic. External ears normal, without discharge. TMs clear bilaterally. Nonasal discharge. Oropharynx clear, no erythema. Mucous membranes moist.  EYES: Conjunctiva non-injected, nolid abnormalities noted. No scleral icterus. PERRL. EOM's grossly intact. Anterior chambers clear. NECK: Supple. Normal ROM. No meningismus. No thyroid tenderness or swelling noted. CARDIOVASCULAR: RRR. No Murmer. No carotid bruits. PULMONARY/CHEST WALL: Effort normal. No tachypnea. Lungs clear to ausculation. ABDOMEN: Normal BS. Soft. Nondistended. No tenderness to palpation. No guarding. No hernias noted. No splenomegaly. Back: Spine is midline. No ecchymosis. No crepituson palpation. No obvious subluxation of vertebral column. No saddle anesthesia or evidence of cauda equina. /ANORECTAL: Not assessed  MUSKULOSKELETAL: Normal ROM. No acute deformities. No edema. No tenderness to palpate. SKIN: Warm and dry. NEUROLOGICAL:  GCS 15. CN II-XII grossly intact. Strength is 5/5 in all extremities and sensation is intact. PSYCHIATRIC: Normal affect, normal insight and judgement. Alert and oriented x 3. DIAGNOSTIC RESULTS:     LABS:    No results found for this visit on 08/03/20. RADIOLOGY:  All x-ray studies are viewed/reviewed by me. Formal interpretations per the radiologist are as follows:      CT ABDOMEN PELVIS WO CONTRAST Additional Contrast? None   Final Result   1. No acute intra-abdominal abnormality. 2. Moderate stool within the colon, which can be seen with constipation.    3. Small ground-glass opacities in both lung bases, which could be infectious   or inflammatory. EKG:  See EKG interpretation by an attending physician. PROCEDURES:   N/A    CRITICAL CARE TIME:   N/A    CONSULTS:  None      EMERGENCY DEPARTMENT COURSE andDIFFERENTIAL DIAGNOSIS/MDM:   Vitals:    Vitals:    08/03/20 1727 08/03/20 1728 08/03/20 2026 08/03/20 2247   BP:  125/74 134/74 133/74   Pulse:  76  55   Resp:  16  16   Temp:  98 °F (36.7 °C)     TempSrc: Oral Oral     SpO2:  98% 97% 97%   Weight:  235 lb (106.6 kg)     Height:  6' 1\" (1.854 m)         Patient wasgiven the following medications:  Medications - No data to display      Patient was evaluated independently by myself with the attending physician available for consultation. Patient presented to the emergency room today with complaints of constipation. Patient had a KUB as an outpatient that showed some dilation and there was some concern for obstruction so I did do a CT of the patient's abdomen. CT showed no significant abnormality with just a moderate stool burden. Patient abdomen was soft, there was no tenderness or significant distention. I did offer enema here in the ED patient declined. Patient was given mag citrate, he states that he has a doctor's appointment in the morning and asked if he could just drink that after his doctor appointment because he did not want to miss it given that the patient has no pain and assessment is otherwise unremarkable I did not feel this was unreasonable as the patient was going to do that. He was instructed to return the ED for any worsening symptoms. He was discharged in good condition. Patient laboratory studies, radiographic imaging, and assessment were all discussed with the patient and/orpatient family. There was shared decision-making between myself as well as the patient and/or their surrogate and we are all in agreement with discharge home.   There was an opportunity for questions and all questions were answered tothe best of my ability and to the satisfaction of the patient and/or patient family. MDM  Considered AAA, appendicitis, diverticulitis, pancreatitis, perforated peptic ulcer, perforated viscus, early appendicitis, bowel obstruction, cholecystitis, constipation, gastroenteritis, hepatitis, inflammatory bowel disease, intussesception, ischemic bowel, neoplasm, PUD, renal/ureteral calculi, gonadal torsion, UTI, volvulus. Currently no surgical or severe medical etiology found. Pt. afebrile, toleration PO without difficulty, good pain control, patient able to follow detailed discharge instructions provided. If worsening pain or concerns return to ED. Otherwise follow up with PCP in 12-24 hours for recheck. FINAL IMPRESSION:      1. Constipation, unspecified constipation type          DISPOSITION/PLAN:   DISPOSITION Decision To Discharge      PATIENT REFERRED TO:  Romana Bias, MD  [address] [place] [address].   [address] [place] [address]  [phone]    Call   For follow up      DISCHARGE MEDICATIONS:  Discharge Medication List as of 8/3/2020 10:38 PM                     (Please note thatportions of this note were completed with a voice recognition program.  Efforts were made to edit the dictations, but occasionally words are mis-transcribed.)    FRANCISCO JAVIER Lomeli CNP-C (electronicallysigned)       FRANCISCO JAVIER Lomeli CNP  08/05/20 [id number]

## 2022-01-25 ENCOUNTER — HOSPITAL ENCOUNTER (OUTPATIENT)
Dept: PHYSICAL THERAPY | Age: 72
Setting detail: THERAPIES SERIES
Discharge: HOME | End: 2022-01-25
Payer: MEDICARE

## 2022-01-25 PROCEDURE — 97140 MANUAL THERAPY 1/> REGIONS: CPT

## 2022-01-25 PROCEDURE — 97530 THERAPEUTIC ACTIVITIES: CPT

## 2022-01-25 PROCEDURE — 97162 PT EVAL MOD COMPLEX 30 MIN: CPT

## 2022-01-25 NOTE — FLOWSHEET NOTE
[address] [place] [address] and Therapy[address]  Phone: [phone]  Fax [phone]      Physical Therapy Daily Treatment Note    Date:  2022    Patient Name:  General Cordoba    :  1950  MRN: [id number]  Restrictions/Precautions: universal    Medical/Treatment Diagnosis Information:  · Diagnosis: N39.41 (ICD-10-CM) - Urge incontinence  Insurance/Certification information:  PT Insurance Information: [employer]  Physician Information:  Referring Practitioner: Loi Matt MD  Plan of care signed (Y/N):  N  Visit# / total visits:       Functional Outcome (if applicable):           NIH-Chronic Prostatitis Symptom Index: 3/43    Medicare Cap (if applicable):  n/a = total amount used, updated 2022    Time in:   12:45pm      Timed Treatment: 60min. Total Treatment Time:  75min.  ________________________________________________________________________________________    Pain Level:    /10  SUBJECTIVE:  See eval    OBJECTIVE:     Exercise (TE) Resistance/Repetitions Other comments            PF strengthening                                                                  Other Treatment:   TA:  Bladder re-training/education:     Bladder Diary: patient educated on importance of filling out bladder diary at home, complete with fluid intake, voids, and leakage when applicable. Voiding: patient educated on normal voiding and urinary cycle and the physiology of bladder control muscles and pelvic floor. The patient was educated on bladder dysfunction as well as JULISSA with urethral involvement. The patient was also educated on pelvic floor functioning.     Dietary:     Other:    Manual Treatments: Rectal Examination with feedback for correct contraction        Modalities:  --    Test/Measurements:  See eval         ASSESSMENT:   See eval      Treatment/Activity Tolerance:   [x] Patient tolerated treatment well [] Patient limited by fatique  [] Patient limited by pain [] Patient limited by other medical complications  [] Other:     Goals:      Patient stated goal: \"to control my urine better\"  []? Progressing: []? Met: []? Not Met: []? Adjusted        Therapist goals for Patient:   Short Term Goals: To be achieved in: 2 weeks  1. Independent in HEP and progression per patient tolerance, in order to prevent future occurrence of presenting issue. []? Progressing: []? Met: []? Not Met: []? Adjusted  2. Patient will have a 25% decrease in urinary leakage to indicate improvement in pelvic floor strength and relaxation, muscle coordination, and/or bladder retraining.  []? Progressing: []? Met: []? Not Met: []? Adjusted     Long Term Goals: To be achieved in: 8 weeks  1. Disability index score of 0 for the NIH-Chronic Prostatitis symptom index to assist with reaching prior level of function. []? Progressing: []? Met: []? Not Met: []? Adjusted  2. Patient will increase PERF score to 4/5 and endurance to 10 seconds to demonstrate increased strength and control over pelvic floor musculature. []? Progressing: []? Met: []? Not Met: []? Adjusted  3. Patient will report 1 day or greater without need for urinary pad to progress towards completing ADLs and recreational activities without leakage. []? Progressing: []? Met: []? Not Met: []? Adjusted  4. Patient will return to functional activities including walking and sit to stand without increased symptoms or restriction. []? Progressing: []? Met: []? Not Met: []?  Adjusted        Plan: [x] Continue per plan of care [] Alter current plan (see comments)   [] Plan of care initiated [] Hold pending MD visit [] Discharge      Plan for Next Session: review diary, begin TE     Re-Certification Due Date:  Visit 8      Signature:  Jerel Magallon, PT, DPT

## 2022-01-25 NOTE — FLOWSHEET NOTE
[facility], [facility], [address], [address]  Phone: [phone]  Fax [phone]                                                        Physical Therapy Certification    Dear Referring Practitioner: Froilan Mantilla MD,    We had the pleasure of evaluating the following patient for physical therapy services at [address]. A summary of our findings can be found in the initial assessment below. This includes our plan of care. If you have any questions or concerns regarding these findings, please do not hesitate to contact me at the office phone number checked above. Thank you for the referral.       Physician Signature:_______________________________Date:__________________  By signing above (or electronic signature), therapist's plan is approved by physician      Patient: Radha Hernandez   : 1950   MRN: [id number]  Referring Physician: Referring Practitioner: Froilan Mantilla MD      Evaluation Date: 2022      Medical Diagnosis Information:  Diagnosis: N39.41 (ICD-10-CM) - Urge incontinence                                             Insurance information: PT Insurance Information: [place]    Second person requested for examination:  [x] No    [] Yes   If yes, who was present: wife, Dereje Wyatt     Precautions/ Contra-indications: universal     Latex Allergy:  [x]NO      []YES    Preferred Language for Healthcare:   [x]English       []Other:    C-SSRS Triggered by Intake questionnaire (Past 2 wk assessment ):   [x] No, Questionnaire did not trigger screening.   [] Yes, Patient intake triggered C-SSRS Screening     [] Completed, no further action required. [] Completed, PCP notified via Epic    Functional Outcome: NIH-Chronic Prostatitis Symptom Index: 3/43       SUBJECTIVE: Patient presents today with wife, Dereje Wyatt.   Reports he has had 3 back surgeries over the last 12 years, most recently in October 2021. Reports he also had a TURP procedure in June of 2020. Reports he has had an increase in urinary incontinence since 2020. States \"over the last few months I have gained a little more control\". Reports he now has more control during the day and is able to make it to the bathroom. Reports he does have leakage at night. States he wears diapers and has to change them about 4 times per day. Reports he sits and sleeps in a lift chair. Uses a SPC to ambulate. Denies constipation. Denies stool leakage. Reports his stool is well formed and difficult to pass. Reports he does have pain with bowel movements. Urinary frequency? Daytime? yes Nighttime? no   Bowel problems? See above  Urinary or bowel leakage? Urinary   Leakage frequency? Throughout the day  Protection:    Type: Diapers   #changes/day: 4x/day     4 week or greater of failed trial of PFPT program?   [x] No    [] Yes    PFPT program as defined by \"Completing 4 weeks of an ordered plan of pelvic muscle exercises designed to increase periurethral muscle strength\". Relevant Medical History: see chart, reviewed with patient     Pain Scale: 0-10/10; back pain and (B) hip pain     Numbness/Tingling: denies    Occupation/School: retired    Living Status/Prior Level of Function: Prior to this injury / incident, pt was independent with ADLs and IADLs    OBJECTIVE:  Observation:   Posture: WFL   Gait analysis: slow jeanne, use of SPC    Neuro:   Sensation: (B) UEs:intact to light touch   Sensation: (B) LEs: intact to light touch   Anal Sensation:    S5: intact to light touch    S4: intact to light touch    S3:intact to light touch   Reflexes:     Anal: present    Cough: present    Pelvic Floor Exam  External:  Skin Condition: redness with mild appearance of skin breakdown, patient aware, reports problems with bedsore due to prolonged sitting.   Sensation: intact  Palpation: no point tenderness  Tone: hypotonic  Perineal Body Mobility:    Voluntary PFM Contraction: present (normal)   Voluntary PFM Relaxation: present (normal)   Involuntary PFM Contraction/Cough Reflex: present (normal)   Involuntary PFM Relaxation: present (normal)  Perineal Body Descent:   Rest: flattened, supported   Bearing down: present (caudal), absent (normal-cephalad)    Digital Rectal Examination:  Sensation: intact  Palpation: no point tenderness noted  Resting Rectal Tone: FAIR  PERFECT:   Power: 3-/5   Endurance: 3sec. Repetitions: 3   Fast Twitch: 4                          [x] Patient history, allergies, meds reviewed. Medical chart reviewed. See intake form. Review Of Systems (ROS):  [x]Performed Review of systems (Integumentary, CardioPulmonary, Neurological) by intake and observation. Intake form has been scanned into medical record. Patient has been instructed to contact their primary care physician regarding ROS issues if not already being addressed at this time.       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   []Osteoporosis (M81.8)  []Osteopenia (M85.8)  []Scoliosis       Endocrine conditions   []Hypothyroid (E03.9)  []Hyperthyroid Gastrointestinal conditions   []Constipation (I49.22)   Metabolic conditions   []Morbid obesity (E66.01)  [x]Diabetes type 1(E10.65) or 2 (E11.65)   []Neuropathy (G60.9)     Cardio/Pulmonary conditions   []Asthma (J45)  []Coughing   []COPD (J44.9)  []CHF  []A-fib   Psychological Disorders  []Anxiety (F41.9)  []Depression (F32.9)   [x]Other: Bipolar   Developmental Disorders  []Autism (F84.0)  []CP PF contraction   []Absent/poor control of PF relaxation  [x]Decreased control of bladder  []Decreased control of bowel    Functional Activity Limitations (from functional questionnaire and intake)  [x]Reduced ability to maintain good posture and demonstrate good body mechanics with sitting, bending, and lifting  [x]Reduced ability to perform lifting, reaching, carrying tasks  [x]Reduced ability to control urine  []Reduced ability to control bowel movements   [x]Reduced ability to ambulate prolonged functional periods/distances/surfaces  [x]Reduced ability to squat   []Reduced ability to forward bend  []Reduced ability to ascend/descend stairs  []Reduced ability to tolerate penetration/intercourse    Participation Restrictions  [x]Reduced participation in self care activities  [x]Reduced participation in home management activities  [x]Reduced participation in work activities  [x]Reduced participation in social activities  [x]Reduced participation in sport/recreational activities    Classification/Subgrouping:  []signs/symptoms consistent vaginismus/dyspareunia    []signs/symptoms consistent with pelvic floor organ prolapse  [x]signs/symptoms consistent with stress urinary incontinence  [x]signs/symptoms consistent with urge urinary incontinence  []signs/symptoms consistent with bowel incontinence  []signs/symptoms consistent with post-surgical status including decreased ROM, strength and function  []signs/symptoms consistent with other:       Prognosis/Rehab Potential:      []Excellent   [x]Good    []Fair   []Poor    Tolerance of evaluation/treatment:    []Excellent   [x]Good    []Fair   []Poor    Physical Therapy Evaluation Complexity Justification  [x] A history of present problem with:  [] no personal factors and/or comorbidities that impact the plan of care;  [x]1-2 personal factors and/or comorbidities that impact the plan of care  []3 personal factors and/or comorbidities that impact the plan of care  [x] An examination of body systems using standardized tests and measures addressing any of the following: body structures and functions (impairments), activity limitations, and/or participation restrictions;:  [] a total of 1-2 or more elements   [x] a total of 3 or more elements   [] a total of 4 or more elements   [x] A clinical presentation with:  [] stable and/or uncomplicated characteristics   [x] evolving clinical presentation with changing characteristics  [] unstable and unpredictable characteristics;   [x] Clinical decision making of [] low, [x] moderate, [] high complexity using standardized patient assessment instrument and/or measurable assessment of functional outcome. [] EVAL (LOW) 98548   [x] EVAL (MOD) 95495  [] EVAL (HIGH) 20130   [] RE-EVAL       PLAN:   Frequency/Duration:  1 days per week for 6 Weeks:  Interventions:  [x]  Therapeutic exercise including: strength training, ROM, and functional mobility for joint, spine, core, and pelvic floor   [x]  NMR activation and proprioception for abdominals, pelvic floor musculature activation and coordination, and posture retraining   [x]  Manual therapy as indicated for spine, ribs, soft tissue, and pelvic floor to include: IASTM with or without dilator, STM, PROM, Gr I-IV mobilizations   [x] Modalities as needed that may include: E-stim, Biofeedback as indicated  [x] Patient education on pelvic floor anatomy and function, bladder and bowel anatomy and function, joint protection, postural re-education, activity modification, progression of HEP. HEP instruction: Written HEP instructions provided and reviewed    GOALS:  Patient stated goal: \"to control my urine better\"  [] Progressing: [] Met: [] Not Met: [] Adjusted      Therapist goals for Patient:   Short Term Goals: To be achieved in: 2 weeks  1. Independent in HEP and progression per patient tolerance, in order to prevent future occurrence of presenting issue.   [] Progressing: [] Met: [] Not Met: [] Adjusted  2. Patient will have a 25% decrease in urinary leakage to indicate improvement in pelvic floor strength and relaxation, muscle coordination, and/or bladder retraining. [] Progressing: [] Met: [] Not Met: [] Adjusted    Long Term Goals: To be achieved in: 8 weeks  1. Disability index score of 0 for the NIH-Chronic Prostatitis symptom index to assist with reaching prior level of function. [] Progressing: [] Met: [] Not Met: [] Adjusted  2. Patient will increase PERF score to 4/5 and endurance to 10 seconds to demonstrate increased strength and control over pelvic floor musculature. [] Progressing: [] Met: [] Not Met: [] Adjusted  3. Patient will report 1 day or greater without need for urinary pad to progress towards completing ADLs and recreational activities without leakage. [] Progressing: [] Met: [] Not Met: [] Adjusted  4. Patient will return to functional activities including walking and sit to stand without increased symptoms or restriction.    [] Progressing: [] Met: [] Not Met: [] Adjusted      Electronically signed by:  Catrachita Jacques, PT, DPT

## 2022-01-31 ENCOUNTER — HOSPITAL ENCOUNTER (OUTPATIENT)
Dept: PHYSICAL THERAPY | Age: 72
Setting detail: THERAPIES SERIES
Discharge: HOME | End: 2022-01-31

## 2022-01-31 NOTE — FLOWSHEET NOTE
[facility] Therapy  [address] [place] [address] 73, [address] [facility] [address]  Phone: [phone]   Fax: [phone]      Physical Therapy  Cancellation/No-show Note  Patient Name:  Cuba Cobb  :  1950   Date:  2022  Cancels to date: 1  No-shows to date: 0    For today's appointment patient:  [x] Cancelled  [] Rescheduled appointment  [] No-show     Reason given by patient:  [] Patient ill  [] Conflicting appointment  [] No transportation    [] Conflict with work  [] No reason given  [x] Other:     Comments: PT had to cancel today's appointment. Patient needs a pre-cert.      Electronically signed by:  Ashlyn Rojas PT

## 2022-02-01 ENCOUNTER — HOSPITAL ENCOUNTER (OUTPATIENT)
Dept: PHYSICAL THERAPY | Age: 72
Setting detail: THERAPIES SERIES
Discharge: HOME | End: 2022-02-01
Payer: MEDICARE

## 2022-02-01 NOTE — FLOWSHEET NOTE
[facility] Outpatient [facility] and Therapy  [address] [place] [address] 30, [address] [facility] [address]  Phone: [phone]   Fax: [phone]      Physical Therapy  Cancellation/No-show Note  Patient Name:  Morenita Sánchez  :  1950   Date:  2022  Cancels to date: 2  No-shows to date: 0    For today's appointment patient:  [x] Cancelled  [] Rescheduled appointment  [] No-show     Reason given by patient:  [] Patient ill  [] Conflicting appointment  [] No transportation    [] Conflict with work  [] No reason given  [x] Other:     Comments: Patient called to cancel today's appointment due to his dog dying.     Electronically signed by:  Walt Cervantes PT, DPT

## 2022-02-08 ENCOUNTER — HOSPITAL ENCOUNTER (OUTPATIENT)
Dept: PHYSICAL THERAPY | Age: 72
Setting detail: THERAPIES SERIES
Discharge: HOME | End: 2022-02-08
Payer: MEDICARE

## 2022-02-08 PROCEDURE — 97530 THERAPEUTIC ACTIVITIES: CPT

## 2022-02-08 PROCEDURE — 97110 THERAPEUTIC EXERCISES: CPT

## 2022-02-08 NOTE — FLOWSHEET NOTE
[facility], [facility], [facility]  Phone: [phone]  Fax [phone]      Physical Therapy Daily Treatment Note    Date:  2022    Patient Name:  Amy Gonzalez    :  1950  MRN: [id number]  Restrictions/Precautions: universal    Medical/Treatment Diagnosis Information:  · Diagnosis: N39.41 (ICD-10-CM) - Urge incontinence  Insurance/Certification information:  PT Insurance Information: [place]  Physician Information:  Referring Practitioner: Yesica Gilbert MD  Plan of care signed (Y/N):  N  Visit# / total visits:  2/5 (AIM approved 5 Visits from 2022 to 3/27/2022)     Functional Outcome (if applicable):          NIH-Chronic Prostatitis Symptom Index: 3/43    Medicare Cap (if applicable):  n/a = total amount used, updated 2022    Time in:   1:20pm      Timed Treatment: 40min. Total Treatment Time:  40min.  ________________________________________________________________________________________    Pain Level:    0-9/10 \"when I am walking and standing in my back\". SUBJECTIVE:  The patient reports he is having a rough day, his son passed away and his dog passed away in the last 2 weeks. Reports he was supposed to have the battery changed in his pain modulator, surgery got postponed due to A1C being too high. Reports \"my bladder is erratic, there are days I can get the urge and go to the bathroom, but then there are days I go frequently\". OBJECTIVE:     Exercise (TE) Resistance/Repetitions Other comments            PF strengthening        Short hold: (1sec) 10 times       Long hold: (3sec) 10 times                                                   HEP:  Access Code: [email or web address]  URL: [email or web address]  Date: 2022  Prepared by: Tarah Trotter    Exercises  Seated Pelvic Floor Contraction - 1 x daily - 7 x weekly - 1 sets - 10 reps - 1sec. hold  Seated Pelvic Floor Contraction - 1 x daily - 7 x weekly - 1 sets - 10 reps - 3sec. hold    Other Treatment:   TA:  Bladder re-training/education:     Bladder Diary: voiding 8 times per day, 1 leak captured on diary. Patient will re-fill out correctly and return next visit    Voiding: educated in normal voiding patterns    Dietary: patient educated on the \"4Cs\" to reduce bladder irritation and leakage. Other: elevation of legs prior to bed    Manual Treatments:     Modalities:  --    Test/Measurements:  See eval         ASSESSMENT:   The patient reports today minimal leakage during the day, though waking up wet at night. Patient does take trazodone at night. The patient was encouraged to monitor for bladder irritants. Patient did not fill out diary correctly, re-instructed in correct way to fill out and patient to do so and bring back next visit for more in depth analysis and recommendations. The patient did respond well to added TE this date. Updated HEP issued to patient. Treatment/Activity Tolerance:   [x] Patient tolerated treatment well [] Patient limited by fatique  [] Patient limited by pain [] Patient limited by other medical complications  [] Other:     Goals:      Patient stated goal: \"to control my urine better\"  []? Progressing: []? Met: []? Not Met: []? Adjusted        Therapist goals for Patient:   Short Term Goals: To be achieved in: 2 weeks  1. Independent in HEP and progression per patient tolerance, in order to prevent future occurrence of presenting issue. []? Progressing: []? Met: []? Not Met: []? Adjusted  2. Patient will have a 25% decrease in urinary leakage to indicate improvement in pelvic floor strength and relaxation, muscle coordination, and/or bladder retraining.  []? Progressing: []? Met: []? Not Met: []? Adjusted     Long Term Goals: To be achieved in: 8 weeks  1. Disability index score of 0 for the NIH-Chronic Prostatitis symptom index to assist with reaching prior level of function. []? Progressing: []? Met: []? Not Met: []? Adjusted  2. Patient will increase PERF score to 4/5 and endurance to 10 seconds to demonstrate increased strength and control over pelvic floor musculature. []? Progressing: []? Met: []? Not Met: []? Adjusted  3. Patient will report 1 day or greater without need for urinary pad to progress towards completing ADLs and recreational activities without leakage. []? Progressing: []? Met: []? Not Met: []? Adjusted  4. Patient will return to functional activities including walking and sit to stand without increased symptoms or restriction. []? Progressing: []? Met: []? Not Met: []? Adjusted        Plan: [x] Continue per plan of care [] Alter current plan (see comments)   [] Plan of care initiated [] Hold pending MD visit [] Discharge      Plan for Next Session: review diary, cont.  TE     Re-Certification Due Date:  Visit 5      Signature:  Andi Conde, PT, DPT

## 2022-02-17 ENCOUNTER — HOSPITAL ENCOUNTER (OUTPATIENT)
Dept: PHYSICAL THERAPY | Age: 72
Setting detail: THERAPIES SERIES
Discharge: HOME | End: 2022-02-17
Payer: MEDICARE

## 2022-02-17 PROCEDURE — 97110 THERAPEUTIC EXERCISES: CPT

## 2022-02-17 PROCEDURE — 97530 THERAPEUTIC ACTIVITIES: CPT

## 2022-02-17 NOTE — FLOWSHEET NOTE
[facility]. and Therapy, [address] [place] [address]   Phone: [phone]  Fax [phone]      Physical Therapy Daily Treatment Note    Date:  2022    Patient Name:  Jaelyn Robertson    :  1950  MRN: [id number]  Restrictions/Precautions: universal    Medical/Treatment Diagnosis Information:  · Diagnosis: N39.41 (ICD-10-CM) - Urge incontinence  Insurance/Certification information:  PT Insurance Information: [place]  Physician Information:  Referring Practitioner: Vmashi Reid MD  Plan of care signed (Y/N):  N  Visit# / total visits:  3/5 (AIM approved 5 Visits from 2022 to 3/27/2022)     Functional Outcome (if applicable):          NIH-Chronic Prostatitis Symptom Index: 3/43    Medicare Cap (if applicable):  n/a = total amount used, updated 2022    Time in:   1:05pm      Timed Treatment: 40min. Total Treatment Time:  40min.  ________________________________________________________________________________________    Pain Level:    0-9/10 \"when I am walking and standing in my back\". SUBJECTIVE:  The patient reports he is having a rough day with the back, reports he just wants to cry he is having so much pain with his hips and back. Reports \"I am very pleased with what I have done to improve control during the day, but at night I just have no control\". OBJECTIVE:     Exercise (TE) Resistance/Repetitions Other comments            PF strengthening        Short hold: (1sec) 10 times       Long hold: (3sec) 10 times            PF + Hip ABD   x10 W/ green tband   PF + Hip ADD x10 W/ ball squeeze        Sit to stand w/ PF x10                     HEP:  Access Code: [email or web address]  URL: [email or web address]  Date: 2022  Prepared by: Pk Peaks    Exercises  Seated Pelvic Floor Contraction - 1 x daily - 7 x weekly - 1 sets - 10 reps - 1sec. hold  Seated Pelvic Floor Contraction - 1 x daily - 7 x weekly - 1 sets - 10 reps - 3sec. hold  Seated Pelvic Floor Contraction with Isometric Hip Adduction - 1 x daily - 7 x weekly - 3 sets - 10 reps - 2-3sec. hold  Seated Pelvic Floor Contraction with Hip Abduction and Resistance Loop - 1 x daily - 7 x weekly - 3 sets - 10 reps - 2-3sec. hold  Sit to Stand with Pelvic Floor Contraction - 2 x daily - 7 x weekly - 1 sets - 10 reps      Other Treatment:   TA:  Bladder re-training/education:     Bladder Diary: voiding 8-13 times per day, 1 leak captured on diary. Voiding: educated in normal voiding patterns    Dietary: patient educated on the \"4Cs\" to reduce bladder irritation and leakage. Educated to increase fiber intake as well as water intake. Minimal intake of both noted on diary. Other: elevation of legs prior to bed    Manual Treatments:     Modalities:  --    Test/Measurements:  See eval         ASSESSMENT:   The patient responded well to above session. Adapted nicely to added TE this date. The patient was encouraged to reach out to MD regarding additional treatment options. The patient was very upset (nearly tearful) about his back pain. Encouraged to reach out to MD regarding this as well. Updated HEP issued to patient. Patient will continue with above plan of care and follow up next week to assess progress. Treatment/Activity Tolerance:   [x] Patient tolerated treatment well [] Patient limited by fatique  [] Patient limited by pain [] Patient limited by other medical complications  [] Other:     Goals:      Patient stated goal: \"to control my urine better\"  []? Progressing: []? Met: []? Not Met: []? Adjusted        Therapist goals for Patient:   Short Term Goals: To be achieved in: 2 weeks  1. Independent in HEP and progression per patient tolerance, in order to prevent future occurrence of presenting issue. []? Progressing: []? Met: []? Not Met: []? Adjusted  2.  Patient will have a 25% decrease in urinary leakage to indicate improvement in pelvic floor strength and relaxation, muscle coordination, and/or bladder retraining.  []? Progressing: []? Met: []? Not Met: []? Adjusted     Long Term Goals: To be achieved in: 8 weeks  1. Disability index score of 0 for the NIH-Chronic Prostatitis symptom index to assist with reaching prior level of function. []? Progressing: []? Met: []? Not Met: []? Adjusted  2. Patient will increase PERF score to 4/5 and endurance to 10 seconds to demonstrate increased strength and control over pelvic floor musculature. []? Progressing: []? Met: []? Not Met: []? Adjusted  3. Patient will report 1 day or greater without need for urinary pad to progress towards completing ADLs and recreational activities without leakage. []? Progressing: []? Met: []? Not Met: []? Adjusted  4. Patient will return to functional activities including walking and sit to stand without increased symptoms or restriction. []? Progressing: []? Met: []? Not Met: []? Adjusted        Plan: [x] Continue per plan of care [] Alter current plan (see comments)   [] Plan of care initiated [] Hold pending MD visit [] Discharge      Plan for Next Session: review diary, cont.  TE     Re-Certification Due Date:  Visit 5      Signature:  Denis Boast, PT, DPT

## 2022-02-22 ENCOUNTER — APPOINTMENT (OUTPATIENT)
Dept: PHYSICAL THERAPY | Age: 72
End: 2022-02-22
Payer: MEDICARE

## 2022-03-03 ENCOUNTER — HOSPITAL ENCOUNTER (OUTPATIENT)
Dept: PHYSICAL THERAPY | Age: 72
Setting detail: THERAPIES SERIES
Discharge: HOME | End: 2022-03-03
Payer: MEDICARE

## 2022-03-03 PROCEDURE — 97110 THERAPEUTIC EXERCISES: CPT

## 2022-03-03 PROCEDURE — 97530 THERAPEUTIC ACTIVITIES: CPT

## 2022-03-03 NOTE — FLOWSHEET NOTE
[facility]. and Therapy, [facility], [address]  Phone: [phone]  Fax [phone]      Physical Therapy Daily Treatment Note    Date:  3/3/2022    Patient Name:  General Cordoba    :  1950  MRN: [id number]  Restrictions/Precautions: universal    Medical/Treatment Diagnosis Information:  · Diagnosis: N39.41 (ICD-10-CM) - Urge incontinence  Insurance/Certification information:  PT Insurance Information: Aetna  Physician Information:  Referring Practitioner: Loi Matt MD  Plan of care signed (Y/N):  N  Visit# / total visits:       Functional Outcome (if applicable):          NIH-Chronic Prostatitis Symptom Index: 3/43    Medicare Cap (if applicable):  n/a = total amount used, updated 3/3/2022    Time in:   4:15pm      Timed Treatment: 40min. Total Treatment Time:  40min.  ________________________________________________________________________________________    Pain Level:    0-9/10 \"when I am walking and standing in my back\". SUBJECTIVE:  The patient reports \"in the daytime, I am great, I don't have any problems anymore, but I can make it to the bathroom\". Reports \"there are times when I am dry, that never happened before I started therapy\". \"night time I wake up and it has gotten better, but it wets the towels under me\". Reports his main problems are with night time leakage. Reports back pain continues and is limiting his mobility. OBJECTIVE:     Exercise (TE) Resistance/Repetitions Other comments            PF strengthening        Short hold: (1sec) 10 times       Long hold: (5sec) 10 times            PF + Hip ABD   x10 W/ green tband   PF + Hip ADD x10 W/ ball squeeze        Sit to stand w/ PF x10             Seated TA + PF [id number]. HEP:  Access Code: [email or web address]  URL: [email or web address]  Date: 2022  Prepared by: Haleigh Mclain  Seated Pelvic Floor Contraction - 1 x daily - 7 x weekly - 1 sets - 10 reps - 1sec. hold  Seated Pelvic Floor Contraction - 1 x daily - 7 x weekly - 1 sets - 10 reps - 3sec. hold  Seated Pelvic Floor Contraction with Isometric Hip Adduction - 1 x daily - 7 x weekly - 3 sets - 10 reps - 2-3sec. hold  Seated Pelvic Floor Contraction with Hip Abduction and Resistance Loop - 1 x daily - 7 x weekly - 3 sets - 10 reps - 2-3sec. hold  Sit to Stand with Pelvic Floor Contraction - 2 x daily - 7 x weekly - 1 sets - 10 reps  Seated Transversus Abdominis Bracing - 1 x daily - 7 x weekly - 1 sets - 10 reps - 5sec. hold        Other Treatment:   TA:  Bladder re-training/education:     Bladder Diary: voiding 8-13 times per day, 1 leak captured on diary. Voiding: educated in normal voiding patterns    Dietary: patient educated on the \"4Cs\" to reduce bladder irritation and leakage. Educated to increase fiber intake as well as water intake. Minimal intake of both noted on diary. Other: elevation of legs prior to bed in recliner    Manual Treatments:     Modalities:  --    Test/Measurements:  See eval         ASSESSMENT:   The patient responded well to above session. Adapted nicely to added TE this date. Updated HEP issued to patient. Patient will continue with above plan of care and follow up next week to assess progress. Treatment/Activity Tolerance:   [x] Patient tolerated treatment well [] Patient limited by fatique  [] Patient limited by pain [] Patient limited by other medical complications  [] Other:     Goals:      Patient stated goal: \"to control my urine better\"  []? Progressing: []? Met: []? Not Met: []? Adjusted        Therapist goals for Patient:   Short Term Goals: To be achieved in: 2 weeks  1. Independent in HEP and progression per patient tolerance, in order to prevent future occurrence of presenting issue. []? Progressing: []? Met: []? Not Met: []? Adjusted  2.  Patient will have a 25% decrease in urinary leakage to indicate improvement in pelvic floor strength and relaxation, muscle coordination, and/or bladder retraining.  []? Progressing: []? Met: []? Not Met: []? Adjusted     Long Term Goals: To be achieved in: 8 weeks  1. Disability index score of 0 for the NIH-Chronic Prostatitis symptom index to assist with reaching prior level of function. []? Progressing: []? Met: []? Not Met: []? Adjusted  2. Patient will increase PERF score to 4/5 and endurance to 10 seconds to demonstrate increased strength and control over pelvic floor musculature. []? Progressing: []? Met: []? Not Met: []? Adjusted  3. Patient will report 1 day or greater without need for urinary pad to progress towards completing ADLs and recreational activities without leakage. []? Progressing: []? Met: []? Not Met: []? Adjusted  4. Patient will return to functional activities including walking and sit to stand without increased symptoms or restriction. []? Progressing: []? Met: []? Not Met: []? Adjusted        Plan: [x] Continue per plan of care [] Alter current plan (see comments)   [] Plan of care initiated [] Hold pending MD visit [] Discharge      Plan for Next Session: cont.  TE     Re-Certification Due Date:  Visit 5      Signature:  Peggy Gutierrez, PT, DPT

## 2022-03-10 ENCOUNTER — HOSPITAL ENCOUNTER (OUTPATIENT)
Dept: PHYSICAL THERAPY | Age: 72
Setting detail: THERAPIES SERIES
Discharge: HOME | End: 2022-03-10
Payer: MEDICARE

## 2022-03-10 PROCEDURE — 97110 THERAPEUTIC EXERCISES: CPT

## 2022-03-10 NOTE — FLOWSHEET NOTE
[facility]. and Therapy, [facility] [address], [address]   Phone: [phone]  Fax [phone]      Physical Therapy Daily Treatment Note    Date:  3/10/2022    Patient Name:  Shira Wheeler    :  1950  MRN: [id number]  Restrictions/Precautions: universal    Medical/Treatment Diagnosis Information:  · Diagnosis: N39.41 (ICD-10-CM) - Urge incontinence  Insurance/Certification information:  PT Insurance Information: Aetna  Physician Information:  Referring Practitioner: Roberto Huerta MD  Plan of care signed (Y/N):  N  Visit# / total visits:       Functional Outcome (if applicable):          NIH-Chronic Prostatitis Symptom Index: 3/43    Medicare Cap (if applicable):  n/a = total amount used, updated 3/10/2022    Time in:   4:15pm      Timed Treatment: 40min. Total Treatment Time:  40min.  ________________________________________________________________________________________    Pain Level:    0-9/10 \"when I am walking and standing in my back\". SUBJECTIVE:  The patient reports \"I feel like it is getting better, the exercises are helping\". \"It is not perfect, but I am leaking less\". Reports \"I still wet the diaper at night but I am not wetting the bed\". Reports continued compliance with HEP and continued back pain that is limiting mobility. OBJECTIVE:     Exercise (TE) Resistance/Repetitions Other comments            PF strengthening        Short hold: (1sec) 10 times       Long hold: (5sec) 10 times            PF + Hip ABD   x10 W/ green tband   PF + Hip ADD x10 W/ ball squeeze        Sit to stand w/ PF x10             Seated TA + PF [id number]. Purnima MARCH            HEP:  Access Code: [email or web address]  URL: [email or web address]  Date: 2022  Prepared by: Noah Bailey    Exercises  Seated Pelvic Floor Contraction - 1 x daily - 7 x weekly - 1 sets - 10 reps - 1sec. hold  Seated Pelvic Floor Contraction - 1 x daily - 7 x weekly - 1 sets - 10 reps - 3sec. hold  Seated Pelvic Floor Contraction with Isometric Hip Adduction - 1 x daily - 7 x weekly - 3 sets - 10 reps - 2-3sec. hold  Seated Pelvic Floor Contraction with Hip Abduction and Resistance Loop - 1 x daily - 7 x weekly - 3 sets - 10 reps - 2-3sec. hold  Sit to Stand with Pelvic Floor Contraction - 2 x daily - 7 x weekly - 1 sets - 10 reps  Seated Transversus Abdominis Bracing - 1 x daily - 7 x weekly - 1 sets - 10 reps - 5sec. hold        Other Treatment:   TA:  Bladder re-training/education:     Bladder Diary: voiding 8-13 times per day, 1 leak captured on diary. Voiding: educated in normal voiding patterns    Dietary: patient educated on the \"4Cs\" to reduce bladder irritation and leakage. Educated to increase fiber intake as well as water intake. Minimal intake of both noted on diary. Other: elevation of legs prior to bed in recliner    Manual Treatments:     Modalities:  --    Test/Measurements:  See eval         ASSESSMENT:   The patient responded well to above session. Will continue working on dietary changes. Patient will continue with above plan of care and follow up next week to assess progress. Treatment/Activity Tolerance:   [x] Patient tolerated treatment well [] Patient limited by fatique  [] Patient limited by pain [] Patient limited by other medical complications  [] Other:     Goals:      Patient stated goal: \"to control my urine better\"  []? Progressing: []? Met: []? Not Met: []? Adjusted        Therapist goals for Patient:   Short Term Goals: To be achieved in: 2 weeks  1. Independent in HEP and progression per patient tolerance, in order to prevent future occurrence of presenting issue. []? Progressing: []? Met: []? Not Met: []? Adjusted  2.  Patient will have a 25% decrease in urinary leakage to indicate improvement in pelvic floor strength and relaxation, muscle coordination, and/or bladder retraining.  []? Progressing: []? Met: []? Not Met: []? Adjusted     Long Term Goals: To be achieved in: 8 weeks  1. Disability index score of 0 for the NIH-Chronic Prostatitis symptom index to assist with reaching prior level of function. []? Progressing: []? Met: []? Not Met: []? Adjusted  2. Patient will increase PERF score to 4/5 and endurance to 10 seconds to demonstrate increased strength and control over pelvic floor musculature. []? Progressing: []? Met: []? Not Met: []? Adjusted  3. Patient will report 1 day or greater without need for urinary pad to progress towards completing ADLs and recreational activities without leakage. []? Progressing: []? Met: []? Not Met: []? Adjusted  4. Patient will return to functional activities including walking and sit to stand without increased symptoms or restriction. []? Progressing: []? Met: []? Not Met: []? Adjusted        Plan: [x] Continue per plan of care [] Alter current plan (see comments)   [] Plan of care initiated [] Hold pending MD visit [] Discharge      Plan for Next Session: cont.  TE     Re-Certification Due Date:  Visit 5      Signature:  Ele Shrestha, PT, DPT

## 2022-03-17 ENCOUNTER — HOSPITAL ENCOUNTER (OUTPATIENT)
Dept: PHYSICAL THERAPY | Age: 72
Setting detail: THERAPIES SERIES
Discharge: HOME | End: 2022-03-17
Payer: MEDICARE

## 2022-03-17 NOTE — FLOWSHEET NOTE
[facility] Outpatient Rehabilitation and Therapy  [address] [place] [address] 22, [address] [facility] [address]  Phone: [phone]   Fax: [phone]      Physical Therapy  Cancellation/No-show Note  Patient Name:  Tyler Dunham  :  1950   Date:  3/17/2022  Cancels to date: 2  No-shows to date: 0    For today's appointment patient:  [x] Cancelled  [] Rescheduled appointment  [] No-show     Reason given by patient:  [x] Patient ill  [] Conflicting appointment  [] No transportation    [] Conflict with work  [] No reason given  [] Other:     Comments:     Electronically signed by:  Aylin Ruelas PT, DPT

## 2022-03-24 ENCOUNTER — HOSPITAL ENCOUNTER (OUTPATIENT)
Dept: PHYSICAL THERAPY | Age: 72
Setting detail: THERAPIES SERIES
Discharge: HOME | End: 2022-03-24
Payer: MEDICARE

## 2022-03-24 NOTE — FLOWSHEET NOTE
[facility] Out[facility] and Therapy  [address] [place] [address] 99, [address] [facility] [address]  Phone: [phone]   Fax: [phone]      Physical Therapy  Cancellation/No-show Note  Patient Name:  Drew Harry  :  1950   Date:  3/24/2022  Cancels to date: 3  No-shows to date: 0    For today's appointment patient:  [x] Cancelled  [] Rescheduled appointment  [] No-show     Reason given by patient:  [x] Patient ill  [] Conflicting appointment  [] No transportation    [] Conflict with work  [] No reason given  [] Other:     Comments:     Electronically signed by:  Luz Maria Germain PT, DPT

## 2022-04-05 ENCOUNTER — HOSPITAL ENCOUNTER (OUTPATIENT)
Dept: PHYSICAL THERAPY | Age: 72
Setting detail: THERAPIES SERIES
Discharge: HOME | End: 2022-04-05
Payer: MEDICARE

## 2022-04-05 PROCEDURE — 97110 THERAPEUTIC EXERCISES: CPT

## 2022-04-05 PROCEDURE — 97530 THERAPEUTIC ACTIVITIES: CPT

## 2022-04-05 NOTE — FLOWSHEET NOTE
[facility], [facility], [address] [place] [address]  Phone: [phone]  Fax [phone]      Physical Therapy Daily Treatment Note    Date:  2022    Patient Name:  Ruth Webb    :  1950  MRN: [id number]  Restrictions/Precautions: universal    Medical/Treatment Diagnosis Information:  · Diagnosis: N39.41 (ICD-10-CM) - Urge incontinence  Insurance/Certification information:  PT Insurance Information: Aetna  Physician Information:  Referring Practitioner: Jonathan Lazo MD  Plan of care signed (Y/N):  N  Visit# / total visits:       Functional Outcome (if applicable):          NIH-Chronic Prostatitis Symptom Index: 3/43    Medicare Cap (if applicable):  n/a = total amount used, updated 2022    Time in:   4:10pm      Timed Treatment: 44min. Total Treatment Time:  44min.  ________________________________________________________________________________________    Pain Level:    0-9/10 \"when I am walking and standing in my back\". SUBJECTIVE:  The patient reports \"I am not wetting at night and I am controlled during the day\". \"there is some leakage at night but not much\". Reports he feels he has had less leakage and less frequency of urination and larger voids. Reports continued compliance with HEP and continued back pain that is limiting mobility. OBJECTIVE:     Exercise (TE) Resistance/Repetitions Other comments            PF strengthening        Short hold: (1sec) 10 times       Long hold: (5sec) 10 times            PF + Hip ABD   x10 W/ green tband   PF + Hip ADD x10 W/ ball squeeze        Sit to stand w/ PF x10             Seated TA + PF [id number]. Bridge  HEP    Clamshell HEP            HEP:  Access Code: [email or web address]  URL: [email or web address]  Date: 2022  Prepared by: Jeremy Julien    Exercises  Seated Pelvic Floor Contraction - 1 x daily - 7 x weekly - 1 sets - 10 reps - 1sec. hold  Seated Pelvic Floor Contraction - 1 x daily - 7 x weekly - 1 sets - 10 reps - 3sec. hold  Seated Pelvic Floor Contraction with Isometric Hip Adduction - 1 x daily - 7 x weekly - 3 sets - 10 reps - 2-3sec. hold  Seated Pelvic Floor Contraction with Hip Abduction and Resistance Loop - 1 x daily - 7 x weekly - 3 sets - 10 reps - 2-3sec. hold  Sit to Stand with Pelvic Floor Contraction - 2 x daily - 7 x weekly - 1 sets - 10 reps  Seated Transversus Abdominis Bracing - 1 x daily - 7 x weekly - 1 sets - 10 reps - 5sec. hold  Hooklying Bridge - 1 x daily - 7 x weekly - 1 sets - 10 reps - 2-3sec. hold  Clamshell - 1 x daily - 7 x weekly - 1 sets - 10 reps - 2-3sec hold          Other Treatment:   TA:  Bladder re-training/education:     Bladder Diary: voiding 8-13 times per day, 1 leak captured on diary. Voiding: educated in normal voiding patterns    Dietary: patient educated on the \"4Cs\" to reduce bladder irritation and leakage. Educated to increase fiber intake as well as water intake. Minimal intake of both noted on diary. Other: elevation of legs prior to bed in recliner    Manual Treatments:     Modalities:  --    Test/Measurements:  See eval         ASSESSMENT:   The patient responded well to above session. Will continue working on dietary changes. Patient will trial new TE in recliner as he is unable to lay flat on PT table this date due to back pain. The patient has made great progress so far with urinary incontinence. Patient will continue with above plan of care and follow up in 2 weeks to assess progress. Treatment/Activity Tolerance:   [x] Patient tolerated treatment well [] Patient limited by fatique  [] Patient limited by pain [] Patient limited by other medical complications  [] Other:     Goals:      Patient stated goal: \"to control my urine better\"  []? Progressing: []? Met: []? Not Met: []? Adjusted        Therapist goals for Patient:   Short Term Goals:  To be achieved in: 2 weeks  1. Independent in HEP and progression per patient tolerance, in order to prevent future occurrence of presenting issue. []? Progressing: []? Met: []? Not Met: []? Adjusted  2. Patient will have a 25% decrease in urinary leakage to indicate improvement in pelvic floor strength and relaxation, muscle coordination, and/or bladder retraining.  []? Progressing: []? Met: []? Not Met: []? Adjusted     Long Term Goals: To be achieved in: 8 weeks  1. Disability index score of 0 for the NIH-Chronic Prostatitis symptom index to assist with reaching prior level of function. []? Progressing: []? Met: []? Not Met: []? Adjusted  2. Patient will increase PERF score to 4/5 and endurance to 10 seconds to demonstrate increased strength and control over pelvic floor musculature. []? Progressing: []? Met: []? Not Met: []? Adjusted  3. Patient will report 1 day or greater without need for urinary pad to progress towards completing ADLs and recreational activities without leakage. []? Progressing: []? Met: []? Not Met: []? Adjusted  4. Patient will return to functional activities including walking and sit to stand without increased symptoms or restriction. []? Progressing: []? Met: []? Not Met: []? Adjusted        Plan: [x] Continue per plan of care [] Alter current plan (see comments)   [] Plan of care initiated [] Hold pending MD visit [] Discharge      Plan for Next Session: cont.  TE     Re-Certification Due Date:  Visit 5      Signature:  Gordon Marcus, PT, DPT

## 2022-04-19 ENCOUNTER — HOSPITAL ENCOUNTER (OUTPATIENT)
Dept: PHYSICAL THERAPY | Age: 72
Setting detail: THERAPIES SERIES
Discharge: HOME | End: 2022-04-19
Payer: MEDICARE

## 2022-04-19 PROCEDURE — 97530 THERAPEUTIC ACTIVITIES: CPT

## 2022-04-19 PROCEDURE — 97110 THERAPEUTIC EXERCISES: CPT

## 2022-04-19 NOTE — FLOWSHEET NOTE
[address] [place] [address] [facility] [address] [place] [address]  Phone: [phone]  Fax [phone]      Physical Therapy Daily Treatment Note    Date:  2022    Patient Name:  Dmitri Guy    :  1950  MRN: [id number]  Restrictions/Precautions: universal    Medical/Treatment Diagnosis Information:  · Diagnosis: N39.41 (ICD-10-CM) - Urge incontinence  Insurance/Certification information:  PT Insurance Information: Aetna  Physician Information:  Referring Practitioner: Liz Piper MD  Plan of care signed (Y/N):  N  Visit# / total visits:       Functional Outcome (if applicable):          NIH-Chronic Prostatitis Symptom Index: 3/43    Medicare Cap (if applicable):  n/a = total amount used, updated 2022    Time in:   4:05pm      Timed Treatment: 40min. Total Treatment Time:  40min.  ________________________________________________________________________________________    Pain Level:    0-9/10 \"when I am walking and standing in my back\". SUBJECTIVE:  The patient reports \"I am not feeling good, I feel like I could die because I am having so much pain\". Reports continued compliance with HEP and continued back pain that is limiting mobility. Reports he has had improvements overall with urine control during the day. Continues to use 2 diapers per night that \"are sopping wet\". Reports he went to see a new spine specialist who ordered SI joint injections. OBJECTIVE:     Exercise (TE) Resistance/Repetitions Other comments            PF strengthening        Short hold: (1sec) 10 times       Long hold: (5sec) 10 times            PF + Hip ABD   x10 W/ green tband   PF + Hip ADD x10 W/ ball squeeze        Sit to stand w/ PF x10             Seated TA + PF [id number]. Bridge  HEP    Clamshell HEP            HEP:  Access Code: [email or web address]  URL: [email or web address]  Date: 2022  Prepared by: Preeti Croft    Exercises  Seated Pelvic Floor Contraction - 1 x daily - 7 x weekly - 1 sets - 10 reps - 1sec. hold  Seated Pelvic Floor Contraction - 1 x daily - 7 x weekly - 1 sets - 10 reps - 3sec. hold  Seated Pelvic Floor Contraction with Isometric Hip Adduction - 1 x daily - 7 x weekly - 3 sets - 10 reps - 2-3sec. hold  Seated Pelvic Floor Contraction with Hip Abduction and Resistance Loop - 1 x daily - 7 x weekly - 3 sets - 10 reps - 2-3sec. hold  Sit to Stand with Pelvic Floor Contraction - 2 x daily - 7 x weekly - 1 sets - 10 reps  Seated Transversus Abdominis Bracing - 1 x daily - 7 x weekly - 1 sets - 10 reps - 5sec. hold  Hooklying Bridge - 1 x daily - 7 x weekly - 1 sets - 10 reps - 2-3sec. hold  Clamshell - 1 x daily - 7 x weekly - 1 sets - 10 reps - 2-3sec hold          Other Treatment:   TA:  Bladder re-training/education:     Bladder Diary: voiding 8-13 times per day, 1 leak captured on diary. Voiding: educated in normal voiding patterns    Dietary: patient educated on the \"4Cs\" to reduce bladder irritation and leakage. Educated to increase fiber intake as well as water intake. Minimal intake of both noted on diary. Other: elevation of legs prior to bed in recliner    Manual Treatments:     Modalities:  --    Test/Measurements:  See eval         ASSESSMENT:   Majority of session spent discussing problems patient and wife are encountering with incontinence. The patinet does have frequent and excessive urinary leakage at night and patinet is no longer motivated to bathe and clean himself. Spoke with patient and his wife regarding motivation and potential for using condom catheter at night? Advised patient to speak with urologist and general practitioner regarding general health. Patient will continue with above plan of care and follow up in 2-3 weeks to assess progress if warranted.        Treatment/Activity Tolerance:   [x] Patient tolerated treatment well [] Patient limited by priya  [] Patient limited by pain [] Patient limited by other medical complications  [] Other:     Goals:      Patient stated goal: \"to control my urine better\"  []? Progressing: []? Met: []? Not Met: []? Adjusted        Therapist goals for Patient:   Short Term Goals: To be achieved in: 2 weeks  1. Independent in HEP and progression per patient tolerance, in order to prevent future occurrence of presenting issue. []? Progressing: []? Met: []? Not Met: []? Adjusted  2. Patient will have a 25% decrease in urinary leakage to indicate improvement in pelvic floor strength and relaxation, muscle coordination, and/or bladder retraining.  []? Progressing: []? Met: []? Not Met: []? Adjusted     Long Term Goals: To be achieved in: 8 weeks  1. Disability index score of 0 for the NIH-Chronic Prostatitis symptom index to assist with reaching prior level of function. []? Progressing: []? Met: []? Not Met: []? Adjusted  2. Patient will increase PERF score to 4/5 and endurance to 10 seconds to demonstrate increased strength and control over pelvic floor musculature. []? Progressing: []? Met: []? Not Met: []? Adjusted  3. Patient will report 1 day or greater without need for urinary pad to progress towards completing ADLs and recreational activities without leakage. []? Progressing: []? Met: []? Not Met: []? Adjusted  4. Patient will return to functional activities including walking and sit to stand without increased symptoms or restriction. []? Progressing: []? Met: []? Not Met: []? Adjusted        Plan: [x] Continue per plan of care [] Alter current plan (see comments)   [] Plan of care initiated [] Hold pending MD visit [] Discharge      Plan for Next Session: cont.  TE     Re-Certification Due Date:  Visit 5      Signature:  Teddy Mckeon, PT, DPT

## 2022-04-20 NOTE — FLOWSHEET NOTE
This PT called and left a message with the office of Anali Herring MD updating her of the patinet's progress during the day and lack of progress during the night.   Then this PT called and left a voicemail to the patient informing him of this message and the office number he can reach Anali Herring MD at.  4/20/2022 Elizabeth Whiting PT, DPT

## 2022-05-03 ENCOUNTER — HOSPITAL ENCOUNTER (OUTPATIENT)
Dept: PHYSICAL THERAPY | Age: 72
Setting detail: THERAPIES SERIES
Discharge: HOME | End: 2022-05-03

## 2022-05-03 NOTE — PROGRESS NOTES
Physical Therapy        Physical Therapy  Cancellation/No-show Note  Patient Name:  Amor Díaz  :  1950   Date:  5/3/2022  Cancels to date: 1 Evaluation   No-shows to date: 0    For today's appointment patient:  [x] Cancelled  [] Rescheduled appointment  [] No-show     Reason given by patient:  [x] Patient ill  [] Conflicting appointment  [] No transportation    [] Conflict with work  [] No reason given  [] Other:     Comments:      Electronically signed by:  Venita Hurt PT

## 2022-05-05 ENCOUNTER — HOSPITAL ENCOUNTER (OUTPATIENT)
Dept: PHYSICAL THERAPY | Age: 72
Setting detail: THERAPIES SERIES
Discharge: HOME | End: 2022-05-05

## 2022-05-05 NOTE — PROGRESS NOTES
Physical Therapy        Physical Therapy  Cancellation/No-show Note  Patient Name:  Laine Varghese  :  1950   Date:  2022  Cancels to date: 1 Evaluation   No-shows to date: 1 Evaluation     For today's appointment patient:  [] Cancelled  [] Rescheduled appointment  [x] No-show     Reason given by patient:  [] Patient ill  [] Conflicting appointment  [] No transportation    [] Conflict with work  [] No reason given  [] Other:     Comments:      Electronically signed by:  Dana Toney PT

## 2022-06-06 ENCOUNTER — HOSPITAL ENCOUNTER (OUTPATIENT)
Dept: CT IMAGING | Age: 72
Discharge: HOME | End: 2022-06-06
Payer: MEDICARE

## 2022-06-06 ENCOUNTER — HOSPITAL ENCOUNTER (OUTPATIENT)
Dept: INTERVENTIONAL RADIOLOGY/VASCULAR | Age: 72
Discharge: HOME | End: 2022-06-06
Payer: MEDICARE

## 2022-06-06 VITALS
DIASTOLIC BLOOD PRESSURE: 55 MMHG | HEIGHT: 73 IN | RESPIRATION RATE: 16 BRPM | TEMPERATURE: 98.2 F | OXYGEN SATURATION: 97 % | WEIGHT: 239 LBS | SYSTOLIC BLOOD PRESSURE: 118 MMHG | HEART RATE: 62 BPM | BODY MASS INDEX: 31.68 KG/M2

## 2022-06-06 DIAGNOSIS — M48.07 SPINAL STENOSIS OF LUMBOSACRAL REGION: ICD-10-CM

## 2022-06-06 LAB
GLUCOSE BLD-MCNC: 226 MG/DL (ref 70–99)
HCT VFR BLD CALC: 41.2 % (ref 40.5–52.5)
HEMOGLOBIN: 13.9 G/DL (ref 13.5–17.5)
INR BLD: 1.09 (ref 0.87–1.14)
MCH RBC QN AUTO: 32.1 PG (ref 26–34)
MCHC RBC AUTO-ENTMCNC: 33.7 G/DL (ref 31–36)
MCV RBC AUTO: 95.1 FL (ref 80–100)
PDW BLD-RTO: 12.9 % (ref 12.4–15.4)
PERFORMED ON: ABNORMAL
PLATELET # BLD: 232 K/UL (ref 135–450)
PMV BLD AUTO: 8.1 FL (ref 5–10.5)
PROTHROMBIN TIME: 13.9 SEC (ref 11.7–14.5)
RBC # BLD: 4.33 M/UL (ref 4.2–5.9)
WBC # BLD: 7 K/UL (ref 4–11)

## 2022-06-06 PROCEDURE — 36415 COLL VENOUS BLD VENIPUNCTURE: CPT

## 2022-06-06 PROCEDURE — 2709999900 FL MYELOGRAM LUMBOSACRAL S&I

## 2022-06-06 PROCEDURE — 85610 PROTHROMBIN TIME: CPT

## 2022-06-06 PROCEDURE — 7100000011 HC PHASE II RECOVERY - ADDTL 15 MIN

## 2022-06-06 PROCEDURE — 7100000010 HC PHASE II RECOVERY - FIRST 15 MIN

## 2022-06-06 PROCEDURE — 72132 CT LUMBAR SPINE W/DYE: CPT

## 2022-06-06 PROCEDURE — 6360000004 HC RX CONTRAST MEDICATION: Performed by: NURSE PRACTITIONER

## 2022-06-06 PROCEDURE — 85027 COMPLETE CBC AUTOMATED: CPT

## 2022-06-06 RX ORDER — ROSUVASTATIN CALCIUM 20 MG/1
20 TABLET, COATED ORAL NIGHTLY
COMMUNITY

## 2022-06-06 RX ORDER — GLIPIZIDE 5 MG/1
5 TABLET ORAL DAILY
COMMUNITY

## 2022-06-06 RX ADMIN — IOHEXOL 20 ML: 240 INJECTION, SOLUTION INTRATHECAL; INTRAVASCULAR; INTRAVENOUS; ORAL at 11:05

## 2022-06-06 ASSESSMENT — PAIN DESCRIPTION - DESCRIPTORS: DESCRIPTORS: ACHING

## 2022-06-06 ASSESSMENT — PAIN - FUNCTIONAL ASSESSMENT: PAIN_FUNCTIONAL_ASSESSMENT: 0-10

## 2022-06-06 NOTE — PROGRESS NOTES
Image guided myelogram completed. Pt tolerated procedure without any signs or symptoms of distress. Vital signs stable. Patient to lat flat with HOB raised 15 degrees and bedrest for 2 hours. Patient has a bandage to lower back that is clean, dry and intact. Report called to [facility] RN . Pt transported back to CT then SDS in stable condition.     Vital Signs  Vitals:    06/06/22 0937   BP: 128/62   Pulse: 66   Resp: 16   Temp: 98.2 °F (36.8 °C)   SpO2: 97%    (vital signs in table format)

## 2022-06-06 NOTE — PROGRESS NOTES
RADIOLOGY:  Patient status post fluoroscopically guided lumbar myelogram.  Patient tolerated the procedure well. Full report to follow.

## 2022-06-06 NOTE — PROGRESS NOTES
Pt arrived for image guided myelogram by Francy Eden. Procedure explained including the risk and benefits of the procedure. All questions answered. Pt verbalizes understanding of the procedure and states no more questions. Consent confirmed. Vital signs stable. Labs, allergies, medications, and code status reviewed. No contraindications noted. Patient was placed prone on the IR table. Time out completed prior to procedure start.      Vital Signs  Vitals:    06/06/22 0937   BP: 128/62   Pulse: 66   Resp: 16   Temp: 98.2 °F (36.8 °C)   SpO2: 97%    (vital signs in table format)      Allergies  No known allergies (allergies)    Labs  Lab Results   Component Value Date    INR 1.09 06/06/2022    PROTIME 13.9 06/06/2022     Lab Results   Component Value Date    CREATININE 0.90 02/25/2021    BUN 17 02/25/2021     02/25/2021    GFR >60 10/21/2010    K 4.6 02/25/2021     02/25/2021    CO2 27 02/25/2021     Lab Results   Component Value Date    WBC 7.0 06/06/2022    HGB 13.9 06/06/2022    HCT 41.2 06/06/2022    MCV 95.1 06/06/2022     06/06/2022

## 2022-06-07 ENCOUNTER — APPOINTMENT (OUTPATIENT)
Dept: CT IMAGING | Age: 72
End: 2022-06-07
Payer: MEDICARE

## 2022-06-07 ENCOUNTER — APPOINTMENT (OUTPATIENT)
Dept: GENERAL RADIOLOGY | Age: 72
End: 2022-06-07
Payer: MEDICARE

## 2022-06-07 ENCOUNTER — HOSPITAL ENCOUNTER (EMERGENCY)
Age: 72
Discharge: ANOTHER HOSPITAL | End: 2022-06-08
Attending: EMERGENCY MEDICINE
Payer: MEDICARE

## 2022-06-07 VITALS
WEIGHT: 239 LBS | OXYGEN SATURATION: 97 % | TEMPERATURE: 98.3 F | HEIGHT: 74 IN | RESPIRATION RATE: 16 BRPM | DIASTOLIC BLOOD PRESSURE: 80 MMHG | HEART RATE: 60 BPM | BODY MASS INDEX: 30.67 KG/M2 | SYSTOLIC BLOOD PRESSURE: 131 MMHG

## 2022-06-07 DIAGNOSIS — R29.6 FREQUENT FALLS: Primary | ICD-10-CM

## 2022-06-07 DIAGNOSIS — M48.07 SPINAL STENOSIS OF LUMBOSACRAL REGION: ICD-10-CM

## 2022-06-07 DIAGNOSIS — R29.898 WEAKNESS OF BOTH LOWER EXTREMITIES: ICD-10-CM

## 2022-06-07 DIAGNOSIS — R53.81 PHYSICAL DEBILITY: ICD-10-CM

## 2022-06-07 LAB
A/G RATIO: 2.3 (ref 1.1–2.2)
ALBUMIN SERPL-MCNC: 5.1 G/DL (ref 3.4–5)
ALP BLD-CCNC: 87 U/L (ref 40–129)
ALT SERPL-CCNC: 31 U/L (ref 10–40)
ANION GAP SERPL CALCULATED.3IONS-SCNC: 13 MMOL/L (ref 3–16)
APTT: 30.1 SEC (ref 23–34.3)
AST SERPL-CCNC: 36 U/L (ref 15–37)
BASOPHILS ABSOLUTE: 0.1 K/UL (ref 0–0.2)
BASOPHILS RELATIVE PERCENT: 0.8 %
BILIRUB SERPL-MCNC: 0.4 MG/DL (ref 0–1)
BILIRUBIN URINE: NEGATIVE
BLOOD, URINE: NEGATIVE
BUN BLDV-MCNC: 15 MG/DL (ref 7–20)
C-REACTIVE PROTEIN: <3 MG/L (ref 0–5.1)
CALCIUM SERPL-MCNC: 9.9 MG/DL (ref 8.3–10.6)
CHLORIDE BLD-SCNC: 104 MMOL/L (ref 99–110)
CLARITY: CLEAR
CO2: 25 MMOL/L (ref 21–32)
COLOR: YELLOW
CREAT SERPL-MCNC: 0.9 MG/DL (ref 0.8–1.3)
EKG ATRIAL RATE: 64 BPM
EKG DIAGNOSIS: NORMAL
EKG P AXIS: 11 DEGREES
EKG P-R INTERVAL: 208 MS
EKG Q-T INTERVAL: 420 MS
EKG QRS DURATION: 88 MS
EKG QTC CALCULATION (BAZETT): 433 MS
EKG R AXIS: -26 DEGREES
EKG T AXIS: 47 DEGREES
EKG VENTRICULAR RATE: 64 BPM
EOSINOPHILS ABSOLUTE: 0.3 K/UL (ref 0–0.6)
EOSINOPHILS RELATIVE PERCENT: 4.3 %
GFR AFRICAN AMERICAN: >60
GFR NON-AFRICAN AMERICAN: >60
GLUCOSE BLD-MCNC: 178 MG/DL (ref 70–99)
GLUCOSE URINE: NEGATIVE MG/DL
HCT VFR BLD CALC: 42.6 % (ref 40.5–52.5)
HEMOGLOBIN: 14.3 G/DL (ref 13.5–17.5)
INR BLD: 1.09 (ref 0.87–1.14)
KETONES, URINE: NEGATIVE MG/DL
LEUKOCYTE ESTERASE, URINE: NEGATIVE
LYMPHOCYTES ABSOLUTE: 1.5 K/UL (ref 1–5.1)
LYMPHOCYTES RELATIVE PERCENT: 22.2 %
MCH RBC QN AUTO: 31.8 PG (ref 26–34)
MCHC RBC AUTO-ENTMCNC: 33.5 G/DL (ref 31–36)
MCV RBC AUTO: 95.1 FL (ref 80–100)
MICROSCOPIC EXAMINATION: NORMAL
MONOCYTES ABSOLUTE: 0.5 K/UL (ref 0–1.3)
MONOCYTES RELATIVE PERCENT: 7.2 %
NEUTROPHILS ABSOLUTE: 4.6 K/UL (ref 1.7–7.7)
NEUTROPHILS RELATIVE PERCENT: 65.5 %
NITRITE, URINE: NEGATIVE
PDW BLD-RTO: 13.2 % (ref 12.4–15.4)
PH UA: 7 (ref 5–8)
PLATELET # BLD: 239 K/UL (ref 135–450)
PMV BLD AUTO: 7.7 FL (ref 5–10.5)
POTASSIUM REFLEX MAGNESIUM: 4.5 MMOL/L (ref 3.5–5.1)
PROTEIN UA: NEGATIVE MG/DL
PROTHROMBIN TIME: 13.9 SEC (ref 11.7–14.5)
RBC # BLD: 4.48 M/UL (ref 4.2–5.9)
SEDIMENTATION RATE, ERYTHROCYTE: 14 MM/HR (ref 0–20)
SODIUM BLD-SCNC: 142 MMOL/L (ref 136–145)
SPECIFIC GRAVITY UA: 1.01 (ref 1–1.03)
TOTAL PROTEIN: 7.3 G/DL (ref 6.4–8.2)
URINE REFLEX TO CULTURE: NORMAL
URINE TYPE: NORMAL
UROBILINOGEN, URINE: 0.2 E.U./DL
WBC # BLD: 7 K/UL (ref 4–11)

## 2022-06-07 PROCEDURE — 71045 X-RAY EXAM CHEST 1 VIEW: CPT

## 2022-06-07 PROCEDURE — 72133 CT LUMBAR SPINE W/O & W/DYE: CPT

## 2022-06-07 PROCEDURE — 93005 ELECTROCARDIOGRAM TRACING: CPT | Performed by: PHYSICIAN ASSISTANT

## 2022-06-07 PROCEDURE — 85025 COMPLETE CBC W/AUTO DIFF WBC: CPT

## 2022-06-07 PROCEDURE — 85610 PROTHROMBIN TIME: CPT

## 2022-06-07 PROCEDURE — 85652 RBC SED RATE AUTOMATED: CPT

## 2022-06-07 PROCEDURE — 80053 COMPREHEN METABOLIC PANEL: CPT

## 2022-06-07 PROCEDURE — 85730 THROMBOPLASTIN TIME PARTIAL: CPT

## 2022-06-07 PROCEDURE — 6370000000 HC RX 637 (ALT 250 FOR IP): Performed by: PHYSICIAN ASSISTANT

## 2022-06-07 PROCEDURE — 81003 URINALYSIS AUTO W/O SCOPE: CPT

## 2022-06-07 PROCEDURE — 86140 C-REACTIVE PROTEIN: CPT

## 2022-06-07 PROCEDURE — 99285 EMERGENCY DEPT VISIT HI MDM: CPT

## 2022-06-07 PROCEDURE — 6360000004 HC RX CONTRAST MEDICATION: Performed by: PHYSICIAN ASSISTANT

## 2022-06-07 PROCEDURE — 93010 ELECTROCARDIOGRAM REPORT: CPT | Performed by: INTERNAL MEDICINE

## 2022-06-07 RX ORDER — OXYCODONE HYDROCHLORIDE AND ACETAMINOPHEN 5; 325 MG/1; MG/1
1 TABLET ORAL ONCE
Status: COMPLETED | OUTPATIENT
Start: 2022-06-07 | End: 2022-06-07

## 2022-06-07 RX ORDER — GABAPENTIN 300 MG/1
300 CAPSULE ORAL 3 TIMES DAILY
COMMUNITY

## 2022-06-07 RX ADMIN — OXYCODONE AND ACETAMINOPHEN 1 TABLET: 325; 5 TABLET ORAL at 22:52

## 2022-06-07 RX ADMIN — IOPAMIDOL 75 ML: 755 INJECTION, SOLUTION INTRAVENOUS at 14:51

## 2022-06-07 ASSESSMENT — PAIN DESCRIPTION - ORIENTATION: ORIENTATION: RIGHT;LEFT;LOWER

## 2022-06-07 ASSESSMENT — PAIN DESCRIPTION - DESCRIPTORS: DESCRIPTORS: DISCOMFORT;SHARP

## 2022-06-07 ASSESSMENT — PAIN DESCRIPTION - LOCATION: LOCATION: BACK

## 2022-06-07 ASSESSMENT — PAIN - FUNCTIONAL ASSESSMENT
PAIN_FUNCTIONAL_ASSESSMENT: 0-10

## 2022-06-07 ASSESSMENT — PAIN SCALES - GENERAL
PAINLEVEL_OUTOF10: 9
PAINLEVEL_OUTOF10: 10
PAINLEVEL_OUTOF10: 9
PAINLEVEL_OUTOF10: 10
PAINLEVEL_OUTOF10: 9

## 2022-06-07 NOTE — ED NOTES
Pt placed in gown/pt soiled urine depends changed/pt repositioned in bed for comfort. Pt ordering food tray.      Fabricio Bustamante LPN  81/47/56 [id number]

## 2022-06-07 NOTE — ED PROVIDER NOTES
[facility]  ED  EMERGENCY DEPARTMENT ENCOUNTER        Pt Name: Konrad Musa  MRN: [id number]  [place] 1950  Date of evaluation: 6/7/2022  Provider: Mariluz Hernandez PA-C  PCP: No primary care provider on file. Note Started: 1:37 PM EDT        I have seen and evaluated this patient with my supervising physician Dominguez Blank MD.    [address] [place] [address]       Chief Complaint   Patient presents with    Fall     patient reports he had a CT mylogram yesterday and left the appointment feeling fine. reports when he got home he sat in his chair. reports he got up and fell to the ground. pt states his legs are weak. pt called his doctor and was told to go to the ED     Fatigue    Back Pain       HISTORY OF PRESENT ILLNESS   (Location, Timing/Onset, Context/Setting, Quality, Duration, Modifying Factors, Severity, Associated Signs and Symptoms)  Note limiting factors. Chief Complaint: Increasing back pain, progressive paresthesia, progressive weakness, increasing falls    Konrad Musa is a 70 y.o. male who presents the above complaint. The patient has known lumbar disc disease and spinal stenosis. He has had 3 previous back surgeries. He is expecting a fourth back surgery. He is been seen by Dr. Favian Boss, Dr. Vivian Regalado most recently Dr. Tracy Farr with [facility].  Dr. Tracy Farr currently out of country. [facility] is covering. The patient with progressive pain and progressive debility had a CT myelogram this facility yesterday. The impression listed for the myelogram yesterday indicating a successful fluoroscopy guided lumbar myelogram.  The patient accompanied by his wife who is a retired nurse. Patient typically uses a walker. He is not, at this time, able to use the walker due to the progressed weakness in bilateral legs. not able to use a walker due to progressive bilateral leg weakness and paresthesias.   He has had 3 falls in the third fall brought him in to ED.  He is a large gentleman weighing 239 pounds. The patient will has come in for evaluation due to increasing back pain, paresthesia bilateral extremities and weakness. Nursing Notes were all reviewed and agreed with or any disagreements were addressed in the HPI. REVIEW OF SYSTEMS    (2-9 systems for level 4, 10 or more for level 5)     Review of Systems    Positives and Pertinent negatives as per HPI. Except as noted above in the ROS, all other systems were reviewed and negative. PAST MEDICAL HISTORY     Past Medical History:   Diagnosis Date    Anxiety     Arthritis     Asthma     Bipolar [facility]     Colitis     Depression     Diabetes ([facility]     Diabetes mellitus ([facility]     Encounter for imaging to screen for metal prior to MRI 06/08/2022    NOT MRI conditional [employer] model#SC-1160, Lead: [id number] implanted 9/9/19 by [facility] at [place] [address]. Lead is unsafe for MRI. Patient unable to have any MRI ever.     Glaucoma     H/O bladder problems     High blood pressure     History of kidney problems     IBS (irritable bowel syndrome)     Liver disease     hx of elevated LFT's    Obesity     Psychiatric problem          SURGICAL HISTORY     Past Surgical History:   Procedure Laterality Date    APPENDECTOMY      BACK SURGERY      CERVICAL FUSION      COLONOSCOPY      EYE SURGERY      HIP SURGERY      JOINT REPLACEMENT Right     THR    OTHER SURGICAL HISTORY  06/06/2018     right L4-5 hemilaminotomy, partial facetectomy, foraminotomy, reexploration and excision of synovial csyt,  Bilateral L2-3 and L3-4 hemilaminotomy, partial facetectomy, foraminotomy    MI NERVOUS SYSTEM SURGERY UNLISTED Bilateral 11/12/2018    BILATERAL LUMBAR THREE, LUMBAR FOUR, LUMBAR FIVE RADIOFREQUENCY ABLATION SITE CONFIRMED BY FLUOROSCOPY performed by Ondina Ruiz MD at [facility] DX/THER SBST INTRLMNR CRV/THRC W/IMG GDN Bilateral 9/25/2018 BILATERAL LUMBAR THREE, LUMBAR FOUR, LUMBAR FIVE MEDIAL BRANCH BLOCK SITE CONFIRMED BY FLUOROSCOPY performed by Leanne Hurst MD at [facility] DX/THER SBST INTRLMNR CRV/THRC W/IMG GDN Bilateral 10/16/2018    BILATERAL LUMBAR THREE, FOUR, FIVE MEDIAL BRANCH BLOCK SITE CONFIRMED BY FLUOROSCOPY performed by Leanne Hurst MD at [address] TURP N/A 6/9/2020    CYSTOSCOPY, TRANSURETHRAL RESECTION OF PROSTATE performed by Shorty Cuellar MD at [address] [place] [address]       Discharge Medication List as of 6/8/2022 12:35 AM      CONTINUE these medications which have NOT CHANGED    Details   gabapentin (NEURONTIN) 300 MG capsule Take 300 mg by mouth 3 times daily. Historical Med      Dulaglutide (TRULICITY) 3 EI/6.9KB SOPN Inject into the skin once a weekHistorical Med      glipiZIDE (GLUCOTROL) 5 mg tablet Take 5 mg by mouth dailyHistorical Med      apixaban (ELIQUIS) 5 MG TABS tablet Take by mouth 2 times dailyHistorical Med      rosuvastatin (CRESTOR) 20 MG tablet Take 20 mg by mouth dailyHistorical Med      lithium 300 MG capsule 3 times daily (with meals) Historical Med      lamoTRIgine (LAMICTAL) 25 MG tablet Historical Med      tamsulosin (FLOMAX) 0.4 MG capsule Historical Med      oxybutynin (DITROPAN) 5 MG tablet TK 1 T PO  BIDHistorical Med      losartan (COZAAR) 50 MG tablet Take by mouthHistorical Med      Insulin Degludec 100 UNIT/ML SOPN Inject 10 Units into the skin nightlyHistorical Med      verapamil (CALAN SR) 120 MG extended release tablet Take 120 mg by mouth nightlyHistorical Med      glimepiride (AMARYL) 2 MG tablet Take 2 mg by mouth every morning (before breakfast)Historical Med      metFORMIN (GLUCOPHAGE) 500 MG tablet Take 1,000 mg by mouth 2 times daily (with meals)       traZODone (DESYREL) 100 MG tablet Take 300 mg by mouth nightly Historical Med      doxazosin (CARDURA) 2 MG tablet Take 2 mg by mouth Takes 3 times weekly; takes at nightHistorical Med               ALLERGIES     No known allergies    FAMILYHISTORY       Family History   Problem Relation Age of Onset    Alcohol Abuse Sister     Coronary Art Dis Mother     Obesity Mother     Osteoarthritis Mother     Parkinsonism Mother     Coronary Art Dis Father     Obesity Father           SOCIAL HISTORY       Social History     Tobacco Use    Smoking status: Never Smoker    Smokeless tobacco: Never Used   Vaping Use    Vaping Use: Never used   Substance Use Topics    Alcohol use: No    Drug use: No       SCREENINGS             PHYSICAL EXAM    (up to 7 for level 4, 8 or more for level 5)     ED Triage Vitals [06/07/22 1156]   BP Temp Temp Source Heart Rate Resp SpO2 Height Weight   137/69 98.3 °F (36.8 °C) Oral 63 17 97 % 6' 1\" (1.854 m) 239 lb (108.4 kg)       Physical Exam  Vitals and nursing note reviewed. Constitutional:       Appearance: Normal appearance. He is well-developed. He is obese. HENT:      Head: Normocephalic and atraumatic. Right Ear: External ear normal.      Left Ear: External ear normal.   Eyes:      General: No scleral icterus. Right eye: No discharge. Left eye: No discharge. Conjunctiva/sclera: Conjunctivae normal.   Cardiovascular:      Rate and Rhythm: Normal rate and regular rhythm. Heart sounds: Normal heart sounds. Pulmonary:      Effort: Pulmonary effort is normal.      Breath sounds: Normal breath sounds. Abdominal:      General: Abdomen is flat. Bowel sounds are normal.      Palpations: Abdomen is soft. Tenderness: There is no right CVA tenderness or left CVA tenderness. Musculoskeletal:         General: Normal range of motion. Cervical back: Normal range of motion and neck supple. Right lower leg: No edema. Left lower leg: No edema. Comments: I did elevate both patient legs independently. Produces pain in the back. I helped about 45 degrees patient not able to hold his leg.   I did have the patient in a semireclined position at about 15 degrees head up. I did ask him to raise the left leg and in the right leg. He was able to at most raise about an inch off the bed just for split-second. Patient's plantarflexion and dorsiflexion both week. Patient does have sensation to touch but he states that it feels extremely different and this is with comparison to yesterday which was completely normal.   Skin:     General: Skin is warm and dry. Neurological:      General: No focal deficit present. Mental Status: He is alert and oriented to person, place, and time. Mental status is at baseline. Motor: Weakness present. Psychiatric:         Mood and Affect: Mood normal.         Behavior: Behavior normal.         Thought Content: Thought content normal.         Judgment: Judgment normal.         DIAGNOSTIC RESULTS   LABS:    Labs Reviewed   COMPREHENSIVE METABOLIC PANEL W/ REFLEX TO MG FOR LOW K - Abnormal; Notable for the following components:       Result Value    Glucose 178 (*)     Albumin 5.1 (*)     Albumin/Globulin Ratio 2.3 (*)     All other components within normal limits   CBC WITH AUTO DIFFERENTIAL   PROTIME-INR   APTT   SEDIMENTATION RATE   C-REACTIVE PROTEIN   URINALYSIS WITH REFLEX TO CULTURE       When ordered only abnormal lab results are displayed. All other labs were within normal range or not returned as of this dictation. EKG: When ordered, EKG's are interpreted by the Emergency Department Physician in the absence of a cardiologist.  Please see their note for interpretation of EKG.     RADIOLOGY:   Non-plain film images such as CT, Ultrasound and MRI are read by the radiologist. Plain radiographic images are visualized and preliminarily interpreted by the ED Provider with the below findings:        Interpretation per the Radiologist below, if available at the time of this note:    [address] [employer]   Final Result   Addendum 1 of 1   ADDENDUM:   CT lumbar spine June 16, 2021 became available and was compared. There is interval placement of inter spinous process fusion hardware at    L2-3. The vertebral body heights and alignment is stable. The disc disease is   grossly stable. Final   Residual intrathecal contrast.      Degenerative disc disease as described above, exacerbating congenitally   narrow cervical spinal canal.      Spinal canal narrowing, mild-to-moderate at L3-4, mild at L2-3. Foraminal narrowing, moderate to severe at bilateral L4-5, moderate at right   L2-3, mild to moderate at right L3-4 and left L5-S1. XR CHEST PORTABLE   Final Result   No acute cardiopulmonary disease. FL MYELOGRAM LUMBOSACRAL S&I    Result Date: 6/6/2022  EXAMINATION: FLUOROSCOPIC LUMBAR MYELOGRAM 6/6/2022 HISTORY: ORDERING SYSTEM PROVIDED HISTORY: Spinal stenosis of lumbosacral region FLUOROSCOPY DOSE AND TYPE OR TIME AND EXPOSURES: Fluoroscopic time:  1 minute. AIR KERMA: 3317.4 mGy. Number of fluoroscopic images obtained:  3. Remaining fluoroscopic images were generated using last image hold technique. PROCEDURE: : Michele Greer. Miah Alvarado MD Informed consent was obtained after the risks and benefits of the procedure were discussed with the patient and all questions were answered fully. Mascot protocol was observed and a standard timeout was performed. The patient was positioned prone and the back was prepped and draped in the normal sterile fashion. 1% lidocaine was used for local anesthesia. The subarachnoid space was accessed with a 22-gauge 3.5\" spinal needle at the L4-L5 level. Free flow of clear CSF was noted. Approximately 12 ml of Omnipaque 240 was injected into the intrathecal space under fluoroscopic visualization. The stylet was reinserted, spinal needle was removed and brief pressure was applied at the puncture site. There were no immediate complications and the patient tolerated the procedure well.      Status post successful fluoroscopically guided lumbar myelogram.     XR CHEST PORTABLE    Result Date: 6/7/2022  EXAMINATION: ONE XRAY VIEW OF THE CHEST 6/7/2022 12:53 pm COMPARISON: 07/12/2020. HISTORY: ORDERING SYSTEM PROVIDED HISTORY: Back pain TECHNOLOGIST PROVIDED HISTORY: Reason for exam:->Back pain Reason for Exam: back pain; fall FINDINGS: The cardiomediastinal silhouette is stable. Aortic vascular calcification. The lungs are clear. No infiltrate, pleural fluid or evidence of overt failure. Spinal stimulator leads are noted projecting in the lower thoracic spinal canal.     No acute cardiopulmonary disease. PROCEDURES   Unless otherwise noted below, none     Procedures    CRITICAL CARE TIME   Critical Care  There was a high probability of life-threatening clinical deterioration in the patient's condition requiring my urgent intervention. Total critical care time with the patient was 55 minutes excluding separately reportable procedures. Critical care required due to patients significant time spent discussing case with patient, wife, neurosurgery, attending physician and hospitalist.      CONSULTS:  IP CONSULT TO CASE MANAGEMENT  IP CONSULT TO NEUROSURGERY  IP CONSULT TO NEUROSURGERY  IP CONSULT TO HOSPITALIST      EMERGENCY DEPARTMENT COURSE and DIFFERENTIAL DIAGNOSIS/MDM:   Vitals:    Vitals:    06/07/22 1745 06/07/22 1902 06/07/22 2155 06/07/22 2336   BP: 121/64  (!) 142/67 131/80   Pulse: 63  65 60   Resp: 16  16 16   Temp:    98.3 °F (36.8 °C)   TempSrc:    Oral   SpO2: 98%  97% 97%   Weight:  239 lb (108.4 kg)     Height:  6' 2\" (1.88 m)         Patient was given the following medications:  Medications   iopamidol (ISOVUE-370) 76 % injection 75 mL (75 mLs IntraVENous Given 6/7/22 1451)   oxyCODONE-acetaminophen (PERCOCET) 5-325 MG per tablet 1 tablet (1 tablet Oral Given 6/7/22 6152)         Is this patient to be included in the SEP-1 Core Measure due to severe sepsis or septic shock?    No   Exclusion criteria - the patient is NOT to be included for SEP-1 Core Measure due to: Infection is not suspected    This patient had CT myelogram yesterday. Since then he has had progressive weakness to the point of inability to stand and bear weight nor walk. He does simply walk with a walker. He is fallen 3 times. Each time EMS contacted. Third fall EMS contacted and is brought in for evaluation treatment. On examination the patient has significant weakness bilateral extremities with no paresthesia bilateral lower extremities. Ollis Lank studies obtained showing negative UTI. BBC 7.0 and hemoglobin 14.3. The patient has normal renal function. He is diabetic but her blood sugar is 178. The patient sedimentation rate is 14. CRP less than 3.0. CT lumbar spine showing general degenerative change and spinal stenosis. Patient has significant foraminal stenosis severe bilateral L4-5, moderate right L2-3 and moderate right L3-4 and left L5-S1. No evidence of acute spinal pathology such as abscess or hematoma following procedure yesterday. Attending physician speaks with radiologist.    At the 5:50 PM I did speak with neurosurgeon Dr. Sally Marr who personally reviewed the images. He is recommended [facility] for a neurosurgical evaluation with potential surgical intervention. I discussed with the patient for transfer. He recommended I discussed with his wife. I will contact wife for her input. I did discuss use of steroids and was felt for the patient to be evaluated before initiating any steroids. At 6 PM I did request the  to contact wife. At 6:30 PM I spoke with the patient's wife. She prefers patient being transferred to [facility]..  I will contact neurosurgery. At 7:20 PM I spoke with neurosurgery Dr. Mallory Elliott wife wishes transfer to [facility].  He is in agreement.   He does state that he is on-call for that facility and will be doing rounds tomorrow to evaluate the patient. At this time I have requested staff contact wife to inform that he will be going to [facility].    At 8:40 PM I spoke with transfer center and Dr. Terrance Mednoza hospitalist at [facility].  He did indicate the facility is full the patient is being placed on a wait list.  Admitting physician we Dr. Sol Espinoza. At 8:43 PM I did speak with our staff they will contact [facility] for bed availability. We will then contact the wife for her input. At approximately 9 PM conversation with wife indicates  to make the final decision on transfer. Conversation with  indicates agreeable to transfer to [facility].  We will activate process. At 10 PM I spoke with Dr. Becka Tiwari at [facility] for the admission. Patient has been accepted and we transferred by BLS unit to the facility. FINAL IMPRESSION      1. Frequent falls    2. Weakness of both lower extremities    3. Physical debility    4. Spinal stenosis of lumbosacral region          DISPOSITION/PLAN   DISPOSITION Decision To Transfer 06/08/2022 12:35:11 AM      PATIENT REFERRED TO:  No follow-up provider specified. DISCHARGE MEDICATIONS:  Discharge Medication List as of 6/8/2022 12:35 AM          DISCONTINUED MEDICATIONS:  Discharge Medication List as of 6/8/2022 12:35 AM                 (Please note that portions of this note were completed with a voice recognition program.  Efforts were made to edit the dictations but occasionally words are mis-transcribed. )    Ana Hill PA-C (electronically signed)           Ana Hill PA-C  06/1950

## 2022-06-07 NOTE — ED PROVIDER NOTES
I did not perform an evaluation of Ginny Velazco but was asked to review his EKG. EKG interpreted by myself.    Rate: normal  Rhythm: NSR  Axis: normal  Intervals:  otherwise within normal limits  ST Segments: no acute abnormality  T waves: no acute abnormality  Comparison: Compared to 7/11/20, rhythm is normal sinus rhythm from sinus tachycardia, now with resolution of nonspecific ST/T wave abnormalities  Impression: NSR with no acute abnormality       Julius Gandhi MD  06/07/22 3883

## 2022-06-07 NOTE — ED NOTES
@9160 Called Neurosurg per Cara SEGURA  RE:Back pain, new paresthesia, progressive weakness  @1738 Left Voicemail  @1747 D. Arina Lora called and spoke to Cara SEGURA

## 2022-06-07 NOTE — CARE COORDINATION
CASE MANAGEMENT INITIAL ASSESSMENT      Reviewed chart and completed assessment with patient and wife  Family present: wife  Explained Case Management role/services. yes    Primary contact information:wife, [address] [facility] Decision Maker :   Primary Decision Maker: Nick Keen - [phone]          Can this person be reached and be able to respond quickly, such as within a few minutes or hours? Yes      Admit date/status:6/7/22  Diagnosis:inability to ambulate4   Is this a Readmission?:  No      Insurance:[employer] medicare   Precert required for SNF: Yes       3 night stay required: No    Living arrangements, Adls, care needs, prior to admission:home with wife, normally independent in ADLs. Currently unable to ambulate. Durable Medical Equipment at home:  Walker_x_Cane__RTS__ BSC__Shower Chair__  02__ HHN__ CPAP__  BiPap__  Hospital Bed__ W/C___ Other_____    Services in the home and/or outpatient, prior to admission:none    Current PCP:  Willie Jiménez Prescription coverage? Yes Will pt require financial assistance with medications No     Transportation needs: car     Dialysis Facility (if applicable)   · Name:  · Address:  · Dialysis Schedule:  · Phone:  · Fax:    PT/OT recs:    Hospital Exemption Notification (HEN):    Barriers to discharge:medical complications    Plan/comments:Referred to patient for d/c planning. Spoke to patient and wife. Patient is a 70year old male admitted for inability to ambulate and back pain. Patient usually independent. Currently unable to ambulate. Lives at home with wife who is overwhelmed. Patient has been to SNF in past at [facility]. List of providers in network with insurance reviewed. Both agreeable to SNF. They are interested in [facility], [facility], or [facility]. They prefer private room and cost.  Referrals made to facilities. Await response. MD updated.  Electronically signed by GARIMA Peña, AWA on 6/7/2022 at 4:43 PM      ECO on chart for MD signature

## 2022-06-07 NOTE — CONSULTS
Placed call to Neurosurgery @ 0440  RE: Family requesting [facility] per [place] Clementine Devlin called back @ 0854

## 2022-06-07 NOTE — ED NOTES
Pt food tray cancelled. Pt NPO.  Pt  being transferred     [facility]  49/79/18 6190

## 2022-06-07 NOTE — ED PROVIDER NOTES
I independently performed a history and physical on Jeri Branch. All diagnostic, treatment, and disposition decisions were made by myself in conjunction with the advanced practice provider. I have participated in the medical decision making and directed the treatment plan and disposition of the patient. For further details of [facility] emergency department encounter, please see the advanced practice provider's documentation. CHIEF COMPLAINT  Chief Complaint   Patient presents with    Fall     patient reports he had a CT mylogram yesterday and left the appointment feeling fine. reports when he got home he sat in his chair. reports he got up and fell to the ground. pt states his legs are weak. pt called his doctor and was told to go to the ED     Fatigue    Back Pain       Briefly, Jeri Branch is a 70 y.o. male  who presents to the ED complaining of worsening low back pain and leg weakness with inability to walk    FOCUSED PHYSICAL EXAMINATION  /75   Pulse 61   Temp 98.3 °F (36.8 °C) (Oral)   Resp 16   Ht 6' 1\" (1.854 m)   Wt 239 lb (108.4 kg)   SpO2 97%   BMI 31.53 kg/m²      Focused physical examination:  General appearance:  Cooperative. No acute distress. Skin:  Warm. Dry. Eye:  Extraocular movements intact. Ears, nose, mouth and throat:  Oral mucosa moist,  Neck:  Trachea midline. Heart:  Regular rate and rhythm  Perfusion:  intact  Respiratory:  Lungs clear to auscultation bilaterally. Respirations nonlabored. Abdominal:   Non distended. Nontender  Neurological:  Alert and oriented x 3. Patient complains of subjective paresthesias and abnormal sensation in the bilateral lower extremities and perineum. Diminished reflexes in the bilateral patellar and Achilles deep tendons. Patient has significantly limited hip, knee, and ankle flexion and extension in the bilateral lower extremities and cannot bear his own weight.   Musculoskeletal:   Normal ROM, no deformities          Psychiatric:  Normal mood    MDM: Patient with history of chronic back pain multiple procedures in the past and chronic spinal stenosis status post CT myelogram just yesterday for operative planning. Since his myelogram he has had worsening lower leg pain. He has significant paresthesias, weakness, diminished reflexes on exam.  Certainly concerning for possible cauda equina and with recent procedure the possibility of epidural hematoma. Lower concern for epidural abscess. No new trauma or injury. Repeat CT of the lumbar spine was performed which thankfully did still have thecal contrast and after speaking with radiology they can adequately assess that the patient has no cauda equina or epidural hematoma given the evaluation with residual contrast within the thecal space. He does however have significant neuroforaminal stenosis and spinal stenosis. Case was discussed with neurosurgery and I have recommended urgent transfer for further evaluation. CRITICAL CARE TIME   Total Critical Care time was 30 minutes, excluding separately reportable procedures. There was a high probability of clinically significant/life threatening deterioration in the patient's condition which required my urgent intervention. This time was spent reviewing the patient chart, interpreting diagnostic/laboratory data, arranging transfer for neurosurgical evaluation, speaking with consulting providers      During the patient's ED course, the patient was given:  Medications   iopamidol (ISOVUE-370) 76 % injection 75 mL (75 mLs IntraVENous Given 6/7/22 5191)        CLINICAL IMPRESSION  1. Frequent falls    2. Weakness of both lower extremities    3. Physical debility    4. Spinal stenosis of lumbosacral region        DISPOSITION  Transfer      This chart was created using [address] [facility].   Efforts were made by me to ensure accuracy, however some errors may be present due to limitations of this technology.             Soniya Barragan MD  06/07/22 4468

## 2022-06-07 NOTE — PLAN OF CARE
Neurosurgery Plan of Care    69 y/o w/ hx of back pain currently being worked up by Dr. Isatu Matt [place] acutely developed bilateral hip flexor weakness. CT myelogram from yesterday reveals severe foraminal stenosis bilaterally at L2/3 which may be contributing to symptoms. Recommend transfer to [facility] for neurosurgical evaluation.      Cora Bryan MD  Neurosurgery  [facility]  [phone]

## 2022-06-08 ENCOUNTER — APPOINTMENT (OUTPATIENT)
Dept: GENERAL RADIOLOGY | Age: 72
DRG: 552 | End: 2022-06-08
Attending: INTERNAL MEDICINE
Payer: MEDICARE

## 2022-06-08 ENCOUNTER — APPOINTMENT (OUTPATIENT)
Dept: CT IMAGING | Age: 72
DRG: 552 | End: 2022-06-08
Attending: INTERNAL MEDICINE
Payer: MEDICARE

## 2022-06-08 ENCOUNTER — HOSPITAL ENCOUNTER (INPATIENT)
Age: 72
LOS: 5 days | Discharge: NURSING, REHAB OR LONG-TERM CARE | DRG: 552 | End: 2022-06-13
Attending: INTERNAL MEDICINE | Admitting: INTERNAL MEDICINE
Payer: MEDICARE

## 2022-06-08 DIAGNOSIS — M54.50 ACUTE LOW BACK PAIN, UNSPECIFIED BACK PAIN LATERALITY, UNSPECIFIED WHETHER SCIATICA PRESENT: Primary | ICD-10-CM

## 2022-06-08 PROBLEM — R29.898 WEAKNESS OF BOTH LOWER EXTREMITIES: Status: RESOLVED | Noted: 2022-01-01 | Resolved: 2022-01-01

## 2022-06-08 PROBLEM — R29.898 BILATERAL LEG WEAKNESS: Status: ACTIVE | Noted: 2022-06-08

## 2022-06-08 PROBLEM — R29.898 WEAKNESS OF BOTH LOWER EXTREMITIES: Status: ACTIVE | Noted: 2022-06-08

## 2022-06-08 LAB
GLUCOSE BLD-MCNC: 128 MG/DL (ref 70–99)
GLUCOSE BLD-MCNC: 133 MG/DL (ref 70–99)
GLUCOSE BLD-MCNC: 142 MG/DL (ref 70–99)
GLUCOSE BLD-MCNC: 162 MG/DL (ref 70–99)
GLUCOSE BLD-MCNC: 190 MG/DL (ref 70–99)
PERFORMED ON: ABNORMAL

## 2022-06-08 PROCEDURE — 6360000002 HC RX W HCPCS: Performed by: INTERNAL MEDICINE

## 2022-06-08 PROCEDURE — 70450 CT HEAD/BRAIN W/O DYE: CPT

## 2022-06-08 PROCEDURE — 6370000000 HC RX 637 (ALT 250 FOR IP): Performed by: INTERNAL MEDICINE

## 2022-06-08 PROCEDURE — 72132 CT LUMBAR SPINE W/DYE: CPT

## 2022-06-08 PROCEDURE — 72129 CT CHEST SPINE W/DYE: CPT

## 2022-06-08 PROCEDURE — 1200000000 HC SEMI PRIVATE

## 2022-06-08 PROCEDURE — 6360000004 HC RX CONTRAST MEDICATION: Performed by: NURSE PRACTITIONER

## 2022-06-08 PROCEDURE — 72126 CT NECK SPINE W/DYE: CPT

## 2022-06-08 PROCEDURE — 2500000003 HC RX 250 WO HCPCS: Performed by: NURSE PRACTITIONER

## 2022-06-08 PROCEDURE — 72270 MYELOGPHY 2/> SPINE REGIONS: CPT

## 2022-06-08 PROCEDURE — 2580000003 HC RX 258: Performed by: INTERNAL MEDICINE

## 2022-06-08 RX ORDER — TRAZODONE HYDROCHLORIDE 100 MG/1
200 TABLET ORAL NIGHTLY
COMMUNITY

## 2022-06-08 RX ORDER — METFORMIN HYDROCHLORIDE 500 MG/1
1000 TABLET, EXTENDED RELEASE ORAL 2 TIMES DAILY WITH MEALS
COMMUNITY

## 2022-06-08 RX ORDER — LAMOTRIGINE 100 MG/1
50 TABLET ORAL DAILY
Status: DISCONTINUED | OUTPATIENT
Start: 2022-06-09 | End: 2022-06-12

## 2022-06-08 RX ORDER — LIDOCAINE HYDROCHLORIDE 10 MG/ML
5 INJECTION, SOLUTION EPIDURAL; INFILTRATION; INTRACAUDAL; PERINEURAL ONCE
Status: COMPLETED | OUTPATIENT
Start: 2022-06-08 | End: 2022-06-08

## 2022-06-08 RX ORDER — ACETAMINOPHEN 500 MG
1000 TABLET ORAL NIGHTLY PRN
COMMUNITY

## 2022-06-08 RX ORDER — SODIUM CHLORIDE 0.9 % (FLUSH) 0.9 %
5-40 SYRINGE (ML) INJECTION EVERY 12 HOURS SCHEDULED
Status: DISCONTINUED | OUTPATIENT
Start: 2022-06-08 | End: 2022-06-13 | Stop reason: HOSPADM

## 2022-06-08 RX ORDER — LAMOTRIGINE 25 MG/1
25 TABLET ORAL NIGHTLY
Status: DISCONTINUED | OUTPATIENT
Start: 2022-06-08 | End: 2022-06-08

## 2022-06-08 RX ORDER — LOSARTAN POTASSIUM 25 MG/1
50 TABLET ORAL DAILY
Status: DISCONTINUED | OUTPATIENT
Start: 2022-06-08 | End: 2022-06-08

## 2022-06-08 RX ORDER — INSULIN LISPRO 100 [IU]/ML
0-3 INJECTION, SOLUTION INTRAVENOUS; SUBCUTANEOUS NIGHTLY
Status: DISCONTINUED | OUTPATIENT
Start: 2022-06-08 | End: 2022-06-11

## 2022-06-08 RX ORDER — LITHIUM CARBONATE 300 MG/1
300 CAPSULE ORAL DAILY
COMMUNITY

## 2022-06-08 RX ORDER — TRAZODONE HYDROCHLORIDE 100 MG/1
200 TABLET ORAL 2 TIMES DAILY
Status: DISCONTINUED | OUTPATIENT
Start: 2022-06-08 | End: 2022-06-10

## 2022-06-08 RX ORDER — ONDANSETRON 4 MG/1
4 TABLET, ORALLY DISINTEGRATING ORAL EVERY 8 HOURS PRN
Status: DISCONTINUED | OUTPATIENT
Start: 2022-06-08 | End: 2022-06-13 | Stop reason: HOSPADM

## 2022-06-08 RX ORDER — LITHIUM CARBONATE 300 MG/1
600 CAPSULE ORAL NIGHTLY
COMMUNITY

## 2022-06-08 RX ORDER — LAMOTRIGINE 25 MG/1
50 TABLET ORAL NIGHTLY
COMMUNITY

## 2022-06-08 RX ORDER — INSULIN LISPRO 100 [IU]/ML
0-6 INJECTION, SOLUTION INTRAVENOUS; SUBCUTANEOUS
Status: DISCONTINUED | OUTPATIENT
Start: 2022-06-08 | End: 2022-06-11

## 2022-06-08 RX ORDER — MORPHINE SULFATE 2 MG/ML
2 INJECTION, SOLUTION INTRAMUSCULAR; INTRAVENOUS
Status: DISCONTINUED | OUTPATIENT
Start: 2022-06-08 | End: 2022-06-08

## 2022-06-08 RX ORDER — LAMOTRIGINE 25 MG/1
25 TABLET ORAL DAILY
COMMUNITY

## 2022-06-08 RX ORDER — MORPHINE SULFATE 2 MG/ML
2 INJECTION, SOLUTION INTRAMUSCULAR; INTRAVENOUS EVERY 4 HOURS PRN
Status: DISCONTINUED | OUTPATIENT
Start: 2022-06-08 | End: 2022-06-09

## 2022-06-08 RX ORDER — TAMSULOSIN HYDROCHLORIDE 0.4 MG/1
0.4 CAPSULE ORAL DAILY
Status: DISCONTINUED | OUTPATIENT
Start: 2022-06-08 | End: 2022-06-08

## 2022-06-08 RX ORDER — DEXTROSE MONOHYDRATE 50 MG/ML
100 INJECTION, SOLUTION INTRAVENOUS PRN
Status: DISCONTINUED | OUTPATIENT
Start: 2022-06-08 | End: 2022-06-13 | Stop reason: HOSPADM

## 2022-06-08 RX ORDER — SODIUM CHLORIDE 9 MG/ML
INJECTION, SOLUTION INTRAVENOUS PRN
Status: DISCONTINUED | OUTPATIENT
Start: 2022-06-08 | End: 2022-06-13 | Stop reason: HOSPADM

## 2022-06-08 RX ORDER — LITHIUM CARBONATE 300 MG/1
300 CAPSULE ORAL
Status: DISCONTINUED | OUTPATIENT
Start: 2022-06-08 | End: 2022-06-08

## 2022-06-08 RX ORDER — ACETAMINOPHEN 650 MG/1
650 SUPPOSITORY RECTAL EVERY 6 HOURS PRN
Status: DISCONTINUED | OUTPATIENT
Start: 2022-06-08 | End: 2022-06-10

## 2022-06-08 RX ORDER — ONDANSETRON 2 MG/ML
4 INJECTION INTRAMUSCULAR; INTRAVENOUS EVERY 6 HOURS PRN
Status: DISCONTINUED | OUTPATIENT
Start: 2022-06-08 | End: 2022-06-13 | Stop reason: HOSPADM

## 2022-06-08 RX ORDER — SODIUM CHLORIDE 0.9 % (FLUSH) 0.9 %
5-40 SYRINGE (ML) INJECTION PRN
Status: DISCONTINUED | OUTPATIENT
Start: 2022-06-08 | End: 2022-06-13 | Stop reason: HOSPADM

## 2022-06-08 RX ORDER — MORPHINE SULFATE 2 MG/ML
4 INJECTION, SOLUTION INTRAMUSCULAR; INTRAVENOUS EVERY 4 HOURS PRN
Status: DISCONTINUED | OUTPATIENT
Start: 2022-06-08 | End: 2022-06-09

## 2022-06-08 RX ORDER — GABAPENTIN 300 MG/1
300 CAPSULE ORAL 3 TIMES DAILY
Status: DISCONTINUED | OUTPATIENT
Start: 2022-06-08 | End: 2022-06-10

## 2022-06-08 RX ORDER — LAMOTRIGINE 25 MG/1
25 TABLET ORAL NIGHTLY
Status: DISCONTINUED | OUTPATIENT
Start: 2022-06-08 | End: 2022-06-12

## 2022-06-08 RX ORDER — LITHIUM CARBONATE 300 MG/1
600 CAPSULE ORAL NIGHTLY
Status: DISCONTINUED | OUTPATIENT
Start: 2022-06-08 | End: 2022-06-13 | Stop reason: HOSPADM

## 2022-06-08 RX ORDER — POLYETHYLENE GLYCOL 3350 17 G/17G
17 POWDER, FOR SOLUTION ORAL DAILY PRN
Status: DISCONTINUED | OUTPATIENT
Start: 2022-06-08 | End: 2022-06-13 | Stop reason: HOSPADM

## 2022-06-08 RX ORDER — ROSUVASTATIN CALCIUM 20 MG/1
20 TABLET, COATED ORAL NIGHTLY
Status: DISCONTINUED | OUTPATIENT
Start: 2022-06-08 | End: 2022-06-13 | Stop reason: HOSPADM

## 2022-06-08 RX ORDER — ACETAMINOPHEN 325 MG/1
650 TABLET ORAL EVERY 6 HOURS PRN
Status: DISCONTINUED | OUTPATIENT
Start: 2022-06-08 | End: 2022-06-10

## 2022-06-08 RX ORDER — LITHIUM CARBONATE 300 MG/1
300 CAPSULE ORAL DAILY
Status: DISCONTINUED | OUTPATIENT
Start: 2022-06-09 | End: 2022-06-13 | Stop reason: HOSPADM

## 2022-06-08 RX ADMIN — LOSARTAN POTASSIUM 50 MG: 25 TABLET, FILM COATED ORAL at 08:50

## 2022-06-08 RX ADMIN — ROSUVASTATIN CALCIUM 20 MG: 20 TABLET, FILM COATED ORAL at 20:49

## 2022-06-08 RX ADMIN — TRAZODONE HYDROCHLORIDE 200 MG: 100 TABLET ORAL at 20:49

## 2022-06-08 RX ADMIN — MORPHINE SULFATE 4 MG: 2 INJECTION, SOLUTION INTRAMUSCULAR; INTRAVENOUS at 16:35

## 2022-06-08 RX ADMIN — IOHEXOL 10 ML: 240 INJECTION, SOLUTION INTRATHECAL; INTRAVASCULAR; INTRAVENOUS; ORAL at 12:47

## 2022-06-08 RX ADMIN — LAMOTRIGINE 25 MG: 25 TABLET ORAL at 20:57

## 2022-06-08 RX ADMIN — INSULIN LISPRO 1 UNITS: 100 INJECTION, SOLUTION INTRAVENOUS; SUBCUTANEOUS at 16:53

## 2022-06-08 RX ADMIN — TAMSULOSIN HYDROCHLORIDE 0.4 MG: 0.4 CAPSULE ORAL at 08:50

## 2022-06-08 RX ADMIN — ACETAMINOPHEN 650 MG: 325 TABLET ORAL at 01:51

## 2022-06-08 RX ADMIN — MORPHINE SULFATE 2 MG: 2 INJECTION, SOLUTION INTRAMUSCULAR; INTRAVENOUS at 11:53

## 2022-06-08 RX ADMIN — SODIUM CHLORIDE, PRESERVATIVE FREE 10 ML: 5 INJECTION INTRAVENOUS at 08:50

## 2022-06-08 RX ADMIN — LIDOCAINE HYDROCHLORIDE 5 ML: 10 INJECTION, SOLUTION EPIDURAL; INFILTRATION; INTRACAUDAL; PERINEURAL at 12:49

## 2022-06-08 RX ADMIN — VERAPAMIL HYDROCHLORIDE 120 MG: 120 TABLET, FILM COATED, EXTENDED RELEASE ORAL at 20:49

## 2022-06-08 RX ADMIN — LITHIUM CARBONATE 600 MG: 300 CAPSULE, GELATIN COATED ORAL at 20:48

## 2022-06-08 RX ADMIN — LITHIUM CARBONATE 300 MG: 300 CAPSULE, GELATIN COATED ORAL at 16:53

## 2022-06-08 RX ADMIN — INSULIN LISPRO 1 UNITS: 100 INJECTION, SOLUTION INTRAVENOUS; SUBCUTANEOUS at 20:59

## 2022-06-08 RX ADMIN — SODIUM CHLORIDE, PRESERVATIVE FREE 10 ML: 5 INJECTION INTRAVENOUS at 20:51

## 2022-06-08 RX ADMIN — ROSUVASTATIN CALCIUM 20 MG: 20 TABLET, FILM COATED ORAL at 01:51

## 2022-06-08 RX ADMIN — GABAPENTIN 300 MG: 300 CAPSULE ORAL at 20:49

## 2022-06-08 RX ADMIN — MORPHINE SULFATE 4 MG: 2 INJECTION, SOLUTION INTRAMUSCULAR; INTRAVENOUS at 20:51

## 2022-06-08 RX ADMIN — VERAPAMIL HYDROCHLORIDE 120 MG: 120 TABLET, FILM COATED, EXTENDED RELEASE ORAL at 01:51

## 2022-06-08 ASSESSMENT — PAIN SCALES - GENERAL
PAINLEVEL_OUTOF10: 9
PAINLEVEL_OUTOF10: 7
PAINLEVEL_OUTOF10: 9

## 2022-06-08 ASSESSMENT — PAIN - FUNCTIONAL ASSESSMENT: PAIN_FUNCTIONAL_ASSESSMENT: PREVENTS OR INTERFERES SOME ACTIVE ACTIVITIES AND ADLS

## 2022-06-08 ASSESSMENT — LIFESTYLE VARIABLES: HOW OFTEN DO YOU HAVE A DRINK CONTAINING ALCOHOL: NEVER

## 2022-06-08 ASSESSMENT — PAIN DESCRIPTION - LOCATION
LOCATION: BACK

## 2022-06-08 ASSESSMENT — PAIN DESCRIPTION - DESCRIPTORS
DESCRIPTORS: DISCOMFORT
DESCRIPTORS: ACHING

## 2022-06-08 ASSESSMENT — PAIN DESCRIPTION - ORIENTATION
ORIENTATION: RIGHT;LEFT;LOWER
ORIENTATION: LOWER

## 2022-06-08 NOTE — PROGRESS NOTES
Bed in lowest position, bed alarm on, call light and overhead table within reach. Will continue to monitor.      Orientation: x4  Respiration: RA  Ambulation: bedrest   Skin: warm, dry, intact   Pain: 7/10

## 2022-06-08 NOTE — CONSULTS
NEUROSURGERY Antoine Wallace  [id number]   1950   6/8/2022    Requesting physician: Noe Galo MD    Reason for consultation: bilateral lower extremity weakness, progressive     History of present illness: Patient is a 70 y.o. male w/ PMH of bipolar disorder, DM II, glaucoma, IBS, obesity, incontinence, colitis and multiple spinal surgeries who presented on 6/8/2022 with back pain, progressive leg weakness over 48 hours and multiple falls. Patient has had cervical fusion in 1988 with Dr. Krystyna Howe, an L4-5 bilateral decompression in 2011 with Dr. Suyz Santillan, L2-4 lumbar laminectomy with Dr. Delmon Kussmaul (timing unknown, wife things about 2 years ago), SCS placement with Dr. Deann Bean in 2019 and Xstop device placed with Dr. Waqas Chadwick in 2021. Patient was currently following with Dr. Fauzia Kidd with [facility] neurosurgery. He saw him on 5/27 for progressive back and leg pain, at this time a CT myelogram of his lumbar spine was ordered. Patient unable to have MRIs due to SCS. CT myelogram was done on 6/6. CT myelogram of lumbar spine demonstrated Severe left and moderate right neural foraminal narrowing at L4-5 and severe right foraminal stenosis at L2-3. The evening of 6/6 patient became unable to stand due to worsening leg weakness. Prior to that, he ambulated short distances with a walker. He has chronic back pain that radiates to buttocks and anterior thighs that he feels is unchanged, pain did not worsen when weakness did. In approx 36 hours patient had 3 falls which required EMS assistance, he was then taken to ED for evaluation. Patient has chronic urinary incontinence, had this for over 3 years and is unchanged. He reports no change in sensation in upper or lower extremities. He has weakness in BLE. He has bilateral upper extremity tremors. Wife is unsure how long these have been here but they are not new. His mom had Parkinson's disease.  Wife and patient both report he has difficulty with writing which is fairly new, and he does drop thing frequently. Neurosurgery has been consulted for further recommendations     ROS:   GENERAL:  Denies fever or recent illness. Denies weight changes   EYES:  Denies vision change or diplopia  EARS:  Denies hearing loss  CARDIAC:  Denies chest pain  RESPIRATORY:  Denies shortness of breath  SKIN:  Denies rash or lesions   HEM:  Denies excessive bruising  PSYCH:  Denies anxiety or depression  NEURO:  + weakness   :  + incontinence   GI: Denies nausea, vomiting, diarrhea or constipation  MUSCULOSKELETAL:  + chronic back and leg pain     Allergies   Allergen Reactions    No Known Allergies        Past Medical History:   Diagnosis Date    Anxiety     Arthritis     Asthma     [facility]     Colitis     Depression     Diabetes ([facility]     Diabetes mellitus ([facility]     Encounter for imaging to screen for metal prior to MRI 06/08/2022    NOT MRI conditional [employer] model#SC-[id number], Lead: [id number] implanted 9/9/19 by [employer] at [facility] [place]. Lead is unsafe for MRI. Patient unable to have any MRI ever.     Glaucoma     H/O bladder problems     High blood pressure     History of kidney problems     IBS (irritable bowel syndrome)     Liver disease     hx of elevated LFT's    Obesity     Psychiatric problem         Past Surgical History:   Procedure Laterality Date    APPENDECTOMY      BACK SURGERY      CERVICAL FUSION      COLONOSCOPY      EYE SURGERY      HIP SURGERY      JOINT REPLACEMENT Right     THR    OTHER SURGICAL HISTORY  06/06/2018     right L4-5 hemilaminotomy, partial facetectomy, foraminotomy, reexploration and excision of synovial csyt,  Bilateral L2-3 and L3-4 hemilaminotomy, partial facetectomy, foraminotomy    DC NERVOUS SYSTEM SURGERY UNLISTED Bilateral 11/12/2018    BILATERAL LUMBAR THREE, LUMBAR FOUR, LUMBAR FIVE RADIOFREQUENCY ABLATION SITE CONFIRMED BY FLUOROSCOPY performed by Elba Peter Juli Maurer MD at [facility] DX/THER SBST INTRLMNR CRV/THRC W/IMG GDN Bilateral 9/25/2018    BILATERAL LUMBAR THREE, LUMBAR FOUR, LUMBAR FIVE MEDIAL BRANCH BLOCK SITE CONFIRMED BY FLUOROSCOPY performed by Gia Stone MD at [facility] DX/THER SBST INTRLMNR CRV/THRC W/IMG GDN Bilateral 10/16/2018    BILATERAL LUMBAR THREE, FOUR, FIVE MEDIAL BRANCH BLOCK SITE CONFIRMED BY FLUOROSCOPY performed by Gia Stone MD at [address] TURP N/A 6/9/2020    CYSTOSCOPY, TRANSURETHRAL RESECTION OF PROSTATE performed by Amy Fermin MD at [facility]. Not on file   Tobacco Use    Smoking status: Never Smoker    Smokeless tobacco: Never Used   Vaping Use    Vaping Use: Never used   Substance and Sexual Activity    Alcohol use: No    Drug use: No    Sexual activity: Not on file        Family History   Problem Relation Age of Onset    Alcohol Abuse Sister     Coronary Art Dis Mother     Obesity Mother     Osteoarthritis Mother     Parkinsonism Mother     Coronary Art Dis Father     Obesity Father         No outpatient medications have been marked as taking for the 6/8/22 encounter [facility] Encounter).       Current Facility-Administered Medications   Medication Dose Route Frequency Provider Last Rate Last Admin    [Held by provider] gabapentin (NEURONTIN) capsule 300 mg  300 mg Oral TID Liv Castillo MD        [Held by provider] lamoTRIgine (LAMICTAL) tablet 25 mg  25 mg Oral Nightly Liv Castillo MD        [Held by provider] lithium capsule 300 mg  300 mg Oral TID  Lena Askew MD        losartan (COZAAR) tablet 50 mg  50 mg Oral Daily Liv Castillo MD   50 mg at 06/08/22 0850    rosuvastatin (CRESTOR) tablet 20 mg  20 mg Oral Nightly Liv Castillo MD   20 mg at 06/08/22 0151    tamsulosin (FLOMAX) capsule 0.4 mg  0.4 mg Oral Daily Liv Castillo MD   0.4 mg at 06/08/22 0850    verapamil (CALAN SR) extended release tablet 120 mg  120 mg Oral Nightly Antwon Holland MD   120 mg at 06/08/22 0151    glucose chewable tablet 16 g  4 tablet Oral PRN Antwon Holland MD        dextrose bolus 10% 125 mL  125 mL IntraVENous PRN Antwon Holland MD        Or    dextrose bolus 10% 250 mL  250 mL IntraVENous PRN Antwon Holland MD        glucagon (rDNA) injection 1 mg  1 mg IntraMUSCular PRN Antwon Holland MD        dextrose 5 % solution  100 mL/hr IntraVENous PRN Antwon Holland MD        sodium chloride flush 0.9 % injection 5-40 mL  5-40 mL IntraVENous 2 times per day Antwon Holland MD   10 mL at 06/08/22 0850    sodium chloride flush 0.9 % injection 5-40 mL  5-40 mL IntraVENous PRN Antwon Holland MD        0.9 % sodium chloride infusion   IntraVENous PRN Antwon Holland MD        ondansetron (ZOFRAN-ODT) disintegrating tablet 4 mg  4 mg Oral Q8H PRN Antwon Holland MD        Or    ondansetron (ZOFRAN) injection 4 mg  4 mg IntraVENous Q6H PRN Antwon Holland MD        polyethylene glycol (GLYCOLAX) packet 17 g  17 g Oral Daily PRN Antwon Holland MD        acetaminophen (TYLENOL) tablet 650 mg  650 mg Oral Q6H PRN Antwon Holland MD   650 mg at 06/08/22 0151    Or    acetaminophen (TYLENOL) suppository 650 mg  650 mg Rectal Q6H PRN Antwon Holland MD        insulin lispro (1 Unit Dial) 0-6 Units  0-6 Units SubCUTAneous TID  Lena Vyas MD        insulin lispro (1 Unit Dial) 0-3 Units  0-3 Units SubCUTAneous Nightly Antwon Holland MD          Objective:  BP (!) 150/81   Pulse 64   Temp 97.7 °F (36.5 °C) (Oral)   Resp 16   Ht 6' 3\" (1.905 m)   Wt 232 lb 5.8 oz (105.4 kg)   SpO2 94%   BMI 29.04 kg/m²     Physical Exam:  Patient seen and examined   General: Alert and cooperative in no acute distress.      HENT: atraumatic, neck supple  Eyes: Optic discs: Not tested  Pulmonary: unlabored respiratory effort  Cardiovascular:  Warm well perfused. No peripheral edema  Gastrointestinal: abdomen soft, NT, ND    Neurologic Exam:  Neurological:  Mental Status: Awake, alert, oriented x 4  Attention: Intact  Language: No aphasia or dysarthria noted  Sensation: Intact to all extremities to light touch  Coordination: Intact    DTRs:    Right  Left    ankle clonus  - -   toes (babinski)  down down     Cranial Nerves:  Cranial Nerves:  II: Visual acuity not tested, denies new visual changes / diplopia  III, IV, VI: PERRL, 3 mm bilaterally, EOMI, no nystagmus noted  V: Facial sensation intact bilaterally to touch  VII: Face symmetric  VIII: Hearing intact bilaterally to spoken voice  IX: Palate movement equal bilaterally  XI: Shoulder shrug equal bilaterally  XII: Tongue midline    Musculoskeletal:   Gait: Not tested   Assist devices: None   Tone: normal   Motor strength:    Right  Left    Right  Left    Deltoid  4 4  Hip Flex  2 2   Biceps  4 4  Knee Extensors  3 3   Triceps  4 4  Knee Flexors  3 3   Wrist Ext  4 4  Ankle Dorsiflex. 2 3   Wrist Flex  4 4  Ankle Plantarflex. 3 3   Handgrip  4 4  Ext Sae Longus  3 3   Thumb Ext  4 4         Bilateral tremors in hands, left worse than right. Radiological Findings:  CT LUMBAR SPINE W CONTRAST (CT MYELOGRAM LUMBAR SPINE)  6/6/2021   Impression 1. Sequelae of interspinous fusion at L2-3. 2. Mild spinal canal stenosis, moderate right and mild left neural foraminal narrowing at L3-4 secondary to a disc bulge, facet arthropathy and thickening of the ligamentum flavum, not significantly changed from the prior exam from 06/16/2021, allowing for differences in technique. 3. Severe left and moderate right neural foraminal narrowing at L4-5 secondary to a disc bulge and facet arthropathy. 4. Severe right neural foraminal narrowing at L2-3 secondary to a disc bulge and facet arthropathy.      CT lumbar spine w wo contrast  6/7/22 1451  Impression Residual intrathecal contrast.   Degenerative disc disease as described above, exacerbating congenitally narrow cervical spinal canal.   Spinal canal narrowing, mild-to-moderate at L3-4, mild at L2-3. Foraminal narrowing, moderate to severe at bilateral L4-5, moderate at right L2-3, mild to moderate at right L3-4 and left L5-S1. Labs  Recent Labs     06/07/22  1337         CO2 25   BUN 15   CREATININE 0.9   GLUCOSE 178*     Recent Labs     06/06/22  0925 06/07/22  1337   WBC 7.0 7.0   RBC 4.33 4.48   INR 1.09 1.09         Patient Active Problem List    Diagnosis Date Noted    Bilateral leg weakness 06/08/2022    Constipation due to opioid therapy 07/14/2020    Sepsis ([facility] 07/12/2020    Acute epididymo-orchitis 07/12/2020    Bipolar 1 disorder ([facility]     Asthma     Pyelonephritis 07/11/2020    S/P TURP 87/21/9585    Metabolic encephalopathy 57/67/6435    Type 2 diabetes mellitus ([facility] 07/11/2020    Essential hypertension 07/11/2020    Prostate hyperplasia with urinary obstruction 07/11/2020    Urinary retention 06/09/2020    Degeneration of lumbar or lumbosacral intervertebral disc 12/10/2018    Lumbosacral spondylosis without myelopathy 12/10/2018    Displacement of lumbar intervertebral disc without myelopathy 12/10/2018    Spinal stenosis 06/06/2018    Enlarged lymph nodes 11/25/2014       Assessment:  69 yo male admitted with chronic back pain and acute onset progressive bilateral lower extremity weakness with frequent falls. Plan:  1. No emergent neurosurgical intervention    2. q 4 hour neuro checks  3. Unable to have MRIs due to [address] stimulator, lead length is not MRI conditional   4. CT head wo contrast pending  5. CT myelogram of cervical/thoracic spine pending  6. Neurology consult  7. Activity/diet/pain control per primary team  8. Thank you for consult, will follow. Please call with questions. FRANCISCO JAVIER Shook-CNP  Neurosurgery Nurse Practitioner  [phone]    Patient was seen and examined with Dr. Osbaldo Tiwari who agrees with above assessment and plan. Electronically signed by:  FRANCISCO JAVIER Villasenor NP, 6/8/2022 10:38 AM

## 2022-06-08 NOTE — PROGRESS NOTES
Patient unable to have any MRI ever. [employer] stimulator is unsafe-- lead length is not MRI conditional.     CK Pickard made aware, MRIs cancelled. See history for further details. Call MRI at [phone] with any questions.

## 2022-06-08 NOTE — CONSULTS
Placed call to [facility] to initiate transfer to [facility] @ 1954  RE: Transfer to [facility].  Need the hospitalist at [facility] per [place], [place]  Dr. Gama Bragg accepted pt to [facility] @ [address] @ 2125 to initiate transfer to [facility]  Dr. Ashutosh Dubois accepted pt  Bed assignment and ETA given @ [phone]

## 2022-06-08 NOTE — CARE COORDINATION
Social Work: Discussed in Interdisciplinary Rounds:    SW following, pt transferred from [facility]. SW at previous facility completed initial assessment, pt from home with wife, was independent prior to IP stay, no [facility] or [facility] indicated. Pt is unable to ambulate, interested in SNF placement for rehab. Per chart review, [facility] and [facility] SW re-sent referrals as it did not show in [address] [place] [address] Rd. Will need updated therapy notes, precert needed for placement. SW to follow. 1030: YEE rec'd call from Derrek Vanegas in admissions at [facility] location no beds but [facility] location has open beds with private room and bed. SW to update pt/family, will follow up.    1500: YEE met with Nikhil Fragosomee ([phone]) pt's wife at bedside, updated her on SNF placements including [facility] with private room and bath. Wife would prefer [facility] location and also the [facility]. She reports the pt was previously at [facility] but she would prefer the pt to not go back there unless absolute last resort. SW voiced understanding, will continue to follow.     Delores Sanabria, MSW  [phone]

## 2022-06-08 NOTE — H&P
Hospital Medicine History & Physical      PCP: No primary care provider on file. Date of Admission: 6/8/2022    Date of Service: Pt seen/examined on 6/8/2022 and Admitted to Inpatient with expected LOS greater than two midnights due to medical therapy. Chief Complaint: Worsening bilateral lower extremity weakness and frequent falls over the past 48 hours      History Of Present Illness:     70 y.o. male who presents from [facility] for worsening back pain, progressive bilateral lower extremity weakness and frequent falls. Neurosurgery was consulted and recommended transfer to [address] [place] [address]. Patient has history of low back pain due to known lumbar spinal stenosis, has had 3 prior back surgeries. Patient states that he is expecting his fourth. Patient has been following with [facility] neurosurgery. His neurosurgeon who has done previous surgeries [place]. Patient presents with progressive pain and worsening bilateral weakness with associated paresthesias. Patient usually ambulates using a walker. Unable to use a walker due to the worsening bilateral leg weakness and associated paresthesia. Patient has fallen twice yesterday and once on Monday due to the weakness. Patient has had CT myelogram on 6/6 which is showing severe foraminal stenosis bilaterally at L2/3 which may be contributing to his symptoms according to neurosurgery.       Past Medical History:          Diagnosis Date    Anxiety     Arthritis     Asthma     Bipolar 1 disorder ([address]     Colitis     Depression     Diabetes ([address]     Diabetes mellitus [facility]     Glaucoma     H/O bladder problems     High blood pressure     History of kidney problems     IBS (irritable bowel syndrome)     Liver disease     hx of elevated LFT's    Obesity     Psychiatric problem        Past Surgical History:          Procedure Laterality Date    APPENDECTOMY      BACK SURGERY      CERVICAL FUSION      COLONOSCOPY      EYE SURGERY      HIP SURGERY      JOINT REPLACEMENT Right     THR    OTHER SURGICAL HISTORY  06/06/2018     right L4-5 hemilaminotomy, partial facetectomy, foraminotomy, reexploration and excision of synovial csyt,  Bilateral L2-3 and L3-4 hemilaminotomy, partial facetectomy, foraminotomy    ID NERVOUS SYSTEM SURGERY UNLISTED Bilateral 11/12/2018    BILATERAL LUMBAR THREE, LUMBAR FOUR, LUMBAR FIVE RADIOFREQUENCY ABLATION SITE CONFIRMED BY FLUOROSCOPY performed by Hilario Li MD at [facility] DX/THER [facility] INTRLMNR CRV/THRC W/IMG GDN Bilateral 9/25/2018    BILATERAL LUMBAR THREE, LUMBAR FOUR, LUMBAR FIVE MEDIAL BRANCH BLOCK SITE CONFIRMED BY FLUOROSCOPY performed by Hilario Li MD at [facility] DX/THER [facility] INTRNR CRV/THRC W/IMG GDN Bilateral 10/16/2018    BILATERAL LUMBAR THREE, FOUR, FIVE MEDIAL BRANCH BLOCK SITE CONFIRMED BY FLUOROSCOPY performed by Hilario Li MD at [address] TURP N/A 6/9/2020    CYSTOSCOPY, TRANSURETHRAL RESECTION OF PROSTATE performed by Lashanda Arredondo MD at [place] [address]       Medications Prior to Admission:      Prior to Admission medications    Medication Sig Start Date End Date Taking? Authorizing Provider   gabapentin (NEURONTIN) 300 MG capsule Take 300 mg by mouth 3 times daily.     Historical Provider, MD   Dulaglutide (TRULICITY SC) Inject into the skin once a week    Historical Provider, MD   glipiZIDE (GLUCOTROL) 5 mg tablet Take 5 mg by mouth daily    Historical Provider, MD   apixaban (ELIQUIS) 5 MG TABS tablet Take by mouth 2 times daily    Historical Provider, MD   rosuvastatin (CRESTOR) 20 MG tablet Take 20 mg by mouth daily    Historical Provider, MD   lithium 300 MG capsule 3 times daily (with meals)  5/4/20   Historical Provider, MD   lamoTRIgine (LAMICTAL) 25 MG tablet  6/3/20   Historical Provider, MD   tamsulosin (FLOMAX) 0.4 MG capsule  6/1/20   Historical Provider, MD   oxybutynin (DITROPAN) 5 MG tablet TK 1 T PO  BID 5/4/20   Historical Provider, MD   losartan (COZAAR) 50 MG tablet Take by mouth    Historical Provider, MD   Insulin Degludec 100 UNIT/ML SOPN Inject 10 Units into the skin nightly 10/31/19   Historical Provider, MD   verapamil (CALAN SR) 120 MG extended release tablet Take 120 mg by mouth nightly    Historical Provider, MD   glimepiride (AMARYL) 2 MG tablet Take 2 mg by mouth every morning (before breakfast)    Historical Provider, MD   metFORMIN (GLUCOPHAGE) 500 MG tablet Take 1,000 mg by mouth 2 times daily (with meals)     Historical Provider, MD   traZODone (DESYREL) 100 MG tablet Take 300 mg by mouth nightly     Historical Provider, MD   doxazosin (CARDURA) 2 MG tablet Take 2 mg by mouth Takes 3 times weekly; takes at night    Historical Provider, MD       Allergies:  No known allergies    Social History:      TOBACCO:   reports that he has never smoked. He has never used smokeless tobacco.  ETOH:   reports no history of alcohol use. E-Cigarettes/Vaping Use     Questions Responses    E-Cigarette/Vaping Use Never User    Start Date     Passive Exposure     Quit Date     Counseling Given     Comments         Family History:    Reviewed in detail and negative for DM, CAD, Cancer, CVA. Positive as follows:        Problem Relation Age of Onset    Alcohol Abuse Sister     Coronary Art Dis Mother     Obesity Mother     Osteoarthritis Mother     Parkinsonism Mother     Coronary Art Dis Father     Obesity Father        REVIEW OF SYSTEMS COMPLETED:   Pertinent positives as noted in the HPI. All other systems reviewed and negative. PHYSICAL EXAM PERFORMED:    BP (!) 163/90   Pulse 61   Temp 97.9 °F (36.6 °C) (Oral)   Resp 16   Ht 6' 3\" (1.905 m)   Wt 232 lb 5.8 oz (105.4 kg)   SpO2 98%   BMI 29.04 kg/m²     General appearance:  No apparent distress, appears stated age and cooperative. Obese. HEENT:  Normal cephalic, atraumatic without obvious deformity.  Pupils equal, round, and reactive to light. Extra ocular muscles intact. Conjunctivae/corneas clear. Neck: Supple, with full range of motion. No jugular venous distention. Trachea midline. Respiratory:  Normal respiratory effort. Clear to auscultation, bilaterally without Rales/Wheezes/Rhonchi. Cardiovascular:  Regular rate and rhythm with normal S1/S2 without murmurs, rubs or gallops. Abdomen: Soft, non-tender, non-distended with normal bowel sounds. Musculoskeletal:  No clubbing, cyanosis or edema bilaterally. Full range of motion without deformity. Skin: Skin color, texture, turgor normal.  No rashes or lesions. Neurologic:  Cranial nerves: II-XII intact, motor 1-2/5 in bilateral lower extremities, decreased sensation to touch/pain, no saddle anesthesia, no incontinence-urinary or fecal (per ED nursing notes patient has soiled his depends with urine), DTR-knee/ankle unequivocal, plantar flexion and dorsiflexion-weak bilaterally, upper extremities-no sensory deficits, motor 5/5 bilaterally  Psychiatric:  Alert and oriented, thought content appropriate, normal insight  Capillary Refill: Brisk,3 seconds, normal  Peripheral Pulses: +2 palpable, equal bilaterally       Labs:     Recent Labs     06/06/22  0925 06/07/22  1337   WBC 7.0 7.0   HGB 13.9 14.3   HCT 41.2 42.6    239     Recent Labs     06/07/22  1337      K 4.5      CO2 25   BUN 15   CREATININE 0.9   CALCIUM 9.9     Recent Labs     06/07/22  1337   AST 36   ALT 31   BILITOT 0.4   ALKPHOS 87     Recent Labs     06/06/22  0925 06/07/22  1337   INR 1.09 1.09     No results for input(s): Redd Breeze in the last 72 hours.     Urinalysis:      Lab Results   Component Value Date    NITRU Negative 06/07/2022    WBCUA >100 07/11/2020    BACTERIA 4+ 07/11/2020    RBCUA see below 07/11/2020    BLOODU Negative 06/07/2022    SPECGRAV 1.010 06/07/2022    GLUCOSEU Negative 06/07/2022    GLUCOSEU NEGATIVE 10/13/2010       Radiology:     CXR: I have reviewed the CXR with the following interpretation: No acute cardiopulmonary disease  EKG:  I have reviewed the EKG with the following interpretation: NA    CT lumbar spine with and without contrast  Residual intrathecal contrast.       Degenerative disc disease as described above, exacerbating congenitally   narrow cervical spinal canal.       Spinal canal narrowing, mild-to-moderate at L3-4, mild at L2-3.       Foraminal narrowing, moderate to severe at bilateral L4-5, moderate at right   L2-3, mild to moderate at right L3-4 and left L5-S1.       ASSESSMENT:    Active Hospital Problems    Diagnosis Date Noted    Bilateral leg weakness [R29.898] 06/08/2022     Priority: Medium   #Acute on chronic bilateral lower extremity weakness-progressively worsening, with new onset bilateral LE paresthesia   CT myelogram from 6/6 severe foraminal stenosis bilaterally at L2/3   History of multiple back surgeries in the past  #Frequent falls over the past 48 hours due to above symptoms  #Chronic back pain, usually ambulates with a walker, currently unable to ambulate even with assistive device  #DM-2 with hyperglycemia  #Hypertension  #Bipolar disorder    PLAN:  -Neurochecks every 2 hourly until seen by neurosurgery  -No IV steroids per neurosurgery recommendations  -Keep n.p.o.  -Hold his home antidiabetic medications  -Start on low-dose SSI for now  -Continue his other home medications appropriately with sips of water  -IV antihypertensives as needed  -Fall precautions  -Supportive therapy  -Hold anticoagulation with Eliquis until seen by neurosurgery      DVT Prophylaxis: Eliquis held pending possible neurosurgical intervention in a.m. Diet: Diet NPO  Code Status: Full Code    PT/OT Eval Status: Bedrest, fall precautions    Dispo -GMF with telemetry       Odilia Rodriguez MD    Thank you No primary care provider on file. for the opportunity to be involved in this patient's care.  If you have any questions or concerns please feel free to contact me at [phone].

## 2022-06-08 NOTE — PROGRESS NOTES
Hospitalist Progress Note      PCP: No primary care provider on file. Date of Admission: 2022    Chief Complaint on Admission: back, leg pain, weakness    Pt Seen/Examined and Chart Reviewed. Admitting dx as above    SUBJECTIVE/OBJECTIVE:   Patient reports 9/10 back pain radiating to both legs, leg weakness  Also having headache from being NPO  Family is at bedside    Allergies  No known allergies    Medications      Scheduled Meds:   [Held by provider] gabapentin  300 mg Oral TID    [Held by provider] lamoTRIgine  25 mg Oral Nightly    [Held by provider] lithium  300 mg Oral TID WC    losartan  50 mg Oral Daily    rosuvastatin  20 mg Oral Nightly    tamsulosin  0.4 mg Oral Daily    verapamil  120 mg Oral Nightly    sodium chloride flush  5-40 mL IntraVENous 2 times per day    insulin lispro  0-6 Units SubCUTAneous TID WC    insulin lispro  0-3 Units SubCUTAneous Nightly       Infusions:   dextrose      sodium chloride         PRN Meds:  glucose, dextrose bolus **OR** dextrose bolus, glucagon (rDNA), dextrose, sodium chloride flush, sodium chloride, ondansetron **OR** ondansetron, polyethylene glycol, acetaminophen **OR** acetaminophen, morphine    Vitals    TEMPERATURE:  Current - Temp: 97.7 °F (36.5 °C); Max - Temp  Av °F (36.7 °C)  Min: 97.7 °F (36.5 °C)  Max: 98.3 °F (36.8 °C)  RESPIRATIONS RANGE: Resp  Av  Min: 16  Max: 16  PULSE RANGE: Pulse  Av.5  Min: 60  Max: 65  BLOOD PRESSURE RANGE:  Systolic (51KAR), ASP:346 , Min:107 , BYV:090   ; Diastolic (69XZH), UYL:98, Min:61, Max:90    PULSE OXIMETRY RANGE: SpO2  Av.9 %  Min: 94 %  Max: 98 %  24HR INTAKE/OUTPUT:      Intake/Output Summary (Last 24 hours) at 2022 1158  Last data filed at 2022 0230  Gross per 24 hour   Intake 30 ml   Output --   Net 30 ml       Exam:      General appearance: No apparent distress, appears stated age and cooperative.   Lungs: Clear to ascultation, bilaterally without Rales/Wheezes/Rhonchi with good respiratory effort. Heart: Regular rate and rhythm with Normal S1/S2 without  murmurs, rubs or gallops, point of maximum impulse non-displaced  Abdomen: Soft, non-tender or non-distended without rigidity or guarding and positive bowel sounds all four quadrants. Extremities: No clubbing, cyanosis, or edema bilaterally. Skin: Skin color, texture, turgor normal.    Neurologic: Alert and oriented X 3  Mental status: Alert, oriented, thought content appropriate. Data    Recent Labs     06/06/22  0925 06/07/22  1337   WBC 7.0 7.0   HGB 13.9 14.3   HCT 41.2 42.6    239      Recent Labs     06/07/22  1337      K 4.5      CO2 25   BUN 15   CREATININE 0.9     Recent Labs     06/07/22  1337   AST 36   ALT 31   BILITOT 0.4   ALKPHOS 87     Recent Labs     06/06/22  0925 06/07/22  1337   INR 1.09 1.09     No results for input(s): CKTOTAL, CKMB, CKMBINDEX, TROPONINI in the last 72 hours.     Consults:     IP CONSULT TO NEUROSURGERY    Active Hospital Problems    Diagnosis Date Noted    Bilateral leg weakness [R29.898] 06/08/2022     Priority: Medium         ASSESSMENT AND PLAN      Acute on chronic lower back pain with leg weakness, falls, leg pain:  Neurosurgery consulted  Patient is unable to get MRI  Plan for CT myelogram of entire neuraxis  Cont neurochecks  Pain control as needed    DM type 2, bipolar disorder, HTN:  Will ask pharmacy to reconcile home medications  Re order unless contraindicated    DVT Prophylaxis: SCD  Diet: Diet NPO  Code Status: Full Code    PT/OT Eval Status:pending neuro clearance    Dispo - inpt    Arnoldo Mccormick MD

## 2022-06-08 NOTE — PROGRESS NOTES
Reason for Admission: BLE weakness, inability to walk     Major Shift Events: Pt admitted at [address], external catheter initiated due to strict bedrest and increased pain when turning.      Systems Review   Neuro:    A&O: x4  Sedation/RASS   RASS: 0   Pain: 7/10    Cardiac   Rhythm: not on tele    Gtts: n/a    Pulmonary   O2 Modality: RA    GI/    Last BM:    NPO/PO/TF/TPN/rate(goal): NPO     Hematology   Blood Products:    VTE Prophylaxis/Anticoagulation:    H/H: 14.3/42.3 @ 6/7/22 on 6/7/22    Infectious Disease   Culture results/pending:    ABX:     Skin: warm, dry, intact     Lines/tubes: PIV 20 L AC     Mobility    PT/OT: bedrest

## 2022-06-08 NOTE — CONSULTS
Clinical Pharmacy Progress Note  Medication History     Admit Date: 6/8/2022    Pharmacy consulted to verify home medication list by Dr Joo De La Cruz. List of of current medications patient is taking is complete. Home Medication list in EPIC updated to reflect changes noted below. Source of information: patient interview with the help of his wife, Mayra Salcedo, on speaker phone, [facility], pharmacy fills (not accurate in surescripts)    Patient's home pharmacy: [facility]     Changes made to medication list:   Medications removed:    Doxazosin   Glimepiride 2mg daily   Tresiba 10 units QHS   Losartan 50mg daily   Tamsulosin 0.4mg daily   Oxybutynin  Medications added:    APAP prn  Medication doses adjusted:    Trulicity -- clarified dose to 3mg weekly   Lamictal -- changed to 50mg QAM and 25mg QHS   Lithium -- clarified to 300mg QAM and 600mg QHS   Metformin -- changed from IR formulation to the ER formulation   Trazodone -- clarified to 200mg BID  Other notes:    PS sent to Dr Joo De La Cruz. Please call with any questions.   Robel Malin PharmD., [facility]   6/8/2022 4:42 PM  Wireless: [phone]

## 2022-06-09 LAB
ANION GAP SERPL CALCULATED.3IONS-SCNC: 13 MMOL/L (ref 3–16)
BUN BLDV-MCNC: 20 MG/DL (ref 7–20)
CALCIUM SERPL-MCNC: 9.2 MG/DL (ref 8.3–10.6)
CHLORIDE BLD-SCNC: 104 MMOL/L (ref 99–110)
CO2: 18 MMOL/L (ref 21–32)
CREAT SERPL-MCNC: 0.9 MG/DL (ref 0.8–1.3)
GFR AFRICAN AMERICAN: >60
GFR NON-AFRICAN AMERICAN: >60
GLUCOSE BLD-MCNC: 167 MG/DL (ref 70–99)
GLUCOSE BLD-MCNC: 179 MG/DL (ref 70–99)
GLUCOSE BLD-MCNC: 247 MG/DL (ref 70–99)
GLUCOSE BLD-MCNC: 295 MG/DL (ref 70–99)
GLUCOSE BLD-MCNC: 346 MG/DL (ref 70–99)
PERFORMED ON: ABNORMAL
POTASSIUM REFLEX MAGNESIUM: 4.2 MMOL/L (ref 3.5–5.1)
SODIUM BLD-SCNC: 135 MMOL/L (ref 136–145)

## 2022-06-09 PROCEDURE — APPNB180 APP NON BILLABLE TIME > 60 MINS: Performed by: NURSE PRACTITIONER

## 2022-06-09 PROCEDURE — 97530 THERAPEUTIC ACTIVITIES: CPT

## 2022-06-09 PROCEDURE — 97163 PT EVAL HIGH COMPLEX 45 MIN: CPT

## 2022-06-09 PROCEDURE — 97167 OT EVAL HIGH COMPLEX 60 MIN: CPT

## 2022-06-09 PROCEDURE — 36415 COLL VENOUS BLD VENIPUNCTURE: CPT

## 2022-06-09 PROCEDURE — 1200000000 HC SEMI PRIVATE

## 2022-06-09 PROCEDURE — 83036 HEMOGLOBIN GLYCOSYLATED A1C: CPT

## 2022-06-09 PROCEDURE — 6370000000 HC RX 637 (ALT 250 FOR IP): Performed by: INTERNAL MEDICINE

## 2022-06-09 PROCEDURE — 2580000003 HC RX 258: Performed by: INTERNAL MEDICINE

## 2022-06-09 PROCEDURE — 6360000002 HC RX W HCPCS: Performed by: INTERNAL MEDICINE

## 2022-06-09 PROCEDURE — 97535 SELF CARE MNGMENT TRAINING: CPT

## 2022-06-09 PROCEDURE — 80048 BASIC METABOLIC PNL TOTAL CA: CPT

## 2022-06-09 RX ADMIN — INSULIN LISPRO 4 UNITS: 100 INJECTION, SOLUTION INTRAVENOUS; SUBCUTANEOUS at 17:06

## 2022-06-09 RX ADMIN — GABAPENTIN 300 MG: 300 CAPSULE ORAL at 08:25

## 2022-06-09 RX ADMIN — LAMOTRIGINE 50 MG: 100 TABLET ORAL at 09:23

## 2022-06-09 RX ADMIN — SODIUM CHLORIDE, PRESERVATIVE FREE 10 ML: 5 INJECTION INTRAVENOUS at 08:33

## 2022-06-09 RX ADMIN — LAMOTRIGINE 25 MG: 25 TABLET ORAL at 21:59

## 2022-06-09 RX ADMIN — TRAZODONE HYDROCHLORIDE 200 MG: 100 TABLET ORAL at 08:25

## 2022-06-09 RX ADMIN — INSULIN LISPRO 2 UNITS: 100 INJECTION, SOLUTION INTRAVENOUS; SUBCUTANEOUS at 11:41

## 2022-06-09 RX ADMIN — INSULIN LISPRO 1 UNITS: 100 INJECTION, SOLUTION INTRAVENOUS; SUBCUTANEOUS at 08:27

## 2022-06-09 RX ADMIN — LITHIUM CARBONATE 600 MG: 300 CAPSULE, GELATIN COATED ORAL at 21:59

## 2022-06-09 RX ADMIN — MORPHINE SULFATE 4 MG: 2 INJECTION, SOLUTION INTRAMUSCULAR; INTRAVENOUS at 06:14

## 2022-06-09 RX ADMIN — INSULIN LISPRO 2 UNITS: 100 INJECTION, SOLUTION INTRAVENOUS; SUBCUTANEOUS at 22:04

## 2022-06-09 RX ADMIN — VERAPAMIL HYDROCHLORIDE 120 MG: 120 TABLET, FILM COATED, EXTENDED RELEASE ORAL at 22:00

## 2022-06-09 RX ADMIN — SODIUM CHLORIDE, PRESERVATIVE FREE 5 ML: 5 INJECTION INTRAVENOUS at 21:59

## 2022-06-09 RX ADMIN — ROSUVASTATIN CALCIUM 20 MG: 20 TABLET, FILM COATED ORAL at 21:59

## 2022-06-09 RX ADMIN — LITHIUM CARBONATE 300 MG: 300 CAPSULE, GELATIN COATED ORAL at 08:26

## 2022-06-09 RX ADMIN — TRAZODONE HYDROCHLORIDE 200 MG: 100 TABLET ORAL at 21:59

## 2022-06-09 ASSESSMENT — PAIN DESCRIPTION - DESCRIPTORS: DESCRIPTORS: SHARP

## 2022-06-09 ASSESSMENT — PAIN - FUNCTIONAL ASSESSMENT
PAIN_FUNCTIONAL_ASSESSMENT: PREVENTS OR INTERFERES SOME ACTIVE ACTIVITIES AND ADLS
PAIN_FUNCTIONAL_ASSESSMENT: PREVENTS OR INTERFERES WITH MANY ACTIVE NOT PASSIVE ACTIVITIES

## 2022-06-09 ASSESSMENT — PAIN DESCRIPTION - LOCATION
LOCATION: BACK;LEG
LOCATION: BACK
LOCATION: BACK;LEG
LOCATION: BACK

## 2022-06-09 ASSESSMENT — PAIN SCALES - GENERAL
PAINLEVEL_OUTOF10: 9

## 2022-06-09 ASSESSMENT — PAIN DESCRIPTION - ONSET: ONSET: ON-GOING

## 2022-06-09 ASSESSMENT — PAIN DESCRIPTION - ORIENTATION
ORIENTATION: RIGHT;LOWER
ORIENTATION: RIGHT;LOWER

## 2022-06-09 ASSESSMENT — PAIN DESCRIPTION - FREQUENCY: FREQUENCY: CONTINUOUS

## 2022-06-09 NOTE — CONSULTS
NEUROLOGY / [facility] NOTE       Anand Ruggiero MD is requesting this consult. Reason for Consult: Bilateral LE weakness  Admission Chief Complaint: s/p several falls     History of Present Illness     Justo Santiago is a 70 y.o. y/o male with anxiety, bipolar d/o, DM2, hx of urinary retention, HTN, chronic back pain s/p several surgeries and spinal cord stimulator implanted 2019 who presents with acute onset worsening back pain and bilateral lower extremity weakness that started after patient had CT myelogram on Monday 6/6(ordered by Dr. Kiran Lewis to evaluate for progressive lumbar stenosis d/t worsening of symptoms at more recent office visit). Patient's wife states he his pain acutely worsened after procedure, patient fell several times requiring EMS to help him up, upon 3rd call to EMS, they recommended he be transported for evaluation. He states his legs just give out under him. Wife states this has never happened previously. She feels that he was at his typical baseline until Monday after his scheduled Ct myelogram. He denies any new numbness or tingling. He does c/o bilateral thigh pain. Has hx of chronic urinary retention. Patient typically uses walker or cane for assistance with walking. Unable to get MRI of spine d/t incompatible spinal cord stimulator. Patient seen my neurosurgery here with repeat CT myelogram of lumbar / thoracic / cervical spine. No significant cord compression. They do not feel the chronic findings seen on CT myelogram would account for his level of weakness. REVIEW OF SYSTEMS:   Constitutional- No weight loss or fevers   Eyes- No diplopia. No photophobia. Ears/nose/throat- No dysphagia. No Dysarthria   Cardiovascular- No palpitations. No chest pain   Respiratory- No dyspnea. No Cough   Gastrointestinal- No Abdominal pain. No Vomiting. Genitourinary- No incontinence. No urinary retention   Musculoskeletal- No myalgia.  No arthralgia   Skin- No rash. No easy bruising. Psychiatric- No depression. No anxiety   Endocrine- No diabetes. No thyroid issues. Hematologic- No bleeding difficulty. No fatigue   Neurologic- bilateral LE weakness    Past Medical, Surgical, Family, and Social History   PAST MEDICAL HISTORY:  Past Medical History:   Diagnosis Date    Anxiety     Arthritis     Asthma     [facility]     Colitis     Depression     Diabetes ([facility]     Diabetes mellitus ([facility]     Encounter for imaging to screen for metal prior to MRI 06/08/2022    NOT MRI conditional [employer] model#SC-1160, Lead: [id number] implanted 9/9/19 by Kathryn Carvalho at [facility]. Lead is unsafe for MRI. Patient unable to have any MRI ever.     Glaucoma     H/O bladder problems     High blood pressure     History of kidney problems     IBS (irritable bowel syndrome)     Liver disease     hx of elevated LFT's    Obesity     Psychiatric problem      SURGICAL HISTORY:  Past Surgical History:   Procedure Laterality Date    APPENDECTOMY      BACK SURGERY      CERVICAL FUSION      COLONOSCOPY      EYE SURGERY      HIP SURGERY      JOINT REPLACEMENT Right     THR    OTHER SURGICAL HISTORY  06/06/2018     right L4-5 hemilaminotomy, partial facetectomy, foraminotomy, reexploration and excision of synovial csyt,  Bilateral L2-3 and L3-4 hemilaminotomy, partial facetectomy, foraminotomy    OR NERVOUS SYSTEM SURGERY UNLISTED Bilateral 11/12/2018    BILATERAL LUMBAR THREE, LUMBAR FOUR, LUMBAR FIVE RADIOFREQUENCY ABLATION SITE CONFIRMED BY FLUOROSCOPY performed [facility] Jarrell [facility]IMG GDN Bilateral 9/25/2018    BILATERAL LUMBAR THREE, LUMBAR FOUR, LUMBAR FIVE MEDIAL BRANCH BLOCK SITE CONFIRMED BY FLUOROSCOPY performed by Katherin Jarrell MD at [facility] DX/THER SBST INTRLMNR CRV/THRC W/IMG GDN Bilateral 10/16/2018    BILATERAL LUMBAR THREE, FOUR, FIVE MEDIAL BRANCH BLOCK SITE CONFIRMED BY FLUOROSCOPY performed by Riley Fall MD at [address] TURP N/A 6/9/2020    CYSTOSCOPY, TRANSURETHRAL RESECTION OF PROSTATE performed by Rosey Paiz MD at [address] [place] [address] Ave:  Family history non-contributory  Family History   Problem Relation Age of Onset    Alcohol Abuse Sister     Coronary Art Dis Mother     Obesity Mother     Osteoarthritis Mother     Parkinsonism Mother     Coronary Art Dis Father     Obesity Father      Social History     Tobacco Use    Smoking status: Never Smoker    Smokeless tobacco: Never Used   Vaping Use    Vaping Use: Never used   Substance Use Topics    Alcohol use: No    Drug use: No          Allergies & Outpatient Medications   ALLERGIES:  Allergies   Allergen Reactions    No Known Allergies      HOME MEDICATIONS:  Current Discharge Medication List      CONTINUE these medications which have NOT CHANGED    Details   !! lamoTRIgine (LAMICTAL) 25 MG tablet Take 50 mg by mouth daily      !! lamoTRIgine (LAMICTAL) 25 MG tablet Take 25 mg by mouth at bedtime      !! lithium 300 MG capsule Take 300 mg by mouth daily      !! lithium 300 MG capsule Take 600 mg by mouth at bedtime      metFORMIN (GLUCOPHAGE-XR) 500 mg extended release tablet Take 1,000 mg by mouth 2 times daily (with meals)      traZODone (DESYREL) 100 MG tablet Take 200 mg by mouth in the morning and at bedtime      acetaminophen (TYLENOL) 500 MG tablet Take 1,000 mg by mouth nightly as needed for Pain      gabapentin (NEURONTIN) 300 MG capsule Take 300 mg by mouth 3 times daily.       Dulaglutide (TRULICITY) 3 PF/0.5AD SOPN Inject 3 mg into the skin once a week Takes on Sundays      glipiZIDE (GLUCOTROL) 5 mg tablet Take 5 mg by mouth daily      apixaban (ELIQUIS) 5 MG TABS tablet Take 5 mg by mouth 2 times daily       rosuvastatin (CRESTOR) 20 MG tablet Take 20 mg by mouth at bedtime       verapamil (CALAN SR) 120 MG extended release tablet Take 120 mg by mouth nightly       !! - Potential duplicate medications found. Please discuss with provider. Physical Exam   PHYSICAL EXAM:  /66   Pulse 58   Temp 97.7 °F (36.5 °C) (Oral)   Resp 16   Ht 6' 3\" (1.905 m)   Wt 232 lb 5.8 oz (105.4 kg)   SpO2 98%   BMI 29.04 kg/m²     General Alert, no distress, well-nourished    Neurologic  Mental status:   Orientation to person, place, time, situation  Attention intact as able to attend well to the exam    Language fluent in conversation  Comprehension intact; follows simple commands    Cranial nerves:   CN2: Visual fields full w/o extinction on confrontational testing  CN 3,4,6: Pupils equal and reactive to light, extraocular muscles intact  CN5: Facial sensation symmetric   CN7: Face symmetric  CN8: Hearing symmetric to spoken voice  CN9: Palate elevated symmetrically  CN11: Traps full strength on shoulder shrug  CN12: Tongue midline with protrusion    Motor Exam:   R  L    Deltoid 5 5   Biceps 5 5   Triceps 5 5   Wrist extension  5 5   Interossei 5 5      R  L    Hip flexion  4 4   Hip extension  4 4   Knee flexion  2 2   Knee extension  2 2   Ankle dorsiflexion  2 2   Ankle plantar flexion  2 2     Deep tendon reflexes:    R  L    Patellar  0 0     Sensory: light touch intact and symmetric in all 4 extremities. No sensory extinction  Cerebellar/coordination: finger nose finger normal without ataxia  Tone: normal in all 4 extremities    Cardiovascular: Warm, appears well perfused   Respiratory: Easy, non-labored respiratory pattern   Abdominal: Abdomen is without distention   Extremities: Upper and lower extremities are atraumatic in appearance without deformity. Diagnostic Results   IMAGES:  Images personally reviewed and agree w/ radiology interpretation. CT myelogram of lumbar / thoracic / cervical  Impression   1.  Solid osseous fusion status post ACDF from C5 through C7 without residual central or foraminal stenosis at these levels. 2. Additional spondylosis including grade 1 anterolisthesis C4 on C5. No evidence of cervical central stenosis or cord compression. Foraminal stenosis most pronounced on the right at C3-C4 and on the left at C4-C5. Right C4 and left C5 foraminal nerve    root impingement cannot be excluded. IMPRESSION:   1. Diffuse idiopathic skeletal hyperostosis produces confluent anterior longitudinal ligament ossification throughout the majority of the thoracic spine resulting in confluent osseous fusion from T3 through at least T11 and possibly T12 as described. 2. Spine stimulator electrodes in the dorsal extradural canal at T8 and T9.   3. Mild disc bulge at T11-T12 without central or foraminal stenosis. IMPRESSION:   1. Grade 1 retrolisthesis of L2 on L3 and L4 on L5. X-Stop device at the interspinous region between L2 and L3 spinous processes. Previous laminectomy surgery at L3-L4 and L4-L5. 2. Multilevel lumbar spondylosis, with mild central stenosis at L3-L4. There is multilevel foraminal narrowing most pronounced on the right at L2-L3, on the right at L3-L4, and bilaterally at L4-L5. Corresponding impingement of right L2, right L3 and    bilateral L4 foraminal nerve roots cannot be excluded. LABS:  All results below personally reviewed. Pertinent positives & negatives are addressed in Impression & Recommendations below.      LABS   Metabolic Panel Recent Labs     06/07/22  1337 06/09/22  0635    135*   K 4.5 4.2    104   CO2 25 18*   BUN 15 20   CREATININE 0.9 0.9   GLUCOSE 178* 179*   CALCIUM 9.9 9.2   LABALBU 5.1*  --    ALKPHOS 87  --    ALT 31  --    AST 36  --       CBC / Coags Recent Labs     06/07/22  1337   WBC 7.0   RBC 4.48   HGB 14.3   HCT 42.6      INR 1.09      Other Lab Results   Component Value Date    LABA1C 7.0 07/12/2020    TSH 1.64 07/13/2020    FHOMZECT68 737 07/13/2020    COVID19 Not Detected 07/17/2020    COVID19 Not Detected 07/16/2020     Lab Results   Component Value Date    LAMO <0.9 07/11/2020    LACTSEPSIS 2.9 07/11/2020    LACTA 1.0 07/13/2020          CURRENT SCHEDULED MEDICATIONS   Inpatient Medications     [Held by provider] gabapentin, 300 mg, Oral, TID    rosuvastatin, 20 mg, Oral, Nightly    verapamil, 120 mg, Oral, Nightly    sodium chloride flush, 5-40 mL, IntraVENous, 2 times per day    insulin lispro, 0-6 Units, SubCUTAneous, TID WC    insulin lispro, 0-3 Units, SubCUTAneous, Nightly    lamoTRIgine, 50 mg, Oral, Daily    lamoTRIgine, 25 mg, Oral, Nightly    lithium, 300 mg, Oral, Daily    lithium, 600 mg, Oral, Nightly    traZODone, 200 mg, Oral, BID   Infusions    dextrose      sodium chloride        Antibiotics   Recent Abx Admin      No antibiotic orders with administrations found. IMPRESSION & RECOMMENDATIONS     IMPRESSION:  Patient is a 69 y/o M w/ acute on chronic back pain and bilateral leg weakness that started shortly after CT myelogram on Monday 6/6. Had several falls d/t leg weakness. CT myelogram repeated to evaluate for compression yesterday and unremarkable with exception of chronic changes. LE w/ exam with fluctuating weakness, may be somewhat limited by back pain. Time course would not support AIDP. Given multiple CT myelograms CSF studies would not be helpful. Feel this is mostly related to ongoing severe back pain with movement, would recommend pain control, PT/OT and f/u to monitor progression. RECOMMENDATIONS:  PT/OT  Pain control  Outpatient EMG if this does not improve w/ improved pain management     Attempted to call wife with update, unable to leave message. Primary team can follow up tomorrow with above recommendations.      FRANCISCO JAVIER Zamora - CNP   Neurology & Neurocritical Care   Neurology Line: [phone]  [facility]: [facility] NPs  6/9/2022 1:12 PM

## 2022-06-09 NOTE — PROGRESS NOTES
Patient is alert and oriented x4. Vital signs are stable. PRN morphine administered per [facility] for pain. No changes in neuro assessment. Bed is in the lowest position. Bed alarm is activated. Call light is within reach. Will continue to monitor and reassess.

## 2022-06-09 NOTE — PROGRESS NOTES
Hospitalist Progress Note      PCP: No primary care provider on file. Date of Admission: 2022    Chief Complaint on Admission: back, leg pain, weakness    Pt Seen/Examined and Chart Reviewed. Admitting dx as above    SUBJECTIVE/OBJECTIVE:     Reports ongoing severe pain in lower back that is radiating to anterior thighs b/l. Looks lethargic today. Allergies  No known allergies    Medications      Scheduled Meds:   [Held by provider] gabapentin  300 mg Oral TID    rosuvastatin  20 mg Oral Nightly    verapamil  120 mg Oral Nightly    sodium chloride flush  5-40 mL IntraVENous 2 times per day    insulin lispro  0-6 Units SubCUTAneous TID WC    insulin lispro  0-3 Units SubCUTAneous Nightly    lamoTRIgine  50 mg Oral Daily    lamoTRIgine  25 mg Oral Nightly    lithium  300 mg Oral Daily    lithium  600 mg Oral Nightly    traZODone  200 mg Oral BID       Infusions:   dextrose      sodium chloride         PRN Meds:  glucose, dextrose bolus **OR** dextrose bolus, glucagon (rDNA), dextrose, sodium chloride flush, sodium chloride, ondansetron **OR** ondansetron, polyethylene glycol, acetaminophen **OR** acetaminophen    Vitals    TEMPERATURE:  Current - Temp: 97.7 °F (36.5 °C); Max - Temp  Av °F (36.7 °C)  Min: 97.3 °F (36.3 °C)  Max: 98.8 °F (37.1 °C)  RESPIRATIONS RANGE: Resp  Av.6  Min: 16  Max: 18  PULSE RANGE: Pulse  Av.3  Min: 57  Max: 76  BLOOD PRESSURE RANGE:  Systolic (55HKM), HQI:184 , Min:120 , XEC:985   ; Diastolic (93GBR), MOB:97, Min:66, Max:85    PULSE OXIMETRY RANGE: SpO2  Av %  Min: 94 %  Max: 98 %  24HR INTAKE/OUTPUT:      Intake/Output Summary (Last 24 hours) at 2022 1212  Last data filed at 2022 5744  Gross per 24 hour   Intake 370 ml   Output 1300 ml   Net -930 ml       Exam:      General appearance: No apparent distress, appears stated age and cooperative.   Lungs: Clear to ascultation, bilaterally without Rales/Wheezes/Rhonchi with good

## 2022-06-09 NOTE — PROGRESS NOTES
Occupational Therapy  Facility/Department: [facility]T ORTHO/NEURO  Occupational Therapy Initial Assessment and Treatment    Name: Silas Woods  : 1950  MRN: [id number]  Date of Service: 2022    Discharge Recommendations:  Silas Woods scored a 10/24 on the AM-PAC ADL Inpatient form. Current research shows that an AM-PAC score of 17 or less is typically not associated with a discharge to the patient's home setting. Based on the patient's AM-PAC score and their current ADL deficits, it is recommended that the patient have 3-5 sessions per week of Occupational Therapy at d/c to increase the patient's independence. Please see assessment section for further patient specific details. If patient discharges prior to next session this note will serve as a discharge summary. Please see below for the latest assessment towards goals. OT Equipment Recommendations  Equipment Needed: No  Other: defer       Patient Diagnosis(es): There were no encounter diagnoses. Past Medical History:  has a past medical history of Anxiety, Arthritis, Asthma, Bipolar 1 disorder ([address] Colitis, Depression, Diabetes ([address] Diabetes mellitus ([address] Encounter for imaging to screen for metal prior to MRI, Glaucoma, H/O bladder problems, High blood pressure, History of kidney problems, IBS (irritable bowel syndrome), Liver disease, Obesity, and Psychiatric problem. Past Surgical History:  has a past surgical history that includes back surgery; hip surgery; Eye surgery; Colonoscopy; cervical fusion; joint replacement (Right); other surgical history (2018); pr njx dx/ther sbst intrlmnr crv/thrc w/img gdn (Bilateral, 2018); pr njx dx/ther sbst intrlmnr crv/thrc w/img gdn (Bilateral, 10/16/2018); pr nervous system surgery unlisted (Bilateral, 2018); Appendectomy; and TURP (N/A, 2020).     Treatment Diagnosis: decreased ability to perform ADL 2/2 back pain    Assessment   Performance deficits / Impairments: Decreased functional mobility ; Decreased ADL status; Decreased ROM; Decreased strength;Decreased safe awareness;Decreased cognition;Decreased endurance;Decreased balance;Decreased high-level IADLs;Decreased fine motor control;Decreased coordination;Decreased posture  After evaluation and treatment, pt with low back and leg pain is found to be presenting with the above mentioned deficits. Pt would benefit from continued skilled occupational therapy to address these deficits, increasing safety and independence with ADL and functional mobility. He reports his wife works and he is home alone some of the day. He reports he is generally able to complete ADL without assistance with RW. He now has global weakness with BUE 3-/5 to 3+/5 and unable to stand despite max A x2. Jerking noted with all activity. Will continue to assess for discharge needs. Treatment Diagnosis: decreased ability to perform ADL 2/2 back pain  Prognosis: Good  Decision Making: High Complexity  REQUIRES OT FOLLOW-UP: Yes  Activity Tolerance  Activity Tolerance: Patient limited by pain        Plan   Plan  Times per Week: 2-5  Current Treatment Recommendations: Strengthening,Balance training,ROM,Functional mobility training,Endurance training,Patient/Caregiver education & training,Safety education & training,Pain management,Self-Care / ADL,Positioning,Equipment evaluation, education, & procurement,Coordination training     Restrictions  Position Activity Restriction  Other position/activity restrictions: Up with assist    Subjective   General  Chart Reviewed: Yes  Patient assessed for rehabilitation services?: Yes  Family / Caregiver Present: No  Referring Practitioner: FRANCISCO JAVIER Bee NP  Diagnosis: Acute on chronic lower back pain with leg weakness, falls, leg pain. CT spine  Subjective  Subjective: RN cleared pt for tx. Pt supine at session start, drowsy, slow to respond at times. Pt reporting 9.5/10 low back and B anterior thigh pain.  Pt not able to receive pain medication, non-pharm methods implemented: emotional support, repositioning, increased activity, followed by rest        Social/Functional History  Social/Functional History  Lives With: Spouse  Type of Home: House  Home Layout: One level  Home Access: Stairs to enter without rails  Entrance Stairs - Number of Steps: 1  Bathroom Shower/Tub: Walk-in shower  Bathroom Toilet: Handicap height (sink next to toilet)  Bathroom Equipment: Grab bars in shower  Home Equipment: Charna Analilia, rolling  Has the patient had two or more falls in the past year or any fall with injury in the past year?: Yes  Receives Help From: Family  ADL Assistance: [address]: Needs assistance (cleaning lady once a week, wife does cooking and grocery shopping)  Homemaking Responsibilities: No  Ambulation Assistance: Independent with RW  Transfer Assistance: Independent  Active : Yes  Occupation: Retired  Additional Comments: wife works during the day       Objective   Heart Rate: 62  [address]: Monitor  BP: 129/66  BP Location: Right upper arm  BP Method: Automatic  Patient Position: Supine  MAP (Calculated): 87  Resp: 16  SpO2: 98 %  O2 Device: None (Room air)     Vision Exceptions: Wears glasses at all times  Hearing: Within functional limits            Safety Devices  Type of Devices: Gait belt;Nurse notified; Left in bed;Call light within reach; Bed alarm in place              ADL  Feeding: Moderate assistance (to eat in supine at end of session after optimal set-up/positioning; able to eat wrap with set-up, drink with max A)  Toileting: Dependent/Total (catheter)     Static sitting:  Mod to min A with UE support, without UE support - mod A          Bed mobility  Rolling to Left: Maximum assistance (progressing to mod A)  Rolling to Right: Maximum assistance (progressing to mod A)  Supine to Sit: Dependent/Total (max A x2)  Sit to Supine: Dependent/Total (max A x2)  Scooting: Dependent/Total (max A x2, unable to scoot buttocks when attempting independently)     Transfers  Sit to stand: Dependent/Total (max A x2 for partial stand from EOB, height raised, with RW)  Stand to sit: Dependent/Total (max A x2, poorly controlled descent)        Cognition  Overall Cognitive Status: Exceptions  Arousal/Alertness: Delayed responses to stimuli  Following Commands: Follows one step commands with repetition; Follows one step commands with increased time  Attention Span: Difficulty dividing attention; Difficulty attending to directions  Memory: Decreased recall of recent events  Safety Judgement: Decreased awareness of need for assistance;Decreased awareness of need for safety  Problem Solving: Assistance required to generate solutions;Assistance required to implement solutions;Assistance required to identify errors made;Assistance required to correct errors made;Decreased awareness of errors  Insights: Decreased awareness of deficits  Initiation: Requires cues for some  Sequencing: Requires cues for some                                      BUE strength and AROM are NOT [facility] based on functional presentation.   B shoulder and elbow flex/ext: 3-/5  B digit flex/ext: 3+/5    Education: Role of OT, safe t/f training, safe use of DME, awareness of deficits, discharge planning, ADL as therapeutic exercise, importance of OOB       AM-PAC Score        AM-[facility] Inpatient Daily Activity Raw Score: 10 (06/09/22 1541)  AM-PAC Inpatient ADL T-Scale Score : 27.31 (06/09/22 1541)  ADL Inpatient CMS 0-100% Score: 74.7 (06/09/22 1541)  ADL Inpatient CMS G-Code Modifier : CL (06/09/22 1541)    Goals  Short Term Goals  Time Frame for Short term goals: 1 week  Short Term Goal 1: 1 grooming task in supported sitting position with SBA  Short Term Goal 2: Supine to sit with mod A x2  Short Term Goal 3: Sit EOB x3 mins with SBA  Short Term Goal 4: Sit to stand with max A x2 in prep for functional t/f  Patient Goals   Patient goals : \"I want to be in less pain. \"       Therapy Time   Individual Concurrent Group Co-treatment   Time In 6411         Time Out 1530         Minutes 48         Timed Code Treatment Minutes: 35 Minutes       If patient is discharged prior to next treatment session, this note will serve as the discharge summary.   Eulogio Antonio, OTR/L #[id number]

## 2022-06-09 NOTE — CONSULTS
NEUROSURGERY Starlet Gamma  [id number]   1950   6/8/2022    Requesting physician: Gary Menon MD    Reason for consultation: bilateral lower extremity weakness, progressive     History of present illness: Patient is a 70 y.o. male w/ PMH of bipolar disorder, DM II, glaucoma, IBS, obesity, incontinence, colitis and multiple spinal surgeries who presented on 6/8/2022 with back pain, progressive leg weakness over 48 hours and multiple falls. Patient has had cervical fusion in 1988 with Dr. Homero Darnell, an L4-5 bilateral decompression in 2011 with Dr. Gabo Goyal, L2-4 lumbar laminectomy with Dr. Nataliia Reyes (timing unknown, wife things about 2 years ago), SCS placement with Dr. Otto Cardoza in 2019 and Xstop device placed with Dr. Ranjit Elmore in 2021. Patient was currently following with Dr. Mirza Mack with [facility] neurosurgery. He saw him on 5/27 for progressive back and leg pain, at this time a CT myelogram of his lumbar spine was ordered. Patient unable to have MRIs due to SCS. CT myelogram was done on 6/6. CT myelogram of lumbar spine demonstrated Severe left and moderate right neural foraminal narrowing at L4-5 and severe right foraminal stenosis at L2-3. The evening of 6/6 patient became unable to stand due to worsening leg weakness. Prior to that, he ambulated short distances with a walker. He has chronic back pain that radiates to buttocks and anterior thighs that he feels is unchanged, pain did not worsen when weakness did. In approx 36 hours patient had 3 falls which required EMS assistance, he was then taken to ED for evaluation. Patient has chronic urinary incontinence, had this for over 3 years and is unchanged. He reports no change in sensation in upper or lower extremities. He has weakness in BLE. He has bilateral upper extremity tremors. Wife is unsure how long these have been here but they are not new. His mom had Parkinson's disease.  Wife and patient both report he has difficulty with writing which is fairly new, and he does drop thing frequently. Neurosurgery has been consulted for further recommendations     ROS:   GENERAL:  Denies fever or recent illness. Denies weight changes   EYES:  Denies vision change or diplopia  EARS:  Denies hearing loss  CARDIAC:  Denies chest pain  RESPIRATORY:  Denies shortness of breath  SKIN:  Denies rash or lesions   HEM:  Denies excessive bruising  PSYCH:  Denies anxiety or depression  NEURO:  + weakness   :  + incontinence   GI: Denies nausea, vomiting, diarrhea or constipation  MUSCULOSKELETAL:  + chronic back and leg pain     Allergies   Allergen Reactions    No Known Allergies        Past Medical History:   Diagnosis Date    Anxiety     Arthritis     Asthma     [facility]     Colitis     Depression     Diabetes [facility]     Diabetes mellitus [facility]     Encounter for imaging to screen for metal prior to MRI 06/08/2022    NOT MRI conditional [email or web address] model#SC-1160, Lead: [id number] implanted 9/9/19 by Abena Jimenez at [facility]. Lead is unsafe for MRI. Patient unable to have any MRI ever.     Glaucoma     H/O bladder problems     High blood pressure     History of kidney problems     IBS (irritable bowel syndrome)     Liver disease     hx of elevated LFT's    Obesity     Psychiatric problem         Past Surgical History:   Procedure Laterality Date    APPENDECTOMY      BACK SURGERY      CERVICAL FUSION      COLONOSCOPY      EYE SURGERY      HIP SURGERY      JOINT REPLACEMENT Right     THR    OTHER SURGICAL HISTORY  06/06/2018     right L4-5 hemilaminotomy, partial facetectomy, foraminotomy, reexploration and excision of synovial csyt,  Bilateral L2-3 and L3-4 hemilaminotomy, partial facetectomy, foraminotomy    HI NERVOUS SYSTEM SURGERY UNLISTED Bilateral 11/12/2018    BILATERAL LUMBAR THREE, LUMBAR FOUR, LUMBAR FIVE RADIOFREQUENCY ABLATION SITE CONFIRMED BY FLUOROSCOPY performed by HUGH Gabi Dubose MD at [facility] DX/THER SBST INTRLMNR CRV/THRC W/IMG GDN Bilateral 9/25/2018    BILATERAL LUMBAR THREE, LUMBAR FOUR, LUMBAR FIVE MEDIAL BRANCH BLOCK SITE CONFIRMED BY FLUOROSCOPY performed by Star Almaguer MD at [facility] DX/THER SBST INTRLMNR CRV/THRC W/IMG GDN Bilateral 10/16/2018    BILATERAL LUMBAR THREE, FOUR, FIVE MEDIAL BRANCH BLOCK SITE CONFIRMED BY FLUOROSCOPY performed by Star Almaguer MD at [address] TURP N/A 6/9/2020    CYSTOSCOPY, TRANSURETHRAL RESECTION OF PROSTATE performed by Doris España MD at [address] [facility]. Not on file   Tobacco Use    Smoking status: Never Smoker    Smokeless tobacco: Never Used   Vaping Use    Vaping Use: Never used   Substance and Sexual Activity    Alcohol use: No    Drug use: No    Sexual activity: Not on file        Family History   Problem Relation Age of Onset    Alcohol Abuse Sister     Coronary Art Dis Mother     Obesity Mother     Osteoarthritis Mother     Parkinsonism Mother     Coronary Art Dis Father     Obesity Father         Outpatient Medications Marked as Taking for the 6/8/22 encounter [facility] Encounter)   Medication Sig Dispense Refill    lamoTRIgine (LAMICTAL) 25 MG tablet Take 50 mg by mouth daily      lamoTRIgine (LAMICTAL) 25 MG tablet Take 25 mg by mouth at bedtime      lithium 300 MG capsule Take 300 mg by mouth daily      lithium 300 MG capsule Take 600 mg by mouth at bedtime      metFORMIN (GLUCOPHAGE-XR) 500 mg extended release tablet Take 1,000 mg by mouth 2 times daily (with meals)      traZODone (DESYREL) 100 MG tablet Take 200 mg by mouth in the morning and at bedtime      acetaminophen (TYLENOL) 500 MG tablet Take 1,000 mg by mouth nightly as needed for Pain        Current Facility-Administered Medications   Medication Dose Route Frequency Provider Last Rate Last Admin    gabapentin (NEURONTIN) capsule 300 mg  300 mg Oral TID Wilber Angel MD   300 mg at 06/08/22 2049    rosuvastatin (CRESTOR) tablet 20 mg  20 mg Oral Nightly Wilber Angel MD   20 mg at 06/08/22 2049    verapamil (CALAN SR) extended release tablet 120 mg  120 mg Oral Nightly Wilber Angel MD   120 mg at 06/08/22 2049    glucose chewable tablet 16 g  4 tablet Oral PRN Wilber Angel MD        dextrose bolus 10% 125 mL  125 mL IntraVENous DEON Angel MD        Or    dextrose bolus 10% 250 mL  250 mL IntraVENous DEON Angel MD        glucagon (rDNA) injection 1 mg  1 mg IntraMUSCular PRELIZABETH Angel MD        dextrose 5 % solution  100 mL/hr IntraVENous PRELIZABETH Angel MD        sodium chloride flush 0.9 % injection 5-40 mL  5-40 mL IntraVENous 2 times per day Wilber Angel MD   10 mL at 06/08/22 0850    sodium chloride flush 0.9 % injection 5-40 mL  5-40 mL IntraVENous DEON Angel MD        0.9 % sodium chloride infusion   IntraVENous DEON Angel MD        ondansetron (ZOFRAN-ODT) disintegrating tablet 4 mg  4 mg Oral Q8H DEON Angel MD        Or    ondansetron (ZOFRAN) injection 4 mg  4 mg IntraVENous Q6H DEON Angel MD        polyethylene glycol (GLYCOLAX) packet 17 g  17 g Oral Daily PRELIZABETH Angel MD        acetaminophen (TYLENOL) tablet 650 mg  650 mg Oral Q6H PRN Wilber Angel MD   650 mg at 06/08/22 0151    Or    acetaminophen (TYLENOL) suppository 650 mg  650 mg Rectal Q6H PRN Wilber Angel MD        insulin lispro (1 Unit Dial) 0-6 Units  0-6 Units SubCUTAneous TID OSEAS Angel MD   1 Units at 06/08/22 1653    insulin lispro (1 Unit Dial) 0-3 Units  0-3 Units SubCUTAneous Nightly Lena Ray MD        morphine (PF) injection 2 mg  2 mg IntraVENous Q4H PRN Saskia Flores MD        Or    morphine (PF) injection 4 mg 4 mg IntraVENous Q4H PRN Dario Jimenez MD   4 mg at 06/08/22 2051    [START ON 6/9/2022] lamoTRIgine (LAMICTAL) tablet 50 mg  50 mg Oral Daily Dario Jimenez MD        lamoTRIgine (LAMICTAL) tablet 25 mg  25 mg Oral Nightly Dario Jimenez MD   25 mg at 06/08/22 2057    [START ON 6/9/2022] lithium capsule 300 mg  300 mg Oral Daily Dario Jimenez MD        lithium capsule 600 mg  600 mg Oral Nightly Dario Jimenez MD   600 mg at 06/08/22 2048    traZODone (DESYREL) tablet 200 mg  200 mg Oral BID Dario Jimenez MD   200 mg at 06/08/22 2049      Objective:  /68   Pulse 66   Temp 98.8 °F (37.1 °C) (Oral)   Resp 16   Ht 6' 3\" (1.905 m)   Wt 232 lb 5.8 oz (105.4 kg)   SpO2 94%   BMI 29.04 kg/m²     Physical Exam:  Patient seen and examined   General: Alert and cooperative in no acute distress. HENT: atraumatic, neck supple  Eyes: Optic discs: Not tested  Pulmonary: unlabored respiratory effort  Cardiovascular:  Warm well perfused.  No peripheral edema  Gastrointestinal: abdomen soft, NT, ND    Neurologic Exam:  Neurological:  Mental Status: Awake, alert, oriented x 4  Attention: Intact  Language: No aphasia or dysarthria noted  Sensation: Intact to all extremities to light touch  Coordination: Intact    DTRs:    Right  Left    ankle clonus  - -   toes (babinski)  down down     Cranial Nerves:  Cranial Nerves:  II: Visual acuity not tested, denies new visual changes / diplopia  III, IV, VI: PERRL, 3 mm bilaterally, EOMI, no nystagmus noted  V: Facial sensation intact bilaterally to touch  VII: Face symmetric  VIII: Hearing intact bilaterally to spoken voice  IX: Palate movement equal bilaterally  XI: Shoulder shrug equal bilaterally  XII: Tongue midline    Musculoskeletal:   Gait: Not tested   Assist devices: None   Tone: normal   Motor strength:    Right  Left    Right  Left    Deltoid  4 4  Hip Flex  2 2   Biceps  4 4  Knee Extensors  3 3   Triceps  4 4  Knee Flexors  3 3   Wrist Ext  4 4  Ankle Dorsiflex. 2 3   Wrist Flex  4 4  Ankle Plantarflex. 3 3   Handgrip  4 4  Ext Sae Longus  3 3   Thumb Ext  4 4         Bilateral tremors in hands, left worse than right. Radiological Findings:    I personally reviewed the patient's imaging would consist of a CT myogram of the lumbar spine dated 6/6/2021. This demonstrates a right L3-4 and left L4-5 foraminal stenosis but no significant central stenosis. Labs  Recent Labs     06/07/22  1337         CO2 25   BUN 15   CREATININE 0.9   GLUCOSE 178*     Recent Labs     06/06/22  0925 06/07/22  1337   WBC 7.0 7.0   RBC 4.33 4.48   INR 1.09 1.09         Patient Active Problem List    Diagnosis Date Noted    Bilateral leg weakness 06/08/2022    Constipation due to opioid therapy 07/14/2020    Sepsis ([facility] 07/12/2020    Acute epididymo-orchitis 07/12/2020    Bipolar 1 disorder ([facility]     Asthma     Pyelonephritis 07/11/2020    S/P TURP 34/70/5427    Metabolic encephalopathy 21/75/0633    Type 2 diabetes mellitus ([facility] [facility] 07/11/2020    Essential hypertension 07/11/2020    Prostate hyperplasia with urinary obstruction 07/11/2020    Urinary retention 06/09/2020    Degeneration of lumbar or lumbosacral intervertebral disc 12/10/2018    Lumbosacral spondylosis without myelopathy 12/10/2018    Displacement of lumbar intervertebral disc without myelopathy 12/10/2018    Spinal stenosis 06/06/2018    Enlarged lymph nodes 11/25/2014       Assessment:  71 yo male admitted with chronic back pain and acute onset progressive bilateral lower extremity non-painful weakness with frequent falls. Plan:  1. No emergent neurosurgical intervention. 2. q 4 hour neuro checks  3. Unable to have MRIs due to [employer] stimulator, lead length is not MRI conditional   4. CT head wo contrast pending  5. CT myelogram of cervical/thoracic spine pending  6.  Neurology consult as patient's lumbar spine unlikely to explain his clinical syndrome, particularly in light of painless weakness in bilateral lower extremities across multiple nerve root distributions. 7. Thank you for consult, will follow. Please call with questions.             Maegan Galicia MD, PhD  [address], [address] [phone] (c), [phone] (o)

## 2022-06-09 NOTE — PROGRESS NOTES
Physical Therapy  [facility]  Physical Therapy Initial Assessment    Name: Ruth Webb  : 1950  MRN: [id number]  Date of Service: 2022    Discharge Recommendations: Ruth Webb scored a 7/24 on the AM-PAC short mobility form. Current research shows that an AM-PAC score of 17 or less is typically not associated with a discharge to the patient's home setting. Based on the patient's AM-PAC score and their current functional mobility deficits, it is recommended that the patient have 3-5 sessions per week of Physical Therapy at d/c to increase the patient's independence. Please see assessment section for further patient specific details. If patient discharges prior to next session this note will serve as a discharge summary. Please see below for the latest assessment towards goals. PT Equipment Recommendations  Equipment Needed:  (defer)      Patient Diagnosis(es): There were no encounter diagnoses. Past Medical History:  has a past medical history of Anxiety, Arthritis, Asthma, Bipolar 1 disorder ([address] Colitis, Depression, Diabetes ([address] Diabetes mellitus ([address] Encounter for imaging to screen for metal prior to MRI, Glaucoma, H/O bladder problems, High blood pressure, History of kidney problems, IBS (irritable bowel syndrome), Liver disease, Obesity, and Psychiatric problem. Past Surgical History:  has a past surgical history that includes back surgery; hip surgery; Eye surgery; Colonoscopy; cervical fusion; joint replacement (Right); other surgical history (2018); pr njx dx/ther sbst intrlmnr crv/thrc w/img gdn (Bilateral, 2018); pr njx dx/ther sbst intrlmnr crv/thrc w/img gdn (Bilateral, 10/16/2018); pr nervous system surgery unlisted (Bilateral, 2018); Appendectomy; and TURP (N/A, 2020).     Assessment   Body Structures, Functions, Activity Limitations Requiring Skilled Therapeutic Intervention: Decreased functional mobility ;Decreased safe awareness;Decreased endurance;Decreased balance;Decreased strength;Decreased coordination; Increased pain  Assessment: After evaluation and treatment, pt with low back and leg pain is found to be presenting with the above mentioned deficits. Pt would benefit from continued IP skilled PT to address these deficits, increasing safety and independence with functional mobility. He reports his wife works and he is home alone some of the day. He reports he is generally able to perform mobility with RW independently. He now has global weakness with in BUEs/LEs, requiring max A of 2 for bed mobility and unable to stand despite max A x2. Jerking noted with all activity. Will continue to assess for discharge needs. Treatment Diagnosis: dec functional mobility  Therapy Prognosis: Fair  Decision Making: High Complexity  Requires PT Follow-Up: Yes  Activity Tolerance  Activity Tolerance: Patient tolerated evaluation without incident;Patient limited by endurance; Patient limited by pain  Activity Tolerance Comments: pt very fatigued and limited by pain     Plan   Plan  Plan:  (2-5)  Current Treatment Recommendations: Strengthening,Balance training,Functional mobility training,Transfer training,Endurance training,Patient/Caregiver education & training,Therapeutic activities,Safety education & training,Home exercise program,Neuromuscular re-education  Safety Devices  Type of Devices: Gait belt,Nurse notified,Left in bed,Call light within reach,Bed alarm in place     Restrictions  Position Activity Restriction  Other position/activity restrictions: Up with assist     Subjective   General  Chart Reviewed: Yes  Patient assessed for rehabilitation services?: Yes  Additional Pertinent Hx: pt is a 71 yo male admitted for Acute on chronic lower back pain with leg weakness, falls, leg pain. CT spine: Grade 1 retrolisthesis of L2 on L3 and L4 on L5. X-Stop device at the interspinous region between L2 and L3 spinous processes. Previous laminectomy surgery at L3-L4 and L4-L5. Multilevel lumbar spondylosis, with mild central stenosis at L3-L4. There is multilevel foraminal narrowing most pronounced on the right at L2-L3, on the right at L3-L4, and bilaterally at L4-L5. Corresponding impingement of right L2, right L3 and bilateral L4 foraminal nerve roots cannot be excluded. Referring Practitioner: Yessica Upton MD  Follows Commands: Within Functional Limits  Subjective  Subjective: pt supine in bed and agreeable to PT, pt very fatigued throughout session         Social/Functional History  Social/Functional History  Lives With: Spouse  Type of Home: House  Home Layout: One level  Home Access: Stairs to enter without rails  Entrance Stairs - Number of Steps: 1  Bathroom Shower/Tub: Walk-in shower  Bathroom Toilet: Handicap height (sink next to toilet)  Bathroom Equipment: Grab bars in shower  Home Equipment: valorie Potts  Has the patient had two or more falls in the past year or any fall with injury in the past year?: Yes  Receives Help From: Family  ADL Assistance: [facility]: Needs assistance (cleaning lady once a week, wife does cooking and grocery shopping)  Homemaking Responsibilities: No  Ambulation Assistance: Independent (with RW)  Transfer Assistance: Independent  Active : Yes  Occupation: Retired  Additional Comments: wife works during the day  [address]: Impaired  Vision Exceptions: Wears glasses at all times  Hearing  Hearing: Within functional limits    Cognition   Orientation  Overall Orientation Status: Within Functional Limits  Orientation Level: Oriented X4  Cognition  Overall Cognitive Status: Exceptions  Arousal/Alertness: Delayed responses to stimuli  Following Commands: Follows one step commands with repetition; Follows one step commands with increased time  Attention Span: Difficulty dividing attention; Difficulty attending to directions  Memory: Decreased recall of recent events  Safety Judgement: Decreased awareness of need for assistance;Decreased awareness of need for safety  Problem Solving: Assistance required to generate solutions;Assistance required to implement solutions;Assistance required to identify errors made;Assistance required to correct errors made;Decreased awareness of errors  Insights: Decreased awareness of deficits  Initiation: Requires cues for some  Sequencing: Requires cues for some     Objective   Heart Rate: 58  Heart Rate Source: Monitor  BP: 129/66  BP Location: Right upper arm  BP Method: Automatic  Patient Position: Supine  MAP (Calculated): 87  Resp: 16  SpO2: 98 %  O2 Device: None (Room air)                 Strength RLE  Comment: grossly weak 2-/5  Strength LLE  Comment: grossly weak 2-/5           Bed mobility  Rolling to Left: Maximum assistance (progressing to mod A)  Rolling to Right: Maximum assistance (progressing to mod A)  Supine to Sit: Dependent/Total (max A x2)  Sit to Supine: Dependent/Total (max A x2)  Scooting: Dependent/Total (max A x2, unable to scoot buttocks when attempting independently)  Transfers  Sit to Stand: Dependent/Total (max A of 2 at [address] [place] [address] for partial stand from EOB.  pt too fatigued for another attempt)  Stand to sit: Dependent/Total  Comment: unable to come to full stance- ambulation not appropriate at this time  Ambulation  Comments: not appropriate to attempt  More Ambulation?: No  Stairs/Curb  Stairs?: No     Balance  Posture: Fair  Sitting - Static: Fair;- (min- mod A improved with UE support- limited by muscle spasms/  tremors and fear of falling)  Sitting - Dynamic: Poor;+  Standing - Static: Poor;- (unable to come to full stance)           OutComes Score                                                  AM-PAC Score  AM-PAC Inpatient Mobility Raw Score : 7 (06/09/22 1552)  AM-PAC Inpatient T-Scale Score : 26.42 (06/09/22 1552)  Mobility Inpatient CMS 0-100% Score: 92.36 (06/09/22 1552)  Mobility Inpatient CMS G-Code Modifier : CM (06/09/22 1664)          Goals  Short Term Goals  Time Frame for Short term goals: by dc  Short term goal 1: pt will perform rolling with min A  Short term goal 2: pt will perform supine <> sit with mod A  Short term goal 3: pt will perform sit <> stand transfers at [address] [place] [address] with mod A  Short term goal 4: pt will perform bed <> chair transfers with LRAD and mod A of 2  Patient Goals   Patient goals : to return to [facility]       Education  Patient Education  Education Given To: Patient  Education Provided: Role of Therapy;Plan of Care  Education Method: Demonstration;Verbal  Barriers to Learning: Cognition  Education Outcome: Continued education needed      Therapy Time   Individual Concurrent Group Co-treatment   Time In 1440         Time Out 1518         Minutes 38             Timed Code Treatment Minutes:  23 min     Total Treatment Minutes:  38 min       Antoinette Rivera, PT

## 2022-06-09 NOTE — PROGRESS NOTES
NEUROSURGERY PROGRESS NOTE    Jenny Rojas   [id number]   1950   6/9/2022    Hospital Day #1    Subjective: Patient resting in bed, continues to be weak. Pain unchanged. He wants to know when neurology is seeing him. Objective:  /70   Pulse 57   Temp 97.3 °F (36.3 °C) (Oral)   Resp 16   Ht 6' 3\" (1.905 m)   Wt 232 lb 5.8 oz (105.4 kg)   SpO2 97%   BMI 29.04 kg/m²     Labs:  Recent Labs     06/07/22  1337 06/09/22  0635    135*    104   CO2 25 18*   BUN 15 20   CREATININE 0.9 0.9   GLUCOSE 178* 179*     Recent Labs     06/07/22  1337   WBC 7.0   RBC 4.48   INR 1.09     CT CERVICAL SPINE W CONTRAST   Final Result   1. Solid osseous fusion status post ACDF from C5 through C7 without residual central or foraminal stenosis at these levels. 2. Additional spondylosis including grade 1 anterolisthesis C4 on C5. No evidence of cervical central stenosis or cord compression. Foraminal stenosis most pronounced on the right at C3-C4 and on the left at C4-C5. Right C4 and left C5 foraminal nerve    root impingement cannot be excluded. CT thoracic spine findings: There are spine stimulator electrodes demonstrated within the dorsal aspect of the thoracic spinal canal, likely extradural, at the T8 and T9 levels. There is no evidence of thoracic subluxation or fracture. There is diffuse idiopathic skeletal hyperostosis with ossification of the anterior longitudinal ligament producing anterior bridging fusion throughout the thoracic spine, including confluent    ossific fusion from T3 through at least T11, with possible additional bridging osteophytes at [id number] (uncertain if there is solid osseous fusion involving the T11-T12 anterior osteophytes). There is bridging fusion across anterior osteophytes at T12-L1. There is no evidence of upper or mid thoracic disc herniation or cord compression.  There is mild disc bulge in the lower thoracic spine at T11-T12 level, with no associated central stenosis or cord compression. No thoracic foraminal stenosis or root    sleeve truncation is detected. No significant abnormalities are detected in the paraspinal soft tissues or posterior mediastinum. The lungs are not imaged in their entirety, but no definite lung nodule or mass is appreciated. IMPRESSION:   1. Diffuse idiopathic skeletal hyperostosis produces confluent anterior longitudinal ligament ossification throughout the majority of the thoracic spine resulting in confluent osseous fusion from T3 through at least T11 and possibly T12 as described. 2. Spine stimulator electrodes in the dorsal extradural canal at T8 and T9.   3. Mild disc bulge at T11-T12 without central or foraminal stenosis. CT lumbar spine findings: There is an X-Stop device demonstrated in the interspinous space between the L2 and L3 spinous processes. There is mild levoconvex lumbar curvature with apex at approximately L2-L3 disc space. Very mild, grade 1 retrolisthesis of L2 on L3 measures    approximately 2 mm. Additional grade 1 retrolisthesis L4 on L5 measures 3 mm. There is a superior L5 endplate Schmorl's node. No definite retroperitoneal or paraspinal soft tissue abnormalities are detected. The conus medullaris extends to the L1 level. L1-L2: Shallow left foraminal disc herniation. Mild facet arthrosis. No central stenosis. Mild left foraminal stenosis without impingement. L2-L3: Grade 1 retrolisthesis. Moderate facet arthrosis. Moderate diffuse disc bulge. No central stenosis. At least moderate right and mild left foraminal stenosis. Suspect root sleeve truncation involving the right L2 nerve root, may be associated with    right L2 foraminal nerve root impingement. L3-L4: Patient appears to be status post left hemilaminectomy. There is diffuse disc bulge with vacuum disc, and superimposed right foraminal disc herniation.  There is mild central stenosis with AP thecal sac dimension 9 mm. There is moderate to severe    right and moderate left foraminal stenosis. Right L3 foraminal nerve root impingement is suspected. L4-L5: Previous posterior decompression laminectomy. Grade 1 retrolisthesis. Diffuse disc bulge. No central stenosis. Severe bilateral foraminal stenosis. L5-S1: Moderate to severe bilateral facet arthrosis. There is no central stenosis. There is no foraminal stenosis. IMPRESSION:   1. Grade 1 retrolisthesis of L2 on L3 and L4 on L5. X-Stop device at the interspinous region between L2 and L3 spinous processes. Previous laminectomy surgery at L3-L4 and L4-L5. 2. Multilevel lumbar spondylosis, with mild central stenosis at L3-L4. There is multilevel foraminal narrowing most pronounced on the right at L2-L3, on the right at L3-L4, and bilaterally at L4-L5. Corresponding impingement of right L2, right L3 and    bilateral L4 foraminal nerve roots cannot be excluded. CT HEAD WO CONTRAST   Final Result      Symmetric appearance of the brain without acute hematoma, edema or hydrocephalus. Large amount of intrathecal contrast from recent myelogram filling the ventricular system and throughout the CSF spaces. There is evidence of left maxillary sinus mucosal inflammation and chronic thickening      CT LUMBAR SPINE W CONTRAST   Final Result   1. Solid osseous fusion status post ACDF from C5 through C7 without residual central or foraminal stenosis at these levels. 2. Additional spondylosis including grade 1 anterolisthesis C4 on C5. No evidence of cervical central stenosis or cord compression. Foraminal stenosis most pronounced on the right at C3-C4 and on the left at C4-C5. Right C4 and left C5 foraminal nerve    root impingement cannot be excluded. CT thoracic spine findings: There are spine stimulator electrodes demonstrated within the dorsal aspect of the thoracic spinal canal, likely extradural, at the T8 and T9 levels. There is no evidence of thoracic subluxation or fracture. There is diffuse idiopathic skeletal hyperostosis with ossification of the anterior longitudinal ligament producing anterior bridging fusion throughout the thoracic spine, including confluent    ossific fusion from T3 through at least T11, with possible additional bridging osteophytes at [id number]-[id number] (uncertain if there is solid osseous fusion involving the T11-T12 anterior osteophytes). There is bridging fusion across anterior osteophytes at T12-L1. There is no evidence of upper or mid thoracic disc herniation or cord compression. There is mild disc bulge in the lower thoracic spine at T11-T12 level, with no associated central stenosis or cord compression. No thoracic foraminal stenosis or root    sleeve truncation is detected. No significant abnormalities are detected in the paraspinal soft tissues or posterior mediastinum. The lungs are not imaged in their entirety, but no definite lung nodule or mass is appreciated. IMPRESSION:   1. Diffuse idiopathic skeletal hyperostosis produces confluent anterior longitudinal ligament ossification throughout the majority of the thoracic spine resulting in confluent osseous fusion from T3 through at least T11 and possibly T12 as described. 2. Spine stimulator electrodes in the dorsal extradural canal at T8 and T9.   3. Mild disc bulge at T11-T12 without central or foraminal stenosis. CT lumbar spine findings: There is an X-Stop device demonstrated in the interspinous space between the L2 and L3 spinous processes. There is mild levoconvex lumbar curvature with apex at approximately L2-L3 disc space. Very mild, grade 1 retrolisthesis of L2 on L3 measures    approximately 2 mm. Additional grade 1 retrolisthesis L4 on L5 measures 3 mm. There is a superior L5 endplate Schmorl's node. No definite retroperitoneal or paraspinal soft tissue abnormalities are detected.  The conus medullaris extends to the L1 level. L1-L2: Shallow left foraminal disc herniation. Mild facet arthrosis. No central stenosis. Mild left foraminal stenosis without impingement. L2-L3: Grade 1 retrolisthesis. Moderate facet arthrosis. Moderate diffuse disc bulge. No central stenosis. At least moderate right and mild left foraminal stenosis. Suspect root sleeve truncation involving the right L2 nerve root, may be associated with    right L2 foraminal nerve root impingement. L3-L4: Patient appears to be status post left hemilaminectomy. There is diffuse disc bulge with vacuum disc, and superimposed right foraminal disc herniation. There is mild central stenosis with AP thecal sac dimension 9 mm. There is moderate to severe    right and moderate left foraminal stenosis. Right L3 foraminal nerve root impingement is suspected. L4-L5: Previous posterior decompression laminectomy. Grade 1 retrolisthesis. Diffuse disc bulge. No central stenosis. Severe bilateral foraminal stenosis. L5-S1: Moderate to severe bilateral facet arthrosis. There is no central stenosis. There is no foraminal stenosis. IMPRESSION:   1. Grade 1 retrolisthesis of L2 on L3 and L4 on L5. X-Stop device at the interspinous region between L2 and L3 spinous processes. Previous laminectomy surgery at L3-L4 and L4-L5. 2. Multilevel lumbar spondylosis, with mild central stenosis at L3-L4. There is multilevel foraminal narrowing most pronounced on the right at L2-L3, on the right at L3-L4, and bilaterally at L4-L5. Corresponding impingement of right L2, right L3 and    bilateral L4 foraminal nerve roots cannot be excluded. CT THORACIC SPINE W CONTRAST   Final Result   1. Solid osseous fusion status post ACDF from C5 through C7 without residual central or foraminal stenosis at these levels. 2. Additional spondylosis including grade 1 anterolisthesis C4 on C5. No evidence of cervical central stenosis or cord compression.  Foraminal stenosis most pronounced on the right at C3-C4 and on the left at C4-C5. Right C4 and left C5 foraminal nerve    root impingement cannot be excluded. CT thoracic spine findings: There are spine stimulator electrodes demonstrated within the dorsal aspect of the thoracic spinal canal, likely extradural, at the T8 and T9 levels. There is no evidence of thoracic subluxation or fracture. There is diffuse idiopathic skeletal hyperostosis with ossification of the anterior longitudinal ligament producing anterior bridging fusion throughout the thoracic spine, including confluent    ossific fusion from T3 through at least T11, with possible additional bridging osteophytes at Q42-Q64 (uncertain if there is solid osseous fusion involving the T11-T12 anterior osteophytes). There is bridging fusion across anterior osteophytes at T12-L1. There is no evidence of upper or mid thoracic disc herniation or cord compression. There is mild disc bulge in the lower thoracic spine at T11-T12 level, with no associated central stenosis or cord compression. No thoracic foraminal stenosis or root    sleeve truncation is detected. No significant abnormalities are detected in the paraspinal soft tissues or posterior mediastinum. The lungs are not imaged in their entirety, but no definite lung nodule or mass is appreciated. IMPRESSION:   1. Diffuse idiopathic skeletal hyperostosis produces confluent anterior longitudinal ligament ossification throughout the majority of the thoracic spine resulting in confluent osseous fusion from T3 through at least T11 and possibly T12 as described. 2. Spine stimulator electrodes in the dorsal extradural canal at T8 and T9.   3. Mild disc bulge at T11-T12 without central or foraminal stenosis. CT lumbar spine findings: There is an X-Stop device demonstrated in the interspinous space between the L2 and L3 spinous processes.  There is mild levoconvex lumbar curvature with apex at approximately L2-L3 disc space. Very mild, grade 1 retrolisthesis of L2 on L3 measures    approximately 2 mm. Additional grade 1 retrolisthesis L4 on L5 measures 3 mm. There is a superior L5 endplate Schmorl's node. No definite retroperitoneal or paraspinal soft tissue abnormalities are detected. The conus medullaris extends to the L1 level. L1-L2: Shallow left foraminal disc herniation. Mild facet arthrosis. No central stenosis. Mild left foraminal stenosis without impingement. L2-L3: Grade 1 retrolisthesis. Moderate facet arthrosis. Moderate diffuse disc bulge. No central stenosis. At least moderate right and mild left foraminal stenosis. Suspect root sleeve truncation involving the right L2 nerve root, may be associated with    right L2 foraminal nerve root impingement. L3-L4: Patient appears to be status post left hemilaminectomy. There is diffuse disc bulge with vacuum disc, and superimposed right foraminal disc herniation. There is mild central stenosis with AP thecal sac dimension 9 mm. There is moderate to severe    right and moderate left foraminal stenosis. Right L3 foraminal nerve root impingement is suspected. L4-L5: Previous posterior decompression laminectomy. Grade 1 retrolisthesis. Diffuse disc bulge. No central stenosis. Severe bilateral foraminal stenosis. L5-S1: Moderate to severe bilateral facet arthrosis. There is no central stenosis. There is no foraminal stenosis. IMPRESSION:   1. Grade 1 retrolisthesis of L2 on L3 and L4 on L5. X-Stop device at the interspinous region between L2 and L3 spinous processes. Previous laminectomy surgery at L3-L4 and L4-L5. 2. Multilevel lumbar spondylosis, with mild central stenosis at L3-L4. There is multilevel foraminal narrowing most pronounced on the right at L2-L3, on the right at L3-L4, and bilaterally at L4-L5. Corresponding impingement of right L2, right L3 and    bilateral L4 foraminal nerve roots cannot be excluded. neurosurgeon as planned. Please call with questions. DISPO-Dispo timing to be determined by primary team once patient is medically stable for discharge. FRANCISCO JAVIER Fleming-CNP  Neurosurgery Nurse Practitioner  [phone]  Patient was seen and examined with Dr. Abbie Peñaloza who agrees with above assessment and plan. Electronically signed by:  FRANCISCO JAVIER Bee NP, 6/9/2022 10:09 AM

## 2022-06-09 NOTE — PROGRESS NOTES
Neurosurgery Progress Note    Patient seen and examined on 06/09/22. No acute events overnight. Reports no improvement in symptoms. Has anterior thigh pain chronically but no radiating pain into the legs. Neurologically stable on exam with bilateral DF/PF and hip flexor weakness. Cervical and thoracic CT myelogram reviewed with no clear evidence of central stenosis or cord compression. A/P: 69 yo man with chronic back and right anterior thigh pain s/p multiple surgeries including SCS placement. Now presents with weakness of BLE with sudden onset without clear etiology on imaging.     -Neuro stable  -Pain control with PO meds  -No clear underlying etiology for symptoms on CT myelo cervical, thoracic, lumbar. No neurosurgical intervention indicated at this time.  -Neurology consult for workup of weakness  -Will follow peripherally while in house. Please call with questions. Patient to follow up with own neurosurgeon as scheduled.        Roopa Horowitz MD, PhD  [address], [address] [phone] (c), [phone] (o)

## 2022-06-09 NOTE — CONSULTS
[facility] Nurse  Consult Note       NAME:  Maurisio Kyle 153 RECORD NUMBER:  [id number]  AGE: 70 y.o. GENDER: male  : 1950  TODAY'S DATE:  2022    Subjective   Reason for WOCN Evaluation and Assessment: R Buttock - Stage 2, shearing      Nivia Plaster is a 70 y.o. male referred by:   [] Physician  [x] Nursing  [] Other:     Wound Identification:  Wound Type: pressure  Contributing Factors: diabetes, chronic pressure, decreased mobility, shear force and obesity    Wound History: 70 y.o. male who presents from [facility] for worsening back pain, progressive bilateral lower extremity weakness and frequent falls. Neurosurgery was consulted and recommended transfer to [facility]. Patient has history of low back pain due to known lumbar spinal stenosis, has had 3 prior back surgeries. Patient states that he is expecting his fourth. Patient has been following with [facility] neurosurgery. His neurosurgeon who has done previous surgeries [place].       Patient presents with progressive pain and worsening bilateral weakness with associated paresthesias. Patient usually ambulates using a walker. Unable to use a walker due to the worsening bilateral leg weakness and associated paresthesia. Patient has fallen twice yesterday and once on Monday due to the weakness.     Patient has had CT myelogram on  which is showing severe foraminal stenosis bilaterally at L2/3 which may be contributing to his symptoms according to neurosurgery.   Current Wound Care Treatment: R Buttock - zinc paste    Patient Goal of Care:  [x] Wound Healing  [] Odor Control  [] Palliative Care  [] Pain Control   [] Other:         PAST MEDICAL HISTORY        Diagnosis Date    Anxiety     Arthritis     Asthma     [facility]     Colitis     Depression     Diabetes [facility]     Diabetes mellitus ([facility]     Encounter for imaging to screen for metal prior to MRI 06/08/2022    NOT MRI conditional [employer] model#SC-[id number], Lead: [id number] implanted 9/9/19 by Wilberto Saunders at [facility]. Lead is unsafe for MRI. Patient unable to have any MRI ever.     Glaucoma     H/O bladder problems     High blood pressure     History of kidney problems     IBS (irritable bowel syndrome)     Liver disease     hx of elevated LFT's    Obesity     Psychiatric problem        PAST SURGICAL HISTORY    Past Surgical History:   Procedure Laterality Date    APPENDECTOMY      BACK SURGERY      CERVICAL FUSION      COLONOSCOPY      EYE SURGERY      HIP SURGERY      JOINT REPLACEMENT Right     THR    OTHER SURGICAL HISTORY  06/06/2018     right L4-5 hemilaminotomy, partial facetectomy, foraminotomy, reexploration and excision of synovial csyt,  Bilateral L2-3 and L3-4 hemilaminotomy, partial facetectomy, foraminotomy    [place] NERVOUS SYSTEM SURGERY UNLISTED Bilateral 11/12/2018    BILATERAL LUMBAR THREE, LUMBAR FOUR, LUMBAR FIVE RADIOFREQUENCY ABLATION SITE CONFIRMED BY FLUOROSCOPY performed by Avelina Diaz MD at [facility] DX/THER [facility] INTRLMNR CRV/THRC W/IMG GDN Bilateral 9/25/2018    BILATERAL LUMBAR THREE, LUMBAR FOUR, LUMBAR FIVE MEDIAL BRANCH BLOCK SITE CONFIRMED BY FLUOROSCOPY performed by Avelina Diaz MD at [facility] DX/THER [facility] INTRLMNR CRV/THRC W/IMG GDN Bilateral 10/16/2018    BILATERAL LUMBAR THREE, FOUR, FIVE MEDIAL BRANCH BLOCK SITE CONFIRMED BY FLUOROSCOPY performed by Avelina Diaz MD at [address] TURP N/A 6/9/2020    CYSTOSCOPY, TRANSURETHRAL RESECTION OF PROSTATE performed by Shilea Chakraborty MD at [address] [place] [address]    Family History   Problem Relation Age of Onset    Alcohol Abuse Sister     Coronary Art Dis Mother     Obesity Mother     Osteoarthritis Mother     Parkinsonism Mother     Coronary Art Dis Father     Obesity Father        SOCIAL HISTORY    Social History Tobacco Use    Smoking status: Never Smoker    Smokeless tobacco: Never Used   Vaping Use    Vaping Use: Never used   Substance Use Topics    Alcohol use: No    Drug use: No       ALLERGIES    Allergies   Allergen Reactions    No Known Allergies        MEDICATIONS    No current facility-administered medications on file prior to encounter. Current Outpatient Medications on File Prior to Encounter   Medication Sig Dispense Refill    lamoTRIgine (LAMICTAL) 25 MG tablet Take 50 mg by mouth daily      lamoTRIgine (LAMICTAL) 25 MG tablet Take 25 mg by mouth at bedtime      lithium 300 MG capsule Take 300 mg by mouth daily      lithium 300 MG capsule Take 600 mg by mouth at bedtime      metFORMIN (GLUCOPHAGE-XR) 500 mg extended release tablet Take 1,000 mg by mouth 2 times daily (with meals)      traZODone (DESYREL) 100 MG tablet Take 200 mg by mouth in the morning and at bedtime      acetaminophen (TYLENOL) 500 MG tablet Take 1,000 mg by mouth nightly as needed for Pain      gabapentin (NEURONTIN) 300 MG capsule Take 300 mg by mouth 3 times daily.       Dulaglutide (TRULICITY) 3 VT/0.1CD SOPN Inject 3 mg into the skin once a week Takes on Sundays      glipiZIDE (GLUCOTROL) 5 mg tablet Take 5 mg by mouth daily      apixaban (ELIQUIS) 5 MG TABS tablet Take 5 mg by mouth 2 times daily       rosuvastatin (CRESTOR) 20 MG tablet Take 20 mg by mouth at bedtime       verapamil (CALAN SR) 120 MG extended release tablet Take 120 mg by mouth nightly         Objective    /70   Pulse 57   Temp 97.3 °F (36.3 °C) (Oral)   Resp 16   Ht 6' 3\" (1.905 m)   Wt 232 lb 5.8 oz (105.4 kg)   SpO2 97%   BMI 29.04 kg/m²     LABS:  WBC:    Lab Results   Component Value Date    WBC 7.0 06/07/2022     H/H:    Lab Results   Component Value Date    HGB 14.3 06/07/2022    HCT 42.6 06/07/2022     PTT:    Lab Results   Component Value Date    APTT 30.1 06/07/2022   [APTT}  PT/INR:    Lab Results   Component Value Date PROTIME 13.9 06/07/2022    INR 1.09 06/07/2022     HgBA1c:    Lab Results   Component Value Date    LABA1C 7.0 07/12/2020       Assessment: R Buttock - small open area with pink/red wound bed, shearing noted   Dano Risk Score: Dano Scale Score: 20    Patient Active Problem List   Diagnosis Code    Enlarged lymph nodes R59.9    Spinal stenosis M48.00    Degeneration of lumbar or lumbosacral intervertebral disc M51.37    Lumbosacral spondylosis without myelopathy M47.817    Displacement of lumbar intervertebral disc without myelopathy M51.26    Urinary retention R33.9    Pyelonephritis N12    S/P TURP U05.77    Metabolic encephalopathy Q66.94    Type 2 diabetes mellitus ([facility]) E11.9    Essential hypertension I10    Prostate hyperplasia with urinary obstruction N40.1, N13.8    Sepsis ([facility]) A41.9    Bipolar 1 disorder ([facility]) F31.9    Asthma J45.909    Acute epididymo-orchitis N45.3    Constipation due to opioid therapy K59.03, T40.2X5A    Bilateral leg weakness R29.898       Measurements:  Wound 06/09/20 Buttocks (Active)   Number of days: 729       Wound 06/09/20 Buttocks Right (Active)   Number of days: 729       Wound 07/11/20 Buttocks (Active)   Number of days: 697       Wound 07/12/20 Left; Lower; Inner callous from a previous pressure injury, per wife (Active)   Number of days: 696       Wound 06/08/22 Buttocks Right (Active)   Wound Etiology Pressure Stage 2 06/09/22 1003   Dressing Status Intact 06/09/22 1003   Wound Cleansed Not Cleansed 06/09/22 1003   Dressing/Treatment Zinc paste 06/09/22 1003   Wound Length (cm) 1 cm 06/09/22 1003   Wound Width (cm) 1 cm 06/09/22 1003   Wound Depth (cm) 0.1 cm 06/09/22 1003   Wound Surface Area (cm^2) 1 cm^2 06/09/22 1003   Wound Volume (cm^3) 0.1 cm^3 06/09/22 1003   Wound Assessment Pink/red 06/09/22 1003   Drainage Amount None 06/09/22 1003   Odor None 06/09/22 1003   Brenda-wound Assessment Blanchable erythema;Dry/flaky; Intact 06/09/22 1003   Margins

## 2022-06-09 NOTE — CARE COORDINATION
Social Work: Discussed in Interdisciplinary Rounds:    SW following for potential SNF placement. Referrals have been sent based on family preference. Waiting on therapy recs as precert is needed for placement. Top preference is [facility], 2nd choice [facility], last resort if other two can't accept is [facility]. SW to follow.     Murphy Matthews, MSW  [phone]

## 2022-06-09 NOTE — PLAN OF CARE
Problem: Pain  Goal: Verbalizes/displays adequate comfort level or baseline comfort level  Outcome: Progressing   RN assesses pain using 0-10 scale. Patient understands how to rate pain using 0-10 scale. Pain is controled with medication per MAR. RN encourages patient to call out for breakthrough pain. Will continue to monitor and reassess. Problem: Safety - Adult  Goal: Free from fall injury  Outcome: Progressing   Patient remains free from falls during this shift. Bed is in the lowest position and the bedalarm is activated. Anti-slip socks are on. Call light is within reach. Will continue to monitor and reassess.

## 2022-06-10 LAB
ANION GAP SERPL CALCULATED.3IONS-SCNC: 7 MMOL/L (ref 3–16)
BASOPHILS ABSOLUTE: 0.1 K/UL (ref 0–0.2)
BASOPHILS RELATIVE PERCENT: 0.7 %
BUN BLDV-MCNC: 18 MG/DL (ref 7–20)
CALCIUM SERPL-MCNC: 9.6 MG/DL (ref 8.3–10.6)
CHLORIDE BLD-SCNC: 102 MMOL/L (ref 99–110)
CO2: 25 MMOL/L (ref 21–32)
CREAT SERPL-MCNC: 0.9 MG/DL (ref 0.8–1.3)
EOSINOPHILS ABSOLUTE: 0.3 K/UL (ref 0–0.6)
EOSINOPHILS RELATIVE PERCENT: 3.5 %
ESTIMATED AVERAGE GLUCOSE: 182.9 MG/DL
GFR AFRICAN AMERICAN: >60
GFR NON-AFRICAN AMERICAN: >60
GLUCOSE BLD-MCNC: 250 MG/DL (ref 70–99)
GLUCOSE BLD-MCNC: 260 MG/DL (ref 70–99)
GLUCOSE BLD-MCNC: 280 MG/DL (ref 70–99)
GLUCOSE BLD-MCNC: 319 MG/DL (ref 70–99)
GLUCOSE BLD-MCNC: 341 MG/DL (ref 70–99)
HBA1C MFR BLD: 8 %
HCT VFR BLD CALC: 43.4 % (ref 40.5–52.5)
HEMOGLOBIN: 15 G/DL (ref 13.5–17.5)
LYMPHOCYTES ABSOLUTE: 1.4 K/UL (ref 1–5.1)
LYMPHOCYTES RELATIVE PERCENT: 14.6 %
MCH RBC QN AUTO: 32.6 PG (ref 26–34)
MCHC RBC AUTO-ENTMCNC: 34.6 G/DL (ref 31–36)
MCV RBC AUTO: 94.5 FL (ref 80–100)
MONOCYTES ABSOLUTE: 0.8 K/UL (ref 0–1.3)
MONOCYTES RELATIVE PERCENT: 8.1 %
NEUTROPHILS ABSOLUTE: 7 K/UL (ref 1.7–7.7)
NEUTROPHILS RELATIVE PERCENT: 73.1 %
PDW BLD-RTO: 12.9 % (ref 12.4–15.4)
PERFORMED ON: ABNORMAL
PLATELET # BLD: 240 K/UL (ref 135–450)
PMV BLD AUTO: 7.9 FL (ref 5–10.5)
POTASSIUM REFLEX MAGNESIUM: 4.7 MMOL/L (ref 3.5–5.1)
RBC # BLD: 4.6 M/UL (ref 4.2–5.9)
SODIUM BLD-SCNC: 134 MMOL/L (ref 136–145)
WBC # BLD: 9.5 K/UL (ref 4–11)

## 2022-06-10 PROCEDURE — 85025 COMPLETE CBC W/AUTO DIFF WBC: CPT

## 2022-06-10 PROCEDURE — 2580000003 HC RX 258: Performed by: INTERNAL MEDICINE

## 2022-06-10 PROCEDURE — 97535 SELF CARE MNGMENT TRAINING: CPT

## 2022-06-10 PROCEDURE — 6370000000 HC RX 637 (ALT 250 FOR IP): Performed by: INTERNAL MEDICINE

## 2022-06-10 PROCEDURE — 97110 THERAPEUTIC EXERCISES: CPT

## 2022-06-10 PROCEDURE — 80048 BASIC METABOLIC PNL TOTAL CA: CPT

## 2022-06-10 PROCEDURE — 97530 THERAPEUTIC ACTIVITIES: CPT

## 2022-06-10 PROCEDURE — 36415 COLL VENOUS BLD VENIPUNCTURE: CPT

## 2022-06-10 PROCEDURE — 1200000000 HC SEMI PRIVATE

## 2022-06-10 RX ORDER — GABAPENTIN 100 MG/1
100 CAPSULE ORAL 3 TIMES DAILY
Status: DISCONTINUED | OUTPATIENT
Start: 2022-06-10 | End: 2022-06-11

## 2022-06-10 RX ORDER — TRAZODONE HYDROCHLORIDE 100 MG/1
100 TABLET ORAL 2 TIMES DAILY
Status: DISCONTINUED | OUTPATIENT
Start: 2022-06-10 | End: 2022-06-12

## 2022-06-10 RX ORDER — OXYCODONE HYDROCHLORIDE 5 MG/1
5 TABLET ORAL EVERY 4 HOURS PRN
Status: DISCONTINUED | OUTPATIENT
Start: 2022-06-10 | End: 2022-06-13 | Stop reason: HOSPADM

## 2022-06-10 RX ORDER — TRAZODONE HYDROCHLORIDE 50 MG/1
50 TABLET ORAL 2 TIMES DAILY
Status: DISCONTINUED | OUTPATIENT
Start: 2022-06-10 | End: 2022-06-10

## 2022-06-10 RX ORDER — ACETAMINOPHEN 325 MG/1
650 TABLET ORAL EVERY 4 HOURS
Status: DISCONTINUED | OUTPATIENT
Start: 2022-06-10 | End: 2022-06-13 | Stop reason: HOSPADM

## 2022-06-10 RX ADMIN — ACETAMINOPHEN 650 MG: 325 TABLET ORAL at 14:11

## 2022-06-10 RX ADMIN — SODIUM CHLORIDE, PRESERVATIVE FREE 10 ML: 5 INJECTION INTRAVENOUS at 20:29

## 2022-06-10 RX ADMIN — INSULIN LISPRO 4 UNITS: 100 INJECTION, SOLUTION INTRAVENOUS; SUBCUTANEOUS at 17:10

## 2022-06-10 RX ADMIN — INSULIN GLARGINE 21 UNITS: 100 INJECTION, SOLUTION SUBCUTANEOUS at 20:22

## 2022-06-10 RX ADMIN — ROSUVASTATIN CALCIUM 20 MG: 20 TABLET, FILM COATED ORAL at 20:23

## 2022-06-10 RX ADMIN — OXYCODONE 5 MG: 5 TABLET ORAL at 11:40

## 2022-06-10 RX ADMIN — TRAZODONE HYDROCHLORIDE 100 MG: 100 TABLET ORAL at 20:23

## 2022-06-10 RX ADMIN — INSULIN LISPRO 3 UNITS: 100 INJECTION, SOLUTION INTRAVENOUS; SUBCUTANEOUS at 11:45

## 2022-06-10 RX ADMIN — SODIUM CHLORIDE, PRESERVATIVE FREE 10 ML: 5 INJECTION INTRAVENOUS at 07:24

## 2022-06-10 RX ADMIN — GABAPENTIN 100 MG: 100 CAPSULE ORAL at 14:11

## 2022-06-10 RX ADMIN — INSULIN LISPRO 2 UNITS: 100 INJECTION, SOLUTION INTRAVENOUS; SUBCUTANEOUS at 20:23

## 2022-06-10 RX ADMIN — LITHIUM CARBONATE 600 MG: 300 CAPSULE, GELATIN COATED ORAL at 20:23

## 2022-06-10 RX ADMIN — VERAPAMIL HYDROCHLORIDE 120 MG: 120 TABLET, FILM COATED, EXTENDED RELEASE ORAL at 20:23

## 2022-06-10 RX ADMIN — ACETAMINOPHEN 650 MG: 325 TABLET ORAL at 07:35

## 2022-06-10 RX ADMIN — INSULIN LISPRO 3 UNITS: 100 INJECTION, SOLUTION INTRAVENOUS; SUBCUTANEOUS at 08:10

## 2022-06-10 RX ADMIN — OXYCODONE 5 MG: 5 TABLET ORAL at 22:42

## 2022-06-10 RX ADMIN — LAMOTRIGINE 50 MG: 100 TABLET ORAL at 07:25

## 2022-06-10 RX ADMIN — ACETAMINOPHEN 650 MG: 325 TABLET ORAL at 22:36

## 2022-06-10 RX ADMIN — TRAZODONE HYDROCHLORIDE 200 MG: 100 TABLET ORAL at 07:25

## 2022-06-10 RX ADMIN — LAMOTRIGINE 25 MG: 25 TABLET ORAL at 20:23

## 2022-06-10 RX ADMIN — OXYCODONE 5 MG: 5 TABLET ORAL at 17:12

## 2022-06-10 RX ADMIN — ACETAMINOPHEN 650 MG: 325 TABLET ORAL at 17:12

## 2022-06-10 RX ADMIN — LITHIUM CARBONATE 300 MG: 300 CAPSULE, GELATIN COATED ORAL at 07:25

## 2022-06-10 RX ADMIN — GABAPENTIN 100 MG: 100 CAPSULE ORAL at 20:23

## 2022-06-10 ASSESSMENT — PAIN SCALES - GENERAL
PAINLEVEL_OUTOF10: 7
PAINLEVEL_OUTOF10: 0
PAINLEVEL_OUTOF10: 0
PAINLEVEL_OUTOF10: 6
PAINLEVEL_OUTOF10: 0

## 2022-06-10 ASSESSMENT — PAIN DESCRIPTION - LOCATION
LOCATION: BACK
LOCATION: BACK

## 2022-06-10 NOTE — PROGRESS NOTES
Hospitalist Progress Note      PCP: No primary care provider on file. Date of Admission: 2022    Chief Complaint on Admission: back, leg pain, weakness    Pt Seen/Examined and Chart Reviewed. Admitting dx as above    SUBJECTIVE/OBJECTIVE:     No visitors today. Called his wife on both phone numbers listed- no answer, left voicemail on house number, cell phone gives message- \"mailbox is not set up\". Neurology could not get a hold of wife yesterday either. Patient continues to report severe back pain that radiating to the front and down his legs. Unable to move legs very well. Per RN he was awake and chatty, making jokes prior to his home meds. After getting his home medications he is sleepy but arousable. He again confirms that he takes trazodone at this dose. Discussed risks of lethargy if we were to combine pain meds and trazodone. He agrees to lower trazodone dose to give room for pain meds and gabapentin. Allergies  No known allergies    Medications      Scheduled Meds:   [Held by provider] gabapentin  300 mg Oral TID    rosuvastatin  20 mg Oral Nightly    verapamil  120 mg Oral Nightly    sodium chloride flush  5-40 mL IntraVENous 2 times per day    insulin lispro  0-6 Units SubCUTAneous TID WC    insulin lispro  0-3 Units SubCUTAneous Nightly    lamoTRIgine  50 mg Oral Daily    lamoTRIgine  25 mg Oral Nightly    lithium  300 mg Oral Daily    lithium  600 mg Oral Nightly    traZODone  200 mg Oral BID       Infusions:   dextrose      sodium chloride         PRN Meds:  glucose, dextrose bolus **OR** dextrose bolus, glucagon (rDNA), dextrose, sodium chloride flush, sodium chloride, ondansetron **OR** ondansetron, polyethylene glycol, acetaminophen **OR** acetaminophen    Vitals    TEMPERATURE:  Current - Temp: 98.1 °F (36.7 °C);  Max - Temp  Av.4 °F (36.9 °C)  Min: 97.7 °F (36.5 °C)  Max: 98.8 °F (37.1 °C)  RESPIRATIONS RANGE: Resp  Av  Min: 16  Max: 16  PULSE RANGE: Pulse Av.7  Min: 58  Max: 70  BLOOD PRESSURE RANGE:  Systolic (40XDC), SHW:547 , Min:128 , DEIDRA:748   ; Diastolic (35GOD), HUNTER:83, Min:66, Max:79    PULSE OXIMETRY RANGE: SpO2  Av.5 %  Min: 95 %  Max: 98 %  24HR INTAKE/OUTPUT:      Intake/Output Summary (Last 24 hours) at 6/10/2022 1025  Last data filed at 6/10/2022 7255  Gross per 24 hour   Intake 240 ml   Output 1675 ml   Net -1435 ml       Exam:      General appearance: No apparent distress, appears stated age and cooperative. Lungs: Clear to ascultation, bilaterally without Rales/Wheezes/Rhonchi with good respiratory effort. Heart: Regular rate and rhythm with Normal S1/S2 without  murmurs, rubs or gallops, point of maximum impulse non-displaced  Abdomen: Soft, non-tender or non-distended without rigidity or guarding and positive bowel sounds all four quadrants. Extremities: No clubbing, cyanosis, or edema bilaterally. Skin: Skin color, texture, turgor normal.    Neurologic: Alert and oriented X 3, bilateral LE weakness, 3.5  Mental status: Alert, oriented, thought content appropriate. Data    Recent Labs     22  1337 06/10/22  0754   WBC 7.0 9.5   HGB 14.3 15.0   HCT 42.6 43.4    240      Recent Labs     22  1337 22  0635 06/10/22  0754    135* 134*   K 4.5 4.2 4.7    104 102   CO2 25 18* 25   BUN 15 20 18   CREATININE 0.9 0.9 0.9     Recent Labs     22  1337   AST 36   ALT 31   BILITOT 0.4   ALKPHOS 87     Recent Labs     22  1337   INR 1.09     No results for input(s): CKTOTAL, CKMB, CKMBINDEX, TROPONINI in the last 72 hours.     Consults:     IP CONSULT TO NEUROSURGERY  IP CONSULT TO PHARMACY  IP CONSULT TO NEUROLOGY    Active Hospital Problems    Diagnosis Date Noted    Bilateral leg weakness [R29.898] 2022     Priority: Medium         ASSESSMENT AND PLAN      Acute on chronic lower back pain with leg weakness, falls, leg pain:  Neurosurgery consulted  Patient is unable to get MRI  s/p CT myelogram of entire neuraxis- no acute surgical intervention needed, no clear evidence of central stenosis or cord compression. Per neurosurgery- okay to follow up with primary neurosurgeon at outside facility on dc    Neurology was consulted, do not suspect stroke, AIDP ot transverse myelitis. will need EMG as outpatient. PT/OT and pain control    Will decrease trazodone and add lower dose gabapentin and oxycodone    DM type 2, bipolar disorder, HTN:  pharmacy reconciled home medications  Reordered as he takes at home after verification  Place on weight based lantus nightly    DVT Prophylaxis: SCD  Diet: ADULT DIET;  Regular; 4 carb choices (60 gm/meal)  Code Status: Full Code    PT/OT Eval Status:pending needs placement    Dispo - Gelacio Sampson MD

## 2022-06-10 NOTE — PLAN OF CARE
Problem: Pain  Goal: Verbalizes/displays adequate comfort level or baseline comfort level  Outcome: Progressing   RN assesses pain using 0-10 scale. Patient understands how to rate pain using 0-10 scale. Pain is controled with medication per MAR. RN encourages patient to call out for breakthrough pain. Will continue to monitor and reassess. Problem: Safety - Adult  Goal: Free from fall injury  Outcome: Progressing   Patient remains free from falls during this shift. Bed is in the lowest position and the bed alarm is activated. Anti-slip socks are on. Call light is within reach. Will continue to monitor and reassess.

## 2022-06-10 NOTE — PROGRESS NOTES
Occupational Therapy  [facility]    Name: Mary Wright  : 1950  MRN: [id number]  Date of Service: 6/10/2022    Discharge Recommendations:  Mary Wright scored a 10/24 on the AM-PAC ADL Inpatient form. Current research shows that an AM-PAC score of 17 or less is typically not associated with a discharge to the patient's home setting. Based on the patient's AM-PAC score and their current ADL deficits, it is recommended that the patient have 3-5 sessions per week of Occupational Therapy at d/c to increase the patient's independence. Please see assessment section for further patient specific details. If patient discharges prior to next session this note will serve as a discharge summary. Please see below for the latest assessment towards goals. OT Equipment Recommendations  Other: defer to next level of care       Patient Diagnosis(es): There were no encounter diagnoses. Past Medical History:  has a past medical history of Anxiety, Arthritis, Asthma, Bipolar 1 disorder ([address] Colitis, Depression, Diabetes ([address] Diabetes mellitus ([address] Encounter for imaging to screen for metal prior to MRI, Glaucoma, H/O bladder problems, High blood pressure, History of kidney problems, IBS (irritable bowel syndrome), Liver disease, Obesity, and Psychiatric problem. Past Surgical History:  has a past surgical history that includes back surgery; hip surgery; Eye surgery; Colonoscopy; cervical fusion; joint replacement (Right); other surgical history (2018); pr njx dx/ther sbst intrlmnr crv/thrc w/img gdn (Bilateral, 2018); pr njx dx/ther sbst intrlmnr crv/thrc w/img gdn (Bilateral, 10/16/2018); pr nervous system surgery unlisted (Bilateral, 2018); Appendectomy; and TURP (N/A, 2020).     Treatment Diagnosis: decreased ability to perform ADL 2/2 back pain      Assessment   Performance deficits / Impairments: Decreased functional mobility ; Decreased ADL status; Decreased ROM; Decreased strength;Decreased safe awareness;Decreased cognition;Decreased endurance;Decreased balance;Decreased high-level IADLs;Decreased fine motor control;Decreased coordination;Decreased posture  Assessment: Pt participated in hygiene tasks, UE exerc and bed  mobility. He requires max A of 2 to roll, dep of 2 to scoot up in bed while supine. Recommend ongoing inpatient OT at discharge to increase functional status. Treatment Diagnosis: decreased ability to perform ADL 2/2 back pain  Prognosis: Good  REQUIRES OT FOLLOW-UP: Yes        Plan   Plan  Times per Week: 2-5  Current Treatment Recommendations: Strengthening,Balance training,ROM,Functional mobility training,Endurance training,Patient/Caregiver education & training,Safety education & training,Pain management,Self-Care / ADL,Positioning,Equipment evaluation, education, & procurement,Coordination training     Restrictions  Position Activity Restriction  Other position/activity restrictions: Up with assist    Subjective   General  Chart Reviewed: Yes  Patient assessed for rehabilitation services?: Yes  Family / Caregiver Present: No  Referring Practitioner: FRANCISCO JAVIER Bob NP  Diagnosis: Acute on chronic lower back pain with leg weakness, falls, leg pain. CT spine  Subjective  Subjective: Pt in bed, agreeable to do simple hygiene and UE exerc in bed.      Social/Functional History  Social/Functional History  Lives With: Spouse  Type of Home: House  Home Layout: One level  Home Access: Stairs to enter without rails  Entrance Stairs - Number of Steps: 1  Bathroom Shower/Tub: Walk-in shower  Bathroom Toilet: Handicap height (sink next to toilet)  Bathroom Equipment: Grab bars in shower  Home Equipment: [employer] [place], rolling  Has the patient had two or more falls in the past year or any fall with injury in the past year?: Yes  Receives Help From: Family  ADL Assistance: [facility] [facility] [address] [facility] [address]: Needs assistance (cleaning lady once a week, wife does cooking and grocery shopping)  Homemaking Responsibilities: No  Ambulation Assistance: Independent (with RW)  Transfer Assistance: Independent  Active : Yes  Occupation: Retired  Additional Comments: wife works during the day       Objective           Safety Devices  Type of Devices: Left in bed;Bed alarm in place;Nurse notified;Call light within reach           ADL  UE Dressing: Moderate assistance (mod A changing gown in bed)   Dep washing back/LEs secondary to condom cath coming off(Rn notified and replaced it)     Bed mobility  Rolling to Left: Maximum assistance;2 Person assistance (cues, use of bed rail)  Rolling to Right: Maximum assistance;2 Person assistance (cues, use of bed rail)  Scooting:  (dep of 2 scooting up in bed while supine)        Cognition  Overall Cognitive Status: Exceptions  Arousal/Alertness: Delayed responses to stimuli  Following Commands: Follows one step commands with repetition; Follows one step commands with increased time  Attention Span: Difficulty dividing attention; Difficulty attending to directions  Memory: Decreased recall of recent events  Safety Judgement: Decreased awareness of need for assistance;Decreased awareness of need for safety  Problem Solving: Assistance required to generate solutions;Assistance required to implement solutions;Assistance required to identify errors made;Assistance required to correct errors made;Decreased awareness of errors  Insights: Decreased awareness of deficits  Initiation: Requires cues for some  Sequencing: Requires cues for some               Exercise Treatment: supine in bed with head of bed elevated:  10 reps shoulder flex, elbow flex/ext-demonstrationn and cues  Education Given To: Patient  Education Provided: Role of Therapy  Education Method: Verbal  Education Outcome: Verbalized understanding            Pt's condom catheter off-reported to RN and she came to room and replaced it.              G-Code     OutComes Score                                                  AM-PAC Score        AM-PAC Inpatient Daily Activity Raw Score: 10 (06/10/22 1602)  AM-PAC Inpatient ADL T-Scale Score : 27.31 (06/10/22 1602)  ADL Inpatient CMS 0-100% Score: 74.7 (06/10/22 1602)  ADL Inpatient CMS G-Code Modifier : CL (06/10/22 1602)    Goals  Short Term Goals  Time Frame for Short term goals: 1 week  Short Term Goal 1: 1 grooming task in supported sitting position with SBA-6/10 goal not addressed  Short Term Goal 2: Supine to sit with mod A x2-6/10 goal not addressed  Short Term Goal 3: Sit EOB x3 mins with SBA-6/10 goal not addressed  Short Term Goal 4: Sit to stand with max A x2 in prep for functional t/f-6/10 goal not addressed  Patient Goals   Patient goals : \"I want to be in less pain. \"       Therapy Time   Individual Concurrent Group Co-treatment   Time In  1520         Time Out 1558         Minutes  38              Timed Code Treatment Minutes:   38    Total Treatment Minutes:  DEMETRIS BurkR/L [id number] [phone]

## 2022-06-10 NOTE — PROGRESS NOTES
Patient is alert and oriented. Vital signs are stable. External catheter in place and has adequate urine output. Patient is on specialty mattress. Bed is in the lowest position. Bed alarm is activated. Call light is within reach. Will continue to monitor and reassess.

## 2022-06-10 NOTE — CARE COORDINATION
Social Work: Discussed in Interdisciplinary Rounds:    YEE following for SNF placement. Pt spoke with admission at [facility] (this was 2nd preference), they are not in network with pt's insurance but family can pay out of pocket ($5400 up front). [facility] has accepted. [facility] is checking on bed availability will call this SW back with update (this is family 1st choice). SW to follow up, will update family. [id number]: YEE spoke with wife Wallace Martínez to provide update. She now wants pt to go to [facility] as she does not drive. She also would like to speak with a doctor, she states she has not spoken to one since the pt has been here. YEE agreed to pass on message. YEE spoke with [place] [phone] in admissions at [facility], she will submit precert today. SW to follow.      Gemma Barragan, MSW  [phone]

## 2022-06-10 NOTE — PLAN OF CARE
Problem: Skin/Tissue Integrity  Goal: Absence of new skin breakdown  Description: 1. Monitor for areas of redness and/or skin breakdown  2. Assess vascular access sites hourly  3. Every 4-6 hours minimum:  Change oxygen saturation probe site  4. Every 4-6 hours:  If on nasal continuous positive airway pressure, respiratory therapy assess nares and determine need for appliance change or resting period. Outcome: Progressing       Skin assessed with no new areas of skin break down. Problem: Safety - Adult  Goal: Free from fall injury  6/10/2022 1012 by Court Duvall RN  Outcome: Progressing  Patient is a fall risk. Fall risk protocol in place as per fall risk score, see assessment for details. Bed is locked and in lowest possible position, side rails up x2, alarm in place and on, no slip footwear on. Call light/belongings within reach. Patient utilizes call light appropriately for assist as needed. Hourly rounds in place for safety, pt remains free from fall/injury. Will continue to monitor. ;

## 2022-06-11 LAB
GLUCOSE BLD-MCNC: 261 MG/DL (ref 70–99)
GLUCOSE BLD-MCNC: 288 MG/DL (ref 70–99)
GLUCOSE BLD-MCNC: 319 MG/DL (ref 70–99)
GLUCOSE BLD-MCNC: 362 MG/DL (ref 70–99)
PERFORMED ON: ABNORMAL

## 2022-06-11 PROCEDURE — 2580000003 HC RX 258: Performed by: INTERNAL MEDICINE

## 2022-06-11 PROCEDURE — 6370000000 HC RX 637 (ALT 250 FOR IP): Performed by: INTERNAL MEDICINE

## 2022-06-11 PROCEDURE — 1200000000 HC SEMI PRIVATE

## 2022-06-11 RX ORDER — SENNA AND DOCUSATE SODIUM 50; 8.6 MG/1; MG/1
2 TABLET, FILM COATED ORAL 2 TIMES DAILY
Status: DISCONTINUED | OUTPATIENT
Start: 2022-06-11 | End: 2022-06-13 | Stop reason: HOSPADM

## 2022-06-11 RX ORDER — INSULIN LISPRO 100 [IU]/ML
0-6 INJECTION, SOLUTION INTRAVENOUS; SUBCUTANEOUS NIGHTLY
Status: DISCONTINUED | OUTPATIENT
Start: 2022-06-11 | End: 2022-06-13 | Stop reason: HOSPADM

## 2022-06-11 RX ORDER — INSULIN LISPRO 100 [IU]/ML
0-12 INJECTION, SOLUTION INTRAVENOUS; SUBCUTANEOUS
Status: DISCONTINUED | OUTPATIENT
Start: 2022-06-11 | End: 2022-06-13 | Stop reason: HOSPADM

## 2022-06-11 RX ORDER — TRAZODONE HYDROCHLORIDE 100 MG/1
100 TABLET ORAL NIGHTLY
Status: DISCONTINUED | OUTPATIENT
Start: 2022-06-11 | End: 2022-06-12

## 2022-06-11 RX ORDER — GABAPENTIN 300 MG/1
300 CAPSULE ORAL 3 TIMES DAILY
Status: DISCONTINUED | OUTPATIENT
Start: 2022-06-11 | End: 2022-06-13 | Stop reason: HOSPADM

## 2022-06-11 RX ADMIN — LAMOTRIGINE 25 MG: 25 TABLET ORAL at 20:47

## 2022-06-11 RX ADMIN — VERAPAMIL HYDROCHLORIDE 120 MG: 120 TABLET, FILM COATED, EXTENDED RELEASE ORAL at 19:58

## 2022-06-11 RX ADMIN — GABAPENTIN 100 MG: 100 CAPSULE ORAL at 08:24

## 2022-06-11 RX ADMIN — ROSUVASTATIN CALCIUM 20 MG: 20 TABLET, FILM COATED ORAL at 19:57

## 2022-06-11 RX ADMIN — SODIUM CHLORIDE, PRESERVATIVE FREE 10 ML: 5 INJECTION INTRAVENOUS at 08:30

## 2022-06-11 RX ADMIN — INSULIN GLARGINE 21 UNITS: 100 INJECTION, SOLUTION SUBCUTANEOUS at 20:48

## 2022-06-11 RX ADMIN — INSULIN LISPRO 3 UNITS: 100 INJECTION, SOLUTION INTRAVENOUS; SUBCUTANEOUS at 08:22

## 2022-06-11 RX ADMIN — ACETAMINOPHEN 650 MG: 325 TABLET ORAL at 11:21

## 2022-06-11 RX ADMIN — LITHIUM CARBONATE 600 MG: 300 CAPSULE, GELATIN COATED ORAL at 19:57

## 2022-06-11 RX ADMIN — TRAZODONE HYDROCHLORIDE 100 MG: 100 TABLET ORAL at 20:01

## 2022-06-11 RX ADMIN — INSULIN LISPRO 3 UNITS: 100 INJECTION, SOLUTION INTRAVENOUS; SUBCUTANEOUS at 11:25

## 2022-06-11 RX ADMIN — SENNOSIDES AND DOCUSATE SODIUM 2 TABLET: 50; 8.6 TABLET ORAL at 19:57

## 2022-06-11 RX ADMIN — ACETAMINOPHEN 650 MG: 325 TABLET ORAL at 18:33

## 2022-06-11 RX ADMIN — OXYCODONE 5 MG: 5 TABLET ORAL at 17:16

## 2022-06-11 RX ADMIN — LITHIUM CARBONATE 300 MG: 300 CAPSULE, GELATIN COATED ORAL at 08:24

## 2022-06-11 RX ADMIN — ACETAMINOPHEN 650 MG: 325 TABLET ORAL at 08:24

## 2022-06-11 RX ADMIN — INSULIN LISPRO 4 UNITS: 100 INJECTION, SOLUTION INTRAVENOUS; SUBCUTANEOUS at 20:47

## 2022-06-11 RX ADMIN — TRAZODONE HYDROCHLORIDE 100 MG: 100 TABLET ORAL at 19:57

## 2022-06-11 RX ADMIN — ACETAMINOPHEN 650 MG: 325 TABLET ORAL at 14:59

## 2022-06-11 RX ADMIN — LAMOTRIGINE 50 MG: 100 TABLET ORAL at 08:24

## 2022-06-11 RX ADMIN — GABAPENTIN 300 MG: 300 CAPSULE ORAL at 19:56

## 2022-06-11 RX ADMIN — ACETAMINOPHEN 650 MG: 325 TABLET ORAL at 03:40

## 2022-06-11 RX ADMIN — INSULIN LISPRO 8 UNITS: 100 INJECTION, SOLUTION INTRAVENOUS; SUBCUTANEOUS at 18:33

## 2022-06-11 RX ADMIN — SENNOSIDES AND DOCUSATE SODIUM 2 TABLET: 50; 8.6 TABLET ORAL at 14:59

## 2022-06-11 RX ADMIN — SODIUM CHLORIDE, PRESERVATIVE FREE 10 ML: 5 INJECTION INTRAVENOUS at 20:04

## 2022-06-11 RX ADMIN — TRAZODONE HYDROCHLORIDE 100 MG: 100 TABLET ORAL at 08:24

## 2022-06-11 ASSESSMENT — PAIN DESCRIPTION - ORIENTATION
ORIENTATION: POSTERIOR
ORIENTATION: LOWER

## 2022-06-11 ASSESSMENT — PAIN DESCRIPTION - LOCATION
LOCATION: BACK
LOCATION: BACK

## 2022-06-11 ASSESSMENT — PAIN SCALES - GENERAL
PAINLEVEL_OUTOF10: 8
PAINLEVEL_OUTOF10: 10

## 2022-06-11 ASSESSMENT — PAIN DESCRIPTION - DESCRIPTORS
DESCRIPTORS: STABBING;SHARP
DESCRIPTORS: DISCOMFORT

## 2022-06-11 ASSESSMENT — PAIN - FUNCTIONAL ASSESSMENT: PAIN_FUNCTIONAL_ASSESSMENT: PREVENTS OR INTERFERES WITH MANY ACTIVE NOT PASSIVE ACTIVITIES

## 2022-06-11 NOTE — PLAN OF CARE
Pain  Goal: Verbalizes/displays adequate comfort level or baseline comfort level  Outcome: Progressing     Safety - Adult  Goal: Free from fall injury  Outcome: Progressing     Skin/Tissue Integrity  Goal: Absence of new skin breakdown  Description: 1. Monitor for areas of redness and/or skin breakdown  2. Assess vascular access sites hourly  3. Every 4-6 hours minimum:  Change oxygen saturation probe site  4. Every 4-6 hours:  If on nasal continuous positive airway pressure, respiratory therapy assess nares and determine need for appliance change or resting period.   Outcome: Progressing

## 2022-06-11 NOTE — PROGRESS NOTES
Comprehensive Nutrition Assessment    RECOMMENDATIONS:  1. PO Diet: Continue 4CC diet   2. ONS: Start Glucerna QD; Steven BID    NUTRITION ASSESSMENT:   Nutritional summary & status: Nutrition screening triggered for poor appetite. Pt consuming varied po intakes, around 25-50% and 50-75%. Noted pt with pressure injury on buttocks. RD ordering glucerna QD and steven BID to promote adequate po inakes and to promote wound healing. RD to monitor nutrition adequacy throughout adm.  Admission/PMH: Acute on chronic bilateral lower extremity weakness; PMH: DM, Colitis     MALNUTRITION ASSESSMENT  Context of Malnutrition: Acute Illness   Malnutrition Status: At risk for malnutrition (Comment)  Findings of the 6 clinical characteristics of malnutrition (Minimum of 2 out of 6 clinical characteristics is required to make the diagnosis of moderate or severe Protein Calorie Malnutrition based on AND/ASPEN Guidelines):  Energy Intake: Less than/equal to 50% of estimated energy requirements    Energy Intake Time: 3 days     NUTRITION DIAGNOSIS   Inadequate oral intake related to inadequate protein-energy intake as evidenced by intake 26-50%,intake 51-75%,wounds    NUTRITION INTERVENTION  Food and/or Nutrient Delivery:  Continue Current Diet,Start Oral Nutrition Supplement  Nutrition Education/Counseling:  No recommendation at this time   Goals:  Pt will tolerate >50% of all meals and ONS offered throughout adm. Nutrition Monitoring and Evaluation:   Food/Nutrient Intake Outcomes:  Food and Nutrient Intake,Supplement Intake  Physical Signs/Symptoms Outcomes:  Biochemical Data,GI Status,Weight     OBJECTIVE DATA: Significant to nutrition assessment  · Nutrition-Focused Physical Findings: BLE non-pitting edema, estefania lbm   · Labs: Reviewed; Na 134, POCG 261  · Meds: Reviewed; insulin, prn zofran, prn glycolax   · Wounds: Pressure Injury (buttocks)       CURRENT NUTRITION THERAPIES  ADULT DIET;  Regular; 4 carb choices (60 gm/meal)     PO Intake: 26-50%,51-75% (varied po intakes)   PO Supplement Intake:None Ordered  Additional Sources of Calories/IVF:n/a     ANTHROPOMETRICS  Current Height: 6' 3\" (190.5 cm)  Current Weight: 232 lb 5.8 oz (105.4 kg)    Admission weight: 232 lb 5.8 oz (105.4 kg)  Ideal Body Weight (IBW): 196 lbs  (89 kg)    Usual Bodyweight     Weight Changes: no wt loss noted      BMI: 29.1    Wt Readings from Last 50 Encounters:   06/08/22 232 lb 5.8 oz (105.4 kg)   06/07/22 239 lb (108.4 kg)   06/06/22 239 lb (108.4 kg)     COMPARATIVE STANDARDS  Energy (kcal):  4394-3447 (20-24)     Protein (g):  107-124 (r/t BMI, wounds)       Fluid (ml/day):  >1500mL    The patient will still be monitored per nutrition standards of care. Consult dietitian if nutrition interventions essential to patient care is needed.      Noa Muñiz [address], [address]  Ned:  [phone]  Office:  [phone]

## 2022-06-11 NOTE — CARE COORDINATION
Cm called pt's wife Wallace Martínez [phone]. Made her aware pt will need precert from [facility] prior to transferring to facility. Cm informed if precert was not in today, then most likely it would not be until Monday.  Wallace Kotharie asked CM how much the bed cost at [place] per night and how much the bed at [facility] cost. Cm gave Wallace Martínez E[employer] phone number to ask facility about cost.

## 2022-06-11 NOTE — PROGRESS NOTES
Hospitalist Progress Note      PCP: No primary care provider on file. Date of Admission: 2022    Chief Complaint on Admission: back, leg pain, weakness    Pt Seen/Examined and Chart Reviewed. Admitting dx as above    SUBJECTIVE/OBJECTIVE:     Appears a bit more comfortable today, awake and alert. Gets tearful, \"we can send people to the moon but we cannot fix my back\"  Trying to provide reassurance. Patient is asking to see the neurosurgeon. Messaged RN with this request.     Allergies  No known allergies    Medications      Scheduled Meds:   gabapentin  100 mg Oral TID    acetaminophen  650 mg Oral Q4H    insulin glargine  0.2 Units/kg SubCUTAneous Nightly    traZODone  100 mg Oral BID    rosuvastatin  20 mg Oral Nightly    verapamil  120 mg Oral Nightly    sodium chloride flush  5-40 mL IntraVENous 2 times per day    insulin lispro  0-6 Units SubCUTAneous TID WC    insulin lispro  0-3 Units SubCUTAneous Nightly    lamoTRIgine  50 mg Oral Daily    lamoTRIgine  25 mg Oral Nightly    lithium  300 mg Oral Daily    lithium  600 mg Oral Nightly       Infusions:   dextrose      sodium chloride         PRN Meds:  oxyCODONE, glucose, dextrose bolus **OR** dextrose bolus, glucagon (rDNA), dextrose, sodium chloride flush, sodium chloride, ondansetron **OR** ondansetron, polyethylene glycol    Vitals    TEMPERATURE:  Current - Temp: 97.4 °F (36.3 °C);  Max - Temp  Av.7 °F (36.5 °C)  Min: 97.4 °F (36.3 °C)  Max: 97.9 °F (36.6 °C)  RESPIRATIONS RANGE: Resp  Av.3  Min: 16  Max: 18  PULSE RANGE: Pulse  Av.8  Min: 64  Max: 81  BLOOD PRESSURE RANGE:  Systolic (19HXO), MXT:811 , Min:128 , G   ; Diastolic (06[facility]), CDV:20, Min:67, Max:80    PULSE OXIMETRY RANGE: SpO2  Av.5 %  Min: 94 %  Max: 97 %  24HR INTAKE/OUTPUT:      Intake/Output Summary (Last 24 hours) at 2022 1357  Last data filed at 2022 0800  Gross per 24 hour   Intake 480 ml   Output 1150 ml   Net -670 ml Exam:      General appearance: mild distress, appears stated age and cooperative. Lungs: Clear to ascultation, bilaterally without Rales/Wheezes/Rhonchi with good respiratory effort. Heart: Regular rate and rhythm with Normal S1/S2 without  murmurs, rubs or gallops, point of maximum impulse non-displaced  Abdomen: Soft, non-tender or non-distended without rigidity or guarding and positive bowel sounds all four quadrants. Extremities: No clubbing, cyanosis, or edema bilaterally. Skin: Skin color, texture, turgor normal.    Neurologic: Alert and oriented X 3, bilateral LE weakness, 3/5  Mental status: Alert, oriented, thought content appropriate. Data    Recent Labs     06/10/22  0754   WBC 9.5   HGB 15.0   HCT 43.4         Recent Labs     06/09/22  0635 06/10/22  0754   * 134*   K 4.2 4.7    102   CO2 18* 25   BUN 20 18   CREATININE 0.9 0.9     No results for input(s): AST, ALT, ALB, BILIDIR, BILITOT, ALKPHOS in the last 72 hours. No results for input(s): INR in the last 72 hours. No results for input(s): CKTOTAL, CKMB, CKMBINDEX, TROPONINI in the last 72 hours. Consults:     IP CONSULT TO NEUROSURGERY  IP CONSULT TO PHARMACY  IP CONSULT TO NEUROLOGY    Active Hospital Problems    Diagnosis Date Noted    Bilateral leg weakness [R29.898] 06/08/2022     Priority: Medium         ASSESSMENT AND PLAN      Acute on chronic lower back pain with leg weakness, falls, leg pain:  Neurosurgery consulted  Patient is unable to get MRI  s/p CT myelogram of entire neuraxis- no acute surgical intervention needed, no clear evidence of central stenosis or cord compression. Per neurosurgery- okay to follow up with primary neurosurgeon at outside facility on dc    Neurology was consulted, do not suspect stroke, AIDP ot transverse myelitis. will need EMG as outpatient.  PT/OT and pain control    Cont with decreased trazodone (to avoid oversedation) and increase dose of gabapentin and oxycodone    DM type 2, bipolar disorder, HTN:  pharmacy reconciled home medications  Reordered as he takes at home after verification  Place on weight based lantus nightly    DVT Prophylaxis: SCD  Diet: ADULT DIET; Regular; 4 carb choices (60 gm/meal)  ADULT ORAL NUTRITION SUPPLEMENT; Breakfast, Dinner;  Wound Healing Oral Supplement  ADULT ORAL NUTRITION SUPPLEMENT; Lunch; Diabetic Oral Supplement  Code Status: Full Code    PT/OT Eval Status:pending needs placement, should be ready to Tere Weinberg MD

## 2022-06-12 LAB
GLUCOSE BLD-MCNC: 217 MG/DL (ref 70–99)
GLUCOSE BLD-MCNC: 237 MG/DL (ref 70–99)
GLUCOSE BLD-MCNC: 238 MG/DL (ref 70–99)
GLUCOSE BLD-MCNC: 263 MG/DL (ref 70–99)
PERFORMED ON: ABNORMAL

## 2022-06-12 PROCEDURE — 2580000003 HC RX 258: Performed by: INTERNAL MEDICINE

## 2022-06-12 PROCEDURE — 1200000000 HC SEMI PRIVATE

## 2022-06-12 PROCEDURE — 6370000000 HC RX 637 (ALT 250 FOR IP): Performed by: INTERNAL MEDICINE

## 2022-06-12 RX ORDER — HYDRALAZINE HYDROCHLORIDE 20 MG/ML
5 INJECTION INTRAMUSCULAR; INTRAVENOUS EVERY 6 HOURS PRN
Status: DISCONTINUED | OUTPATIENT
Start: 2022-06-12 | End: 2022-06-13 | Stop reason: HOSPADM

## 2022-06-12 RX ORDER — METFORMIN HYDROCHLORIDE 500 MG/1
1000 TABLET, EXTENDED RELEASE ORAL 2 TIMES DAILY WITH MEALS
Status: DISCONTINUED | OUTPATIENT
Start: 2022-06-12 | End: 2022-06-13 | Stop reason: HOSPADM

## 2022-06-12 RX ORDER — LAMOTRIGINE 25 MG/1
25 TABLET ORAL DAILY
Status: DISCONTINUED | OUTPATIENT
Start: 2022-06-13 | End: 2022-06-13 | Stop reason: HOSPADM

## 2022-06-12 RX ORDER — TRAZODONE HYDROCHLORIDE 100 MG/1
200 TABLET ORAL NIGHTLY
Status: DISCONTINUED | OUTPATIENT
Start: 2022-06-12 | End: 2022-06-13 | Stop reason: HOSPADM

## 2022-06-12 RX ORDER — LAMOTRIGINE 100 MG/1
50 TABLET ORAL NIGHTLY
Status: DISCONTINUED | OUTPATIENT
Start: 2022-06-12 | End: 2022-06-13 | Stop reason: HOSPADM

## 2022-06-12 RX ORDER — POLYETHYLENE GLYCOL 3350 17 G/17G
17 POWDER, FOR SOLUTION ORAL DAILY
Status: DISCONTINUED | OUTPATIENT
Start: 2022-06-12 | End: 2022-06-13 | Stop reason: HOSPADM

## 2022-06-12 RX ADMIN — SODIUM CHLORIDE, PRESERVATIVE FREE 10 ML: 5 INJECTION INTRAVENOUS at 08:19

## 2022-06-12 RX ADMIN — OXYCODONE 5 MG: 5 TABLET ORAL at 06:11

## 2022-06-12 RX ADMIN — GABAPENTIN 300 MG: 300 CAPSULE ORAL at 20:06

## 2022-06-12 RX ADMIN — METFORMIN HYDROCHLORIDE 1000 MG: 500 TABLET, EXTENDED RELEASE ORAL at 17:59

## 2022-06-12 RX ADMIN — LAMOTRIGINE 50 MG: 100 TABLET ORAL at 20:06

## 2022-06-12 RX ADMIN — SENNOSIDES AND DOCUSATE SODIUM 2 TABLET: 50; 8.6 TABLET ORAL at 20:07

## 2022-06-12 RX ADMIN — TRAZODONE HYDROCHLORIDE 100 MG: 100 TABLET ORAL at 08:19

## 2022-06-12 RX ADMIN — LITHIUM CARBONATE 600 MG: 300 CAPSULE, GELATIN COATED ORAL at 20:06

## 2022-06-12 RX ADMIN — ACETAMINOPHEN 650 MG: 325 TABLET ORAL at 11:50

## 2022-06-12 RX ADMIN — POLYETHYLENE GLYCOL 3350 17 G: 17 POWDER, FOR SOLUTION ORAL at 15:32

## 2022-06-12 RX ADMIN — LITHIUM CARBONATE 300 MG: 300 CAPSULE, GELATIN COATED ORAL at 08:21

## 2022-06-12 RX ADMIN — ACETAMINOPHEN 650 MG: 325 TABLET ORAL at 06:11

## 2022-06-12 RX ADMIN — SENNOSIDES AND DOCUSATE SODIUM 2 TABLET: 50; 8.6 TABLET ORAL at 08:18

## 2022-06-12 RX ADMIN — ACETAMINOPHEN 650 MG: 325 TABLET ORAL at 19:16

## 2022-06-12 RX ADMIN — TRAZODONE HYDROCHLORIDE 200 MG: 100 TABLET ORAL at 20:06

## 2022-06-12 RX ADMIN — ACETAMINOPHEN 650 MG: 325 TABLET ORAL at 02:46

## 2022-06-12 RX ADMIN — VERAPAMIL HYDROCHLORIDE 120 MG: 120 TABLET, FILM COATED, EXTENDED RELEASE ORAL at 20:07

## 2022-06-12 RX ADMIN — INSULIN LISPRO 6 UNITS: 100 INJECTION, SOLUTION INTRAVENOUS; SUBCUTANEOUS at 17:56

## 2022-06-12 RX ADMIN — INSULIN GLARGINE 21 UNITS: 100 INJECTION, SOLUTION SUBCUTANEOUS at 20:08

## 2022-06-12 RX ADMIN — INSULIN LISPRO 4 UNITS: 100 INJECTION, SOLUTION INTRAVENOUS; SUBCUTANEOUS at 08:16

## 2022-06-12 RX ADMIN — ACETAMINOPHEN 650 MG: 325 TABLET ORAL at 15:28

## 2022-06-12 RX ADMIN — ROSUVASTATIN CALCIUM 20 MG: 20 TABLET, FILM COATED ORAL at 20:07

## 2022-06-12 RX ADMIN — INSULIN LISPRO 4 UNITS: 100 INJECTION, SOLUTION INTRAVENOUS; SUBCUTANEOUS at 12:08

## 2022-06-12 RX ADMIN — LAMOTRIGINE 50 MG: 100 TABLET ORAL at 08:18

## 2022-06-12 RX ADMIN — GABAPENTIN 300 MG: 300 CAPSULE ORAL at 15:28

## 2022-06-12 RX ADMIN — INSULIN LISPRO 2 UNITS: 100 INJECTION, SOLUTION INTRAVENOUS; SUBCUTANEOUS at 20:08

## 2022-06-12 RX ADMIN — METFORMIN HYDROCHLORIDE 1000 MG: 500 TABLET, EXTENDED RELEASE ORAL at 09:55

## 2022-06-12 RX ADMIN — GABAPENTIN 300 MG: 300 CAPSULE ORAL at 08:18

## 2022-06-12 RX ADMIN — SODIUM CHLORIDE, PRESERVATIVE FREE 10 ML: 5 INJECTION INTRAVENOUS at 20:06

## 2022-06-12 ASSESSMENT — PAIN SCALES - GENERAL
PAINLEVEL_OUTOF10: 0
PAINLEVEL_OUTOF10: 10
PAINLEVEL_OUTOF10: 0
PAINLEVEL_OUTOF10: 8

## 2022-06-12 ASSESSMENT — PAIN DESCRIPTION - DESCRIPTORS: DESCRIPTORS: ACHING

## 2022-06-12 ASSESSMENT — PAIN DESCRIPTION - ORIENTATION
ORIENTATION: LOWER
ORIENTATION: LOWER

## 2022-06-12 ASSESSMENT — PAIN DESCRIPTION - LOCATION
LOCATION: BACK
LOCATION: BACK

## 2022-06-12 ASSESSMENT — PAIN - FUNCTIONAL ASSESSMENT: PAIN_FUNCTIONAL_ASSESSMENT: PREVENTS OR INTERFERES SOME ACTIVE ACTIVITIES AND ADLS

## 2022-06-12 NOTE — PROGRESS NOTES
Left message for neurosurgeon that wife, POA would like to speak with physician about plan of care. Patient is willing to do PT/OT.

## 2022-06-12 NOTE — PLAN OF CARE
Discharge Planning  Goal: Discharge to home or other facility with appropriate resources  Outcome: Progressing     Pain  Goal: Verbalizes/displays adequate comfort level or baseline comfort level  Outcome: Progressing     Safety - Adult  Goal: Free from fall injury  Outcome: Progressing     Skin/Tissue Integrity  Goal: Absence of new skin breakdown  Description: 1. Monitor for areas of redness and/or skin breakdown  2. Assess vascular access sites hourly  3. Every 4-6 hours minimum:  Change oxygen saturation probe site  4. Every 4-6 hours:  If on nasal continuous positive airway pressure, respiratory therapy assess nares and determine need for appliance change or resting period. Outcome: Progressing  Note: Turned on R side off of wound. Turned multiple times in bed.

## 2022-06-12 NOTE — PROGRESS NOTES
Hospitalist Progress Note      PCP: No primary care provider on file. Date of Admission: 2022    Chief Complaint on Admission: back, leg pain, weakness    Pt Seen/Examined and Chart Reviewed. Admitting dx as above    SUBJECTIVE/OBJECTIVE:     No acute events overnight. No recent BM. Tolerating some PO. Back pain is severe. Wife who is his POA is at bedside, asking to talk to neurosurgery. Today patient and wife reporting they take lamictal and trazodone differently at home. I have asked the patient every day about his regimen and prior to today he specifically said he takes trazodone 200 bid at home and that other meds were correct. Wife helps with nightly medications, she does not manage morning meds and he takes his morning meds on his own at home. I made changes to current meds. Allergies  No known allergies    Medications      Scheduled Meds:   metFORMIN  1,000 mg Oral BID WC    apixaban  5 mg Oral BID    traZODone  200 mg Oral Nightly    lamoTRIgine  50 mg Oral Nightly    [START ON 2022] lamoTRIgine  25 mg Oral Daily    sennosides-docusate sodium  2 tablet Oral BID    gabapentin  300 mg Oral TID    insulin lispro  0-12 Units SubCUTAneous TID WC    insulin lispro  0-6 Units SubCUTAneous Nightly    acetaminophen  650 mg Oral Q4H    insulin glargine  0.2 Units/kg SubCUTAneous Nightly    rosuvastatin  20 mg Oral Nightly    verapamil  120 mg Oral Nightly    sodium chloride flush  5-40 mL IntraVENous 2 times per day    lithium  300 mg Oral Daily    lithium  600 mg Oral Nightly       Infusions:   dextrose      sodium chloride         PRN Meds:  oxyCODONE, glucose, dextrose bolus **OR** dextrose bolus, glucagon (rDNA), dextrose, sodium chloride flush, sodium chloride, ondansetron **OR** ondansetron, polyethylene glycol    Vitals    TEMPERATURE:  Current - Temp: 98.3 °F (36.8 °C);  Max - Temp  Av °F (36.7 °C)  Min: 97.7 °F (36.5 °C)  Max: 98.3 °F (36.8 °C)  RESPIRATIONS RANGE: Resp  Av.5  Min: 16  Max: 18  PULSE RANGE: Pulse  Av.8  Min: 59  Max: 72  BLOOD PRESSURE RANGE:  Systolic (73TYT), NWW:253 , Min:125 , QNY:812   ; Diastolic (11VFA), GVF:02, Min:70, Max:82    PULSE OXIMETRY RANGE: SpO2  Av.4 %  Min: 93 %  Max: 97 %  24HR INTAKE/OUTPUT:      Intake/Output Summary (Last 24 hours) at 2022 1446  Last data filed at 2022 1046  Gross per 24 hour   Intake 1260 ml   Output 301 ml   Net 959 ml       Exam:      General appearance: mild distress, appears stated age and cooperative. Lungs: Clear to ascultation, bilaterally without Rales/Wheezes/Rhonchi with good respiratory effort. Heart: Regular rate and rhythm with Normal S1/S2 without  murmurs, rubs or gallops, point of maximum impulse non-displaced  Abdomen: Soft, non-tender or non-distended without rigidity or guarding and positive bowel sounds all four quadrants. Extremities: No clubbing, cyanosis, or edema bilaterally. Skin: Skin color, texture, turgor normal.    Neurologic: Alert and oriented X 3, bilateral LE weakness, 3/5  Mental status: Alert, oriented, thought content appropriate. Data    Recent Labs     06/10/22  0754   WBC 9.5   HGB 15.0   HCT 43.4         Recent Labs     06/10/22  0754   *   K 4.7      CO2 25   BUN 18   CREATININE 0.9     No results for input(s): AST, ALT, ALB, BILIDIR, BILITOT, ALKPHOS in the last 72 hours. No results for input(s): INR in the last 72 hours. No results for input(s): CKTOTAL, CKMB, CKMBINDEX, TROPONINI in the last 72 hours.     Consults:     IP CONSULT TO NEUROSURGERY  IP CONSULT TO PHARMACY  IP CONSULT TO NEUROLOGY    Active Hospital Problems    Diagnosis Date Noted    Bilateral leg weakness [R29.898] 2022     Priority: Medium         ASSESSMENT AND PLAN      Acute on chronic lower back pain with leg weakness, falls, leg pain:  Neurosurgery consulted  Patient is unable to get MRI  s/p CT myelogram of entire neuraxis- no acute

## 2022-06-12 NOTE — PROGRESS NOTES
Patient is alert and oriented. Vital signs are stable. Patient resting comfortably in bed. No changes in neuro assessment thus far this shift. Bed is in the lowest position. Bed alarm is activated. Call light is within reach. Will continue to monitor and reassess.

## 2022-06-12 NOTE — PROGRESS NOTES
Mrs. Desi Maldonado is adamantley wanting to speak to neurosurgeon. This RN called and left message for neurosurgeon and is leaving another message. Notified Mrs. Desi Maldonado that neurosurgeon is not recommending surgery but Mrs. Desi Maldonado would like to speak to neurosurgeon about something important. Holding Eliquis in light of Mrs. Cruz's statement that the concern is about surgery.

## 2022-06-13 VITALS
TEMPERATURE: 97.3 F | SYSTOLIC BLOOD PRESSURE: 143 MMHG | HEART RATE: 63 BPM | DIASTOLIC BLOOD PRESSURE: 78 MMHG | OXYGEN SATURATION: 96 % | WEIGHT: 232.37 LBS | BODY MASS INDEX: 28.89 KG/M2 | HEIGHT: 75 IN | RESPIRATION RATE: 16 BRPM

## 2022-06-13 PROBLEM — M54.50 ACUTE LOW BACK PAIN: Status: ACTIVE | Noted: 2022-01-01

## 2022-06-13 LAB
GLUCOSE BLD-MCNC: 182 MG/DL (ref 70–99)
GLUCOSE BLD-MCNC: 247 MG/DL (ref 70–99)
PERFORMED ON: ABNORMAL
PERFORMED ON: ABNORMAL

## 2022-06-13 PROCEDURE — 6370000000 HC RX 637 (ALT 250 FOR IP): Performed by: INTERNAL MEDICINE

## 2022-06-13 RX ORDER — SENNA AND DOCUSATE SODIUM 50; 8.6 MG/1; MG/1
2 TABLET, FILM COATED ORAL 2 TIMES DAILY
Qty: 60 TABLET | Refills: 1 | Status: SHIPPED | OUTPATIENT
Start: 2022-06-13

## 2022-06-13 RX ORDER — POLYETHYLENE GLYCOL 3350 17 G/17G
17 POWDER, FOR SOLUTION ORAL DAILY
Qty: 527 G | Refills: 1 | Status: SHIPPED | OUTPATIENT
Start: 2022-06-14 | End: 2022-07-14

## 2022-06-13 RX ORDER — OXYCODONE HYDROCHLORIDE 5 MG/1
5 TABLET ORAL EVERY 4 HOURS PRN
Qty: 20 TABLET | Refills: 0 | Status: SHIPPED | OUTPATIENT
Start: 2022-06-13 | End: 2022-06-18

## 2022-06-13 RX ADMIN — LITHIUM CARBONATE 300 MG: 300 CAPSULE, GELATIN COATED ORAL at 08:41

## 2022-06-13 RX ADMIN — INSULIN LISPRO 2 UNITS: 100 INJECTION, SOLUTION INTRAVENOUS; SUBCUTANEOUS at 08:51

## 2022-06-13 RX ADMIN — POLYETHYLENE GLYCOL 3350 17 G: 17 POWDER, FOR SOLUTION ORAL at 08:41

## 2022-06-13 RX ADMIN — ACETAMINOPHEN 650 MG: 325 TABLET ORAL at 05:57

## 2022-06-13 RX ADMIN — GABAPENTIN 300 MG: 300 CAPSULE ORAL at 08:41

## 2022-06-13 RX ADMIN — METFORMIN HYDROCHLORIDE 1000 MG: 500 TABLET, EXTENDED RELEASE ORAL at 08:41

## 2022-06-13 RX ADMIN — ACETAMINOPHEN 650 MG: 325 TABLET ORAL at 03:48

## 2022-06-13 RX ADMIN — LAMOTRIGINE 25 MG: 25 TABLET ORAL at 08:42

## 2022-06-13 RX ADMIN — OXYCODONE 5 MG: 5 TABLET ORAL at 03:49

## 2022-06-13 RX ADMIN — SENNOSIDES AND DOCUSATE SODIUM 2 TABLET: 50; 8.6 TABLET ORAL at 08:41

## 2022-06-13 ASSESSMENT — PAIN DESCRIPTION - DESCRIPTORS: DESCRIPTORS: ACHING

## 2022-06-13 ASSESSMENT — PAIN SCALES - GENERAL
PAINLEVEL_OUTOF10: 8
PAINLEVEL_OUTOF10: 7
PAINLEVEL_OUTOF10: 7

## 2022-06-13 ASSESSMENT — PAIN DESCRIPTION - LOCATION: LOCATION: BACK

## 2022-06-13 ASSESSMENT — PAIN - FUNCTIONAL ASSESSMENT: PAIN_FUNCTIONAL_ASSESSMENT: PREVENTS OR INTERFERES SOME ACTIVE ACTIVITIES AND ADLS

## 2022-06-13 ASSESSMENT — PAIN DESCRIPTION - ORIENTATION: ORIENTATION: LOWER

## 2022-06-13 NOTE — PLAN OF CARE
Problem: Pain  Goal: Verbalizes/displays adequate comfort level or baseline comfort level  6/13/2022 0144 by Guillermo Cao RN  Outcome: Progressing  RN assesses pain using 0-10 scale. Patient understands how to rate pain using 0-10 scale. Pain is controled with medication per MAR. RN encourages patient to call out for breakthrough pain. Will continue to monitor and reassess. Problem: Safety - Adult  Goal: Free from fall injury  6/13/2022 0144 by Guillermo Cao RN  Outcome: Progressing  Patient remains free from falls during this shift. Bed is in the lowest position and the bed alarm is activated. Anti-slip socks are on. Call light is within reach. Will continue to monitor and reassess.

## 2022-06-13 NOTE — CARE COORDINATION
Social Work: Discussed in Interdisciplinary Rounds:    SW following for SNF placement at [facility]. Precert is not back yet as of this morning. SW spoke with Gail Snowden in admissions ([phone]), she will call this SW as soon as precert comes in. SW to follow. 9622: Precert approved. Transport scheduled for 12:30pm with [facility]. Report [phone], fax# [phone]. Staff updated, wife updated.  EMILY completed, #[id number]    Ramiro Kay, GARIMA  [phone]

## 2022-06-13 NOTE — PROGRESS NOTES
Patient and wife are adamant about holding patient's elequis until after they are able to speak with neurosurgury tomorrow. I spoke with patient and called patient's wife to educate them on the importance of taking the blood thinner to prevent blood clots. Patient and wife are still refusing. I spoke with the hospitalist and neurosurgery and they are aware. No new orders placed at this time. Will continue to monitor and reassess.

## 2022-06-13 NOTE — FLOWSHEET NOTE
Patient discharged via ems transportation with personal belongings and cell phone. IV removed. Patient was calm and cooperative at the time of discharge. VSS and neuro intact.

## 2022-06-13 NOTE — DISCHARGE INSTR - COC
Continuity of Care Form    Patient Name: Jeri Branch   :  1950  MRN:  [id number]    Admit date:  2022  Discharge date:  22    Code Status Order: Full Code   Advance Directives:      Admitting Physician:  Nicky Clayton MD  PCP: No primary care provider on file.     Discharging Nurse: [facility] Unit/Room#: 6172/5973-75  Discharging Unit Phone Number: ***    Emergency Contact:   Extended Emergency Contact Information  Primary Emergency Contact: Lynnette Cruz  [facility] [address] [facility] [address] [place] [address] Phone: [phone]  Mobile Phone: [phone]  Relation: Spouse    Past Surgical History:  Past Surgical History:   Procedure Laterality Date    APPENDECTOMY      BACK SURGERY      CERVICAL FUSION      COLONOSCOPY      EYE SURGERY      HIP SURGERY      JOINT REPLACEMENT Right     THR    OTHER SURGICAL HISTORY  2018     right L4-5 hemilaminotomy, partial facetectomy, foraminotomy, reexploration and excision of synovial csyt,  Bilateral L2-3 and L3-4 hemilaminotomy, partial facetectomy, foraminotomy    [place] NERVOUS SYSTEM SURGERY UNLISTED Bilateral 2018    BILATERAL LUMBAR THREE, LUMBAR FOUR, LUMBAR FIVE RADIOFREQUENCY ABLATION SITE CONFIRMED BY FLUOROSCOPY performed by Riley Fall MD at [address] [place] [address] DX/THER SBST INTRLMNR CRV/THRC W/IMG GDN Bilateral 2018    BILATERAL LUMBAR THREE, LUMBAR FOUR, LUMBAR FIVE MEDIAL BRANCH BLOCK SITE CONFIRMED BY FLUOROSCOPY performed by Riley Fall MD at [address] [place] [address] DX/THER SBST INTRLMNR CRV/THRC W/IMG GDN Bilateral 10/16/2018    BILATERAL LUMBAR THREE, FOUR, FIVE MEDIAL BRANCH BLOCK SITE CONFIRMED BY FLUOROSCOPY performed by Riley Fall MD at [facility]    TURP N/A 2020    CYSTOSCOPY, TRANSURETHRAL RESECTION OF PROSTATE performed by Rosey Paiz MD at [facility]       Immunization History:   Immunization History Administered Date(s) Administered    Influenza Vaccine, unspecified formulation 10/31/2014, 10/28/2015, 10/04/2016, 10/12/2017    Pneumococcal Conjugate 13-valent (Saturnino Na) 10/28/2015    Zoster Live (Zostavax) 11/01/2014       Active Problems:  Patient Active Problem List   Diagnosis Code    Enlarged lymph nodes R59.9    Spinal stenosis M48.00    Degeneration of lumbar or lumbosacral intervertebral disc M51.37    Lumbosacral spondylosis without myelopathy M47.817    Displacement of lumbar intervertebral disc without myelopathy M51.26    Urinary retention R33.9    Pyelonephritis N12    S/P TURP J29.61    Metabolic encephalopathy M26.33    Type 2 diabetes mellitus ([facility] [address] E11.9    Essential hypertension I10    Prostate hyperplasia with urinary obstruction N40.1, N13.8    Sepsis (HCC) A41.9    Bipolar 1 disorder ([facility]) F31.9    Asthma J45.909    Acute epididymo-orchitis N45.3    Constipation due to opioid therapy K59.03, T40.2X5A    Bilateral leg weakness R29.898    Acute low back pain M54.50       Isolation/Infection:   Isolation            No Isolation          Patient Infection Status       Infection Onset Added Last Indicated Last Indicated By Review Planned Expiration Resolved Resolved By    None active    Resolved    COVID-19 (Rule Out) 07/17/20 07/17/20 07/17/20 COVID-19 (Ordered)   07/17/20 Rule-Out Test Resulted            Nurse Assessment:  Last Vital Signs: BP (!) 143/78   Pulse 63   Temp 97.3 °F (36.3 °C) (Oral)   Resp 16   Ht 6' 3\" (1.905 m)   Wt 232 lb 5.8 oz (105.4 kg)   SpO2 96%   BMI 29.04 kg/m²     Last documented pain score (0-10 scale): Pain Level: 7  Last Weight:   Wt Readings from Last 1 Encounters:   06/08/22 232 lb 5.8 oz (105.4 kg)     Mental Status:  {IP PT MENTAL STATUS:20030}    IV Access:  { PEDRO PABLO IV ACCESS:494988664}    Nursing Mobility/ADLs:  Walking   {[facility] DME RIQV:653445645}  Transfer  {[facility] RLHO:824115630}  Bathing  {[facility] DME DVDW:618508084}  Dressing  {[facility] DME HUSX:640808142}  Toileting  {P DME WSPH:615477728}  Feeding  {[facility] DME OGDV:064022933}  Med Admin  {[facility] DME IXFY:239523692}  Med Delivery   { PEDRO PABLO MED Delivery:434537510}    Wound Care Documentation and Therapy:  Wound 06/09/20 Buttocks (Active)   Number of days: 733       Wound 06/09/20 Buttocks Right (Active)   Number of days: 733       Wound 07/11/20 Buttocks (Active)   Number of days: 701       Wound 07/12/20 Left; Lower; Inner callous from a previous pressure injury, per wife (Active)   Number of days: 700       Wound 06/08/22 Buttocks Right (Active)   Wound Etiology Pressure Stage 2 06/11/22 0315   Dressing Status Intact 06/13/22 0300   Wound Cleansed Not Cleansed 06/09/22 1003   Dressing/Treatment Open to air;Zinc paste 06/13/22 0300   Wound Length (cm) 1 cm 06/09/22 1003   Wound Width (cm) 1 cm 06/09/22 1003   Wound Depth (cm) 0.1 cm 06/09/22 1003   Wound Surface Area (cm^2) 1 cm^2 06/09/22 1003   Wound Volume (cm^3) 0.1 cm^3 06/09/22 1003   Wound Assessment Dry;Pink/red 06/13/22 0300   Drainage Amount None 06/13/22 0300   Odor None 06/13/22 0300   Brenda-wound Assessment Blanchable erythema;Dry/flaky; Intact 06/13/22 0300   Margins Attached edges; Defined edges 06/11/22 0315   Number of days: 5        Elimination:  Continence: Bowel: {YES / DA:94954}  Bladder: {YES / DD:74483}  Urinary Catheter: {Urinary Catheter:168995006}   Colostomy/Ileostomy/Ileal Conduit: {YES / HO:11703}       Date of Last BM: ***    Intake/Output Summary (Last 24 hours) at 6/13/2022 1047  Last data filed at 6/13/2022 0700  Gross per 24 hour   Intake 960 ml   Output 250 ml   Net 710 ml     I/O last 3 completed shifts:   In: 1740 [P.O.:1740]  Out: 551 [Urine:551]    Safety Concerns:     [address] Sm[employer] Pockets Safety Concerns:659201383}    Impairments/Disabilities:      [address] CHAMORRO Impairments/Disabilities:219235570}    Nutrition Therapy:  Current Nutrition Therapy:   [address] Fleming PEDRO PABLO Diet List:770630728}    Routes of Feeding: {CHP DME Other Feedings:713839097}  Liquids: {Slp liquid thickness:54665}  Daily Fluid Restriction: {CHP DME Yes amt example:650860660}  Last Modified Barium Swallow with Video (Video Swallowing Test): {Done Not Done OZZ:672738284}    Treatments at the Time of Hospital Discharge:   Respiratory Treatments: ***  Oxygen Therapy:  {Therapy; copd oxygen:32861}  Ventilator:    { CC Vent EFXD:004354959}    Rehab Therapies: {THERAPEUTIC INTERVENTION:9383296429}  Weight Bearing Status/Restrictions: [facility] CC Weight Bearin}  Other Medical Equipment (for information only, NOT a DME order):  {EQUIPMENT:061184634}  Other Treatments: ***    Patient's personal belongings (please select all that are sent with patient):  {CHP DME Belongings:813839494}    RN SIGNATURE:  {Esignature:201787731}    CASE MANAGEMENT/SOCIAL WORK SECTION    Inpatient Status Date: 22    Readmission Risk Assessment Score:  Readmission Risk              Risk of Unplanned Readmission:  14           Discharging to Facility/ [address] [place] [address]       Phone: [phone]       Fax: [phone]          Dialysis Facility (if applicable)   Name:  Address:  Dialysis Schedule:  Phone:  Fax:    / signature: Electronically signed by GARIMA Moctezuma on 22 at 11:27 AM EDT    PHYSICIAN SECTION    Prognosis: Good    Condition at Discharge: Stable    Rehab Potential (if transferring to Rehab): Good    Recommended Labs or Other Treatments After Discharge:     PT/OT daily as tolerated    Follow up ASAP with [address] [place] [address] neurology for EMGs (Dr. Debbie Ashraf)      Physician Certification: I certify the above information and transfer of Yareli Castillo  is necessary for the continuing treatment of the diagnosis listed and that he requires [facility] for less 30 days.      Update Admission H&P: No change in H&P    PHYSICIAN SIGNATURE:  Electronically signed by Justino Bowling MD on 6/13/22 at 10:47 AM EDT

## 2022-06-13 NOTE — DISCHARGE SUMMARY
Hospital Medicine Discharge Summary      Patient ID: Sailaja Hager      Patient's PCP: No primary care provider on file. Admit Date: 6/8/2022     Discharge Date: 6/13/2022      Admitting Physician: Claudine Bloch, MD    Discharge Physician: Lucillie Simmonds, MD     Discharge Diagnoses: Active Hospital Problems    Diagnosis Date Noted    Acute low back pain [M54.50] 06/13/2022     Priority: Medium    Bilateral leg weakness [R29.898] 06/08/2022     Priority: Medium    Degeneration of lumbar or lumbosacral intervertebral disc [M51.37] 12/10/2018         The patient was seen and examined on day of discharge and this discharge summary is in conjunction with any daily progress note from day of discharge. Hospital Course:     Patient is a 79-year-old male with medical history of chronic back pain, frequent falls, bipolar disorder, HTN, DM type II who presented as a transfer from [facility] emergency room with progressive bilateral lower extremity weakness and back pain. Patient has been following with [facility] neurosurgery most recently. His neurosurgeon was currently out of town and patient was transferred to [facility] as alternative. He was seen by neurosurgical team and neurology. Unable to get MRI due to presence of previous spinal cord stimulator leads. Underwent CT myelogram of cervical, thoracic and lumbar spine, as well as head CT. His images were reviewed by neurosurgery and there was no cord compression that would require emergent intervention. Seen by neurology, his presentation was not consistent with stroke, Rafael Barré syndrome, transverse myelitis. Advised on getting EMG which is not available as inpatient. Oxycodone was started for pain control with some improvement. Patient had PT OT and required to go to rehab facility on discharge. Follow-up with primary neurosurgeon and [facility] neurology as outpatient.      Consults:     IP CONSULT TO NEUROSURGERY  IP CONSULT TO PHARMACY  IP CONSULT TO NEUROLOGY    Disposition: SNF     Discharged Condition: Stable    Code Status: Prior    Activity: activity as tolerated    Diet: cardiac diet and diabetic diet    Follow Up: Primary Care Physician in one week    Exam:     General appearance: No apparent distress, appears stated age and cooperative. Lungs: Clear to ascultation, bilaterally without Rales/Wheezes/Rhonchi with good respiratory effort. Heart: Regular rate and rhythm with Normal S1/S2 without  murmurs, rubs or gallops, point of maximum impulse non-displaced  Abdomen: Soft, non-tender or non-distended without rigidity or guarding and positive bowel sounds all four quadrants. Extremities: No clubbing, cyanosis, or edema bilaterally. Skin: Skin color, texture, turgor normal.    Neurologic: Alert and oriented X 3,  Leg weakness  Mental status: Alert, oriented, thought content appropriate      Labs: For convenience and continuity at follow-up the following most recent labs are provided:    CBC:   Lab Results   Component Value Date    WBC 9.5 06/10/2022    HGB 15.0 06/10/2022    HCT 43.4 06/10/2022     06/10/2022       RENAL:   Lab Results   Component Value Date     06/10/2022    K 4.7 06/10/2022     06/10/2022    CO2 25 06/10/2022    BUN 18 06/10/2022    CREATININE 0.9 06/10/2022           Discharge Medications:   Discharge Medication List as of 6/13/2022 11:28 AM           Details   oxyCODONE (ROXICODONE) 5 MG immediate release tablet Take 1 tablet by mouth every 4 hours as needed for Pain for up to 5 days. , Disp-20 tablet, R-0Print      polyethylene glycol (GLYCOLAX) 17 g packet Take 17 g by mouth daily, Disp-527 g, R-1Print      sennosides-docusate sodium (SENOKOT-S) 8.6-50 MG tablet Take 2 tablets by mouth in the morning and at bedtime, Disp-60 tablet, R-1Print              Details   !! lamoTRIgine (LAMICTAL) 25 MG tablet Take 25 mg by mouth daily Historical Med      !! lamoTRIgine (LAMICTAL) 25 MG tablet Take 50 mg by mouth at bedtime Historical Med      !! lithium 300 MG capsule Take 300 mg by mouth dailyHistorical Med      !! lithium 300 MG capsule Take 600 mg by mouth at bedtimeHistorical Med      metFORMIN (GLUCOPHAGE-XR) 500 mg extended release tablet Take 1,000 mg by mouth 2 times daily (with meals)Historical Med      traZODone (DESYREL) 100 MG tablet Take 200 mg by mouth nightly Historical Med      acetaminophen (TYLENOL) 500 MG tablet Take 1,000 mg by mouth nightly as needed for PainHistorical Med      gabapentin (NEURONTIN) 300 MG capsule Take 300 mg by mouth 3 times daily. Historical Med      Dulaglutide (TRULICITY) 3 XE/8.1CW SOPN Inject 3 mg into the skin once a week Takes on SundaysHistorical Med      glipiZIDE (GLUCOTROL) 5 mg tablet Take 5 mg by mouth dailyHistorical Med      apixaban (ELIQUIS) 5 MG TABS tablet Take 5 mg by mouth 2 times daily Historical Med      rosuvastatin (CRESTOR) 20 MG tablet Take 20 mg by mouth at bedtime Historical Med      verapamil (CALAN SR) 120 MG extended release tablet Take 120 mg by mouth nightlyHistorical Med       !! - Potential duplicate medications found. Please discuss with provider. Time Spent on discharge is more than 30 minutes in the examination, evaluation, counseling and review of medications and discharge plan. Signed:  Shae De La Cruz MD   6/13/2022      Thank you No primary care provider on file. for the opportunity to be involved in this patient's care. If you have any questions or concerns please feel free to contact me at [phone].

## 2022-10-25 ENCOUNTER — APPOINTMENT (OUTPATIENT)
Dept: GENERAL RADIOLOGY | Age: 72
DRG: 690 | End: 2022-10-25
Payer: MEDICARE

## 2022-10-25 ENCOUNTER — HOSPITAL ENCOUNTER (INPATIENT)
Age: 72
LOS: 4 days | Discharge: NURSING, REHAB OR LONG-TERM CARE | DRG: 690 | End: 2022-10-29
Attending: EMERGENCY MEDICINE | Admitting: INTERNAL MEDICINE
Payer: MEDICARE

## 2022-10-25 ENCOUNTER — APPOINTMENT (OUTPATIENT)
Dept: CT IMAGING | Age: 72
DRG: 690 | End: 2022-10-25
Payer: MEDICARE

## 2022-10-25 DIAGNOSIS — G89.29 CHRONIC MIDLINE LOW BACK PAIN WITHOUT SCIATICA: ICD-10-CM

## 2022-10-25 DIAGNOSIS — N30.00 ACUTE CYSTITIS WITHOUT HEMATURIA: ICD-10-CM

## 2022-10-25 DIAGNOSIS — R29.6 FREQUENT FALLS: ICD-10-CM

## 2022-10-25 DIAGNOSIS — M54.50 CHRONIC MIDLINE LOW BACK PAIN WITHOUT SCIATICA: ICD-10-CM

## 2022-10-25 DIAGNOSIS — R33.9 URINARY RETENTION: ICD-10-CM

## 2022-10-25 DIAGNOSIS — R53.1 GENERALIZED WEAKNESS: Primary | ICD-10-CM

## 2022-10-25 PROBLEM — N30.01 ACUTE CYSTITIS WITH HEMATURIA: Status: ACTIVE | Noted: 2022-01-01

## 2022-10-25 LAB
A/G RATIO: 1.3 (ref 1.1–2.2)
ALBUMIN SERPL-MCNC: 4.3 G/DL (ref 3.4–5)
ALP BLD-CCNC: 110 U/L (ref 40–129)
ALT SERPL-CCNC: 51 U/L (ref 10–40)
ANION GAP SERPL CALCULATED.3IONS-SCNC: 12 MMOL/L (ref 3–16)
APTT: 34.2 SEC (ref 23–34.3)
AST SERPL-CCNC: 25 U/L (ref 15–37)
BACTERIA: ABNORMAL /HPF
BASOPHILS ABSOLUTE: 0.1 K/UL (ref 0–0.2)
BASOPHILS RELATIVE PERCENT: 0.5 %
BILIRUB SERPL-MCNC: 0.5 MG/DL (ref 0–1)
BILIRUBIN URINE: NEGATIVE
BLOOD, URINE: ABNORMAL
BUN BLDV-MCNC: 15 MG/DL (ref 7–20)
CALCIUM SERPL-MCNC: 9.6 MG/DL (ref 8.3–10.6)
CHLORIDE BLD-SCNC: 99 MMOL/L (ref 99–110)
CLARITY: CLEAR
CO2: 24 MMOL/L (ref 21–32)
COLOR: YELLOW
CREAT SERPL-MCNC: 1 MG/DL (ref 0.8–1.3)
EOSINOPHILS ABSOLUTE: 0.1 K/UL (ref 0–0.6)
EOSINOPHILS RELATIVE PERCENT: 1.3 %
EPITHELIAL CELLS, UA: ABNORMAL /HPF (ref 0–5)
GFR SERPL CREATININE-BSD FRML MDRD: >60 ML/MIN/{1.73_M2}
GLUCOSE BLD-MCNC: 260 MG/DL (ref 70–99)
GLUCOSE BLD-MCNC: 332 MG/DL (ref 70–99)
GLUCOSE URINE: 250 MG/DL
HCT VFR BLD CALC: 42.5 % (ref 40.5–52.5)
HEMOGLOBIN: 14.1 G/DL (ref 13.5–17.5)
INR BLD: 1.35 (ref 0.87–1.14)
KETONES, URINE: NEGATIVE MG/DL
LEUKOCYTE ESTERASE, URINE: ABNORMAL
LITHIUM DOSE AMOUNT: NORMAL
LITHIUM LEVEL: 1.1 MMOL/L (ref 0.6–1.2)
LYMPHOCYTES ABSOLUTE: 1.6 K/UL (ref 1–5.1)
LYMPHOCYTES RELATIVE PERCENT: 14.4 %
MCH RBC QN AUTO: 32 PG (ref 26–34)
MCHC RBC AUTO-ENTMCNC: 33.2 G/DL (ref 31–36)
MCV RBC AUTO: 96.6 FL (ref 80–100)
MICROSCOPIC EXAMINATION: YES
MONOCYTES ABSOLUTE: 1 K/UL (ref 0–1.3)
MONOCYTES RELATIVE PERCENT: 8.9 %
NEUTROPHILS ABSOLUTE: 8.5 K/UL (ref 1.7–7.7)
NEUTROPHILS RELATIVE PERCENT: 74.9 %
NITRITE, URINE: NEGATIVE
PDW BLD-RTO: 13.2 % (ref 12.4–15.4)
PERFORMED ON: ABNORMAL
PH UA: 7 (ref 5–8)
PLATELET # BLD: 258 K/UL (ref 135–450)
PMV BLD AUTO: 8.4 FL (ref 5–10.5)
POTASSIUM REFLEX MAGNESIUM: 4.5 MMOL/L (ref 3.5–5.1)
PROTEIN UA: ABNORMAL MG/DL
PROTHROMBIN TIME: 16.6 SEC (ref 11.7–14.5)
RBC # BLD: 4.4 M/UL (ref 4.2–5.9)
RBC UA: ABNORMAL /HPF (ref 0–4)
SODIUM BLD-SCNC: 135 MMOL/L (ref 136–145)
SPECIFIC GRAVITY UA: 1.01 (ref 1–1.03)
TOTAL PROTEIN: 7.5 G/DL (ref 6.4–8.2)
TROPONIN: <0.01 NG/ML
URINE REFLEX TO CULTURE: YES
URINE TYPE: ABNORMAL
UROBILINOGEN, URINE: 0.2 E.U./DL
WBC # BLD: 11.3 K/UL (ref 4–11)
WBC UA: ABNORMAL /HPF (ref 0–5)

## 2022-10-25 PROCEDURE — 85610 PROTHROMBIN TIME: CPT

## 2022-10-25 PROCEDURE — 93005 ELECTROCARDIOGRAM TRACING: CPT | Performed by: EMERGENCY MEDICINE

## 2022-10-25 PROCEDURE — 85025 COMPLETE CBC W/AUTO DIFF WBC: CPT

## 2022-10-25 PROCEDURE — 6360000002 HC RX W HCPCS: Performed by: NURSE PRACTITIONER

## 2022-10-25 PROCEDURE — 70450 CT HEAD/BRAIN W/O DYE: CPT

## 2022-10-25 PROCEDURE — 85730 THROMBOPLASTIN TIME PARTIAL: CPT

## 2022-10-25 PROCEDURE — 87186 SC STD MICRODIL/AGAR DIL: CPT

## 2022-10-25 PROCEDURE — 80053 COMPREHEN METABOLIC PANEL: CPT

## 2022-10-25 PROCEDURE — 72125 CT NECK SPINE W/O DYE: CPT

## 2022-10-25 PROCEDURE — 81001 URINALYSIS AUTO W/SCOPE: CPT

## 2022-10-25 PROCEDURE — 6360000002 HC RX W HCPCS: Performed by: EMERGENCY MEDICINE

## 2022-10-25 PROCEDURE — 71045 X-RAY EXAM CHEST 1 VIEW: CPT

## 2022-10-25 PROCEDURE — 2580000003 HC RX 258: Performed by: EMERGENCY MEDICINE

## 2022-10-25 PROCEDURE — 87077 CULTURE AEROBIC IDENTIFY: CPT

## 2022-10-25 PROCEDURE — 99285 EMERGENCY DEPT VISIT HI MDM: CPT

## 2022-10-25 PROCEDURE — 87086 URINE CULTURE/COLONY COUNT: CPT

## 2022-10-25 PROCEDURE — 84484 ASSAY OF TROPONIN QUANT: CPT

## 2022-10-25 PROCEDURE — 6370000000 HC RX 637 (ALT 250 FOR IP): Performed by: NURSE PRACTITIONER

## 2022-10-25 PROCEDURE — 80178 ASSAY OF LITHIUM: CPT

## 2022-10-25 PROCEDURE — 72131 CT LUMBAR SPINE W/O DYE: CPT

## 2022-10-25 PROCEDURE — 1200000000 HC SEMI PRIVATE

## 2022-10-25 PROCEDURE — 72128 CT CHEST SPINE W/O DYE: CPT

## 2022-10-25 RX ORDER — ROSUVASTATIN CALCIUM 10 MG/1
20 TABLET, COATED ORAL NIGHTLY
Status: DISCONTINUED | OUTPATIENT
Start: 2022-10-25 | End: 2022-10-29 | Stop reason: HOSPADM

## 2022-10-25 RX ORDER — LAMOTRIGINE 25 MG/1
25 TABLET ORAL DAILY
Status: DISCONTINUED | OUTPATIENT
Start: 2022-10-26 | End: 2022-10-29 | Stop reason: HOSPADM

## 2022-10-25 RX ORDER — POLYETHYLENE GLYCOL 3350 17 G/17G
17 POWDER, FOR SOLUTION ORAL DAILY PRN
Status: DISCONTINUED | OUTPATIENT
Start: 2022-10-25 | End: 2022-10-27

## 2022-10-25 RX ORDER — ACETAMINOPHEN 500 MG
1000 TABLET ORAL NIGHTLY PRN
Status: DISCONTINUED | OUTPATIENT
Start: 2022-10-25 | End: 2022-10-29 | Stop reason: HOSPADM

## 2022-10-25 RX ORDER — ACETAMINOPHEN 325 MG/1
650 TABLET ORAL EVERY 6 HOURS PRN
Status: DISCONTINUED | OUTPATIENT
Start: 2022-10-25 | End: 2022-10-29 | Stop reason: HOSPADM

## 2022-10-25 RX ORDER — DEXTROSE MONOHYDRATE 100 MG/ML
INJECTION, SOLUTION INTRAVENOUS CONTINUOUS PRN
Status: DISCONTINUED | OUTPATIENT
Start: 2022-10-25 | End: 2022-10-29 | Stop reason: HOSPADM

## 2022-10-25 RX ORDER — INSULIN LISPRO 100 [IU]/ML
0-16 INJECTION, SOLUTION INTRAVENOUS; SUBCUTANEOUS
Status: DISCONTINUED | OUTPATIENT
Start: 2022-10-26 | End: 2022-10-29 | Stop reason: HOSPADM

## 2022-10-25 RX ORDER — SODIUM CHLORIDE 0.9 % (FLUSH) 0.9 %
5-40 SYRINGE (ML) INJECTION EVERY 12 HOURS SCHEDULED
Status: DISCONTINUED | OUTPATIENT
Start: 2022-10-25 | End: 2022-10-29 | Stop reason: HOSPADM

## 2022-10-25 RX ORDER — LITHIUM CARBONATE 150 MG/1
300 CAPSULE ORAL DAILY
Status: DISCONTINUED | OUTPATIENT
Start: 2022-10-26 | End: 2022-10-29 | Stop reason: HOSPADM

## 2022-10-25 RX ORDER — INSULIN LISPRO 100 [IU]/ML
0-4 INJECTION, SOLUTION INTRAVENOUS; SUBCUTANEOUS NIGHTLY
Status: DISCONTINUED | OUTPATIENT
Start: 2022-10-25 | End: 2022-10-29 | Stop reason: HOSPADM

## 2022-10-25 RX ORDER — ONDANSETRON 2 MG/ML
4 INJECTION INTRAMUSCULAR; INTRAVENOUS EVERY 6 HOURS PRN
Status: DISCONTINUED | OUTPATIENT
Start: 2022-10-25 | End: 2022-10-29 | Stop reason: HOSPADM

## 2022-10-25 RX ORDER — ACETAMINOPHEN 650 MG/1
650 SUPPOSITORY RECTAL EVERY 6 HOURS PRN
Status: DISCONTINUED | OUTPATIENT
Start: 2022-10-25 | End: 2022-10-29 | Stop reason: HOSPADM

## 2022-10-25 RX ORDER — VERAPAMIL HYDROCHLORIDE 240 MG/1
120 TABLET, FILM COATED, EXTENDED RELEASE ORAL NIGHTLY
Status: DISCONTINUED | OUTPATIENT
Start: 2022-10-25 | End: 2022-10-29 | Stop reason: HOSPADM

## 2022-10-25 RX ORDER — ONDANSETRON 4 MG/1
4 TABLET, ORALLY DISINTEGRATING ORAL EVERY 8 HOURS PRN
Status: DISCONTINUED | OUTPATIENT
Start: 2022-10-25 | End: 2022-10-29 | Stop reason: HOSPADM

## 2022-10-25 RX ORDER — LAMOTRIGINE 25 MG/1
50 TABLET ORAL NIGHTLY
Status: DISCONTINUED | OUTPATIENT
Start: 2022-10-25 | End: 2022-10-29 | Stop reason: HOSPADM

## 2022-10-25 RX ORDER — ENOXAPARIN SODIUM 100 MG/ML
30 INJECTION SUBCUTANEOUS 2 TIMES DAILY
Status: DISCONTINUED | OUTPATIENT
Start: 2022-10-25 | End: 2022-10-28

## 2022-10-25 RX ORDER — LITHIUM CARBONATE 150 MG/1
600 CAPSULE ORAL NIGHTLY
Status: DISCONTINUED | OUTPATIENT
Start: 2022-10-25 | End: 2022-10-29 | Stop reason: HOSPADM

## 2022-10-25 RX ORDER — TRAZODONE HYDROCHLORIDE 50 MG/1
200 TABLET ORAL NIGHTLY
Status: DISCONTINUED | OUTPATIENT
Start: 2022-10-25 | End: 2022-10-29 | Stop reason: HOSPADM

## 2022-10-25 RX ORDER — GABAPENTIN 300 MG/1
300 CAPSULE ORAL 3 TIMES DAILY
Status: DISCONTINUED | OUTPATIENT
Start: 2022-10-25 | End: 2022-10-29 | Stop reason: HOSPADM

## 2022-10-25 RX ORDER — SODIUM CHLORIDE 0.9 % (FLUSH) 0.9 %
5-40 SYRINGE (ML) INJECTION PRN
Status: DISCONTINUED | OUTPATIENT
Start: 2022-10-25 | End: 2022-10-29 | Stop reason: HOSPADM

## 2022-10-25 RX ORDER — INSULIN GLARGINE 100 [IU]/ML
5 INJECTION, SOLUTION SUBCUTANEOUS NIGHTLY
COMMUNITY
Start: 2022-10-12

## 2022-10-25 RX ORDER — SODIUM CHLORIDE 9 MG/ML
INJECTION, SOLUTION INTRAVENOUS PRN
Status: DISCONTINUED | OUTPATIENT
Start: 2022-10-25 | End: 2022-10-29 | Stop reason: HOSPADM

## 2022-10-25 RX ADMIN — INSULIN LISPRO 4 UNITS: 100 INJECTION, SOLUTION INTRAVENOUS; SUBCUTANEOUS at 22:15

## 2022-10-25 RX ADMIN — ENOXAPARIN SODIUM 30 MG: 100 INJECTION SUBCUTANEOUS at 22:11

## 2022-10-25 RX ADMIN — TRAZODONE HYDROCHLORIDE 200 MG: 50 TABLET ORAL at 22:10

## 2022-10-25 RX ADMIN — LITHIUM CARBONATE 600 MG: 150 CAPSULE, GELATIN COATED ORAL at 22:11

## 2022-10-25 RX ADMIN — GABAPENTIN 300 MG: 300 CAPSULE ORAL at 22:11

## 2022-10-25 RX ADMIN — ROSUVASTATIN CALCIUM 20 MG: 10 TABLET, FILM COATED ORAL at 22:11

## 2022-10-25 RX ADMIN — CEFTRIAXONE SODIUM 1000 MG: 1 INJECTION, POWDER, FOR SOLUTION INTRAMUSCULAR; INTRAVENOUS at 18:19

## 2022-10-25 RX ADMIN — VERAPAMIL HYDROCHLORIDE 120 MG: 240 TABLET, FILM COATED, EXTENDED RELEASE ORAL at 22:10

## 2022-10-25 RX ADMIN — LAMOTRIGINE 50 MG: 25 TABLET ORAL at 22:11

## 2022-10-25 ASSESSMENT — PAIN DESCRIPTION - PAIN TYPE: TYPE: ACUTE PAIN

## 2022-10-25 ASSESSMENT — PAIN DESCRIPTION - ORIENTATION: ORIENTATION: LEFT;RIGHT

## 2022-10-25 ASSESSMENT — PAIN DESCRIPTION - ONSET: ONSET: ON-GOING

## 2022-10-25 ASSESSMENT — PAIN SCALES - GENERAL
PAINLEVEL_OUTOF10: 5
PAINLEVEL_OUTOF10: 0

## 2022-10-25 ASSESSMENT — PAIN DESCRIPTION - DESCRIPTORS: DESCRIPTORS: ACHING

## 2022-10-25 ASSESSMENT — PAIN - FUNCTIONAL ASSESSMENT
PAIN_FUNCTIONAL_ASSESSMENT: PREVENTS OR INTERFERES WITH MANY ACTIVE NOT PASSIVE ACTIVITIES
PAIN_FUNCTIONAL_ASSESSMENT: 0-10
PAIN_FUNCTIONAL_ASSESSMENT: NONE - DENIES PAIN

## 2022-10-25 ASSESSMENT — PAIN DESCRIPTION - FREQUENCY: FREQUENCY: INTERMITTENT

## 2022-10-25 ASSESSMENT — PAIN DESCRIPTION - LOCATION: LOCATION: BACK;HIP

## 2022-10-25 NOTE — CARE COORDINATION
CASE MANAGEMENT INITIAL ASSESSMENT      Reviewed chart and completed assessment with patient and wife  Family present: wife  Explained Case Management role/services. yes    Primary contact information:wife, [address] [facility] Decision Maker :   Primary Decision Maker: Frida Villavicencio - [phone]          Can this person be reached and be able to respond quickly, such as within a few minutes or hours? Yes      Admit date/status:10/25/22  Diagnosis:s/p falls, weakness   Is this a Readmission?:  No      Insurance:t medicare   Precert required for SNF: Yes       3 night stay required: No    Living arrangements, Adls, care needs, prior to admission:from home with wife who works daily, patient is usually independent in ADLs, currently needing assistance    Durable Medical Equipment at home:  Walker_x_Cane__RTS__ BSC__Shower Chair__  02__ HHN__ CPAP__  BiPap__  Hospital Bed__ W/C___ Other_____    Services in the home and/or outpatient, prior to admission:none    Current PCP:Dr. Alfred                                Medications:yes Prescription coverage? Yes Will pt require financial assistance with medications No     Transportation needs: stretcher     Dialysis Facility (if applicable)   Name:  Address:  Dialysis Schedule:  Phone:  Fax:    PT/OT recs:    Hospital Exemption Notification (HEN):    Barriers to discharge:medical complications    Plan/comments:Referred to patient for d/c planning. Spoke to patient and wife. Patient is a 67year old male admitted for weakness. Patient usually lives at home with wife. Patient is usually independent in ADLs. Patient with several falls and weakness in the last week. Discussed SNF. Patient has been to [facility] and is agreeable to return. List of facilities in network with insurance provided and reviewed. Referral to [facility]. Patient will need precert. MD and RN updated.   Electronically signed by GARIMA Simeon on 10/25/2022 at 2:56 PM      ECOC on chart for MD signature

## 2022-10-25 NOTE — ED NOTES
Pt voided 250ml of urine. Pt was bladder scanned for 455ml at this time following multiple scans. Verbal order for vasquez cath obtained at this time.       Ruby Ochoa RN  10/25/22 6703

## 2022-10-25 NOTE — ED NOTES
1628- Called neurosurgery for consult per Dr. Osbaldo Wright- NP Becky Crawford called back and spoke with Dr. Adriana Ellis   RE: back pain, leg weakness, urinary retention, anticoagulant use, frequent falls       Camille Vo  10/25/22 6711

## 2022-10-25 NOTE — ED NOTES
Patient identified as a positive fall risk on the ED triage fall screening. Patient placed in fall precautions which includes:  yellow fall risk bracelet on wrist and yellow socks on feet. Patient instructed on importance of not getting out of bed or ambulating without assistance for safety. Pt verbalized understanding.        Rafael Fowler RN  10/25/22 9843

## 2022-10-25 NOTE — H&P
Hospital Medicine History & Physical      PCP: FRANCISCO JAVIER Viramontes CNP    Date of Admission: 10/25/2022    Date of Service: Pt seen/examined on 10/25/2022 and Admitted to Inpatient with expected LOS greater than two midnights due to medical therapy. Chief Complaint: Fatigue and urinary retention    History Of Present Illness:      67 y.o. male, with past medical history of chronic back pain, hypertension, hyperlipidemia, diabetes, obesity and bipolar, who presented to [facility] with fatigue and urinary retention. History obtained from the patient and review of EMR. The patient stated he has a history of chronic back pain, spinal stenosis and lower extremity weakness with a spinal stimulator. He stated over the last couple of weeks he has been getting progressively weaker and having more frequent falls. Patient stated he was admitted here at [facility] roughly 4 months ago and discharged to a rehab facility where he felt he did well. However, he states since he has been home he feels like he has gotten weaker. The patient stated he also feels like he has been retaining urine. A Stapleton catheter was placed in the emergency room and urine was sent to the lab. The patient's urine was positive for infection and the patient was started on ceftriaxone. He has a the patient's lower extremity weakness a CT of the C-spine and CT of the thoracic and lumbar spine were obtained that were all negative for any acute findings. Per Dr. Andrés Navarro note, he did discuss the patient's case with neurosurgery and there was no need for an emergent CT myelogram at this time. The patient was admitted for further evaluation and treatment. PT/OT and case management were consulted. The patient denied any other associated symptoms as well as any aggravating and/or alleviating factors. At the time of this assessment, the patient was resting comfortably in bed.  He currently denies any chest pain, back pain, abdominal pain, shortness of breath, numbness, tingling, N/V/C/D, fever and/or chills. Past Medical History:          Diagnosis Date    Anxiety     Arthritis     Asthma     Bipolar 1 disorder ([facility] [address]     Colitis     Depression     Diabetes ([facility]     Diabetes mellitus ([facility]     Encounter for imaging to screen for metal prior to MRI 06/08/2022    NOT MRI conditional [employer]#[id number], Lead: [id number] implanted 9/9/19 by Conrad Crane at [facility]. Lead is unsafe for MRI. Patient unable to have any MRI ever.     Glaucoma     H/O bladder problems     High blood pressure     History of kidney problems     IBS (irritable bowel syndrome)     Liver disease     hx of elevated LFT's    Obesity     Psychiatric problem      Past Surgical History:          Procedure Laterality Date    APPENDECTOMY      BACK SURGERY      CERVICAL FUSION      COLONOSCOPY      EYE SURGERY      HIP SURGERY      JOINT REPLACEMENT Right     THR    OTHER SURGICAL HISTORY  06/06/2018     right L4-5 hemilaminotomy, partial facetectomy, foraminotomy, reexploration and excision of synovial csyt,  Bilateral L2-3 and L3-4 hemilaminotomy, partial facetectomy, foraminotomy    ND NERVOUS SYSTEM SURGERY UNLISTED Bilateral 11/12/2018    BILATERAL LUMBAR THREE, LUMBAR FOUR, LUMBAR FIVE RADIOFREQUENCY ABLATION SITE CONFIRMED BY FLUOROSCOPY performed by Carlton Ramirez MD at [address] [place] [address] [facility] W/IMG GDN Bilateral 9/25/2018    BILATERAL LUMBAR THREE, LUMBAR FOUR, LUMBAR FIVE MEDIAL BRANCH BLOCK SITE CONFIRMED BY FLUOROSCOPY performed by Carlton Ramirez MD at [address] [place] [address] DX/THER [facility] INTRLMNR CRV/THRC W/IMG GDN Bilateral 10/16/2018    BILATERAL LUMBAR THREE, FOUR, FIVE MEDIAL BRANCH BLOCK SITE CONFIRMED BY FLUOROSCOPY performed by Carlton Ramirez MD at [facility]    TURP N/A 6/9/2020    CYSTOSCOPY, TRANSURETHRAL RESECTION OF PROSTATE performed by Shirley Villalobos MD at [place] [address] Medications Prior to Admission:      Prior to Admission medications    Medication Sig Start Date End Date Taking? Authorizing Provider   sennosides-docusate sodium (SENOKOT-S) 8.6-50 MG tablet Take 2 tablets by mouth in the morning and at bedtime 6/13/22   Peola Kawasaki, MD   lamoTRIgine (LAMICTAL) 25 MG tablet Take 25 mg by mouth daily     Historical Provider, MD   lamoTRIgine (LAMICTAL) 25 MG tablet Take 50 mg by mouth at bedtime     Historical Provider, MD   lithium 300 MG capsule Take 300 mg by mouth daily    Historical Provider, MD   lithium 300 MG capsule Take 600 mg by mouth at bedtime    Historical Provider, MD   metFORMIN (GLUCOPHAGE-XR) 500 mg extended release tablet Take 1,000 mg by mouth 2 times daily (with meals)    Historical Provider, MD   traZODone (DESYREL) 100 MG tablet Take 200 mg by mouth nightly     Historical Provider, MD   acetaminophen (TYLENOL) 500 MG tablet Take 1,000 mg by mouth nightly as needed for Pain    Historical Provider, MD   gabapentin (NEURONTIN) 300 MG capsule Take 300 mg by mouth 3 times daily. Historical Provider, MD   Dulaglutide (TRULICITY) 3 PB/4.3XL SOPN Inject 3 mg into the skin once a week Takes on Sundays    Historical Provider, MD   glipiZIDE (GLUCOTROL) 5 mg tablet Take 5 mg by mouth daily    Historical Provider, MD   rosuvastatin (CRESTOR) 20 MG tablet Take 20 mg by mouth at bedtime     Historical Provider, MD   verapamil (CALAN SR) 120 MG extended release tablet Take 120 mg by mouth nightly    Historical Provider, MD     Allergies:  No known allergies    Social History:      The patient currently lives at home    TOBACCO:   reports that he has never smoked. He has never used smokeless tobacco.  ETOH:   reports no history of alcohol use.   E-cigarette/Vaping       Questions Responses    E-cigarette/Vaping Use Never User    Start Date     Passive Exposure     Quit Date     Counseling Given     Comments           Family History:      Reviewed and negative in regards to presenting illness/complaint. Problem Relation Age of Onset    Alcohol Abuse Sister     Coronary Art Dis Mother     Obesity Mother     Osteoarthritis Mother     Parkinsonism Mother     Coronary Art Dis Father     Obesity Father      REVIEW OF SYSTEMS COMPLETED:   Pertinent positives as noted in the HPI. All other systems reviewed and negative. PHYSICAL EXAM PERFORMED:    /76   Pulse 67   Temp 97.6 °F (36.4 °C) (Oral)   Resp 14   Wt 232 lb (105.2 kg)   SpO2 96%   BMI 29.00 kg/m²     General appearance: Pleasant, obese male in no apparent distress, appears stated age and cooperative. HEENT: Pupils equal, round, and reactive to light. Extra ocular muscles intact. Conjunctivae/corneas clear. Neck: Supple, with full range of motion. No jugular venous distention. Trachea midline. Respiratory:  Normal respiratory effort. Clear to auscultation, bilaterally without Rales/Wheezes/Rhonchi. Cardiovascular:  Regular rate and rhythm with normal S1/S2 without murmurs, rubs or gallops. Abdomen: Soft, obese, round non-tender, non-distended with normal bowel sounds. Musculoskeletal:  No clubbing, cyanosis or edema bilaterally. Generalized weakness  Skin: Skin color, texture, turgor normal.  No significant rashes or lesions.   Neurologic:  Neurovascularly intact Cranial nerves: II-XII intact, grossly non-focal.  Psychiatric:  Alert and oriented, thought content appropriate, normal insight  Capillary Refill: Brisk,3 seconds, normal  Peripheral Pulses: +2 palpable, equal bilaterally     Labs:     Recent Labs     10/25/22  1353   WBC 11.3*   HGB 14.1   HCT 42.5        Recent Labs     10/25/22  1353   *   K 4.5   CL 99   CO2 24   BUN 15   CREATININE 1.0   CALCIUM 9.6     Recent Labs     10/25/22  1353   AST 25   ALT 51*   BILITOT 0.5   ALKPHOS 110     Recent Labs     10/25/22  1353   INR 1.35*     Recent Labs     10/25/22  1353   TROPONINI <0.01     Urinalysis:      Lab Results Component Value Date/Time    NITRU Negative 10/25/2022 04:36 PM    WBCUA 21-50 10/25/2022 04:36 PM    BACTERIA 3+ 10/25/2022 04:36 PM    RBCUA 3-4 10/25/2022 04:36 PM    BLOODU TRACE-INTACT 10/25/2022 04:36 PM    SPECGRAV 1.010 10/25/2022 04:36 PM    GLUCOSEU 250 10/25/2022 04:36 PM    GLUCOSEU NEGATIVE 10/13/2010 12:10 PM     Radiology:     CXR: I have reviewed the CXR with the following interpretation: NO acute cardiopulmonary findings    EKG:  I have reviewed the EKG with the following interpretation: The Ekg interpreted in the absence of a cardiologist shows. Normal sinus rhythmLeft axis deviationSeptal infarct , age undeterminedAbnormal ECGWhen compared with ECG of 07-JUN-2022 13:09,Septal infarct is now Present     CT Head W/O Contrast   Final Result   No acute intracranial abnormality. CT CSpine W/O Contrast   Final Result   No acute abnormality of the cervical spine. CT THORACIC SPINE WO CONTRAST   Final Result   No acute fracture within the thoracic or lumbar spine. Thoracic spinal stimulator device. Posterior spinous process fusion at L2-L3. CT LUMBAR SPINE WO CONTRAST   Final Result   No acute fracture within the thoracic or lumbar spine. Thoracic spinal stimulator device. Posterior spinous process fusion at L2-L3.          XR CHEST PORTABLE   Final Result   No acute cardiopulmonary findings           Consults:    IP CONSULT TO NEUROSURGERY  IP CONSULT TO HOSPITALIST    ASSESSMENT:    Active Hospital Problems    Diagnosis Date Noted    Acute cystitis with hematuria [N30.01] 10/25/2022     Priority: Medium    Type 2 diabetes mellitus ([facility] [address] [E11.9] 07/11/2020    Essential hypertension [I10] 07/11/2020    Urinary retention [R33.9] 06/09/2020    Lumbosacral spondylosis without myelopathy [M47.817] 12/10/2018    Spinal stenosis [M48.00] 06/06/2018     PLAN:    Acute cystitis with hematuria  and urinary retention  -UA positive for infection  -urine culture ordered in ED  -vasquez placed in ED  -ceftriaxone given in ED and continued 10/25/2022    Frequent falls in patient with lumbosacral spondylosis without myelopathy in patient and spinal stenosis  -CT head revealed no acute intracranial abnormality  -CT C-spine reviewed: No abnormality of cervical spine  -CT thoracic and CT lumbar spine revealed: No acute fracture within the thoracic or lumbar spine. Thoracic spinal stimulator device. Posterior spinous process fusion at 2-L3  -neuro surgery consulted in ED - appreciate recommendations in advance  -Pt/ot eval  -case management - placement    DM2 with neuropathy, uncontrolled  -  -hemglobin a1c in am  -hold home metformin  -hdssi  -poct ac/hs  -hypoglycemia protocol  -carb control diet  -continue gabapentin    Obesity  With Body mass index is 30 kg/m². Complicating assessment and treatment. Placing patient at risk for multiple co-morbidities as well as early death and contributing to the patient's presentation. Counseled on weight loss    Essential HTN  -continue verapamil    HLD  -continue rosuvastatin    Bipolar I disorder  -mood appears stable at this time  -continue home medications    Leukocytosis  -likely 2/2 UTI  -abx as indicated above   -cbc in am    DVT Prophylaxis: Lovenox    Diet: No diet orders on file    Code Status: Prior    PT/OT Eval Status: Ordered    Dispo - 2-3 days pending clinical improvement     FRANCISCO JAVIER Scott CNP    Thank you FRANCISCO JAVIER Wu CNP for the opportunity to be involved in this patient's care. If you have any questions or concerns please feel free to contact me at [phone].  ------------------------------DR. Froilan Mondragon---------------------------

## 2022-10-25 NOTE — ED PROVIDER NOTES
[facility] C3 TELE/MED SURG/ONC  EMERGENCY DEPARTMENT ENCOUNTER      Pt Name: Kenia Balderas  MRN: [id number]  [place] 1950  Date of evaluation: 10/25/2022  Provider: Franklin Reveles MD    CHIEF COMPLAINT       Chief Complaint   Patient presents with    Fatigue     States weak in bilateral legs over the past month. HISTORY OF PRESENT ILLNESS   (Location/Symptom, Timing/Onset, Context/Setting, Quality, Duration, Modifying Factors, Severity)  Note limiting factors. Kenia Balderas is a 67 y.o. male with past medical history of bipolar disorder, chronic low back pain with bilateral lower extremity weakness and BPH status post TURP remotely here today with acute on chronic back pain and leg weakness. Patient presents emergency department today because he is fallen 3 times over the last week. States he has not injured himself or hit his head, but states he is more weak than usual.  Notes chronic weakness in his legs but states over the past 1 to 2 weeks they have become more weak than usual.  States he has chronic low back pain has a spinal stimulator in place but this is worsened with time as well. He states his pain is not particularly worse since his fall however. Denies any numbness, tingling in the legs. No saddle anesthesia. He notes that he has chronic difficulty urinating and some urinary incontinence at times but notes it is gotten much worse as of late. States he has been seen by neurosurgery in the past for his back pain but is unable to have an MRI due to a spinal stimulator. Notes that he has been admitted to the hospital before for leg weakness and deconditioning and did have a rehab stay previously in the year. Notes his symptoms seem somewhat worse as of late. HPI    Nursing Notes were reviewed. REVIEW OF SYSTEMS    (2-9 systems for level 4, 10 or more for level 5)     Review of Systems    Please see HPI for pertinent positive and negative review of system findings.  A full 10 system ROS was performed and otherwise negative. PAST MEDICAL HISTORY     Past Medical History:   Diagnosis Date    Anxiety     Arthritis     Asthma     Bipolar 1 disorder ([facility]     Colitis     Depression     Diabetes ([facility]     Diabetes mellitus ([facility]     Encounter for imaging to screen for metal prior to MRI 06/08/2022    NOT MRI conditional [employer] model#SC-1160, Lead: [id number] implanted 9/9/19 by Dotty Millard at [facility]. Lead is unsafe for MRI. Patient unable to have any MRI ever.     Glaucoma     H/O bladder problems     High blood pressure     History of kidney problems     IBS (irritable bowel syndrome)     Liver disease     hx of elevated LFT's    Obesity     Psychiatric problem          SURGICAL HISTORY       Past Surgical History:   Procedure Laterality Date    APPENDECTOMY      BACK SURGERY      CERVICAL FUSION      COLONOSCOPY      EYE SURGERY      HIP SURGERY      JOINT REPLACEMENT Right     THR    OTHER SURGICAL HISTORY  06/06/2018     right L4-5 hemilaminotomy, partial facetectomy, foraminotomy, reexploration and excision of synovial csyt,  Bilateral L2-3 and L3-4 hemilaminotomy, partial facetectomy, foraminotomy    [place] NERVOUS SYSTEM SURGERY UNLISTED Bilateral 11/12/2018    BILATERAL LUMBAR THREE, LUMBAR FOUR, LUMBAR FIVE RADIOFREQUENCY ABLATION SITE CONFIRMED BY FLUOROSCOPY performed by Bonnie Carrillo MD at [address] [facility] DX/THER SBST INTRLMNR CRV/THRC W/IMG GDN Bilateral 9/25/2018    BILATERAL LUMBAR THREE, LUMBAR FOUR, LUMBAR FIVE MEDIAL BRANCH BLOCK SITE CONFIRMED BY FLUOROSCOPY performed by Bonnie Carrillo MD at [address] [facility] DX/THER SBST INTRLMNR CRV/THRC W/IMG GDN Bilateral 10/16/2018    BILATERAL LUMBAR THREE, FOUR, FIVE MEDIAL BRANCH BLOCK SITE CONFIRMED BY FLUOROSCOPY performed by Bonnie Carrillo MD at [facility]    TURP N/A 6/9/2020    CYSTOSCOPY, TRANSURETHRAL RESECTION OF PROSTATE performed by Lizz Mendiola MD at [address] [facility]       Current Discharge Medication List        CONTINUE these medications which have NOT CHANGED    Details   insulin glargine (BASAGLAR KWIKPEN) 100 UNIT/ML injection pen Inject 5 Units into the skin nightly      warfarin (COUMADIN) 5 MG tablet Take 5 mg by mouth daily      sennosides-docusate sodium (SENOKOT-S) 8.6-50 MG tablet Take 2 tablets by mouth in the morning and at bedtime  Qty: 60 tablet, Refills: 1      !! lamoTRIgine (LAMICTAL) 25 MG tablet Take 25 mg by mouth daily       !! lamoTRIgine (LAMICTAL) 25 MG tablet Take 50 mg by mouth at bedtime       !! lithium 300 MG capsule Take 300 mg by mouth daily      !! lithium 300 MG capsule Take 600 mg by mouth at bedtime      metFORMIN (GLUCOPHAGE-XR) 500 mg extended release tablet Take 1,000 mg by mouth 2 times daily (with meals)      traZODone (DESYREL) 100 MG tablet Take 200 mg by mouth nightly       acetaminophen (TYLENOL) 500 MG tablet Take 1,000 mg by mouth nightly as needed for Pain      gabapentin (NEURONTIN) 300 MG capsule Take 300 mg by mouth 3 times daily. Dulaglutide 3 MG/0.5ML SOPN Inject 3 mg into the skin once a week Takes on Sundays      glipiZIDE (GLUCOTROL) 5 mg tablet Take 5 mg by mouth daily      rosuvastatin (CRESTOR) 20 MG tablet Take 20 mg by mouth at bedtime       verapamil (CALAN SR) 120 MG extended release tablet Take 120 mg by mouth nightly       !! - Potential duplicate medications found. Please discuss with provider.           ALLERGIES     No known allergies    FAMILY HISTORY       Family History   Problem Relation Age of Onset    Alcohol Abuse Sister     Coronary Art Dis Mother     Obesity Mother     Osteoarthritis Mother     Parkinsonism Mother     Coronary Art Dis Father     Obesity Father           SOCIAL HISTORY       Social History     Socioeconomic History    Marital status:      Spouse name: None    Number of children: None    Years of education: None    Highest education level: None   Tobacco Use    Smoking status: Never    Smokeless tobacco: Never   Vaping Use    Vaping Use: Never used   Substance and Sexual Activity    Alcohol use: No    Drug use: No       SCREENINGS    Juanita Coma Scale  Eye Opening: Spontaneous  Best Verbal Response: Oriented  Best Motor Response: Obeys commands  Juanita Coma Scale Score: 15          PHYSICAL EXAM    (up to 7 for level 4, 8 or more for level 5)     ED Triage Vitals [10/25/22 1325]   BP Temp Temp Source Heart Rate Resp SpO2 Height Weight   104/64 97.6 °F (36.4 °C) Oral 80 18 96 % -- 232 lb (105.2 kg)       Physical Exam    General appearance:  Cooperative. No acute distress. Skin:  Warm. Dry. Eye:  Extraocular movements intact. Pupils are equally round and reactive to light and accommodation. CN II-XII intact. Ears, nose, mouth and throat:  Oral mucosa moist,  Neck:  Trachea midline. Heart:  Regular rate and rhythm  Perfusion:  intact  Respiratory:  Lungs clear to auscultation bilaterally. Respirations nonlabored. Abdominal:   Non distended. Nontender  Neurological:  Alert and oriented x 3. Moves all extremities spontaneously. Intact sensation throughout. Intact finger-to-nose bilaterally. No saddle anesthesia. Patient does have some weakness in the bilateral upper and lower extremities which appears fairly equal throughout. Mild drift in the upper and lower extremities bilaterally. Weakness noted throughout all muscle distribution groups. Somewhat diminished 1+ bilateral patellar and Achilles deep tendon reflexes. Gait not tested.    Musculoskeletal:   Normal ROM, no deformities          Psychiatric:  Normal mood      DIAGNOSTIC RESULTS       Labs Reviewed   CULTURE, URINE - Abnormal; Notable for the following components:       Result Value    Organism Enterococcus faecalis (*)     All other components within normal limits    Narrative:     ORDER#: [id number]                          ORDERED BY: Lizette Rice  SOURCE: Urine Clean Catch                  COLLECTED:  10/25/22 16:36  ANTIBIOTICS AT POPPY.:                      RECEIVED :  10/26/22 01:52   CBC WITH AUTO DIFFERENTIAL - Abnormal; Notable for the following components:    WBC 11.3 (*)     Neutrophils Absolute 8.5 (*)     All other components within normal limits   COMPREHENSIVE METABOLIC PANEL W/ REFLEX TO MG FOR LOW K - Abnormal; Notable for the following components:    Sodium 135 (*)     Glucose 260 (*)     ALT 51 (*)     All other components within normal limits   URINALYSIS WITH REFLEX TO CULTURE - Abnormal; Notable for the following components:    Glucose, Ur 250 (*)     Blood, Urine TRACE-INTACT (*)     Protein, UA TRACE (*)     Leukocyte Esterase, Urine MODERATE (*)     All other components within normal limits   PROTIME-INR - Abnormal; Notable for the following components:    Protime 16.6 (*)     INR 1.35 (*)     All other components within normal limits   MICROSCOPIC URINALYSIS - Abnormal; Notable for the following components:    WBC, UA 21-50 (*)     Bacteria, UA 3+ (*)     All other components within normal limits   BASIC METABOLIC PANEL W/ REFLEX TO MG FOR LOW K - Abnormal; Notable for the following components:    Sodium 135 (*)     Glucose 236 (*)     All other components within normal limits   CBC WITH AUTO DIFFERENTIAL - Abnormal; Notable for the following components:    RBC 4.13 (*)     Hemoglobin 13.2 (*)     Hematocrit 39.6 (*)     Neutrophils Absolute 8.0 (*)     All other components within normal limits   BASIC METABOLIC PANEL W/ REFLEX TO MG FOR LOW K - Abnormal; Notable for the following components:    Glucose 254 (*)     All other components within normal limits   POCT GLUCOSE - Abnormal; Notable for the following components:    POC Glucose 332 (*)     All other components within normal limits   POCT GLUCOSE - Abnormal; Notable for the following components:    POC Glucose 243 (*)     All other components within normal limits   POCT GLUCOSE - Abnormal; Notable for the following components:    POC Glucose 263 (*)     All other components within normal limits   POCT GLUCOSE - Abnormal; Notable for the following components:    POC Glucose 448 (*)     All other components within normal limits   POCT GLUCOSE - Abnormal; Notable for the following components:    POC Glucose 283 (*)     All other components within normal limits   POCT GLUCOSE - Abnormal; Notable for the following components:    POC Glucose 241 (*)     All other components within normal limits   TROPONIN   APTT   LITHIUM LEVEL   HEMOGLOBIN A1C   CBC   POCT GLUCOSE   POCT GLUCOSE   POCT GLUCOSE   POCT GLUCOSE   POCT GLUCOSE   POCT GLUCOSE   POCT GLUCOSE   POCT GLUCOSE       Interpretation per the Radiologist below, if obtained/available at the time of this note:    CT Head W/O Contrast   Final Result   No acute intracranial abnormality. CT CSpine W/O Contrast   Final Result   No acute abnormality of the cervical spine. CT THORACIC SPINE WO CONTRAST   Final Result   No acute fracture within the thoracic or lumbar spine. Thoracic spinal stimulator device. Posterior spinous process fusion at L2-L3. CT LUMBAR SPINE WO CONTRAST   Final Result   No acute fracture within the thoracic or lumbar spine. Thoracic spinal stimulator device. Posterior spinous process fusion at L2-L3. XR CHEST PORTABLE   Final Result   No acute cardiopulmonary findings             All other labs/imaging were within normal range or not returned as of this dictation.     EMERGENCY DEPARTMENT COURSE and DIFFERENTIAL DIAGNOSIS/MDM:   Vitals:    Vitals:    10/26/22 1243 10/26/22 1542 10/26/22 2100 10/27/22 0011   BP: 124/77 133/77 (!) 144/68 135/83   Pulse: 68 74 74 70   Resp:  16 16 16   Temp:  98.1 °F (36.7 °C) 98.6 °F (37 °C) 98.3 °F (36.8 °C)   TempSrc:  Oral Oral Oral   SpO2: 93% 97% 97% 95%   Weight:       Height:           Patient presented the emergency department today complaining of leg weakness, frequent falls, low back pain, urinary retention. On anticoagulation. Given his overall complaint I was concerned for a possible spinal cord he injury, cauda equina, epidural hematoma initially, but further history and review of his medical chart reveals that these are much more chronic symptoms. Still, certainly does remain a possible etiology to his symptoms. I did perform a work-up of the patient's for any potential traumatic etiology but imaging of the head, C, T or L-spine were all negative. Labs were fairly unremarkable however the patient was found to have a urinary tract infection and was given a dose of Rocephin. Stapleton catheter was placed as he does have fairly significant urinary retention which may be due to his underlying UTI. While an MRI would be necessary to rule out possible spinal cord pathology my suspicion for this is fairly low especially with a prior presentation for this exact same thing only a few months ago. At that time, he did undergo CT myelogram showing no evidence of this. The case today was discussed with neurosurgery and they share my concerns, but also agree that this is unlikely. They were comfortable admitting the patient to [facility].  Should any of his symptoms worsen, they did recommend CT myelogram, but at present are comfortable with managing here at this facility. Charly Lamb M.D., am the primary clinician of record. MDM      CONSULTS     IP CONSULT TO NEUROSURGERY  IP CONSULT TO HOSPITALIST  IP CONSULT TO CASE MANAGEMENT    Critical Care:   None    REASSESSMENT          PROCEDURE     Unless otherwise noted below, none     Procedures      FINAL IMPRESSION      1. Generalized weakness    2. Chronic midline low back pain without sciatica    3. Frequent falls    4. Urinary retention    5.  Acute cystitis without hematuria            DISPOSITION/PLAN   DISPOSITION Admitted 10/25/2022 05:45:58 PM        PATIENT REFERRED TO:  [address] [facility]  [address] [facility] [address]  [phone]          DISCHARGE MEDICATIONS:  Current Discharge Medication List        Controlled Substances Monitoring:     RX Monitoring 6/13/2022   Attestation -   Acute Pain Prescriptions Severe pain not adequately treated with lower dose.        (Please note that portions of this note were completed with a voice recognition program.  Efforts were made to edit the dictations but occasionally words are mis-transcribed.)    Juventino Bonner MD (electronically signed)  Attending Emergency Physician            Kasie Cota MD  10/27/22 9283

## 2022-10-26 LAB
ANION GAP SERPL CALCULATED.3IONS-SCNC: 9 MMOL/L (ref 3–16)
BASOPHILS ABSOLUTE: 0.1 K/UL (ref 0–0.2)
BASOPHILS RELATIVE PERCENT: 0.6 %
BUN BLDV-MCNC: 11 MG/DL (ref 7–20)
CALCIUM SERPL-MCNC: 9.1 MG/DL (ref 8.3–10.6)
CHLORIDE BLD-SCNC: 101 MMOL/L (ref 99–110)
CO2: 25 MMOL/L (ref 21–32)
CREAT SERPL-MCNC: 0.9 MG/DL (ref 0.8–1.3)
EKG ATRIAL RATE: 66 BPM
EKG DIAGNOSIS: NORMAL
EKG P AXIS: 58 DEGREES
EKG P-R INTERVAL: 206 MS
EKG Q-T INTERVAL: 412 MS
EKG QRS DURATION: 90 MS
EKG QTC CALCULATION (BAZETT): 431 MS
EKG R AXIS: -37 DEGREES
EKG T AXIS: 61 DEGREES
EKG VENTRICULAR RATE: 66 BPM
EOSINOPHILS ABSOLUTE: 0.1 K/UL (ref 0–0.6)
EOSINOPHILS RELATIVE PERCENT: 1.3 %
ESTIMATED AVERAGE GLUCOSE: 240.3 MG/DL
GFR SERPL CREATININE-BSD FRML MDRD: >60 ML/MIN/{1.73_M2}
GLUCOSE BLD-MCNC: 236 MG/DL (ref 70–99)
GLUCOSE BLD-MCNC: 243 MG/DL (ref 70–99)
GLUCOSE BLD-MCNC: 263 MG/DL (ref 70–99)
GLUCOSE BLD-MCNC: 283 MG/DL (ref 70–99)
GLUCOSE BLD-MCNC: 448 MG/DL (ref 70–99)
HBA1C MFR BLD: 10 %
HCT VFR BLD CALC: 39.6 % (ref 40.5–52.5)
HEMOGLOBIN: 13.2 G/DL (ref 13.5–17.5)
LYMPHOCYTES ABSOLUTE: 1.7 K/UL (ref 1–5.1)
LYMPHOCYTES RELATIVE PERCENT: 15.7 %
MCH RBC QN AUTO: 31.9 PG (ref 26–34)
MCHC RBC AUTO-ENTMCNC: 33.3 G/DL (ref 31–36)
MCV RBC AUTO: 95.7 FL (ref 80–100)
MONOCYTES ABSOLUTE: 1.1 K/UL (ref 0–1.3)
MONOCYTES RELATIVE PERCENT: 9.8 %
NEUTROPHILS ABSOLUTE: 8 K/UL (ref 1.7–7.7)
NEUTROPHILS RELATIVE PERCENT: 72.6 %
PDW BLD-RTO: 12.8 % (ref 12.4–15.4)
PERFORMED ON: ABNORMAL
PLATELET # BLD: 240 K/UL (ref 135–450)
PMV BLD AUTO: 8.2 FL (ref 5–10.5)
POTASSIUM REFLEX MAGNESIUM: 3.9 MMOL/L (ref 3.5–5.1)
RBC # BLD: 4.13 M/UL (ref 4.2–5.9)
SODIUM BLD-SCNC: 135 MMOL/L (ref 136–145)
WBC # BLD: 11 K/UL (ref 4–11)

## 2022-10-26 PROCEDURE — 6370000000 HC RX 637 (ALT 250 FOR IP): Performed by: INTERNAL MEDICINE

## 2022-10-26 PROCEDURE — 1200000000 HC SEMI PRIVATE

## 2022-10-26 PROCEDURE — 97162 PT EVAL MOD COMPLEX 30 MIN: CPT

## 2022-10-26 PROCEDURE — 97530 THERAPEUTIC ACTIVITIES: CPT

## 2022-10-26 PROCEDURE — 83036 HEMOGLOBIN GLYCOSYLATED A1C: CPT

## 2022-10-26 PROCEDURE — 6360000002 HC RX W HCPCS: Performed by: NURSE PRACTITIONER

## 2022-10-26 PROCEDURE — 6370000000 HC RX 637 (ALT 250 FOR IP): Performed by: NURSE PRACTITIONER

## 2022-10-26 PROCEDURE — 36415 COLL VENOUS BLD VENIPUNCTURE: CPT

## 2022-10-26 PROCEDURE — 80048 BASIC METABOLIC PNL TOTAL CA: CPT

## 2022-10-26 PROCEDURE — 97166 OT EVAL MOD COMPLEX 45 MIN: CPT

## 2022-10-26 PROCEDURE — 2500000003 HC RX 250 WO HCPCS: Performed by: INTERNAL MEDICINE

## 2022-10-26 PROCEDURE — 2580000003 HC RX 258: Performed by: NURSE PRACTITIONER

## 2022-10-26 PROCEDURE — 85025 COMPLETE CBC W/AUTO DIFF WBC: CPT

## 2022-10-26 RX ORDER — INSULIN GLARGINE 100 [IU]/ML
5 INJECTION, SOLUTION SUBCUTANEOUS NIGHTLY
Status: DISCONTINUED | OUTPATIENT
Start: 2022-10-26 | End: 2022-10-27

## 2022-10-26 RX ORDER — WARFARIN SODIUM 5 MG/1
5 TABLET ORAL DAILY
Status: ON HOLD | COMMUNITY
Start: 2022-09-30 | End: 2022-10-28 | Stop reason: HOSPADM

## 2022-10-26 RX ORDER — MENTHOL AND ZINC OXIDE .44; 20.625 G/100G; G/100G
OINTMENT TOPICAL 2 TIMES DAILY
Status: DISCONTINUED | OUTPATIENT
Start: 2022-10-26 | End: 2022-10-29 | Stop reason: HOSPADM

## 2022-10-26 RX ORDER — WARFARIN SODIUM 5 MG/1
5 TABLET ORAL DAILY
Status: ON HOLD | COMMUNITY
Start: 2022-09-30 | End: 2022-10-26

## 2022-10-26 RX ADMIN — LITHIUM CARBONATE 300 MG: 150 CAPSULE, GELATIN COATED ORAL at 08:36

## 2022-10-26 RX ADMIN — GABAPENTIN 300 MG: 300 CAPSULE ORAL at 15:45

## 2022-10-26 RX ADMIN — ENOXAPARIN SODIUM 30 MG: 100 INJECTION SUBCUTANEOUS at 21:15

## 2022-10-26 RX ADMIN — LITHIUM CARBONATE 600 MG: 150 CAPSULE, GELATIN COATED ORAL at 21:11

## 2022-10-26 RX ADMIN — Medication: at 15:41

## 2022-10-26 RX ADMIN — MICONAZOLE NITRATE: 2 POWDER TOPICAL at 15:40

## 2022-10-26 RX ADMIN — SODIUM CHLORIDE, PRESERVATIVE FREE 10 ML: 5 INJECTION INTRAVENOUS at 03:02

## 2022-10-26 RX ADMIN — LAMOTRIGINE 50 MG: 25 TABLET ORAL at 21:11

## 2022-10-26 RX ADMIN — LAMOTRIGINE 25 MG: 25 TABLET ORAL at 08:36

## 2022-10-26 RX ADMIN — INSULIN GLARGINE 5 UNITS: 100 INJECTION, SOLUTION SUBCUTANEOUS at 21:17

## 2022-10-26 RX ADMIN — INSULIN LISPRO 8 UNITS: 100 INJECTION, SOLUTION INTRAVENOUS; SUBCUTANEOUS at 11:44

## 2022-10-26 RX ADMIN — CEFTRIAXONE SODIUM 1000 MG: 1 INJECTION, POWDER, FOR SOLUTION INTRAMUSCULAR; INTRAVENOUS at 15:59

## 2022-10-26 RX ADMIN — GABAPENTIN 300 MG: 300 CAPSULE ORAL at 21:14

## 2022-10-26 RX ADMIN — INSULIN LISPRO 16 UNITS: 100 INJECTION, SOLUTION INTRAVENOUS; SUBCUTANEOUS at 17:07

## 2022-10-26 RX ADMIN — INSULIN LISPRO 4 UNITS: 100 INJECTION, SOLUTION INTRAVENOUS; SUBCUTANEOUS at 08:38

## 2022-10-26 RX ADMIN — TRAZODONE HYDROCHLORIDE 200 MG: 50 TABLET ORAL at 21:11

## 2022-10-26 RX ADMIN — ENOXAPARIN SODIUM 30 MG: 100 INJECTION SUBCUTANEOUS at 08:37

## 2022-10-26 RX ADMIN — SODIUM CHLORIDE, PRESERVATIVE FREE 10 ML: 5 INJECTION INTRAVENOUS at 21:00

## 2022-10-26 RX ADMIN — SODIUM CHLORIDE, PRESERVATIVE FREE 10 ML: 5 INJECTION INTRAVENOUS at 08:37

## 2022-10-26 RX ADMIN — ROSUVASTATIN CALCIUM 20 MG: 10 TABLET, FILM COATED ORAL at 21:14

## 2022-10-26 RX ADMIN — GABAPENTIN 300 MG: 300 CAPSULE ORAL at 08:36

## 2022-10-26 RX ADMIN — ACETAMINOPHEN 650 MG: 500 TABLET ORAL at 08:36

## 2022-10-26 RX ADMIN — VERAPAMIL HYDROCHLORIDE 120 MG: 240 TABLET, FILM COATED, EXTENDED RELEASE ORAL at 21:14

## 2022-10-26 ASSESSMENT — PAIN SCALES - GENERAL: PAINLEVEL_OUTOF10: 0

## 2022-10-26 NOTE — CONSULTS
Consult placed    Who:millie  Date:10/25/2022,  Time:8:59 PM        Electronically signed by Kevin Salomon on 10/25/2022 at 8:59 PM

## 2022-10-26 NOTE — PROGRESS NOTES
Pt was admitted to this unit from the ED, pt was admitted for urinary retention, none noted thus far, pt has 16 [place] vasquez in with 10ml balloon, pt is tolerating good, draining yellow urine. Pt had no c/o pain only when repositioning pt states he has chronic back pain. Pt tolerated meds good, took medications whole. No bleeding noted in catheter will continue to monitor, fsbs was 332 was covered with 4units. Call light within reach.

## 2022-10-26 NOTE — PROGRESS NOTES
4 Eyes Skin Assessment     NAME:  Katherin Elder  YOB: 1950  MEDICAL RECORD NUMBER:  [id number]    The patient is being assess for  Admission    I agree that 2 RN's have performed a thorough Head to Toe Skin Assessment on the patient. ALL assessment sites listed below have been assessed. Areas assessed by both nurses:    Head, Face, Ears, Shoulders, Back, Chest, Arms, Elbows, Hands, Sacrum. Buttock, Coccyx, Ischium, and Legs. Feet and Heels        Does the Patient have a Wound? Yes wound(s) were present on assessment.  LDA wound assessment was Initiated and completed        Dano Prevention initiated:  Yes   Wound Care Orders initiated:  No    Pressure Injury (Stage 3,4, Unstageable, DTI, NWPT, and Complex wounds) if present place referral/consult order under [de-identified] No    New and Established Ostomies if present place consult order under : No      Nurse 1 eSignature: Electronically signed by Sherine Hoover RN on 10/26/22 at 3:50 AM EDT    **SHARE this note so that the co-signing nurse is able to place an eSignature**    Nurse 2 eSignature: Electronically signed by Brittany Calderon RN on 10/26/22 at 4:23 AM EDT

## 2022-10-26 NOTE — DISCHARGE INSTR - COC
NContinuity of Care Form    Patient Name: Bing Oviedo   :  1950  MRN:  [id number]    Admit date:  10/25/2022  Discharge date:  10/29/2022    Code Status Order: Full Code   Advance Directives:     Admitting Physician:  No admitting provider for patient encounter. PCP: FRANCISCO JAVIER Montemayor CNP    Discharging Nurse:  [address] Unit/Room#: 9482/7261-05  Discharging Unit Phone Number: [phone]    Emergency Contact:   Extended Emergency Contact Information  Primary Emergency Contact: Lynnette Cruz  [facility] [facility] [address] Phone: [phone]  Mobile Phone: [phone]  Relation: Spouse    Past Surgical History:  Past Surgical History:   Procedure Laterality Date    APPENDECTOMY      BACK SURGERY      CERVICAL FUSION      COLONOSCOPY      EYE SURGERY      HIP SURGERY      JOINT REPLACEMENT Right     THR    OTHER SURGICAL HISTORY  2018     right L4-5 hemilaminotomy, partial facetectomy, foraminotomy, reexploration and excision of synovial csyt,  Bilateral L2-3 and L3-4 hemilaminotomy, partial facetectomy, foraminotomy    [place] NERVOUS SYSTEM SURGERY UNLISTED Bilateral 2018    BILATERAL LUMBAR THREE, LUMBAR FOUR, LUMBAR FIVE RADIOFREQUENCY ABLATION SITE CONFIRMED BY FLUOROSCOPY performed by Kwaku Flynn MD at [address] [place] [address] [facility] W/IMG GDN Bilateral 2018    BILATERAL LUMBAR THREE, LUMBAR FOUR, LUMBAR FIVE MEDIAL BRANCH BLOCK SITE CONFIRMED BY FLUOROSCOPY performed by Kwaku Flynn MD at [address] [place] [address] DX/THER SBST INTRLMNR CRV/THRC W/IMG GDN Bilateral 10/16/2018    BILATERAL LUMBAR THREE, FOUR, FIVE MEDIAL BRANCH BLOCK SITE CONFIRMED BY FLUOROSCOPY performed by Kwaku Flynn MD at [place] [address]    TURP N/A 2020    CYSTOSCOPY, TRANSURETHRAL RESECTION OF PROSTATE performed by Eric Mora MD at [place] [address]       Immunization History: Immunization History   Administered Date(s) Administered    Influenza Vaccine, unspecified formulation 10/31/2014, 10/28/2015, 10/04/2016, 10/12/2017    Pneumococcal Conjugate 13-valent (Mariza Oka) 10/28/2015    Zoster Live (Zostavax) 11/01/2014       Active Problems:  Patient Active Problem List   Diagnosis Code    Enlarged lymph nodes R59.9    Spinal stenosis M48.00    Degeneration of lumbar or lumbosacral intervertebral disc M51.37    Lumbosacral spondylosis without myelopathy M47.817    Displacement of lumbar intervertebral disc without myelopathy M51.26    Urinary retention R33.9    Pyelonephritis N12    S/P TURP V97.57    Metabolic encephalopathy I68.15    Type 2 diabetes mellitus ([facility] E11.9    Essential hypertension I10    Prostate hyperplasia with urinary obstruction N40.1, N13.8    Sepsis ([facility] A41.9    Bipolar 1 disorder ([facility] F31.9    Asthma J45.909    Acute epididymo-orchitis N45.3    Constipation due to opioid therapy K59.03, T40.2X5A    Bilateral leg weakness R29.898    Acute low back pain M54.50    Acute cystitis with hematuria N30.01       Isolation/Infection:   Isolation            No Isolation          Patient Infection Status       Infection Onset Added Last Indicated Last Indicated By Review Planned Expiration Resolved Resolved By    None active    Resolved    COVID-19 (Rule Out) 07/17/20 07/17/20 07/17/20 COVID-19 (Ordered)   07/17/20 Rule-Out Test Resulted            Nurse Assessment:  Last Vital Signs: /77   Pulse 68   Temp 98.4 °F (36.9 °C) (Oral)   Resp 16   Ht 6' 1\" (1.854 m)   Wt 232 lb (105.2 kg)   SpO2 93%   BMI 30.61 kg/m²     Last documented pain score (0-10 scale): Pain Level: 0  Last Weight:   Wt Readings from Last 1 Encounters:   10/25/22 232 lb (105.2 kg)     Mental Status:  oriented    IV Access:  - None    Nursing Mobility/ADLs:  Walking   Dependent  Transfer  Dependent  Bathing  Dependent  Dressing  Assisted  Toileting  Assisted  Feeding  Independent  Med Admin Assisted  Med Delivery   none    Wound Care Documentation and Therapy:  Wound 06/08/22 Buttocks Right (Active)   Number of days: 140       Wound 10/26/22 Coccyx Mid mid gluteal skin fold (Active)   Wound Image   10/26/22 1409   Wound Etiology Skin Tear 10/26/22 1409   Dressing Status Clean;Dry; Intact 10/26/22 1409   Wound Cleansed Other (Comment) 10/26/22 1409   Dressing/Treatment Calmoseptine/zinc oxide with menthol; Other (comment) 10/26/22 1409   Offloading for Diabetic Foot Ulcers Offloading ordered 10/26/22 1409   Wound Length (cm) 2 cm 10/26/22 1409   Wound Width (cm) 0.3 cm 10/26/22 1409   Wound Depth (cm) 0.1 cm 10/26/22 1409   Wound Surface Area (cm^2) 0.6 cm^2 10/26/22 1409   Wound Volume (cm^3) 0.06 cm^3 10/26/22 1409   Distance Tunneling (cm) 0 cm 10/26/22 1409   Tunneling Position ___ O'Clock 0 10/26/22 1409   Undermining Starts ___ O'Clock 0 10/26/22 1409   Undermining Ends___ O'Clock 0 10/26/22 1409   Undermining Maxium Distance (cm) 0 10/26/22 1409   Wound Assessment Pink/red 10/26/22 1409   Drainage Amount None 10/26/22 1409   Odor None 10/26/22 1409   Brenda-wound Assessment Blanchable erythema 10/26/22 1409   Margins Attached edges; Defined edges 10/26/22 1409   Wound Thickness Description not for Pressure Injury Partial thickness 10/26/22 1409   Number of days: 0        Elimination:  Continence: Bowel: Yes  Bladder: Yes  Urinary Catheter: None   Colostomy/Ileostomy/Ileal Conduit: No       Date of Last BM: None    Intake/Output Summary (Last 24 hours) at 10/26/2022 1433  Last data filed at 10/26/2022 0406  Gross per 24 hour   Intake --   Output 2500 ml   Net -2500 ml     I/O last 3 completed shifts:  In: -   Out: 2500 [Urine:2500]    Safety Concerns: At Risk for Falls    Impairments/Disabilities:      None    Nutrition Therapy:  Current Nutrition Therapy:   - Oral Diet:  Carb Control 4 carbs/meal (1800kcals/day)    Routes of Feeding: Oral  Liquids:  Thin Liquids  Daily Fluid Restriction: no  Last Modified Barium Swallow with Video (Video Swallowing Test): not done    Treatments at the Time of Hospital Discharge:   Respiratory Treatments:   Oxygen Therapy:  is not on home oxygen therapy. Ventilator:    - No ventilator support    Rehab Therapies: Physical Therapy  Weight Bearing Status/Restrictions: No weight bearing restrictions  Other Medical Equipment (for information only, NOT a DME order): Other Treatments:     Patient's personal belongings (please select all that are sent with patient):  None    RN SIGNATURE:  Electronically signed by Lauren Hylton RN on 10/29/22 at 10:53 AM EDT    CASE MANAGEMENT/SOCIAL WORK SECTION    Inpatient Status Date: 10/25/2022    Readmission Risk Assessment Score:  Readmission Risk              Risk of Unplanned Readmission:  19         Discharging to Facility/ [address] [facility]   [phone]     / signature: Electronically signed by Lauren Hylton RN on 10/29/22 at 10:53 AM EDT    PHYSICIAN SECTION    Prognosis: Good    Condition at Discharge: Stable    Rehab Potential (if transferring to Rehab): Fair    Recommended Labs or Other Treatments After Discharge:   Wound care:  Clean buttocks with foamy cleanser. Pat dry. Apply lite dusting of Micotin 2% Powder, rub in. Cover with Calmoseptine bid. Follow up with Elizabeth Dyson NP call for appointment  [phone]. Physician Certification: I certify the above information and transfer of Jordon Johnson  is necessary for the continuing treatment of the diagnosis listed and that he requires [facility] for less 30 days.      Update Admission H&P: No change in H&P    PHYSICIAN SIGNATURE:  Electronically signed by Marianela Nowak DO on 10/28/22 at 3:45 PM EDT

## 2022-10-26 NOTE — PROGRESS NOTES
Occupational Therapy  Facility/Department: [facility] C3 TELE/MED SURG/ONC  Occupational Therapy Initial Assessment & Treatment    Name: Nita Delgado  : 1950  MRN: [id number]  Date of Service: 10/26/2022    Discharge Recommendations:  Subacute/Skilled Nursing Facility  OT Equipment Recommendations  Other: Defer to facility to obtain     Patient Diagnosis(es): The primary encounter diagnosis was Generalized weakness. Diagnoses of Chronic midline low back pain without sciatica, Frequent falls, Urinary retention, and Acute cystitis without hematuria were also pertinent to this visit. Past Medical History:  has a past medical history of Anxiety, Arthritis, Asthma, Bipolar 1 disorder ([facility] [address] Colitis, Depression, Diabetes ([facility] [address] Diabetes mellitus ([facility] [address] Encounter for imaging to screen for metal prior to MRI, Glaucoma, H/O bladder problems, High blood pressure, History of kidney problems, IBS (irritable bowel syndrome), Liver disease, Obesity, and Psychiatric problem. Past Surgical History:  has a past surgical history that includes back surgery; hip surgery; Eye surgery; Colonoscopy; cervical fusion; joint replacement (Right); other surgical history (2018); pr njx dx/ther sbst intrlmnr crv/thrc w/img gdn (Bilateral, 2018); pr njx dx/ther sbst intrlmnr crv/thrc w/img gdn (Bilateral, 10/16/2018); pr nervous system surgery unlisted (Bilateral, 2018); Appendectomy; and TURP (N/A, 2020). Assessment   Performance deficits / Impairments: Decreased functional mobility ; Decreased ADL status; Decreased safe awareness;Decreased cognition;Decreased endurance;Decreased balance;Decreased high-level IADLs;Decreased coordination  Assessment: Pt is a 66 yo M who presented to [facility] for fatigue and urinary retention. Pt lives w/ wife in two-story home w/ 1+1 MATTHEW; pt typically IND w/ ADLs and amb w/ SPC in home, RW in community.  Pt reports wife works during the day and is unable to provide 24hr assist. Upon eval, pt found to have spilled orange juice on self. Pt completed bed mob w/ maxAx2, assist req for raising trunk off bed and cues for proper positioning/technique. Once sitting, pt completed gown exchange. Pt able to tolerate 1 STS bout to RW for sheet exchange on bed w/ minAx2, and SPT for bed>chair w/ minAx2 and RW. Once sitting, pt completed feeding w/ SPV and set-up. Pt is presenting below his PLOF and would benefit from inpatient OT services to address the deficits above. Rec SNF at d/c to maximize pt's functional status and safety prior to return to home environment.   Prognosis: Fair  Decision Making: Medium Complexity  REQUIRES OT FOLLOW-UP: Yes  Activity Tolerance  Activity Tolerance: Patient Tolerated treatment well;Patient limited by pain        AM-[facility] Daily Activity Inpatient   How much help for putting on and taking off regular lower body clothing?: A Lot  How much help for Bathing?: A Lot  How much help for Toileting?: Total  How much help for putting on and taking off regular upper body clothing?: A Little  How much help for taking care of personal grooming?: A Little  How much help for eating meals?: None  AM-[facility] Inpatient Daily Activity Raw Score: 15  AM-PAC Inpatient ADL T-Scale Score : 34.69  ADL Inpatient CMS 0-100% Score: 56.46  ADL Inpatient CMS G-Code Modifier : CK     Plan   Occupational Therapy Plan  Times Per Week: 3-5x/k  Current Treatment Recommendations: Strengthening, Balance training, Functional mobility training, Endurance training, Pain management, Safety education & training, Self-Care / ADL, Patient/Caregiver education & training     Restrictions  Restrictions/Precautions  Restrictions/Precautions: Fall Risk, General Precautions  Position Activity Restriction  Other position/activity restrictions: up with assistance, vasquez catheter, RUE IV    Subjective   General  Chart Reviewed: Yes, Progress Notes, History and Physical  Patient assessed for rehabilitation services?: Yes  Subjective  Subjective: pt resting in bed w/ wife present, requetsing pain medication. agreeable to OT eval  Pain: Pt reports 10/10 pain in mid back, 8/10 pain in LEs. RN notified of pt's c/o pain    Social/Functional History  Social/Functional History  Lives With: Spouse (and dog)  Type of Home: House  Home Layout: One level  Home Access: Stairs to enter with rails  Entrance Stairs - Number of Steps: 1+1 MATTHEW  Bathroom Shower/Tub: Walk-in shower  Bathroom Toilet: Handicap height  Bathroom Equipment: Grab bars in shower, Grab bars around toilet  Home Equipment: [employer], rolling  Has the patient had two or more falls in the past year or any fall with injury in the past year?: Yes (9 falls in past year)  ADL Assistance: Independent (spouse reports pt has not been showering often recently)  Homemaking Assistance:  (wife completes all)  Homemaking Responsibilities: No  Ambulation Assistance: Needs assistance (SPC in home, RW in community)  Transfer Assistance: Independent  Additional Comments: pt sleeps in lift chair at baseline. Pt states he and spouse are considering a ramp for entering home     Objective   Heart Rate: 68  Heart Rate Source: Monitor  BP: 124/77  BP Location: Right upper arm  BP Method: Automatic  MAP (Calculated): 92.67  Resp: 16  SpO2: 93 %  O2 Device: None (Room air)       Observation/Palpation  Posture: Fair  Safety Devices  Type of Devices: All fall risk precautions in place;Call light within reach; Chair alarm in place; Patient at risk for falls; Left in chair;Nurse notified  Restraints  Restraints Initially in Place: No    Bed Mobility Training  Bed Mobility Training: Yes  Rolling:  Moderate assistance (with increased time d/t pain)  Supine to Sit: Moderate assistance;Assist X2 (with HOB elevated, increased time, and use of rail; consistent cues for technique)  Balance  Sitting: Impaired  Sitting - Static: Fair (occasional)  Sitting - Dynamic: Fair (occasional)  Standing: Impaired  Standing - Static: Constant support  Standing - Dynamic: Constant support  Transfer Training  Transfer Training: Yes  Sit to Stand: Minimum assistance;Assist X2 (up to SW with increased time and consistent cues for hand placement)  Stand to Sit: Minimum assistance;Assist X2 (with decreased eccentric control, increased time, and cues for hand placement)  Stand Pivot Transfers: Minimum assistance;Assist X2 (using SW, EOB>recliner chair. Pt demo's kyphotic posture and consistent B hip/knee flexion in stance. Cues for upright positioning, sequencing, and safety with SW when pivoting)  Gait Training  Gait Training: No (d/t poor standing balance/tolerance and increased pain levels)     AROM: Within functional limits  PROM: Within functional limits  Strength: Within functional limits  Coordination: Within functional limits    ADL  Feeding: Supervision;Setup (seated in chair)  UE Bathing: Maximum assistance (seated at EOB)  UE Bathing Skilled Clinical Factors: wash backside  UE Dressing: Minimal assistance (seatd at EOB)  UE Dressing Skilled Clinical Factors: gown exchange  Toileting: Dependent/Total  Toileting Skilled Clinical Factors: vasquez     Activity Tolerance  Activity Tolerance: Patient tolerated evaluation without incident;Patient limited by pain     Vision  Vision: Impaired  Vision Exceptions: Wears glasses for reading  Hearing  Hearing: Within functional limits    Cognition  Overall Cognitive Status: Exceptions  Arousal/Alertness: Appropriate responses to stimuli  Following Commands: Follows one step commands with repetition  Attention Span: Appears intact; Difficulty attending to directions  Memory: Decreased recall of recent events;Decreased recall of precautions  Safety Judgement: Decreased awareness of need for safety;Decreased awareness of need for assistance  Problem Solving: Decreased awareness of errors  Insights: Decreased awareness of deficits  Initiation: Requires cues for some  Sequencing: Requires cues for some  Cognition Comment: pt impulsive throughout activity, req cues for pacing and safety  Orientation  Overall Orientation Status: Impaired  Orientation Level: Oriented to person;Oriented to situation;Disoriented to time;Disoriented to place      Education Given To: Patient; Family  Education Provided: Role of Therapy;Plan of Care;Precautions;IADL Safety;Transfer Training;ADL Adaptive Strategies; Family Education; Fall Prevention Strategies  Education Method: Demonstration;Verbal  Barriers to Learning: Cognition  Education Outcome: Verbalized understanding;Continued education needed    Disease Specific Education: Pt educated on importance of OOB mobility, prevention of complications of bedrest, and general safety during hospitalization. Pt verbalized understanding      Goals  Short Term Goals  Time Frame for Short Term Goals: 1 week (11/2) unless otherwise mentioned  Short Term Goal 1: Pt will complete STS transfer w/ CGA and LRAD  Short Term Goal 2: Pt will complete toilet transfer w/ CGA and LRAD  Short Term Goal 3: Pt will complete 2-3 grooming tasks in stance at sink w/ CGA and LRAD  Short Term Goal 4: Pt will complete LB dressing w/ Ros and LRAD  Patient Goals   Patient goals : \"I want to get better\"       Therapy Time   Individual Concurrent Group Co-treatment   Time In 1105         Time Out 1138         Minutes 33         Timed Code Treatment Minutes: 23 Minutes (10 min eval)     If pt is unable to be seen after this session, please let this note serve as discharge summary. Please see case management note for discharge disposition. Thank you.      Geneva Agee, OTR/L

## 2022-10-26 NOTE — PLAN OF CARE
Problem: Skin/Tissue Integrity  Goal: Absence of new skin breakdown  Description: 1. Monitor for areas of redness and/or skin breakdown  2. Assess vascular access sites hourly  3. Every 4-6 hours minimum:  Change oxygen saturation probe site  4. Every 4-6 hours:  If on nasal continuous positive airway pressure, respiratory therapy assess nares and determine need for appliance change or resting period.   Outcome: Progressing     Problem: ABCDS Injury Assessment  Goal: Absence of physical injury  Outcome: Progressing     Problem: Neurosensory - Adult  Goal: Achieves stable or improved neurological status  Outcome: Progressing  Goal: Achieves maximal functionality and self care  Outcome: Progressing     Problem: Skin/Tissue Integrity - Adult  Goal: Skin integrity remains intact  Outcome: Progressing  Goal: Incisions, wounds, or drain sites healing without S/S of infection  Outcome: Progressing  Goal: Oral mucous membranes remain intact  Outcome: Progressing     Problem: Musculoskeletal - Adult  Goal: Return mobility to safest level of function  Outcome: Progressing  Goal: Maintain proper alignment of affected body part  Outcome: Progressing  Goal: Return ADL status to a safe level of function  Outcome: Progressing     Problem: Genitourinary - Adult  Goal: Absence of urinary retention  Outcome: Progressing  Goal: Urinary catheter remains patent  Outcome: Progressing     Problem: Infection - Adult  Goal: Absence of infection at discharge  Outcome: Progressing  Goal: Absence of infection during hospitalization  Outcome: Progressing  Goal: Absence of fever/infection during anticipated neutropenic period  Outcome: Progressing     Problem: Metabolic/Fluid and Electrolytes - Adult  Goal: Electrolytes maintained within normal limits  Outcome: Progressing  Goal: Hemodynamic stability and optimal renal function maintained  Outcome: Progressing  Goal: Glucose maintained within prescribed range  Outcome: Progressing Problem: Hematologic - Adult  Goal: Maintains hematologic stability  Outcome: Progressing

## 2022-10-26 NOTE — CARE COORDINATION
Hospital day 1: Patient on C3 re acute cystitis with hematuria care managed by IM and pending consult with neurosurgery. Patient from home with spouse, recent falls. Patient and family agreeable to SNF placement, request [facility]. Patient will need PT/OT when appropriate so precert can be started for placement. SW following. JENNIFER Pan  1451: Spoke with EGS updated therapy notes in please start cert. JENNIFER Pan

## 2022-10-26 NOTE — PROGRESS NOTES
Secure message sent to Dr. Donny David \"pt poc glucose 448, was taken while pt is eating. Will give humalog per MAR. \" Writer gave pt Humalog per MAR. Pt is alert and has some confusion. Pleasant and cooperative. Pt stated back pain, medicated per MAR. Pt stated no nausea; no emesis. Appetite good. Up to chair this afternoon. Wound care completed. Low air loss pump plugged in to bed per order and on. Bed locked in lowest position; side rails 2/4; call light and bedside table within reach of pt.

## 2022-10-26 NOTE — PROGRESS NOTES
Physical Therapy  Facility/Department: [facility] C3 TELE/MED SURG/ONC  Physical Therapy Initial Assessment    Name: Fidel Sheehan  : 1950  MRN: [id number]  Date of Service: 10/26/2022    Discharge Recommendations:  Subacute/Skilled Nursing Facility   PT Equipment Recommendations  Equipment Needed: No  Other: TBD per accepting facility      Patient Diagnosis(es): The primary encounter diagnosis was Generalized weakness. Diagnoses of Chronic midline low back pain without sciatica, Frequent falls, Urinary retention, and Acute cystitis without hematuria were also pertinent to this visit. Past Medical History:  has a past medical history of Anxiety, Arthritis, Asthma, Bipolar 1 disorder ([facility] [address] Colitis, Depression, Diabetes ([facility] [address] Diabetes mellitus ([facility] [address] Encounter for imaging to screen for metal prior to MRI, Glaucoma, H/O bladder problems, High blood pressure, History of kidney problems, IBS (irritable bowel syndrome), Liver disease, Obesity, and Psychiatric problem. Past Surgical History:  has a past surgical history that includes back surgery; hip surgery; Eye surgery; Colonoscopy; cervical fusion; joint replacement (Right); other surgical history (2018); pr njx dx/ther sbst intrlmnr crv/thrc w/img gdn (Bilateral, 2018); pr njx dx/ther sbst intrlmnr crv/thrc w/img gdn (Bilateral, 10/16/2018); pr nervous system surgery unlisted (Bilateral, 2018); Appendectomy; and TURP (N/A, 2020). Assessment   Body Structures, Functions, Activity Limitations Requiring Skilled Therapeutic Intervention: Decreased functional mobility ; Decreased strength;Decreased safe awareness;Decreased cognition;Decreased endurance;Decreased balance;Decreased posture; Increased pain  Assessment: Pt is a 67year old male admitted with acute cystitits, hematuria, and frequent falls at home. Pt reporting increased pain levels in back and BLE throughout session.  Able to complete bed mobility with mod A x 2, sit<>stand transfers with min A x 2, and stand pivot transfer to chair with min A x 2 using SW. Pt requires increased time and consistent cues for safety and technique d/t pain and cognition. Pt and spouse both report pt with frequent falls at home and decreasing independence in past few months. At this time, recommend SNF upon d/c in order to assist pt to increase strength and independence and reduce burden of care. Will continue to progress functional mobility as appropriate. Treatment Diagnosis: impaired mobility and strength  Therapy Prognosis: Good  Decision Making: Medium Complexity  Requires PT Follow-Up: Yes  Activity Tolerance  Activity Tolerance: Patient tolerated evaluation without incident;Patient limited by pain     Plan   Physcial Therapy Plan  General Plan: 3-5 times per week  Therapy Duration: 1 Week  Current Treatment Recommendations: Strengthening, Balance training, Functional mobility training, Transfer training, Endurance training, Gait training, Stair training, Home exercise program, Safety education & training, Patient/Caregiver education & training, Equipment evaluation, education, & procurement, Therapeutic activities  Safety Devices  Type of Devices: All fall risk precautions in place, Call light within reach, Chair alarm in place, Patient at risk for falls, Left in chair, Nurse notified     Restrictions  Restrictions/Precautions  Restrictions/Precautions: Fall Risk, General Precautions  Position Activity Restriction  Other position/activity restrictions: up with assistance, vasquez catheter, RUE IV     Subjective   Pain: Pt reports 10/10 pain in mid back, 8/10 pain in LEs.  RN notified of pt's c/o pain  General  Chart Reviewed: Yes  Patient assessed for rehabilitation services?: Yes  Additional Pertinent Hx: DMII, bipolar, asthma, depression, anxiety, chronic back pain (has spinal stimulator)  Response To Previous Treatment: Not applicable  Family / Caregiver Present: Yes (spouse)  Referring Practitioner: Wilmar Nowak DO  Referral Date : 10/26/22  Diagnosis: Acute cystitis with hematuria and urinary retention, frequent falls at home  General Comment  Comments: RN cleared pt for PT  Subjective  Subjective: Pt supine in bed upon arrival and agreeable to PT evaluation with encouragement initially d/t back pain           Social/Functional History  Social/Functional History  Lives With: Spouse (and dog)  Type of Home: House  Home Layout: One level  Home Access: Stairs to enter with rails  Entrance Stairs - Number of Steps: 1+1 MATTHEW  Bathroom Shower/Tub: Walk-in shower  Bathroom Toilet: Handicap height  Bathroom Equipment: Grab bars in shower, Grab bars around toilet  Home Equipment: [employer], rolling  Has the patient had two or more falls in the past year or any fall with injury in the past year?: Yes (9 falls in past year)  ADL Assistance: Independent (spouse reports pt has not been showering often recently)  Homemaking Assistance:  (wife completes all)  Homemaking Responsibilities: No  Ambulation Assistance: Needs assistance ([address] in home, RW in community)  Transfer Assistance: Independent  Additional Comments: pt sleeps in lift chair at baseline.  Pt states he and spouse are considering a ramp for entering home    Vision/Hearing  Vision  Vision: Impaired  Vision Exceptions: Wears glasses for reading  Hearing  Hearing: Within functional limits      Cognition   Orientation  Overall Orientation Status: Impaired  Orientation Level: Oriented to person;Oriented to situation;Disoriented to time;Disoriented to place     Objective   Heart Rate: 68  Heart Rate Source: Monitor  BP: 124/77  BP Location: Right upper arm  BP Method: Automatic  MAP (Calculated): 92.67  Resp: 16  SpO2: 93 %  O2 Device: None (Room air)       Observation/Palpation  Posture: Fair           Strength RLE  Comment: Difficult to formally assess d/t pain, however observed to be at least 3/5 as seen with ROM  Strength LLE  Comment: Difficult to formally assess d/t pain, however observed to be at least 3/5 as seen with ROM          Bed Mobility Training  Bed Mobility Training: Yes  Rolling: Moderate assistance (with increased time d/t pain)  Supine to Sit: Moderate assistance;Assist X2 (with HOB elevated, increased time, and use of rail; consistent cues for technique)  Balance  Sitting: Impaired  Sitting - Static: Fair (occasional)  Sitting - Dynamic: Fair (occasional)  Standing: Impaired  Standing - Static: Constant support  Standing - Dynamic: Constant support  Transfer Training  Transfer Training: Yes  Sit to Stand: Minimum assistance;Assist X2 (up to SW with increased time and consistent cues for hand placement)  Stand to Sit: Minimum assistance;Assist X2 (with decreased eccentric control, increased time, and cues for hand placement)  Stand Pivot Transfers: Minimum assistance;Assist X2 (using SW, EOB>recliner chair. Pt demo's kyphotic posture and consistent B hip/knee flexion in stance.  Cues for upright positioning, sequencing, and safety with SW when pivoting)  Gait Training  Gait Training: No (d/t poor standing balance/tolerance and increased pain levels)          -PAC Score  -PAC Inpatient Mobility Raw Score : 12 (10/26/22 1237)  AM-PAC Inpatient T-Scale Score : 35.33 (10/26/22 1237)  Mobility Inpatient CMS 0-100% Score: 68.66 (10/26/22 1237)  Mobility Inpatient CMS G-Code Modifier : CL (10/26/22 1237)            Goals  Short Term Goals  Time Frame for Short Term Goals: 1 week- 11/2/22  Short Term Goal 1: Pt will complete bed mobility with mod A  Short Term Goal 2: Pt will complete functional transfers with min A  Short Term Goal 3: Pt will ambulate 10ft with min A using SW  Short Term Goal 4: Pt will participate in 10-15 reps BLE exercises for strengthening by 10/29/22  Patient Goals   Patient Goals : \"get rid of the pain\"       Education  Patient Education  Education Given To: Patient  Education Provided: Role of Therapy;Plan of Care;Transfer Training;Orientation; Family Education  Education Provided Comments: Educated pt on role of PT, importance of mobility, safety, bed mobility, transfers, d/c recs  Education Method: Demonstration;Verbal  Barriers to Learning: Cognition  Education Outcome: Verbalized understanding;Continued education needed      Therapy Time   Individual Concurrent Group Co-treatment   Time In 1105         Time Out 1139         Minutes 34         Timed Code Treatment Minutes: 24 Minutes (10 min eval)       Bailey Howard, PT, DPT

## 2022-10-26 NOTE — CONSULTS
[facility] Nurse  Consult Note       NAME:  Melisa Payne  MEDICAL RECORD NUMBER:  [id number]  AGE: 67 y.o. GENDER: male  : 1950  TODAY'S DATE:  10/26/2022    Subjective  [facility]  then I went to a nursing home and I developed sores on my bottom. I have been going to a wound care nurse in [facility] and she gave me some powder and cream.  I do have some pain on my bottom. It has been helping some. I have trouble at night wetting my self, but during the day I am OK using the urinal. At night I have no control, it just comes out when I am sleeping. Reason for WOCN Evaluation and Assessment: fany Payne is a 67 y.o. male referred by:   [x] Physician  [x] Nursing  [] Other:     Wound Identification:  Wound Type: Intertriginous dermatitis (Intertrigo) mid gluteral skin fold ICD-10 # L30.4. Brenda wound with macular papular rash with satellite lesions / fungal rash. No pressure injury noted on left buttocks. Contributing Factors: diabetes, poor glucose control, chronic pressure, decreased mobility, shear force, obesity, and incontinence of stool    Wound History: Follows Danna Pereyra NP wound care specialist.  Last seen 10/18/22 for his wound care issues. Current Wound Care Treatment:  foam dressing in place    Patient Goal of Care:  [x] Wound Healing  [] Odor Control  [] Palliative Care  [] Pain Control   [] Other:         PAST MEDICAL HISTORY        Diagnosis Date    Anxiety     Arthritis     Asthma     Bipolar 1 disorder ([facility]     Colitis     Depression     Diabetes ([facility]     Diabetes mellitus ([facility]     Encounter for imaging to screen for metal prior to MRI 2022    NOT MRI conditional Blue Medora model#[id number], Lead: [id number] implanted 19 by Cristhian Carbajal at [facility]. Lead is unsafe for MRI. Patient unable to have any MRI ever.     Glaucoma     H/O bladder problems     High blood pressure     History of kidney problems     IBS (irritable bowel syndrome)     Liver disease     hx of elevated LFT's    Obesity     Psychiatric problem        PAST SURGICAL HISTORY    Past Surgical History:   Procedure Laterality Date    APPENDECTOMY      BACK SURGERY      CERVICAL FUSION      COLONOSCOPY      EYE SURGERY      HIP SURGERY      JOINT REPLACEMENT Right     THR    OTHER SURGICAL HISTORY  06/06/2018     right L4-5 hemilaminotomy, partial facetectomy, foraminotomy, reexploration and excision of synovial csyt,  Bilateral L2-3 and L3-4 hemilaminotomy, partial facetectomy, foraminotomy    IA NERVOUS SYSTEM SURGERY UNLISTED Bilateral 11/12/2018    BILATERAL LUMBAR THREE, LUMBAR FOUR, LUMBAR FIVE RADIOFREQUENCY ABLATION SITE CONFIRMED BY FLUOROSCOPY performed by Magali Orozco MD at [address] [place] [address] [facility]IMG [facility]DN Bilateral 9/25/2018    BILATERAL LUMBAR THREE, LUMBAR FOUR, LUMBAR FIVE MEDIAL BRANCH BLOCK SITE CONFIRMED BY FLUOROSCOPY performed by Magali Orozco MD at [address] [place] [address] DX/THER SBST INTRLMNR CRV/THRC W/IMG GDN Bilateral 10/16/2018    BILATERAL LUMBAR THREE, FOUR, FIVE MEDIAL BRANCH BLOCK SITE CONFIRMED BY FLUOROSCOPY performed by Magali Orozco MD at [facility]    TURP N/A 6/9/2020    CYSTOSCOPY, TRANSURETHRAL RESECTION OF PROSTATE performed by Otto Cifuentes MD at [address] [place] [address] B History   Problem Relation Age of Onset    Alcohol Abuse Sister     Coronary Art Dis Mother     Obesity Mother     Osteoarthritis Mother     Parkinsonism Mother     Coronary Art Dis Father     Obesity Father        SOCIAL HISTORY    Social History     Tobacco Use    Smoking status: Never    Smokeless tobacco: Never   Vaping Use    Vaping Use: Never used   Substance Use Topics    Alcohol use: No    Drug use: No       ALLERGIES    Allergies   Allergen Reactions    No Known Allergies        MEDICATIONS    No current facility-administered medications on file prior to encounter. Current Outpatient Medications on File Prior to Encounter   Medication Sig Dispense Refill    insulin glargine (BASAGLAR KWIKPEN) 100 UNIT/ML injection pen Inject 5 Units into the skin nightly      warfarin (COUMADIN) 5 MG tablet Take 5 mg by mouth daily      sennosides-docusate sodium (SENOKOT-S) 8.6-50 MG tablet Take 2 tablets by mouth in the morning and at bedtime 60 tablet 1    lamoTRIgine (LAMICTAL) 25 MG tablet Take 25 mg by mouth daily       lamoTRIgine (LAMICTAL) 25 MG tablet Take 50 mg by mouth at bedtime       lithium 300 MG capsule Take 300 mg by mouth daily      lithium 300 MG capsule Take 600 mg by mouth at bedtime      metFORMIN (GLUCOPHAGE-XR) 500 mg extended release tablet Take 1,000 mg by mouth 2 times daily (with meals)      traZODone (DESYREL) 100 MG tablet Take 200 mg by mouth nightly       acetaminophen (TYLENOL) 500 MG tablet Take 1,000 mg by mouth nightly as needed for Pain      gabapentin (NEURONTIN) 300 MG capsule Take 300 mg by mouth 3 times daily. Dulaglutide 3 MG/0.5ML SOPN Inject 3 mg into the skin once a week Takes on Sundays      glipiZIDE (GLUCOTROL) 5 mg tablet Take 5 mg by mouth daily      rosuvastatin (CRESTOR) 20 MG tablet Take 20 mg by mouth at bedtime       verapamil (CALAN SR) 120 MG extended release tablet Take 120 mg by mouth nightly         Objective  Sitting up in chair. Assisted back to bed with walker. Weak and shaky with ambulation.       /77   Pulse 68   Temp 98.4 °F (36.9 °C) (Oral)   Resp 16   Ht 6' 1\" (1.854 m)   Wt 232 lb (105.2 kg)   SpO2 93%   BMI 30.61 kg/m²     LABS:  WBC:    Lab Results   Component Value Date/Time    WBC 11.0 10/26/2022 05:15 AM     H/H:    Lab Results   Component Value Date/Time    HGB 13.2 10/26/2022 05:15 AM    HCT 39.6 10/26/2022 05:15 AM     PTT:    Lab Results   Component Value Date/Time    APTT 34.2 10/25/2022 01:53 PM   [APTT}  PT/INR:    Lab Results   Component Value Date/Time    PROTIME Drainage Amount None 10/26/22 1409   Odor None 10/26/22 1409   Brenda-wound Assessment Blanchable erythema 10/26/22 1409   Margins Attached edges; Defined edges 10/26/22 1409   Wound Thickness Description not for Pressure Injury Partial thickness 10/26/22 1409   Number of days: 0     Mid gluteal skin fold:           Response to treatment:  treatment ordered     Pain Assessment:  Severity:  3 / 10  Quality of pain: aching  Wound Pain Timing/Severity: intermittent  Premedicated: No    Plan   Plan of Care: Wound 10/26/22 Coccyx Mid mid gluteal skin fold-Dressing/Treatment: Calmoseptine/zinc oxide with menthol, Other (comment) (micotin powder)    Recommend:  Clean buttocks with foamy cleanser. Pat dry. Apply lite dusting of Micotin 2% Powder, rub in. Cover with Calmoseptine bid. Wound care following. Call wound care for deterioration [phone] or call [phone] and leave message. PEDRO PABLO updated for discharge. Specialty Bed Required : Yes   [x] Low Air Loss Add low air loss pump to assist with moisture issues. [x] Pressure Redistribution Isoflex gel therapy pressure redistribution mattress in place. [] Fluid Immersion  [] Bariatric  [] Total Pressure Relief  [x] Other: Add low air loss cushion when up in chair. Send phome with patient please. Current Diet: ADULT DIET; Regular; 3 carb choices (45 gm/meal)  Dietician consult:  Yes    Discharge Plan:  Placement for patient upon discharge: intermediate care facility    Patient appropriate for Outpatient [address] [facility] [address]: Yes follow up with Danna Pereyra NP. Referrals:  []  / discharge planner planning for [employer]  [] [address] [employer]  [] Supplies  [] Other    Patient/Caregiver Teaching: Instructed on treatment. Chair cushion when up in chair.     Level of patient/caregiver understanding able to:   [] Indicates understanding       [x] Needs reinforcement  [] Unsuccessful      [x] Verbal Understanding  [] Demonstrated understanding       [] No evidence of learning  [] Refused teaching         [] N/A       Electronically signed by Lelia Gupta RN, MSN, Adriana Zheng on 10/26/2022 at 2:11 PM

## 2022-10-27 LAB
ANION GAP SERPL CALCULATED.3IONS-SCNC: 9 MMOL/L (ref 3–16)
BUN BLDV-MCNC: 14 MG/DL (ref 7–20)
CALCIUM SERPL-MCNC: 9.2 MG/DL (ref 8.3–10.6)
CHLORIDE BLD-SCNC: 104 MMOL/L (ref 99–110)
CO2: 26 MMOL/L (ref 21–32)
CREAT SERPL-MCNC: 1 MG/DL (ref 0.8–1.3)
GFR SERPL CREATININE-BSD FRML MDRD: >60 ML/MIN/{1.73_M2}
GLUCOSE BLD-MCNC: 241 MG/DL (ref 70–99)
GLUCOSE BLD-MCNC: 254 MG/DL (ref 70–99)
GLUCOSE BLD-MCNC: 268 MG/DL (ref 70–99)
GLUCOSE BLD-MCNC: 303 MG/DL (ref 70–99)
GLUCOSE BLD-MCNC: 324 MG/DL (ref 70–99)
HCT VFR BLD CALC: 42.9 % (ref 40.5–52.5)
HEMOGLOBIN: 14.4 G/DL (ref 13.5–17.5)
MCH RBC QN AUTO: 32.4 PG (ref 26–34)
MCHC RBC AUTO-ENTMCNC: 33.6 G/DL (ref 31–36)
MCV RBC AUTO: 96.4 FL (ref 80–100)
ORGANISM: ABNORMAL
PDW BLD-RTO: 12.9 % (ref 12.4–15.4)
PERFORMED ON: ABNORMAL
PLATELET # BLD: 241 K/UL (ref 135–450)
PMV BLD AUTO: 8.1 FL (ref 5–10.5)
POTASSIUM REFLEX MAGNESIUM: 4.1 MMOL/L (ref 3.5–5.1)
RBC # BLD: 4.45 M/UL (ref 4.2–5.9)
SODIUM BLD-SCNC: 139 MMOL/L (ref 136–145)
URINE CULTURE, ROUTINE: ABNORMAL
WBC # BLD: 8.1 K/UL (ref 4–11)

## 2022-10-27 PROCEDURE — 2580000003 HC RX 258: Performed by: NURSE PRACTITIONER

## 2022-10-27 PROCEDURE — 6370000000 HC RX 637 (ALT 250 FOR IP): Performed by: STUDENT IN AN ORGANIZED HEALTH CARE EDUCATION/TRAINING PROGRAM

## 2022-10-27 PROCEDURE — 1200000000 HC SEMI PRIVATE

## 2022-10-27 PROCEDURE — 85027 COMPLETE CBC AUTOMATED: CPT

## 2022-10-27 PROCEDURE — 6370000000 HC RX 637 (ALT 250 FOR IP): Performed by: NURSE PRACTITIONER

## 2022-10-27 PROCEDURE — 36415 COLL VENOUS BLD VENIPUNCTURE: CPT

## 2022-10-27 PROCEDURE — 80048 BASIC METABOLIC PNL TOTAL CA: CPT

## 2022-10-27 PROCEDURE — 6360000002 HC RX W HCPCS: Performed by: NURSE PRACTITIONER

## 2022-10-27 RX ORDER — AMOXICILLIN 250 MG/1
500 CAPSULE ORAL EVERY 8 HOURS SCHEDULED
Status: DISCONTINUED | OUTPATIENT
Start: 2022-10-27 | End: 2022-10-29 | Stop reason: HOSPADM

## 2022-10-27 RX ORDER — AMOXICILLIN 250 MG/1
500 CAPSULE ORAL EVERY 8 HOURS SCHEDULED
Status: DISCONTINUED | OUTPATIENT
Start: 2022-10-27 | End: 2022-10-27

## 2022-10-27 RX ORDER — INSULIN GLARGINE 100 [IU]/ML
15 INJECTION, SOLUTION SUBCUTANEOUS NIGHTLY
Status: DISCONTINUED | OUTPATIENT
Start: 2022-10-27 | End: 2022-10-29 | Stop reason: HOSPADM

## 2022-10-27 RX ORDER — POLYETHYLENE GLYCOL 3350 17 G/17G
17 POWDER, FOR SOLUTION ORAL DAILY
Status: DISCONTINUED | OUTPATIENT
Start: 2022-10-27 | End: 2022-10-27

## 2022-10-27 RX ORDER — POLYETHYLENE GLYCOL 3350 17 G/17G
17 POWDER, FOR SOLUTION ORAL DAILY PRN
Status: DISCONTINUED | OUTPATIENT
Start: 2022-10-27 | End: 2022-10-29 | Stop reason: HOSPADM

## 2022-10-27 RX ADMIN — AMOXICILLIN 500 MG: 250 CAPSULE ORAL at 21:26

## 2022-10-27 RX ADMIN — ENOXAPARIN SODIUM 30 MG: 100 INJECTION SUBCUTANEOUS at 09:29

## 2022-10-27 RX ADMIN — MICONAZOLE NITRATE: 2 POWDER TOPICAL at 09:38

## 2022-10-27 RX ADMIN — Medication: at 10:53

## 2022-10-27 RX ADMIN — ENOXAPARIN SODIUM 30 MG: 100 INJECTION SUBCUTANEOUS at 21:25

## 2022-10-27 RX ADMIN — INSULIN LISPRO 4 UNITS: 100 INJECTION, SOLUTION INTRAVENOUS; SUBCUTANEOUS at 21:39

## 2022-10-27 RX ADMIN — LAMOTRIGINE 50 MG: 25 TABLET ORAL at 21:27

## 2022-10-27 RX ADMIN — SODIUM CHLORIDE, PRESERVATIVE FREE 10 ML: 5 INJECTION INTRAVENOUS at 21:53

## 2022-10-27 RX ADMIN — LITHIUM CARBONATE 300 MG: 150 CAPSULE, GELATIN COATED ORAL at 09:28

## 2022-10-27 RX ADMIN — AMOXICILLIN 500 MG: 250 CAPSULE ORAL at 14:10

## 2022-10-27 RX ADMIN — POLYETHYLENE GLYCOL 3350 17 G: 17 POWDER, FOR SOLUTION ORAL at 10:53

## 2022-10-27 RX ADMIN — SODIUM CHLORIDE, PRESERVATIVE FREE 10 ML: 5 INJECTION INTRAVENOUS at 09:40

## 2022-10-27 RX ADMIN — GABAPENTIN 300 MG: 300 CAPSULE ORAL at 09:28

## 2022-10-27 RX ADMIN — ROSUVASTATIN CALCIUM 20 MG: 10 TABLET, FILM COATED ORAL at 21:28

## 2022-10-27 RX ADMIN — Medication: at 21:52

## 2022-10-27 RX ADMIN — INSULIN LISPRO 12 UNITS: 100 INJECTION, SOLUTION INTRAVENOUS; SUBCUTANEOUS at 11:09

## 2022-10-27 RX ADMIN — GABAPENTIN 300 MG: 300 CAPSULE ORAL at 21:28

## 2022-10-27 RX ADMIN — GABAPENTIN 300 MG: 300 CAPSULE ORAL at 14:10

## 2022-10-27 RX ADMIN — INSULIN GLARGINE 15 UNITS: 100 INJECTION, SOLUTION SUBCUTANEOUS at 21:38

## 2022-10-27 RX ADMIN — LAMOTRIGINE 25 MG: 25 TABLET ORAL at 09:29

## 2022-10-27 RX ADMIN — INSULIN LISPRO 4 UNITS: 100 INJECTION, SOLUTION INTRAVENOUS; SUBCUTANEOUS at 09:29

## 2022-10-27 RX ADMIN — INSULIN LISPRO 8 UNITS: 100 INJECTION, SOLUTION INTRAVENOUS; SUBCUTANEOUS at 18:20

## 2022-10-27 RX ADMIN — VERAPAMIL HYDROCHLORIDE 120 MG: 240 TABLET, FILM COATED, EXTENDED RELEASE ORAL at 21:28

## 2022-10-27 RX ADMIN — TRAZODONE HYDROCHLORIDE 200 MG: 50 TABLET ORAL at 21:26

## 2022-10-27 RX ADMIN — MICONAZOLE NITRATE: 2 POWDER TOPICAL at 21:43

## 2022-10-27 RX ADMIN — LITHIUM CARBONATE 600 MG: 150 CAPSULE, GELATIN COATED ORAL at 21:27

## 2022-10-27 NOTE — PROGRESS NOTES
Progress Note      PCP: FRANCISCO JAVIER Pearce - CNP    Date of Admission: 10/25/2022    Chief Complaint: Back pain    Hospital Course:    67 y.o. male, with past medical history of chronic back pain, hypertension, hyperlipidemia, Factor V Leiden (hx multiple thrombotic events) diabetes, obesity and bipolar, being worked up for [facility] who presented to [address] [place] [address] with fatigue and urinary retention and recent fall. He has been falling more recently and he attributes this to possible Parkinson's and back pain, his legs give out. He has been having urinary incontinence over the last few weeks. No bowel incontinence. He denies hx of neuropathy and radiculopathy. A Stapleton catheter was placed in the emergency room and urine was sent to the lab. The patient's urine was positive for infection and the patient was started on ceftriaxone. CT of the C-spine and CT of the thoracic and lumbar spine were obtained that were all negative for any acute findings. Urine culture revealed enterococcus faecalis, patient transitioned to oral amoxacillin, total of 7 days of antibiotic treatment. Patient has a pain management physician, neurologist, neurosurgeon, urologist and hematologist outpatient. Hematologist: James Tello. Neurosurgeon: Dr. Jong Lagos. Urologist: Dr. Olen Nyhan [facility]. Neurologist: Dr. John Orellana. Subjective: Patient main concern this morning is low back pain. He localizes pain to low back and posterior belt line. Stapleton in place, no complaint of abdominal pain or distention. No fever, chills, nausea, vomiting, diarrhea. No BM since Monday. Patient would like to go to SNF following hospital stay per case management bed should be available tomorrow.      Medications:  Reviewed    Infusion Medications    sodium chloride      dextrose       Scheduled Medications    polyethylene glycol  17 g Oral Daily    insulin glargine  5 Units SubCUTAneous Nightly    miconazole   Topical BID    menthol-zinc oxide   Topical BID    gabapentin  300 mg Oral TID    lamoTRIgine  25 mg Oral Daily    lamoTRIgine  50 mg Oral Nightly    lithium  300 mg Oral Daily    lithium  600 mg Oral Nightly    rosuvastatin  20 mg Oral Nightly    traZODone  200 mg Oral Nightly    verapamil  120 mg Oral Nightly    sodium chloride flush  5-40 mL IntraVENous 2 times per day    enoxaparin  30 mg SubCUTAneous BID    insulin lispro  0-16 Units SubCUTAneous TID     insulin lispro  0-4 Units SubCUTAneous Nightly    cefTRIAXone (ROCEPHIN) IV  1,000 mg IntraVENous Q24H     PRN Meds: acetaminophen, sodium chloride flush, sodium chloride, ondansetron **OR** ondansetron, acetaminophen **OR** acetaminophen, glucose, dextrose bolus **OR** dextrose bolus, glucagon (rDNA), dextrose      Intake/Output Summary (Last 24 hours) at 10/27/2022 0945  Last data filed at 10/27/2022 0230  Gross per 24 hour   Intake --   Output 5150 ml   Net -5150 ml       Physical Exam Performed:  BP (!) 154/74   Pulse 78   Temp 98 °F (36.7 °C) (Oral)   Resp 18   Ht 6' 1\" (1.854 m)   Wt 232 lb (105.2 kg)   SpO2 95%   BMI 30.61 kg/m²     General appearance: No apparent distress, appears stated age and cooperative. HEENT: Conjunctivae/corneas clear. Neck: Supple, with full range of motion. No jugular venous distention. Trachea midline. Respiratory:  Normal respiratory effort. Clear to auscultation, bilaterally without Rales/Wheezes/Rhonchi. Cardiovascular: Regular rate and rhythm with normal S1/S2 without murmurs, rubs or gallops. Abdomen: Soft,, non-distended with normal bowel sounds. Mild tenderness over LLQ. Musculoskeletal: No clubbing, cyanosis or edema bilaterally. Reduced lower extremity strength. No radiculopathy with leg lift bilaterally. Skin: Skin color, texture, turgor normal.  No rashes or lesions. Neurologic:  Neurovascularly intact without any focal sensory/motor deficits.    Psychiatric: Alert and oriented x 3, At times forgetful ; thought content appropriate, normal insight  Capillary Refill: Brisk,3 seconds, normal   Peripheral Pulses: +2 palpable, equal bilaterally       Labs:   Recent Labs     10/25/22  1353 10/26/22  0515 10/27/22  0550   WBC 11.3* 11.0 8.1   HGB 14.1 13.2* 14.4   HCT 42.5 39.6* 42.9    240 241     Recent Labs     10/25/22  1353 10/26/22  0515 10/27/22  0550   * 135* 139   K 4.5 3.9 4.1   CL 99 101 104   CO2 24 25 26   BUN 15 11 14   CREATININE 1.0 0.9 1.0   CALCIUM 9.6 9.1 9.2     Recent Labs     10/25/22  1353   AST 25   ALT 51*   BILITOT 0.5   ALKPHOS 110     Recent Labs     10/25/22  1353   INR 1.35*     Recent Labs     10/25/22  1353   TROPONINI <0.01       Urinalysis:    Lab Results   Component Value Date/Time    NITRU Negative 10/25/2022 04:36 PM    WBCUA 21-50 10/25/2022 04:36 PM    BACTERIA 3+ 10/25/2022 04:36 PM    RBCUA 3-4 10/25/2022 04:36 PM    BLOODU TRACE-INTACT 10/25/2022 04:36 PM    SPECGRAV 1.010 10/25/2022 04:36 PM    GLUCOSEU 250 10/25/2022 04:36 PM    GLUCOSEU NEGATIVE 10/13/2010 12:10 PM       Radiology:  CT Head W/O Contrast   Final Result   No acute intracranial abnormality. CT CSpine W/O Contrast   Final Result   No acute abnormality of the cervical spine. CT THORACIC SPINE WO CONTRAST   Final Result   No acute fracture within the thoracic or lumbar spine. Thoracic spinal stimulator device. Posterior spinous process fusion at L2-L3. CT LUMBAR SPINE WO CONTRAST   Final Result   No acute fracture within the thoracic or lumbar spine. Thoracic spinal stimulator device. Posterior spinous process fusion at L2-L3.          XR CHEST PORTABLE   Final Result   No acute cardiopulmonary findings                 Assessment/Plan:    Active Hospital Problems    Diagnosis     Acute cystitis with hematuria [N30.01]      Priority: Medium    Type 2 diabetes mellitus ([facility] [E11.9]     Essential hypertension [I10]     Urinary retention [R33.9]     Lumbosacral spondylosis without myelopathy [M47.817]     Spinal stenosis [M48.00]      Acute cystitis with hematuria  and urinary retention   -UA positive for infection  -Urine culture- enterococcus faecalis, sensitive to ampicillin  - Transition to Oral Ampicillin in preparation for d/c  - D/C vasquez, voiding trial     Frequent falls in patient with lumbosacral spondylosis without myelopathy in patient and spinal stenosis  -CT head revealed no acute intracranial abnormality  -CT C-spine reviewed: No abnormality of cervical spine  -CT thoracic and CT lumbar spine revealed: No acute fracture within the thoracic or lumbar spine. Thoracic spinal stimulator device. Posterior spinous process fusion at 2-L3  -neuro surgery consulted in ED - appreciate recommendations in advance  -Pt/OT- recommend SNF  - Case management - [facility]     DM2, uncontrolled  -hemglobin a1c- 10.0%   -hold home metformin  -Basal insulin increased to 15U nightly  -hdssi  -poct ac/hs  -hypoglycemia protocol  -Carb control diet    Chronic back pain  -continue gabapentin     Obesity  With Body mass index is 30 kg/m². Complicating assessment and treatment. Placing patient at risk for multiple co-morbidities as well as early death and contributing to the patient's presentation. Counseled on weight loss     Essential HTN  -continue verapamil     HLD  -continue rosuvastatin     Bipolar I disorder  -mood appears stable at this time  -continue home medications     Leukocytosis  -likely 2/2 UTI  -abx as indicated above     Factor V Leiden  - Lifelong anticoagulation recommended by hematologist due to multiple thrombus    Intertriginous dermatitis (Intertrigo) mid gluteral skin fold. Brenda wound with macular papular rash with satellite lesions / fungal rash. No pressure injury noted on left buttocks. - Wound care following    DVT Prophylaxis: lovenox  Diet: ADULT DIET;  Regular; 3 carb choices (45 gm/meal)  Code Status: Full Code    PT/OT Eval Status: ordered    Dispo - pending clinical improvement, 2-3 days    Jennifer Nowak DO     Addendum to Resident  Progress note:  Pt seen,examined and evaluated with resident and discussed regarding POC . I have reviewed the current history, physical findings, labs and assessment and plan , edited the note where appropriate and agree with note as documented by resident DO ( Dr. Nowak)     Patient seen and examined this morning reports feeling better. Noted HPI with chronic back pain and multiple falls associated with generalized weakness/fatigue. ED work-up suggestive of acute cystitis with urinary retention. Had Stapleton placement in ED. Empirically started on IV Rocephin   pending cultures. Urine cultures suggestive of Enterococcus faecalis UTI sensitive to ampicillin. Will transition from IV Rocephin to p.o. amoxicillin 500 mg 3 times daily for total of 7 days. Evaluated by PT OT and was recommended for SNF. Social work assisting with disposition.      - Noted patient being worked up outpatient for Parkinson's disease through his neurologist Dr. Charlotte Stout. Continue rest of the current management as above.       -DC Stapleton catheter and attempt voiding trial.  Likely DC in a.m. to SNF     Hans Potter MD  Hospitalist Physician

## 2022-10-27 NOTE — PROGRESS NOTES
.4 Eyes Skin Assessment     The patient is being assess for  4 Eyes shift assessment    I agree that 2 RN's have performed a thorough Head to Toe Skin Assessment on the patient. ALL assessment sites listed below have been assessed. Areas assessed by both nurses: [place] and [place]  [x]   Head, Face, and Ears   [x]   Shoulders, Back, and Chest  [x]   Arms, Elbows, and Hands   [x]   Coccyx, Sacrum, and IschIum  [x]   Legs, Feet, and Heels        Does the Patient have Skin Breakdown?   Yes LDA WOUND CARE was Initiated documentation include the Brenda-wound, Wound Assessment, Measurements, Dressing Treatment, Drainage, and Color\",         Dano Prevention initiated:  Yes   Wound Care Orders initiated:  Yes      [id number] nurse consulted for Pressure Injury (Stage 3,4, Unstageable, DTI, NWPT, and Complex wounds), New and Established Ostomies:  No      Nurse 1 eSignature: Electronically signed by Venessa Holloway on 10/27/22 at 4:15 PM EDT    **SHARE this note so that the co-signing nurse is able to place an eSignature**    Nurse 2 eSignature: Electronically signed by Alfreda Xie RN on 10/27/22 at 4:16 PM EDT

## 2022-10-27 NOTE — CARE COORDINATION
Hospital day 2: Patient on C3 re Acute cystitis with hematuria care managed by IM. Patient from home with spouse, accepted at [facility] pre-cert pending. Patient ct with IV ATB. SW will ct to follow. JENNIFER Butterfield

## 2022-10-27 NOTE — PROGRESS NOTES
Patient is alert and oriented x4. Patient has noted behaviors this shift, including inappropriate behaviors towards women, yelling, and excessive food consumption. Stapleton was removed per provider order. Pt voided during an incontinent episode an hour later. Pt is a q2 turn in bed. Call light in reach.

## 2022-10-27 NOTE — PROGRESS NOTES
Pt is alert and oriented x4 pt had no c/o pain or acute distress noted, tolerated all medications good, vasquez catheter is patent. 2800ml so far noted on this shift, pt has been drinking pitchers of water. Call light within reach and safety has been maintained.

## 2022-10-27 NOTE — PLAN OF CARE
Problem: Skin/Tissue Integrity  Goal: Absence of new skin breakdown  Description: 1. Monitor for areas of redness and/or skin breakdown  2. Assess vascular access sites hourly  3. Every 4-6 hours minimum:  Change oxygen saturation probe site  4. Every 4-6 hours:  If on nasal continuous positive airway pressure, respiratory therapy assess nares and determine need for appliance change or resting period. Outcome: Progressing  Note: No new skin issues. Problem: ABCDS Injury Assessment  Goal: Absence of physical injury  Outcome: Progressing  Flowsheets (Taken 10/27/2022 1655)  Absence of Physical Injury: Implement safety measures based on patient assessment  Note: Remains free from injury. Problem: Skin/Tissue Integrity - Adult  Goal: Skin integrity remains intact  Outcome: Progressing  Flowsheets (Taken 10/26/2022 1836 by Gregg Fulton RN)  Skin Integrity Remains Intact: Monitor for areas of redness and/or skin breakdown  Note: No new skin issues.       Problem: Genitourinary - Adult  Goal: Absence of urinary retention  Outcome: Progressing  Flowsheets (Taken 10/25/2022 2015 by Juliano Whittington RN)  Absence of urinary retention:   Assess patients ability to void and empty bladder   Monitor intake/output and perform bladder scan as needed  Note: Urinated post vasquez removal.     Problem: Infection - Adult  Goal: Absence of infection at discharge  Outcome: Progressing     Problem: Metabolic/Fluid and Electrolytes - Adult  Goal: Electrolytes maintained within normal limits  Outcome: Progressing

## 2022-10-28 LAB
ANION GAP SERPL CALCULATED.3IONS-SCNC: 10 MMOL/L (ref 3–16)
BUN BLDV-MCNC: 19 MG/DL (ref 7–20)
CALCIUM SERPL-MCNC: 9.1 MG/DL (ref 8.3–10.6)
CHLORIDE BLD-SCNC: 100 MMOL/L (ref 99–110)
CO2: 24 MMOL/L (ref 21–32)
CREAT SERPL-MCNC: 1 MG/DL (ref 0.8–1.3)
GFR SERPL CREATININE-BSD FRML MDRD: >60 ML/MIN/{1.73_M2}
GLUCOSE BLD-MCNC: 165 MG/DL (ref 70–99)
GLUCOSE BLD-MCNC: 249 MG/DL (ref 70–99)
GLUCOSE BLD-MCNC: 305 MG/DL (ref 70–99)
GLUCOSE BLD-MCNC: 324 MG/DL (ref 70–99)
GLUCOSE BLD-MCNC: 329 MG/DL (ref 70–99)
GLUCOSE BLD-MCNC: 340 MG/DL (ref 70–99)
GLUCOSE BLD-MCNC: 348 MG/DL (ref 70–99)
HCT VFR BLD CALC: 40.6 % (ref 40.5–52.5)
HEMOGLOBIN: 13.5 G/DL (ref 13.5–17.5)
MCH RBC QN AUTO: 31.9 PG (ref 26–34)
MCHC RBC AUTO-ENTMCNC: 33.4 G/DL (ref 31–36)
MCV RBC AUTO: 95.7 FL (ref 80–100)
PDW BLD-RTO: 12.7 % (ref 12.4–15.4)
PERFORMED ON: ABNORMAL
PLATELET # BLD: 248 K/UL (ref 135–450)
PMV BLD AUTO: 7.7 FL (ref 5–10.5)
POTASSIUM REFLEX MAGNESIUM: 4.3 MMOL/L (ref 3.5–5.1)
RBC # BLD: 4.24 M/UL (ref 4.2–5.9)
SODIUM BLD-SCNC: 134 MMOL/L (ref 136–145)
WBC # BLD: 6.9 K/UL (ref 4–11)

## 2022-10-28 PROCEDURE — 36415 COLL VENOUS BLD VENIPUNCTURE: CPT

## 2022-10-28 PROCEDURE — 1200000000 HC SEMI PRIVATE

## 2022-10-28 PROCEDURE — 6370000000 HC RX 637 (ALT 250 FOR IP): Performed by: NURSE PRACTITIONER

## 2022-10-28 PROCEDURE — 80048 BASIC METABOLIC PNL TOTAL CA: CPT

## 2022-10-28 PROCEDURE — 6360000002 HC RX W HCPCS: Performed by: NURSE PRACTITIONER

## 2022-10-28 PROCEDURE — 2580000003 HC RX 258: Performed by: NURSE PRACTITIONER

## 2022-10-28 PROCEDURE — 85027 COMPLETE CBC AUTOMATED: CPT

## 2022-10-28 PROCEDURE — 6360000002 HC RX W HCPCS: Performed by: INTERNAL MEDICINE

## 2022-10-28 PROCEDURE — 6370000000 HC RX 637 (ALT 250 FOR IP): Performed by: STUDENT IN AN ORGANIZED HEALTH CARE EDUCATION/TRAINING PROGRAM

## 2022-10-28 RX ORDER — ENOXAPARIN SODIUM 100 MG/ML
70 INJECTION SUBCUTANEOUS ONCE
Status: COMPLETED | OUTPATIENT
Start: 2022-10-28 | End: 2022-10-28

## 2022-10-28 RX ORDER — ENOXAPARIN SODIUM 100 MG/ML
100 INJECTION SUBCUTANEOUS 2 TIMES DAILY
Status: DISCONTINUED | OUTPATIENT
Start: 2022-10-28 | End: 2022-10-29 | Stop reason: HOSPADM

## 2022-10-28 RX ORDER — AMOXICILLIN 500 MG/1
500 CAPSULE ORAL EVERY 8 HOURS SCHEDULED
Qty: 11 CAPSULE | Refills: 0
Start: 2022-10-28 | End: 2022-11-01

## 2022-10-28 RX ORDER — ENOXAPARIN SODIUM 100 MG/ML
100 INJECTION SUBCUTANEOUS 2 TIMES DAILY
Qty: 28 ML | Refills: 1 | Status: SHIPPED | OUTPATIENT
Start: 2022-10-28 | End: 2022-11-11

## 2022-10-28 RX ADMIN — SODIUM CHLORIDE, PRESERVATIVE FREE 10 ML: 5 INJECTION INTRAVENOUS at 22:22

## 2022-10-28 RX ADMIN — ROSUVASTATIN CALCIUM 20 MG: 10 TABLET, FILM COATED ORAL at 21:47

## 2022-10-28 RX ADMIN — Medication: at 21:51

## 2022-10-28 RX ADMIN — INSULIN GLARGINE 15 UNITS: 100 INJECTION, SOLUTION SUBCUTANEOUS at 22:19

## 2022-10-28 RX ADMIN — LAMOTRIGINE 25 MG: 25 TABLET ORAL at 09:25

## 2022-10-28 RX ADMIN — INSULIN LISPRO 12 UNITS: 100 INJECTION, SOLUTION INTRAVENOUS; SUBCUTANEOUS at 09:23

## 2022-10-28 RX ADMIN — AMOXICILLIN 500 MG: 250 CAPSULE ORAL at 14:20

## 2022-10-28 RX ADMIN — ENOXAPARIN SODIUM 100 MG: 100 INJECTION SUBCUTANEOUS at 21:49

## 2022-10-28 RX ADMIN — Medication: at 09:27

## 2022-10-28 RX ADMIN — ENOXAPARIN SODIUM 70 MG: 100 INJECTION SUBCUTANEOUS at 11:28

## 2022-10-28 RX ADMIN — LITHIUM CARBONATE 300 MG: 150 CAPSULE, GELATIN COATED ORAL at 09:25

## 2022-10-28 RX ADMIN — GABAPENTIN 300 MG: 300 CAPSULE ORAL at 09:25

## 2022-10-28 RX ADMIN — LAMOTRIGINE 50 MG: 25 TABLET ORAL at 21:48

## 2022-10-28 RX ADMIN — MICONAZOLE NITRATE: 2 POWDER TOPICAL at 09:26

## 2022-10-28 RX ADMIN — TRAZODONE HYDROCHLORIDE 200 MG: 50 TABLET ORAL at 21:47

## 2022-10-28 RX ADMIN — INSULIN LISPRO 12 UNITS: 100 INJECTION, SOLUTION INTRAVENOUS; SUBCUTANEOUS at 11:28

## 2022-10-28 RX ADMIN — GABAPENTIN 300 MG: 300 CAPSULE ORAL at 14:20

## 2022-10-28 RX ADMIN — MICONAZOLE NITRATE: 2 POWDER TOPICAL at 21:50

## 2022-10-28 RX ADMIN — GABAPENTIN 300 MG: 300 CAPSULE ORAL at 21:49

## 2022-10-28 RX ADMIN — ENOXAPARIN SODIUM 30 MG: 100 INJECTION SUBCUTANEOUS at 09:24

## 2022-10-28 RX ADMIN — AMOXICILLIN 500 MG: 250 CAPSULE ORAL at 05:06

## 2022-10-28 RX ADMIN — INSULIN LISPRO 4 UNITS: 100 INJECTION, SOLUTION INTRAVENOUS; SUBCUTANEOUS at 22:20

## 2022-10-28 RX ADMIN — AMOXICILLIN 500 MG: 250 CAPSULE ORAL at 21:49

## 2022-10-28 RX ADMIN — LITHIUM CARBONATE 600 MG: 150 CAPSULE, GELATIN COATED ORAL at 21:47

## 2022-10-28 RX ADMIN — VERAPAMIL HYDROCHLORIDE 120 MG: 240 TABLET, FILM COATED, EXTENDED RELEASE ORAL at 21:48

## 2022-10-28 RX ADMIN — SODIUM CHLORIDE, PRESERVATIVE FREE 10 ML: 5 INJECTION INTRAVENOUS at 09:25

## 2022-10-28 ASSESSMENT — PAIN DESCRIPTION - LOCATION: LOCATION: ABDOMEN;BACK

## 2022-10-28 ASSESSMENT — PAIN SCALES - GENERAL: PAINLEVEL_OUTOF10: 0

## 2022-10-28 ASSESSMENT — PAIN DESCRIPTION - ORIENTATION: ORIENTATION: LOWER

## 2022-10-28 ASSESSMENT — PAIN DESCRIPTION - DESCRIPTORS: DESCRIPTORS: STABBING;DISCOMFORT

## 2022-10-28 NOTE — PROGRESS NOTES
A&Ox4. No complaints of /SOB, chest pain or heaviness. No cyanosis or pallor. Not in distress. Will continue to monitor.

## 2022-10-28 NOTE — CARE COORDINATION
Patient precert remains pending, Spoke with facility liaison will call weekend phone if comes in late this date, transport canceled Patient notified. Sudheer Underwood [facility]  [id number]: Approval obtained no transport left as canceled earlier tentative 1130 via [address] for 10/28/2022 Patient, Spouse, and facility aware.  JENNIFER Monaco

## 2022-10-28 NOTE — PLAN OF CARE
Problem: ABCDS Injury Assessment  Goal: Absence of physical injury  Outcome: Progressing  Flowsheets (Taken 10/28/2022 1302)  Absence of Physical Injury: Implement safety measures based on patient assessment     Problem: Skin/Tissue Integrity - Adult  Goal: Skin integrity remains intact  Outcome: Progressing  Flowsheets (Taken 10/28/2022 1302)  Skin Integrity Remains Intact:   Monitor for areas of redness and/or skin breakdown   Assess vascular access sites hourly   Every 4-6 hours minimum: Change oxygen saturation probe site  Goal: Incisions, wounds, or drain sites healing without S/S of infection  Outcome: Progressing  Goal: Oral mucous membranes remain intact  Outcome: Progressing     Problem: Infection - Adult  Goal: Absence of infection at discharge  Outcome: Progressing  Flowsheets (Taken 10/28/2022 1302)  Absence of infection at discharge:   Assess and monitor for signs and symptoms of infection   Monitor lab/diagnostic results   Monitor all insertion sites i.e., indwelling lines, tubes and drains   Monitor endotracheal (as able) and nasal secretions for changes in amount and color

## 2022-10-28 NOTE — PLAN OF CARE
Problem: Skin/Tissue Integrity  Goal: Absence of new skin breakdown  Description: 1. Monitor for areas of redness and/or skin breakdown  2. Assess vascular access sites hourly  3. Every 4-6 hours minimum:  Change oxygen saturation probe site  4. Every 4-6 hours:  If on nasal continuous positive airway pressure, respiratory therapy assess nares and determine need for appliance change or resting period. 10/27/2022 2114 by Marcelo Hughes RN  Outcome: Progressing  10/27/2022 1655 by Vianney Crisostomo  Outcome: Progressing  Note: No new skin issues. Problem: ABCDS Injury Assessment  Goal: Absence of physical injury  10/27/2022 2114 by Marcelo Hughes RN  Outcome: Progressing  10/27/2022 1655 by Vianney Crisostomo  Outcome: Progressing  Flowsheets (Taken 10/27/2022 1655)  Absence of Physical Injury: Implement safety measures based on patient assessment  Note: Remains free from injury. Problem: Neurosensory - Adult  Goal: Achieves stable or improved neurological status  Outcome: Progressing  Goal: Achieves maximal functionality and self care  Outcome: Progressing     Problem: Skin/Tissue Integrity - Adult  Goal: Skin integrity remains intact  10/27/2022 2114 by Marcelo Hughes RN  Outcome: Progressing  10/27/2022 1655 by Vianney Crisostomo  Outcome: Progressing  Flowsheets (Taken 10/26/2022 1836 by Virgen Weinstein RN)  Skin Integrity Remains Intact: Monitor for areas of redness and/or skin breakdown  Note: No new skin issues.    Goal: Incisions, wounds, or drain sites healing without S/S of infection  Outcome: Progressing  Goal: Oral mucous membranes remain intact  Outcome: Progressing

## 2022-10-29 VITALS
BODY MASS INDEX: 30.75 KG/M2 | HEIGHT: 73 IN | HEART RATE: 62 BPM | DIASTOLIC BLOOD PRESSURE: 82 MMHG | RESPIRATION RATE: 18 BRPM | SYSTOLIC BLOOD PRESSURE: 146 MMHG | TEMPERATURE: 98 F | OXYGEN SATURATION: 97 % | WEIGHT: 232 LBS

## 2022-10-29 LAB
ANION GAP SERPL CALCULATED.3IONS-SCNC: 10 MMOL/L (ref 3–16)
BUN BLDV-MCNC: 19 MG/DL (ref 7–20)
CALCIUM SERPL-MCNC: 9.5 MG/DL (ref 8.3–10.6)
CHLORIDE BLD-SCNC: 100 MMOL/L (ref 99–110)
CO2: 25 MMOL/L (ref 21–32)
CREAT SERPL-MCNC: 1 MG/DL (ref 0.8–1.3)
GFR SERPL CREATININE-BSD FRML MDRD: >60 ML/MIN/{1.73_M2}
GLUCOSE BLD-MCNC: 250 MG/DL (ref 70–99)
GLUCOSE BLD-MCNC: 257 MG/DL (ref 70–99)
GLUCOSE BLD-MCNC: 281 MG/DL (ref 70–99)
HCT VFR BLD CALC: 43.3 % (ref 40.5–52.5)
HEMOGLOBIN: 14.4 G/DL (ref 13.5–17.5)
MCH RBC QN AUTO: 32.1 PG (ref 26–34)
MCHC RBC AUTO-ENTMCNC: 33.3 G/DL (ref 31–36)
MCV RBC AUTO: 96.4 FL (ref 80–100)
PDW BLD-RTO: 12.6 % (ref 12.4–15.4)
PERFORMED ON: ABNORMAL
PERFORMED ON: ABNORMAL
PLATELET # BLD: 264 K/UL (ref 135–450)
PMV BLD AUTO: 8 FL (ref 5–10.5)
POTASSIUM REFLEX MAGNESIUM: 4 MMOL/L (ref 3.5–5.1)
RBC # BLD: 4.49 M/UL (ref 4.2–5.9)
SODIUM BLD-SCNC: 135 MMOL/L (ref 136–145)
WBC # BLD: 7.2 K/UL (ref 4–11)

## 2022-10-29 PROCEDURE — 36415 COLL VENOUS BLD VENIPUNCTURE: CPT

## 2022-10-29 PROCEDURE — 6370000000 HC RX 637 (ALT 250 FOR IP): Performed by: NURSE PRACTITIONER

## 2022-10-29 PROCEDURE — 2580000003 HC RX 258: Performed by: NURSE PRACTITIONER

## 2022-10-29 PROCEDURE — 80048 BASIC METABOLIC PNL TOTAL CA: CPT

## 2022-10-29 PROCEDURE — 6360000002 HC RX W HCPCS: Performed by: INTERNAL MEDICINE

## 2022-10-29 PROCEDURE — 6370000000 HC RX 637 (ALT 250 FOR IP): Performed by: STUDENT IN AN ORGANIZED HEALTH CARE EDUCATION/TRAINING PROGRAM

## 2022-10-29 PROCEDURE — 85027 COMPLETE CBC AUTOMATED: CPT

## 2022-10-29 RX ADMIN — SODIUM CHLORIDE, PRESERVATIVE FREE 10 ML: 5 INJECTION INTRAVENOUS at 09:19

## 2022-10-29 RX ADMIN — INSULIN LISPRO 8 UNITS: 100 INJECTION, SOLUTION INTRAVENOUS; SUBCUTANEOUS at 09:22

## 2022-10-29 RX ADMIN — MICONAZOLE NITRATE: 2 POWDER TOPICAL at 09:22

## 2022-10-29 RX ADMIN — LAMOTRIGINE 25 MG: 25 TABLET ORAL at 09:18

## 2022-10-29 RX ADMIN — ENOXAPARIN SODIUM 100 MG: 100 INJECTION SUBCUTANEOUS at 09:19

## 2022-10-29 RX ADMIN — POLYETHYLENE GLYCOL 3350 17 G: 17 POWDER, FOR SOLUTION ORAL at 09:29

## 2022-10-29 RX ADMIN — LITHIUM CARBONATE 300 MG: 150 CAPSULE, GELATIN COATED ORAL at 09:17

## 2022-10-29 RX ADMIN — AMOXICILLIN 500 MG: 250 CAPSULE ORAL at 06:37

## 2022-10-29 RX ADMIN — GABAPENTIN 300 MG: 300 CAPSULE ORAL at 09:18

## 2022-10-29 RX ADMIN — Medication: at 09:22

## 2022-10-29 ASSESSMENT — PAIN DESCRIPTION - LOCATION: LOCATION: BACK

## 2022-10-29 ASSESSMENT — PAIN SCALES - GENERAL: PAINLEVEL_OUTOF10: 6

## 2022-10-29 ASSESSMENT — PAIN DESCRIPTION - DESCRIPTORS: DESCRIPTORS: STABBING

## 2022-10-29 ASSESSMENT — PAIN DESCRIPTION - ORIENTATION: ORIENTATION: MID

## 2022-10-29 NOTE — PLAN OF CARE
Problem: Skin/Tissue Integrity  Goal: Absence of new skin breakdown  Description: 1. Monitor for areas of redness and/or skin breakdown  2. Assess vascular access sites hourly  3. Every 4-6 hours minimum:  Change oxygen saturation probe site  4. Every 4-6 hours:  If on nasal continuous positive airway pressure, respiratory therapy assess nares and determine need for appliance change or resting period. 10/29/2022 1040 by Narayan Rodriguez RN  Outcome: Adequate for Discharge  10/29/2022 1040 by Narayan Rodriguez RN  Outcome: Adequate for Discharge  10/29/2022 1039 by Narayan Rodriguez RN  Outcome: Adequate for Discharge  10/28/2022 2139 by Alyson Melton RN  Outcome: Progressing     Problem: Neurosensory - Adult  Goal: Achieves stable or improved neurological status  10/29/2022 1040 by Narayan Rodriguez RN  Outcome: Adequate for Discharge  10/29/2022 1040 by Narayan Rodriguez RN  Outcome: Adequate for Discharge  10/29/2022 1039 by Narayan Rodriguez RN  Outcome: Adequate for Discharge  10/28/2022 2139 by Alyson Melton RN  Outcome: Progressing  Flowsheets (Taken 10/28/2022 2139)  Achieves stable or improved neurological status:   Assess for and report changes in neurological status   Initiate measures to prevent increased intracranial pressure   Maintain blood pressure and fluid volume within ordered parameters to optimize cerebral perfusion and minimize risk of hemorrhage   Monitor temperature, glucose, and sodium.  Initiate appropriate interventions as ordered  Goal: Achieves maximal functionality and self care  10/29/2022 1040 by Narayan Rodriguez RN  Outcome: Adequate for Discharge  10/29/2022 1040 by Narayan Rodriguez RN  Outcome: Adequate for Discharge  10/29/2022 1039 by Narayan Rodriguez RN  Outcome: Adequate for Discharge  10/28/2022 2139 by Alyson Melton RN  Outcome: Progressing  Flowsheets (Taken 10/28/2022 2139)  Achieves maximal functionality and self care:   Monitor swallowing and airway patency with patient fatigue and changes in neurological status   Encourage and assist patient to increase activity and self care with guidance from physical therapy/occupational therapy   Encourage visually impaired, hearing impaired and aphasic patients to use assistive/communication devices     Problem: Skin/Tissue Integrity - Adult  Goal: Skin integrity remains intact  10/29/2022 1040 by Vlad Gilbert RN  Outcome: Adequate for Discharge  10/29/2022 1040 by Vlad Gilbert RN  Outcome: Adequate for Discharge  10/29/2022 1039 by Vlad Gilbert RN  Outcome: Adequate for Discharge  Flowsheets (Taken 10/29/2022 1039)  Skin Integrity Remains Intact:   Monitor for areas of redness and/or skin breakdown   Assess vascular access sites hourly   Every 4-6 hours minimum: Change oxygen saturation probe site  10/28/2022 2139 by Andreea Hobbs RN  Outcome: Progressing  Flowsheets (Taken 10/28/2022 1302 by Vlad Gilbert RN)  Skin Integrity Remains Intact:   Monitor for areas of redness and/or skin breakdown   Assess vascular access sites hourly   Every 4-6 hours minimum: Change oxygen saturation probe site  Goal: Incisions, wounds, or drain sites healing without S/S of infection  10/29/2022 1040 by Vlad Gilbert RN  Outcome: Adequate for Discharge  10/29/2022 1040 by Vlad Gilbert RN  Outcome: Adequate for Discharge  10/29/2022 1039 by Vlad Gilbert RN  Outcome: Adequate for Discharge  10/28/2022 2139 by Andreea Hobbs RN  Outcome: Progressing  Flowsheets (Taken 10/28/2022 2139)  Incisions, Wounds, or Drain Sites Healing Without Sign and Symptoms of Infection: ADMISSION and DAILY: Assess and document risk factors for pressure ulcer development  Goal: Oral mucous membranes remain intact  10/29/2022 1040 by Vlad Gilbert RN  Outcome: Adequate for Discharge  10/29/2022 1040 by Vlad Gilbert RN  Outcome: Adequate for Discharge  10/29/2022 1039 by Vlad Gilbert RN  Outcome: Adequate for Discharge  10/28/2022 2139 by Sonia Palacios Haven Sanchez RN  Outcome: Progressing  Flowsheets (Taken 10/28/2022 2139)  Oral Mucous Membranes Remain Intact:   Assess oral mucosa and hygiene practices   Implement oral medicated treatments as ordered   Implement preventative oral hygiene regimen     Problem: Musculoskeletal - Adult  Goal: Return mobility to safest level of function  10/29/2022 1040 by Mer Nguyen RN  Outcome: Adequate for Discharge  10/29/2022 1040 by Mer Nguyen RN  Outcome: Adequate for Discharge  10/29/2022 1039 by Mer Nguyen RN  Outcome: Adequate for Discharge  Flowsheets (Taken 10/29/2022 1039)  Return Mobility to Safest Level of Function:   Assess patient stability and activity tolerance for standing, transferring and ambulating with or without assistive devices   Assist with transfers and ambulation using safe patient handling equipment as needed   Ensure adequate protection for wounds/incisions during mobilization   Obtain physical therapy/occupational therapy consults as needed  10/28/2022 2139 by Caroline Cool RN  Outcome: Progressing  Flowsheets (Taken 10/28/2022 2139)  Return Mobility to Safest Level of Function:   Assess patient stability and activity tolerance for standing, transferring and ambulating with or without assistive devices   Assist with transfers and ambulation using safe patient handling equipment as needed   Ensure adequate protection for wounds/incisions during mobilization   Obtain physical therapy/occupational therapy consults as needed   Apply continuous passive motion per provider or physical therapy orders to increase flexion toward goal   Instruct patient/family in ordered activity level  Goal: Maintain proper alignment of affected body part  10/29/2022 1040 by Mer Nguyen RN  Outcome: Adequate for Discharge  10/29/2022 1040 by Mer Nguyen RN  Outcome: Adequate for Discharge  10/29/2022 1039 by Mer Nguyen RN  Outcome: Adequate for Discharge  10/28/2022 2139 by Seferino Chen Sylvia Cole RN  Outcome: Progressing  Goal: Return ADL status to a safe level of function  10/29/2022 1040 by Estella Cerda RN  Outcome: Adequate for Discharge  10/29/2022 1040 by Estella Cerda RN  Outcome: Adequate for Discharge  10/29/2022 1039 by Estella Cerda RN  Outcome: Adequate for Discharge  10/28/2022 2139 by Mari Coe RN  Outcome: Progressing  Flowsheets (Taken 10/28/2022 2139)  Return ADL Status to a Safe Level of Function:   Administer medication as ordered   Assess activities of daily living deficits and provide assistive devices as needed   Obtain physical therapy/occupational therapy consults as needed   Assist and instruct patient to increase activity and self care as tolerated     Problem: Genitourinary - Adult  Goal: Absence of urinary retention  10/29/2022 1040 by sEtella Cerda RN  Outcome: Adequate for Discharge  10/29/2022 1040 by Estella Cerda RN  Outcome: Adequate for Discharge  10/29/2022 1039 by Estella Cerda RN  Outcome: Adequate for Discharge  Flowsheets (Taken 10/29/2022 1039)  Absence of urinary retention:   Assess patients ability to void and empty bladder   Monitor intake/output and perform bladder scan as needed   Place urinary catheter per Licensed Independent Practitioner order if needed  10/28/2022 2139 by Mari Coe RN  Outcome: Progressing  Flowsheets (Taken 10/25/2022 2015 by David Morrow RN)  Absence of urinary retention:   Assess patients ability to void and empty bladder   Monitor intake/output and perform bladder scan as needed  Goal: Urinary catheter remains patent  10/29/2022 1040 by Estella Cerda RN  Outcome: Adequate for Discharge  10/29/2022 1040 by Estella Cerda RN  Outcome: Adequate for Discharge  10/29/2022 1039 by Estella Cerda RN  Outcome: Adequate for Discharge  10/28/2022 2139 by Mari Coe RN  Outcome: Progressing  Flowsheets (Taken 10/25/2022 2015 by David Morrow RN)  Urinary catheter remains patent: Assess patency of urinary catheter     Problem: Infection - Adult  Goal: Absence of infection at discharge  10/29/2022 1040 by Cheryl Bocanegra RN  Outcome: Adequate for Discharge  10/29/2022 1040 by Cheryl Bocanegra RN  Outcome: Adequate for Discharge  10/29/2022 1039 by Cheryl Bocanegra RN  Outcome: Adequate for Discharge  Flowsheets (Taken 10/29/2022 1039)  Absence of infection at discharge:   Assess and monitor for signs and symptoms of infection   Monitor lab/diagnostic results   Monitor all insertion sites i.e., indwelling lines, tubes and drains   Monitor endotracheal (as able) and nasal secretions for changes in amount and color  10/28/2022 2139 by Elodia Brittle, RN  Outcome: Progressing  Flowsheets (Taken 10/28/2022 1302 by Cheryl Bocanegra RN)  Absence of infection at discharge:   Assess and monitor for signs and symptoms of infection   Monitor lab/diagnostic results   Monitor all insertion sites i.e., indwelling lines, tubes and drains   Monitor endotracheal (as able) and nasal secretions for changes in amount and color  Goal: Absence of infection during hospitalization  10/29/2022 1040 by Cheryl Bocanegra RN  Outcome: Adequate for Discharge  10/29/2022 1040 by Cheryl Bocanegra RN  Outcome: Adequate for Discharge  10/29/2022 1039 by Cheryl Bocanegra RN  Outcome: Adequate for Discharge  10/28/2022 2139 by Elodia Brittle, RN  Outcome: Progressing  Goal: Absence of fever/infection during anticipated neutropenic period  10/29/2022 1040 by Cheryl Bocanegra RN  Outcome: Adequate for Discharge  10/29/2022 1040 by Cheryl Bocanegra RN  Outcome: Adequate for Discharge  10/29/2022 1039 by Cheryl Bocanegra RN  Outcome: Adequate for Discharge  10/28/2022 2139 by Elodia Brittle, RN  Outcome: Progressing     Problem: Metabolic/Fluid and Electrolytes - Adult  Goal: Electrolytes maintained within normal limits  10/29/2022 1040 by Cheryl Bocanegra RN  Outcome: Adequate for Discharge  10/29/2022 1040 by Cheryl Bocanegra RN  Outcome: Adequate for Discharge  10/29/2022 1039 by Keisha Jacobsen RN  Outcome: Adequate for Discharge  10/28/2022 2139 by Betsy Flores RN  Outcome: Progressing  Flowsheets (Taken 10/28/2022 2139)  Electrolytes maintained within normal limits:   Monitor labs and assess patient for signs and symptoms of electrolyte imbalances   Administer electrolyte replacement as ordered   Monitor response to electrolyte replacements, including repeat lab results as appropriate   Fluid restriction as ordered   Instruct patient on fluid and nutrition restrictions as appropriate  Goal: Hemodynamic stability and optimal renal function maintained  10/29/2022 1040 by Keisha Jacobsen RN  Outcome: Adequate for Discharge  10/29/2022 1040 by Keisha Jacobsen RN  Outcome: Adequate for Discharge  10/29/2022 1039 by Keisha Jacobsen RN  Outcome: Adequate for Discharge  10/28/2022 2139 by Betsy Flores RN  Outcome: Progressing  Flowsheets (Taken 10/28/2022 2139)  Hemodynamic stability and optimal renal function maintained:   Monitor labs and assess for signs and symptoms of volume excess or deficit   Monitor intake, output and patient weight   Monitor urine specific gravity, serum osmolarity and serum sodium as indicated or ordered   Monitor response to interventions for patient's volume status, including labs, urine output, blood pressure (other measures as available)   Encourage oral intake as appropriate   Instruct patient on fluid and nutrition restrictions as appropriate  Goal: Glucose maintained within prescribed range  10/29/2022 1040 by Keisha Jacobsen RN  Outcome: Adequate for Discharge  10/29/2022 1040 by Keisha Jacobsen RN  Outcome: Adequate for Discharge  10/29/2022 1039 by Keihsa Jacobsen RN  Outcome: Adequate for Discharge  10/28/2022 2139 by Betsy Flores RN  Outcome: Progressing  Flowsheets (Taken 10/28/2022 2139)  Glucose maintained within prescribed range:   Monitor blood glucose as ordered Assess for signs and symptoms of hyperglycemia and hypoglycemia   Administer ordered medications to maintain glucose within target range   Assess barriers to adequate nutritional intake and initiate nutrition consult as needed   Instruct patient on self management of diabetes and initiate consult as needed     Problem: Hematologic - Adult  Goal: Maintains hematologic stability  10/29/2022 1040 by Karl Walker RN  Outcome: Adequate for Discharge  10/29/2022 1040 by Karl Walker RN  Outcome: Adequate for Discharge  10/29/2022 1039 by Karl Walker RN  Outcome: Adequate for Discharge  10/28/2022 2139 by Alysa Santiago RN  Outcome: Progressing     Problem: Pain  Goal: Verbalizes/displays adequate comfort level or baseline comfort level  10/29/2022 1040 by Karl Walker RN  Outcome: Adequate for Discharge  10/29/2022 1040 by Karl Walker RN  Outcome: Adequate for Discharge  10/29/2022 1039 by Karl Walker RN  Outcome: Adequate for Discharge     Problem: Chronic Conditions and Co-morbidities  Goal: Patient's chronic conditions and co-morbidity symptoms are monitored and maintained or improved  10/29/2022 1040 by Karl Walker RN  Outcome: Adequate for Discharge  10/29/2022 1040 by Karl Walker RN  Outcome: Adequate for Discharge  10/29/2022 1039 by Karl Walker RN  Outcome: Adequate for Discharge

## 2022-10-29 NOTE — CARE COORDINATION
CASE MANAGEMENT DISCHARGE SUMMARY      Discharge to: [facility] skilled     Precertification completed: yes  Hospital Exemption Notification (HENS) completed: yes    IMM given: (date)     New Durable Medical Equipment ordered/agency: na    Transportation:    Medical Transport explained to [email or web address]. Pt/family voice no agency preference. Agency used:[facility]   time:1130   Ambulance form completed: Yes    Confirmed discharge plan with:RN, EGS, spouse     Patient: yes         Facility/Agency, name:  [facility] faxed [phone]   Phone number for report to facility: [phone]     RN, name: Montserrat Record    Note: Discharging nurse to complete [facility], reconcile [facility], and place final copy with patient's discharge packet. RN to ensure that written prescriptions for  Level II medications are sent with patient to the facility as per protocol.

## 2022-10-29 NOTE — PLAN OF CARE
Problem: Skin/Tissue Integrity  Goal: Absence of new skin breakdown  Description: 1. Monitor for areas of redness and/or skin breakdown  2. Assess vascular access sites hourly  3. Every 4-6 hours minimum:  Change oxygen saturation probe site  4. Every 4-6 hours:  If on nasal continuous positive airway pressure, respiratory therapy assess nares and determine need for appliance change or resting period. 10/28/2022 2139 by Harpreet Cox RN  Outcome: Progressing  10/28/2022 1302 by Rohit Fields RN  Outcome: Progressing     Problem: ABCDS Injury Assessment  Goal: Absence of physical injury  10/28/2022 2139 by Harpreet Cox RN  Outcome: Progressing  Flowsheets (Taken 10/28/2022 1302 by Rohit Fields RN)  Absence of Physical Injury: Implement safety measures based on patient assessment  10/28/2022 1302 by Rohit Fields RN  Outcome: Progressing  Flowsheets (Taken 10/28/2022 1302)  Absence of Physical Injury: Implement safety measures based on patient assessment     Problem: Neurosensory - Adult  Goal: Achieves stable or improved neurological status  10/28/2022 2139 by Harpreet Cox RN  Outcome: Progressing  Flowsheets (Taken 10/28/2022 2139)  Achieves stable or improved neurological status:   Assess for and report changes in neurological status   Initiate measures to prevent increased intracranial pressure   Maintain blood pressure and fluid volume within ordered parameters to optimize cerebral perfusion and minimize risk of hemorrhage   Monitor temperature, glucose, and sodium.  Initiate appropriate interventions as ordered  10/28/2022 1302 by Rohit Fields RN  Outcome: Progressing  Goal: Achieves maximal functionality and self care  10/28/2022 2139 by Harpreet Cox RN  Outcome: Progressing  Flowsheets (Taken 10/28/2022 2139)  Achieves maximal functionality and self care:   Monitor swallowing and airway patency with patient fatigue and changes in neurological status   Encourage and assist patient to increase activity and self care with guidance from physical therapy/occupational therapy   Encourage visually impaired, hearing impaired and aphasic patients to use assistive/communication devices  10/28/2022 1302 by Jennifer Jones RN  Outcome: Progressing     Problem: Skin/Tissue Integrity - Adult  Goal: Skin integrity remains intact  10/28/2022 2139 by Antwan De Leon RN  Outcome: Progressing  Flowsheets (Taken 10/28/2022 1302 by Jennifer Jones RN)  Skin Integrity Remains Intact:   Monitor for areas of redness and/or skin breakdown   Assess vascular access sites hourly   Every 4-6 hours minimum: Change oxygen saturation probe site  10/28/2022 1302 by Jennifer Jones RN  Outcome: Progressing  Flowsheets (Taken 10/28/2022 1302)  Skin Integrity Remains Intact:   Monitor for areas of redness and/or skin breakdown   Assess vascular access sites hourly   Every 4-6 hours minimum: Change oxygen saturation probe site  Goal: Incisions, wounds, or drain sites healing without S/S of infection  10/28/2022 2139 by Antwan De Leon RN  Outcome: Progressing  Flowsheets (Taken 10/28/2022 2139)  Incisions, Wounds, or Drain Sites Healing Without Sign and Symptoms of Infection: ADMISSION and DAILY: Assess and document risk factors for pressure ulcer development  10/28/2022 1302 by Jennifer Jones RN  Outcome: Progressing  Goal: Oral mucous membranes remain intact  10/28/2022 2139 by Antwan De Leon RN  Outcome: Progressing  Flowsheets (Taken 10/28/2022 2139)  Oral Mucous Membranes Remain Intact:   Assess oral mucosa and hygiene practices   Implement oral medicated treatments as ordered   Implement preventative oral hygiene regimen  10/28/2022 1302 by Jennifer Jones RN  Outcome: Progressing     Problem: Musculoskeletal - Adult  Goal: Return mobility to safest level of function  10/28/2022 2139 by Antwan De Leon RN  Outcome: Progressing  Flowsheets (Taken 10/28/2022 2139)  Return Mobility to Safest Level of Function:   Assess patient stability and activity tolerance for standing, transferring and ambulating with or without assistive devices   Assist with transfers and ambulation using safe patient handling equipment as needed   Ensure adequate protection for wounds/incisions during mobilization   Obtain physical therapy/occupational therapy consults as needed   Apply continuous passive motion per provider or physical therapy orders to increase flexion toward goal   Instruct patient/family in ordered activity level  10/28/2022 1302 by Rohit Fields RN  Outcome: Progressing  Goal: Maintain proper alignment of affected body part  10/28/2022 2139 by Harpreet Cox RN  Outcome: Progressing  10/28/2022 1302 by Rohit Fields RN  Outcome: Progressing  Goal: Return ADL status to a safe level of function  10/28/2022 2139 by Harpreet Cox RN  Outcome: Progressing  Flowsheets (Taken 10/28/2022 2139)  Return ADL Status to a Safe Level of Function:   Administer medication as ordered   Assess activities of daily living deficits and provide assistive devices as needed   Obtain physical therapy/occupational therapy consults as needed   Assist and instruct patient to increase activity and self care as tolerated  10/28/2022 1302 by Rohit Fields RN  Outcome: Progressing     Problem: Genitourinary - Adult  Goal: Absence of urinary retention  10/28/2022 2139 by Harpreet Cox RN  Outcome: Progressing  Flowsheets (Taken 10/25/2022 2015 by Carly Massey RN)  Absence of urinary retention:   Assess patients ability to void and empty bladder   Monitor intake/output and perform bladder scan as needed  10/28/2022 1302 by Rohit Fields RN  Outcome: Progressing  Goal: Urinary catheter remains patent  10/28/2022 2139 by Harpreet Cox RN  Outcome: Progressing  Flowsheets (Taken 10/25/2022 2015 by Gordon Celina [facility], RN)  Urinary catheter remains patent: Assess patency of urinary catheter  10/28/2022 1302 by Jean Stoddard RN  Outcome: Progressing     Problem: Infection - Adult  Goal: Absence of infection at discharge  10/28/2022 2139 by Silvio Rodriguez RN  Outcome: Progressing  Flowsheets (Taken 10/28/2022 1302 by Jean Stoddard RN)  Absence of infection at discharge:   Assess and monitor for signs and symptoms of infection   Monitor lab/diagnostic results   Monitor all insertion sites i.e., indwelling lines, tubes and drains   Monitor endotracheal (as able) and nasal secretions for changes in amount and color  10/28/2022 1302 by Jean Stoddard RN  Outcome: Progressing  Flowsheets (Taken 10/28/2022 1302)  Absence of infection at discharge:   Assess and monitor for signs and symptoms of infection   Monitor lab/diagnostic results   Monitor all insertion sites i.e., indwelling lines, tubes and drains   Monitor endotracheal (as able) and nasal secretions for changes in amount and color  Goal: Absence of infection during hospitalization  10/28/2022 2139 by Silvio Rodriguez RN  Outcome: Progressing  10/28/2022 1302 by Jean Stoddard RN  Outcome: Progressing  Goal: Absence of fever/infection during anticipated neutropenic period  10/28/2022 2139 by Silvio Rodriguez RN  Outcome: Progressing  10/28/2022 1302 by Jean Stoddard RN  Outcome: Progressing     Problem: Metabolic/Fluid and Electrolytes - Adult  Goal: Electrolytes maintained within normal limits  10/28/2022 2139 by Silvio Rodriguez RN  Outcome: Progressing  Flowsheets (Taken 10/28/2022 2139)  Electrolytes maintained within normal limits:   Monitor labs and assess patient for signs and symptoms of electrolyte imbalances   Administer electrolyte replacement as ordered   Monitor response to electrolyte replacements, including repeat lab results as appropriate   Fluid restriction as ordered   Instruct patient on fluid and nutrition restrictions as appropriate  10/28/2022 1302 by Jean Stoddard RN  Outcome: Progressing  Goal: Hemodynamic stability and optimal renal function maintained  10/28/2022 2139 by Silvio Rodriguez RN  Outcome: Progressing  Flowsheets (Taken 10/28/2022 2139)  Hemodynamic stability and optimal renal function maintained:   Monitor labs and assess for signs and symptoms of volume excess or deficit   Monitor intake, output and patient weight   Monitor urine specific gravity, serum osmolarity and serum sodium as indicated or ordered   Monitor response to interventions for patient's volume status, including labs, urine output, blood pressure (other measures as available)   Encourage oral intake as appropriate   Instruct patient on fluid and nutrition restrictions as appropriate  10/28/2022 1302 by Jean Stoddard RN  Outcome: Progressing  Goal: Glucose maintained within prescribed range  10/28/2022 2139 by Silvio Rodriguez RN  Outcome: Progressing  Flowsheets (Taken 10/28/2022 2139)  Glucose maintained within prescribed range:   Monitor blood glucose as ordered   Assess for signs and symptoms of hyperglycemia and hypoglycemia   Administer ordered medications to maintain glucose within target range   Assess barriers to adequate nutritional intake and initiate nutrition consult as needed   Instruct patient on self management of diabetes and initiate consult as needed  10/28/2022 1302 by Jean Stoddard RN  Outcome: Progressing     Problem: Hematologic - Adult  Goal: Maintains hematologic stability  10/28/2022 2139 by Silvio Rodriguez RN  Outcome: Progressing  10/28/2022 1302 by Jean Stoddard RN  Outcome: Progressing

## 2022-10-29 NOTE — PROGRESS NOTES
Patient alert and oriented, tolerated medications as ordered. Attempted to call report to [facility] and left a message for nurse Michelle to call back for report. Will call again to give report. Patient picked up by transport staff, report and patient's medications given to transport staff. Patient left the floor in [facility] via stretcher.

## 2022-10-29 NOTE — DISCHARGE SUMMARY
Discharge Summary    Patient ID: Nelida Proctor      Patient's PCP: FRANCISCO JAVIER Echeverria CNP    Admit Date: 10/25/2022     Discharge Date: 10/29/2022  10/29/22     Admitting Physician: No admitting provider for patient encounter. Discharge Physician: Doris Calderon MD     Discharge Diagnoses: Urinary retention resolved and leukocytosis secondary to cystitis resolved       Active Hospital Problems    Diagnosis     Acute cystitis with hematuria [N30.01]      Priority: Medium    Type 2 diabetes mellitus ([address] [E11.9]     Essential hypertension [I10]     Urinary retention [R33.9]     Lumbosacral spondylosis without myelopathy [M47.817]     Spinal stenosis [M48.00]        The patient was seen and examined on day of discharge and this discharge summary is in conjunction with any daily progress note from day of discharge. Hospital Course: 67 y.o. male, with past medical history of chronic back pain, hypertension, hyperlipidemia, Factor V Leiden (hx multiple thrombotic events) diabetes, obesity and bipolar, being worked up for Trinean who presented to [facility] with fatigue and urinary retention and recent fall. He has been falling more recently and he attributes this to possible Parkinson's and back pain, his legs give out. He has been having urinary incontinence over the last few weeks. No bowel incontinence. He denies hx of neuropathy and radiculopathy. A Stapleton catheter was placed in the emergency room and urine was sent to the lab. The patient's urine was positive for infection and the patient was started on ceftriaxone. CT of the C-spine and CT of the thoracic and lumbar spine were obtained that were all negative for any acute findings. Urine culture revealed enterococcus faecalis, patient transitioned to oral amoxacillin, total of 7 days of antibiotic treatment. Patient urinating without difficulty at discharge and no constitutional symptoms.        Physical Exam Performed:   BP (!) 146/82 Pulse 62   Temp 98 °F (36.7 °C) (Oral)   Resp 18   Ht 6' 1\" (1.854 m)   Wt 232 lb (105.2 kg)   SpO2 97%   BMI 30.61 kg/m²       General appearance:  No apparent distress, appears stated age and cooperative. HEENT:  Normal cephalic, atraumatic without obvious deformity. Pupils equal, round, and reactive to light. Extra ocular muscles intact. Conjunctivae/corneas clear. Neck: Supple, with full range of motion. No jugular venous distention. Trachea midline. Respiratory:  Normal respiratory effort. Clear to auscultation, bilaterally without Rales/Wheezes/Rhonchi. Cardiovascular:  Regular rate and rhythm with normal S1/S2 without murmurs, rubs or gallops. Abdomen: Soft, non-tender, non-distended with normal bowel sounds. Musculoskeletal:  No clubbing, cyanosis or edema bilaterally. Full range of motion without deformity. Skin: Skin color, texture, turgor normal.  No rashes or lesions. Neurologic:  Neurovascularly intact without any focal sensory/motor deficits. Cranial nerves: II-XII intact, grossly non-focal.  Psychiatric:  Alert and oriented, thought content appropriate, normal insight  Capillary Refill: Brisk,< 3 seconds   Peripheral Pulses: +2 palpable, equal bilaterally       Labs: For convenience and continuity at follow-up the following most recent labs are provided:      CBC:    Lab Results   Component Value Date/Time    WBC 7.2 10/29/2022 06:47 AM    HGB 14.4 10/29/2022 06:47 AM    HCT 43.3 10/29/2022 06:47 AM     10/29/2022 06:47 AM       Renal:    Lab Results   Component Value Date/Time     10/29/2022 06:47 AM    K 4.0 10/29/2022 06:47 AM     10/29/2022 06:47 AM    CO2 25 10/29/2022 06:47 AM    BUN 19 10/29/2022 06:47 AM    CREATININE 1.0 10/29/2022 06:47 AM    CALCIUM 9.5 10/29/2022 06:47 AM    PHOS 3.2 07/17/2020 04:50 AM     Significant Diagnostic Studies    Radiology:   CT Head W/O Contrast   Final Result   No acute intracranial abnormality.          CT CSpine W/O Contrast Final Result   No acute abnormality of the cervical spine. CT THORACIC SPINE WO CONTRAST   Final Result   No acute fracture within the thoracic or lumbar spine. Thoracic spinal stimulator device. Posterior spinous process fusion at L2-L3. CT LUMBAR SPINE WO CONTRAST   Final Result   No acute fracture within the thoracic or lumbar spine. Thoracic spinal stimulator device. Posterior spinous process fusion at L2-L3. XR CHEST PORTABLE   Final Result   No acute cardiopulmonary findings                Consults:   IP CONSULT TO NEUROSURGERY  IP CONSULT TO HOSPITALIST  IP CONSULT TO CASE MANAGEMENT    Disposition:  SNF ([facility])     Condition at Discharge: Stable    Discharge Instructions/Follow-up:  Follow up with PCP following SNF. Patient d/c on lovenox due to upcoming spinal epidural procedure.  Patient takes Warfarin due to Factor V Leiden and hx of multiple thrombi in the past. Life long anticoagulant therapy indicated by hematologist.     Code Status:  Prior     Activity: activity as tolerated    Diet: diabetic diet      Discharge Medications:     Discharge Medication List as of 10/29/2022 11:11 AM             Details   enoxaparin (LOVENOX) 100 MG/ML Inject 1 mL into the skin 2 times daily for 14 days, Disp-28 mL, R-1Normal      amoxicillin (AMOXIL) 500 MG capsule Take 1 capsule by mouth every 8 hours for 11 doses, Disp-11 capsule, R-0NO PRINT                Details   insulin glargine (BASAGLAR KWIKPEN) 100 UNIT/ML injection pen Inject 5 Units into the skin nightlyHistorical Med      sennosides-docusate sodium (SENOKOT-S) 8.6-50 MG tablet Take 2 tablets by mouth in the morning and at bedtime, Disp-60 tablet, R-1Print      !! lamoTRIgine (LAMICTAL) 25 MG tablet Take 25 mg by mouth daily Historical Med      !! lamoTRIgine (LAMICTAL) 25 MG tablet Take 50 mg by mouth at bedtime Historical Med      !! lithium 300 MG capsule Take 300 mg by mouth dailyHistorical Med      !! lithium 300 MG capsule Take 600 mg by mouth at bedtimeHistorical Med      metFORMIN (GLUCOPHAGE-XR) 500 mg extended release tablet Take 1,000 mg by mouth 2 times daily (with meals)Historical Med      traZODone (DESYREL) 100 MG tablet Take 200 mg by mouth nightly Historical Med      acetaminophen (TYLENOL) 500 MG tablet Take 1,000 mg by mouth nightly as needed for PainHistorical Med      gabapentin (NEURONTIN) 300 MG capsule Take 300 mg by mouth 3 times daily. Historical Med      Dulaglutide 3 MG/0.5ML SOPN Inject 3 mg into the skin once a week Takes on SundaysHistorical Med      glipiZIDE (GLUCOTROL) 5 mg tablet Take 5 mg by mouth dailyHistorical Med      rosuvastatin (CRESTOR) 20 MG tablet Take 20 mg by mouth at bedtime Historical Med      verapamil (CALAN SR) 120 MG extended release tablet Take 120 mg by mouth nightlyHistorical Med       !! - Potential duplicate medications found. Please discuss with provider. Time Spent on discharge is more than 30 minutes in the examination, evaluation, counseling and review of medications and discharge plan. Signed:    Jamil Esparza MD   10/30/2022      Thank you FRANCISCO JAVIER Velasquez CNP for the opportunity to be involved in this patient's care. If you have any questions or concerns please feel free to contact me at [phone]. Addendum to Resident DC summary note:  Pt seen,examined and evaluated with resident and discussed regarding POC . I have reviewed the current history, physical findings, labs and assessment and plan , edited the note where appropriate and agree with note as documented by resident DO ( Dr. Nowak)    patient seen and evaluated on the day of discharge. Patient informed about following up with appointments. Patient verbalized understanding for follow-up appointments. Patient   informed of the results of tests, a time was given to answer questions, a plan was proposed and he agreed with plan. Medical reconciliation performed.   Patient discharged stable condition.     Mik Ni MD  Hospitalist Physician

## 2022-10-29 NOTE — PROGRESS NOTES
Progress Note      PCP: FRANCISCO JAVIER Abdi CNP    Date of Admission: 10/25/2022    Chief Complaint: Back pain    Hospital Course:    67 y.o. male, with past medical history of chronic back pain, hypertension, hyperlipidemia, Factor V Leiden (hx multiple thrombotic events) diabetes, obesity and bipolar, being worked up for [employer] who presented to [facility] with fatigue and urinary retention and recent fall. He has been falling more recently and he attributes this to possible Parkinson's and back pain, his legs give out. He has been having urinary incontinence over the last few weeks. No bowel incontinence. He denies hx of neuropathy and radiculopathy. A Stapleton catheter was placed in the emergency room and urine was sent to the lab. The patient's urine was positive for infection and the patient was started on ceftriaxone. CT of the C-spine and CT of the thoracic and lumbar spine were obtained that were all negative for any acute findings. Urine culture revealed enterococcus faecalis, patient transitioned to oral amoxacillin, total of 7 days of antibiotic treatment. Patient has a pain management physician, neurologist, neurosurgeon, urologist and hematologist outpatient. Hematologist: James Schradre. Neurosurgeon: Dr. Asuncion Shen. Urologist: Dr. Lindy [facility]. Neurologist: Dr. Tena Virk. Subjective: Patient main concern this morning is low back pain. He localizes pain to low back and posterior belt line. . No fever, chills, nausea, vomiting, diarrhea. Urinating w/o difficulty.      Medications:  Reviewed    Infusion Medications    sodium chloride      dextrose       Scheduled Medications    enoxaparin  100 mg SubCUTAneous BID    insulin glargine  15 Units SubCUTAneous Nightly    amoxicillin  500 mg Oral 3 times per day    miconazole   Topical BID    menthol-zinc oxide   Topical BID    gabapentin  300 mg Oral TID    lamoTRIgine  25 mg Oral Daily    lamoTRIgine  50 mg Oral Nightly    lithium  300 mg Oral Daily    lithium  600 mg Oral Nightly    rosuvastatin  20 mg Oral Nightly    traZODone  200 mg Oral Nightly    verapamil  120 mg Oral Nightly    sodium chloride flush  5-40 mL IntraVENous 2 times per day    insulin lispro  0-16 Units SubCUTAneous TID     insulin lispro  0-4 Units SubCUTAneous Nightly     PRN Meds: polyethylene glycol, acetaminophen, sodium chloride flush, sodium chloride, ondansetron **OR** ondansetron, acetaminophen **OR** acetaminophen, glucose, dextrose bolus **OR** dextrose bolus, glucagon (rDNA), dextrose      Intake/Output Summary (Last 24 hours) at 10/29/2022 0848  Last data filed at 10/28/2022 1709  Gross per 24 hour   Intake 1030 ml   Output 1500 ml   Net -470 ml       Physical Exam Performed:  /76   Pulse 64   Temp 97.8 °F (36.6 °C) (Oral)   Resp 18   Ht 6' 1\" (1.854 m)   Wt 232 lb (105.2 kg)   SpO2 97%   BMI 30.61 kg/m²     General appearance: No apparent distress, appears stated age and cooperative. HEENT: Conjunctivae/corneas clear. Neck: Supple, with full range of motion. No jugular venous distention. Trachea midline. Respiratory:  Normal respiratory effort. Clear to auscultation, bilaterally without Rales/Wheezes/Rhonchi. Cardiovascular: Regular rate and rhythm with normal S1/S2 without murmurs, rubs or gallops. Abdomen: Soft,, non-distended with normal bowel sounds. Musculoskeletal: No clubbing, cyanosis or edema bilaterally. Reduced lower extremity strength. No radiculopathy with leg lift bilaterally. Skin: Skin color, texture, turgor normal.  No rashes or lesions. Neurologic:  Neurovascularly intact without any focal sensory/motor deficits.    Psychiatric: Alert and oriented x 3, At times forgetful ; thought content appropriate, normal insight  Capillary Refill: Brisk,3 seconds, normal   Peripheral Pulses: +2 palpable, equal bilaterally       Labs:   Recent Labs     10/27/22  0550 10/28/22  0704 10/29/22  0647   WBC 8.1 6.9 7.2   HGB 14.4 13.5 14.4   HCT 42.9 40.6 43.3    248 264     Recent Labs     10/27/22  0550 10/28/22  0704 10/29/22  0647    134* 135*   K 4.1 4.3 4.0    100 100   CO2 26 24 25   BUN 14 19 19   CREATININE 1.0 1.0 1.0   CALCIUM 9.2 9.1 9.5     Urinalysis:    Lab Results   Component Value Date/Time    NITRU Negative 10/25/2022 04:36 PM    WBCUA 21-50 10/25/2022 04:36 PM    BACTERIA 3+ 10/25/2022 04:36 PM    RBCUA 3-4 10/25/2022 04:36 PM    BLOODU TRACE-INTACT 10/25/2022 04:36 PM    SPECGRAV 1.010 10/25/2022 04:36 PM    GLUCOSEU 250 10/25/2022 04:36 PM    GLUCOSEU NEGATIVE 10/13/2010 12:10 PM       Radiology:  CT Head W/O Contrast   Final Result   No acute intracranial abnormality. CT CSpine W/O Contrast   Final Result   No acute abnormality of the cervical spine. CT THORACIC SPINE WO CONTRAST   Final Result   No acute fracture within the thoracic or lumbar spine. Thoracic spinal stimulator device. Posterior spinous process fusion at L2-L3. CT LUMBAR SPINE WO CONTRAST   Final Result   No acute fracture within the thoracic or lumbar spine. Thoracic spinal stimulator device. Posterior spinous process fusion at L2-L3.          XR CHEST PORTABLE   Final Result   No acute cardiopulmonary findings                 Assessment/Plan:    Active Hospital Problems    Diagnosis     Acute cystitis with hematuria [N30.01]      Priority: Medium    Type 2 diabetes mellitus ([facility] [address] [E11.9]     Essential hypertension [I10]     Urinary retention [R33.9]     Lumbosacral spondylosis without myelopathy [M47.817]     Spinal stenosis [M48.00]      Acute cystitis with hematuria  and urinary retention   -UA positive for infection  -Urine culture- enterococcus faecalis, sensitive to ampicillin  - Transition to Oral Ampicillin in preparation for d/c     Frequent falls in patient with lumbosacral spondylosis without myelopathy in patient and spinal stenosis  -CT head revealed no acute intracranial abnormality  -CT C-spine reviewed: No abnormality of cervical spine  -CT thoracic and CT lumbar spine revealed: No acute fracture within the thoracic or lumbar spine. Thoracic spinal stimulator device. Posterior spinous process fusion at 2-L3  -neuro surgery consulted in ED - appreciate recommendations in advance  -Pt/OT- recommend SNF  - Case management - [facility]     DM2, uncontrolled  -hemglobin a1c- 10.0%   -hold home metformin  -Basal insulin increased to 15U nightly  -hdssi  -poct ac/hs  -hypoglycemia protocol  -Carb control diet    Chronic back pain  -continue gabapentin     Obesity  With Body mass index is 30 kg/m². Complicating assessment and treatment. Placing patient at risk for multiple co-morbidities as well as early death and contributing to the patient's presentation. Counseled on weight loss     Essential HTN  -continue verapamil     HLD  -continue rosuvastatin     Bipolar I disorder  -mood appears stable at this time  -continue home medications     Leukocytosis  -likely 2/2 UTI  -abx as indicated above     Factor V Leiden  - Lifelong anticoagulation recommended by hematologist due to multiple thrombus    Intertriginous dermatitis (Intertrigo) mid gluteral skin fold. Brenda wound with macular papular rash with satellite lesions / fungal rash. No pressure injury noted on left buttocks. - Wound care following    DVT Prophylaxis: lovenox  Diet: ADULT DIET; Regular; 3 carb choices (45 gm/meal)  Code Status: Full Code    PT/OT Eval Status: ordered    Dispo - pending pre-cert. Expect pre-cert later today or tomorrow morning. Jennifer Nowak DO     Addendum to Resident  Progress note:  Pt seen,examined and evaluated with resident and discussed regarding POC .  I have reviewed the current history, physical findings, labs and assessment and plan , edited the note where appropriate and agree with note as documented by resident DO ( Dr. Nowak)    Patient seen and examined this morning. Evaluated by PT OT with recommendations for SNF. Medically stable for discharge currently awaiting pre-CERT for SNF.     Severo Shackleton, MD  Hospitalist Physician

## 2022-11-18 ENCOUNTER — APPOINTMENT (OUTPATIENT)
Dept: CT IMAGING | Age: 72
DRG: 092 | End: 2022-11-18
Payer: MEDICARE

## 2022-11-18 ENCOUNTER — HOSPITAL ENCOUNTER (INPATIENT)
Age: 72
LOS: 2 days | Discharge: NURSING, REHAB OR LONG-TERM CARE | DRG: 092 | End: 2022-11-23
Attending: EMERGENCY MEDICINE | Admitting: INTERNAL MEDICINE
Payer: MEDICARE

## 2022-11-18 DIAGNOSIS — R29.6 FREQUENT FALLS: Primary | ICD-10-CM

## 2022-11-18 DIAGNOSIS — G89.29 CHRONIC BILATERAL LOW BACK PAIN, UNSPECIFIED WHETHER SCIATICA PRESENT: ICD-10-CM

## 2022-11-18 DIAGNOSIS — M54.50 ACUTE LOW BACK PAIN WITHOUT SCIATICA, UNSPECIFIED BACK PAIN LATERALITY: ICD-10-CM

## 2022-11-18 DIAGNOSIS — S09.90XA MINOR CLOSED HEAD INJURY: ICD-10-CM

## 2022-11-18 DIAGNOSIS — R29.898 BILATERAL LEG WEAKNESS: ICD-10-CM

## 2022-11-18 DIAGNOSIS — E11.42 DIABETIC PERIPHERAL NEUROPATHY (HCC): ICD-10-CM

## 2022-11-18 DIAGNOSIS — M54.50 CHRONIC BILATERAL LOW BACK PAIN, UNSPECIFIED WHETHER SCIATICA PRESENT: ICD-10-CM

## 2022-11-18 DIAGNOSIS — Z79.01 ANTICOAGULATED ON COUMADIN: ICD-10-CM

## 2022-11-18 DIAGNOSIS — R79.1 SUBTHERAPEUTIC INTERNATIONAL NORMALIZED RATIO (INR): ICD-10-CM

## 2022-11-18 DIAGNOSIS — S20.211A RIB CONTUSION, RIGHT, INITIAL ENCOUNTER: ICD-10-CM

## 2022-11-18 PROBLEM — E66.9 OBESITY: Status: ACTIVE | Noted: 2022-11-18

## 2022-11-18 PROBLEM — R53.1 GENERALIZED WEAKNESS: Status: ACTIVE | Noted: 2022-11-18

## 2022-11-18 LAB
ANION GAP SERPL CALCULATED.3IONS-SCNC: 9 MMOL/L (ref 3–16)
BASOPHILS ABSOLUTE: 0 K/UL (ref 0–0.2)
BASOPHILS RELATIVE PERCENT: 0.3 %
BUN BLDV-MCNC: 15 MG/DL (ref 7–20)
CALCIUM SERPL-MCNC: 10.6 MG/DL (ref 8.3–10.6)
CHLORIDE BLD-SCNC: 99 MMOL/L (ref 99–110)
CO2: 29 MMOL/L (ref 21–32)
CREAT SERPL-MCNC: 1.1 MG/DL (ref 0.8–1.3)
EOSINOPHILS ABSOLUTE: 0.1 K/UL (ref 0–0.6)
EOSINOPHILS RELATIVE PERCENT: 0.8 %
GFR SERPL CREATININE-BSD FRML MDRD: >60 ML/MIN/{1.73_M2}
GLUCOSE BLD-MCNC: 219 MG/DL (ref 70–99)
GLUCOSE BLD-MCNC: 334 MG/DL (ref 70–99)
HCT VFR BLD CALC: 42.7 % (ref 40.5–52.5)
HEMOGLOBIN: 14 G/DL (ref 13.5–17.5)
INR BLD: 1.31 (ref 0.87–1.14)
LITHIUM DOSE AMOUNT: ABNORMAL
LITHIUM LEVEL: 2.2 MMOL/L (ref 0.6–1.2)
LYMPHOCYTES ABSOLUTE: 1.2 K/UL (ref 1–5.1)
LYMPHOCYTES RELATIVE PERCENT: 12 %
MCH RBC QN AUTO: 31.1 PG (ref 26–34)
MCHC RBC AUTO-ENTMCNC: 32.7 G/DL (ref 31–36)
MCV RBC AUTO: 95.1 FL (ref 80–100)
MONOCYTES ABSOLUTE: 0.8 K/UL (ref 0–1.3)
MONOCYTES RELATIVE PERCENT: 8.3 %
NEUTROPHILS ABSOLUTE: 7.9 K/UL (ref 1.7–7.7)
NEUTROPHILS RELATIVE PERCENT: 78.6 %
PDW BLD-RTO: 12.9 % (ref 12.4–15.4)
PERFORMED ON: ABNORMAL
PLATELET # BLD: 321 K/UL (ref 135–450)
PMV BLD AUTO: 8.2 FL (ref 5–10.5)
POTASSIUM REFLEX MAGNESIUM: 5.1 MMOL/L (ref 3.5–5.1)
PROTHROMBIN TIME: 16.2 SEC (ref 11.7–14.5)
RBC # BLD: 4.49 M/UL (ref 4.2–5.9)
SODIUM BLD-SCNC: 137 MMOL/L (ref 136–145)
TROPONIN: <0.01 NG/ML
WBC # BLD: 10.1 K/UL (ref 4–11)

## 2022-11-18 PROCEDURE — G0378 HOSPITAL OBSERVATION PER HR: HCPCS

## 2022-11-18 PROCEDURE — 80048 BASIC METABOLIC PNL TOTAL CA: CPT

## 2022-11-18 PROCEDURE — 83036 HEMOGLOBIN GLYCOSYLATED A1C: CPT

## 2022-11-18 PROCEDURE — 99285 EMERGENCY DEPT VISIT HI MDM: CPT

## 2022-11-18 PROCEDURE — 84443 ASSAY THYROID STIM HORMONE: CPT

## 2022-11-18 PROCEDURE — 80178 ASSAY OF LITHIUM: CPT

## 2022-11-18 PROCEDURE — 71250 CT THORAX DX C-: CPT

## 2022-11-18 PROCEDURE — 6370000000 HC RX 637 (ALT 250 FOR IP): Performed by: PHYSICIAN ASSISTANT

## 2022-11-18 PROCEDURE — 2580000003 HC RX 258: Performed by: NURSE PRACTITIONER

## 2022-11-18 PROCEDURE — 70450 CT HEAD/BRAIN W/O DYE: CPT

## 2022-11-18 PROCEDURE — 72125 CT NECK SPINE W/O DYE: CPT

## 2022-11-18 PROCEDURE — 96361 HYDRATE IV INFUSION ADD-ON: CPT

## 2022-11-18 PROCEDURE — 85610 PROTHROMBIN TIME: CPT

## 2022-11-18 PROCEDURE — 96360 HYDRATION IV INFUSION INIT: CPT

## 2022-11-18 PROCEDURE — 85025 COMPLETE CBC W/AUTO DIFF WBC: CPT

## 2022-11-18 PROCEDURE — 6370000000 HC RX 637 (ALT 250 FOR IP): Performed by: NURSE PRACTITIONER

## 2022-11-18 PROCEDURE — 84484 ASSAY OF TROPONIN QUANT: CPT

## 2022-11-18 PROCEDURE — 93005 ELECTROCARDIOGRAM TRACING: CPT | Performed by: EMERGENCY MEDICINE

## 2022-11-18 RX ORDER — LITHIUM CARBONATE 150 MG/1
300 CAPSULE ORAL DAILY
Status: DISCONTINUED | OUTPATIENT
Start: 2022-11-19 | End: 2022-11-18

## 2022-11-18 RX ORDER — TRAZODONE HYDROCHLORIDE 50 MG/1
200 TABLET ORAL NIGHTLY
Status: DISCONTINUED | OUTPATIENT
Start: 2022-11-18 | End: 2022-11-23 | Stop reason: HOSPADM

## 2022-11-18 RX ORDER — INSULIN LISPRO 100 [IU]/ML
0-4 INJECTION, SOLUTION INTRAVENOUS; SUBCUTANEOUS NIGHTLY
Status: DISCONTINUED | OUTPATIENT
Start: 2022-11-18 | End: 2022-11-21

## 2022-11-18 RX ORDER — GABAPENTIN 300 MG/1
300 CAPSULE ORAL 3 TIMES DAILY
Status: DISCONTINUED | OUTPATIENT
Start: 2022-11-18 | End: 2022-11-23 | Stop reason: HOSPADM

## 2022-11-18 RX ORDER — INSULIN LISPRO 100 [IU]/ML
0-8 INJECTION, SOLUTION INTRAVENOUS; SUBCUTANEOUS
Status: DISCONTINUED | OUTPATIENT
Start: 2022-11-19 | End: 2022-11-21

## 2022-11-18 RX ORDER — ENOXAPARIN SODIUM 100 MG/ML
40 INJECTION SUBCUTANEOUS DAILY
Status: DISCONTINUED | OUTPATIENT
Start: 2022-11-19 | End: 2022-11-20

## 2022-11-18 RX ORDER — LITHIUM CARBONATE 150 MG/1
600 CAPSULE ORAL NIGHTLY
Status: DISCONTINUED | OUTPATIENT
Start: 2022-11-18 | End: 2022-11-18

## 2022-11-18 RX ORDER — SODIUM CHLORIDE 9 MG/ML
INJECTION, SOLUTION INTRAVENOUS PRN
Status: DISCONTINUED | OUTPATIENT
Start: 2022-11-18 | End: 2022-11-23 | Stop reason: HOSPADM

## 2022-11-18 RX ORDER — SODIUM CHLORIDE 0.9 % (FLUSH) 0.9 %
5-40 SYRINGE (ML) INJECTION EVERY 12 HOURS SCHEDULED
Status: DISCONTINUED | OUTPATIENT
Start: 2022-11-18 | End: 2022-11-23 | Stop reason: HOSPADM

## 2022-11-18 RX ORDER — ACETAMINOPHEN 500 MG
1000 TABLET ORAL NIGHTLY PRN
Status: DISCONTINUED | OUTPATIENT
Start: 2022-11-18 | End: 2022-11-23 | Stop reason: HOSPADM

## 2022-11-18 RX ORDER — ONDANSETRON 2 MG/ML
4 INJECTION INTRAMUSCULAR; INTRAVENOUS EVERY 6 HOURS PRN
Status: DISCONTINUED | OUTPATIENT
Start: 2022-11-18 | End: 2022-11-23 | Stop reason: HOSPADM

## 2022-11-18 RX ORDER — SODIUM CHLORIDE 0.9 % (FLUSH) 0.9 %
5-40 SYRINGE (ML) INJECTION PRN
Status: DISCONTINUED | OUTPATIENT
Start: 2022-11-18 | End: 2022-11-23 | Stop reason: HOSPADM

## 2022-11-18 RX ORDER — INSULIN GLARGINE 100 [IU]/ML
5 INJECTION, SOLUTION SUBCUTANEOUS NIGHTLY
Status: DISCONTINUED | OUTPATIENT
Start: 2022-11-18 | End: 2022-11-19

## 2022-11-18 RX ORDER — ACETAMINOPHEN 500 MG
1000 TABLET ORAL ONCE
Status: COMPLETED | OUTPATIENT
Start: 2022-11-18 | End: 2022-11-18

## 2022-11-18 RX ORDER — ROSUVASTATIN CALCIUM 10 MG/1
20 TABLET, COATED ORAL NIGHTLY
Status: DISCONTINUED | OUTPATIENT
Start: 2022-11-18 | End: 2022-11-23 | Stop reason: HOSPADM

## 2022-11-18 RX ORDER — LAMOTRIGINE 25 MG/1
25 TABLET ORAL DAILY
Status: DISCONTINUED | OUTPATIENT
Start: 2022-11-19 | End: 2022-11-19

## 2022-11-18 RX ORDER — ACETAMINOPHEN 325 MG/1
650 TABLET ORAL EVERY 6 HOURS PRN
Status: DISCONTINUED | OUTPATIENT
Start: 2022-11-18 | End: 2022-11-23 | Stop reason: HOSPADM

## 2022-11-18 RX ORDER — POLYETHYLENE GLYCOL 3350 17 G/17G
17 POWDER, FOR SOLUTION ORAL DAILY PRN
Status: DISCONTINUED | OUTPATIENT
Start: 2022-11-18 | End: 2022-11-23 | Stop reason: HOSPADM

## 2022-11-18 RX ORDER — ACETAMINOPHEN 650 MG/1
650 SUPPOSITORY RECTAL EVERY 6 HOURS PRN
Status: DISCONTINUED | OUTPATIENT
Start: 2022-11-18 | End: 2022-11-23 | Stop reason: HOSPADM

## 2022-11-18 RX ORDER — ONDANSETRON 4 MG/1
4 TABLET, ORALLY DISINTEGRATING ORAL EVERY 8 HOURS PRN
Status: DISCONTINUED | OUTPATIENT
Start: 2022-11-18 | End: 2022-11-23 | Stop reason: HOSPADM

## 2022-11-18 RX ORDER — DEXTROSE MONOHYDRATE 100 MG/ML
INJECTION, SOLUTION INTRAVENOUS CONTINUOUS PRN
Status: DISCONTINUED | OUTPATIENT
Start: 2022-11-18 | End: 2022-11-23 | Stop reason: HOSPADM

## 2022-11-18 RX ORDER — SODIUM CHLORIDE 9 MG/ML
INJECTION, SOLUTION INTRAVENOUS CONTINUOUS
Status: DISCONTINUED | OUTPATIENT
Start: 2022-11-18 | End: 2022-11-20

## 2022-11-18 RX ADMIN — VERAPAMIL HYDROCHLORIDE 120 MG: 120 TABLET, FILM COATED, EXTENDED RELEASE ORAL at 22:11

## 2022-11-18 RX ADMIN — SODIUM CHLORIDE: 9 INJECTION, SOLUTION INTRAVENOUS at 22:11

## 2022-11-18 RX ADMIN — ACETAMINOPHEN 1000 MG: 500 TABLET ORAL at 17:30

## 2022-11-18 RX ADMIN — GABAPENTIN 300 MG: 300 CAPSULE ORAL at 22:11

## 2022-11-18 RX ADMIN — TRAZODONE HYDROCHLORIDE 200 MG: 50 TABLET ORAL at 22:11

## 2022-11-18 RX ADMIN — ROSUVASTATIN CALCIUM 20 MG: 10 TABLET, FILM COATED ORAL at 22:15

## 2022-11-18 RX ADMIN — INSULIN GLARGINE 5 UNITS: 100 INJECTION, SOLUTION SUBCUTANEOUS at 22:19

## 2022-11-18 RX ADMIN — INSULIN LISPRO 4 UNITS: 100 INJECTION, SOLUTION INTRAVENOUS; SUBCUTANEOUS at 22:19

## 2022-11-18 ASSESSMENT — PAIN - FUNCTIONAL ASSESSMENT: PAIN_FUNCTIONAL_ASSESSMENT: 0-10

## 2022-11-18 ASSESSMENT — PAIN DESCRIPTION - LOCATION: LOCATION: BACK

## 2022-11-18 ASSESSMENT — PAIN DESCRIPTION - DESCRIPTORS: DESCRIPTORS: ACHING

## 2022-11-18 ASSESSMENT — PAIN SCALES - GENERAL
PAINLEVEL_OUTOF10: 7
PAINLEVEL_OUTOF10: 10

## 2022-11-18 ASSESSMENT — PAIN DESCRIPTION - ORIENTATION: ORIENTATION: MID

## 2022-11-18 NOTE — ED PROVIDER NOTES
[facility]  ED  EMERGENCY DEPARTMENT ENCOUNTER        Pt Name: Marvin Rajan  MRN: [id number]  [place] 1950  Date of evaluation: 11/18/2022  Provider: Alexandra Poole PA-C  PCP: FRANCISCO JAVIER Mcgovern CNP  Note Started: 4:29 PM EST 11/18/2022        LUMA. I have evaluated this patient. My supervising physician was available for consultation. This case has been discussed with attending physician who was evaluated the patient. CHIEF COMPLAINT       Chief Complaint   Patient presents with    Back Pain     Pt has chronic back pain, had epidural done two days ago. Pt had fall yesterday after legs gave out. Pt does report hitting head on the carpet, denies LOC. Pt is on coumadin. Pt is A&Ox4        HISTORY OF PRESENT ILLNESS   (Location, Timing/Onset, Context/Setting, Quality, Duration, Modifying Factors, Severity, Associated Signs and Symptoms)  Note limiting factors. Chief Complaint: Fall with head injury and right lateral posterior chest wall pain    Marvin Rajan is a 67 y.o. male who presents to the emergency department with his wife. The patient has balance issue, gait issue and diabetic peripheral neuropathy. The patient yesterday approximately 2 PM in the afternoon sustained posterior head injury, mid back pain more to the right. He does report falling back like a tree. He fell onto the carpet. Did not land on buttock with straight back he tells me. He is on Coumadin. However he had been off Coumadin 10 days and received an epidural steroid injection this past Tuesday or 3 days ago. Yesterday he started back on his Coumadin. He is on Coumadin for previous DVT right leg. He reports slight headache. He did take Tylenol 1000 mg p.o. at 8 AM this morning. He does report his legs gave out. They do get out periodically. It is related to his chronic back pain. Wife also indicating that he has bladder outlet issues and currently under evaluation by urology group.   This is well could be related to his back. He reports no fevers or chills. No urinary complaints. No diarrhea constipation no chest pain or shortness of breath. Patient is on Coumadin for factor V Leiden mutation and previous DVT right leg. Nursing Notes were all reviewed and agreed with or any disagreements were addressed in the HPI. REVIEW OF SYSTEMS    (2-9 systems for level 4, 10 or more for level 5)     Review of Systems    Positives and Pertinent negatives as per HPI. Except as noted above in the ROS, all other systems were reviewed and negative. PAST MEDICAL HISTORY     Past Medical History:   Diagnosis Date    Anxiety     Arthritis     Asthma     Bipolar 1 disorder ([facility] [address]     Colitis     Depression     Diabetes [facility]     Diabetes mellitus ([facility]     Encounter for imaging to screen for metal prior to MRI 06/08/2022    NOT MRI conditional [employer] model#SC-1160, Lead: [id number] implanted 9/9/19 by Soco Murdock at [facility]. Lead is unsafe for MRI. Patient unable to have any MRI ever.     Factor V Leiden mutation ([facility]     Glaucoma     H/O bladder problems     High blood pressure     History of kidney problems     IBS (irritable bowel syndrome)     Liver disease     hx of elevated LFT's    Obesity     Psychiatric problem          SURGICAL HISTORY     Past Surgical History:   Procedure Laterality Date    APPENDECTOMY      BACK SURGERY      CERVICAL FUSION      COLONOSCOPY      EYE SURGERY      HIP SURGERY      JOINT REPLACEMENT Right     THR    OTHER SURGICAL HISTORY  06/06/2018     right L4-5 hemilaminotomy, partial facetectomy, foraminotomy, reexploration and excision of synovial csyt,  Bilateral L2-3 and L3-4 hemilaminotomy, partial facetectomy, foraminotomy    DC NERVOUS SYSTEM SURGERY UNLISTED Bilateral 11/12/2018    BILATERAL LUMBAR THREE, LUMBAR FOUR, LUMBAR FIVE RADIOFREQUENCY ABLATION SITE CONFIRMED BY FLUOROSCOPY performed by Trever Timmons MD at [place] [address] [place] NJX DX/THER SBST INTRLMNR CRV/THRC W/IMG GDN Bilateral 9/25/2018    BILATERAL LUMBAR THREE, LUMBAR FOUR, LUMBAR FIVE MEDIAL BRANCH BLOCK SITE CONFIRMED BY FLUOROSCOPY performed by Desirae Nicolas MD [address]  DX/THER SBST INTRLMNR CRV/THRC W/IMG GDN Bilateral 10/16/2018    BILATERAL LUMBAR THREE, FOUR, FIVE MEDIAL BRANCH BLOCK SITE CONFIRMED BY FLUOROSCOPY performed by Desirae Nicolas MD at [address]    TURP N/A 6/9/2020    CYSTOSCOPY, TRANSURETHRAL RESECTION OF PROSTATE performed by Linh Montaño MD at [address] [facility]       Previous Medications    ACETAMINOPHEN (TYLENOL) 500 MG TABLET    Take 1,000 mg by mouth nightly as needed for Pain    DULAGLUTIDE 3 MG/0.5ML SOPN    Inject 3 mg into the skin once a week Takes on Sundays    GABAPENTIN (NEURONTIN) 300 MG CAPSULE    Take 300 mg by mouth 3 times daily.     GLIPIZIDE (GLUCOTROL) 5 MG TABLET    Take 5 mg by mouth daily    INSULIN GLARGINE (BASAGLAR KWIKPEN) 100 UNIT/ML INJECTION PEN    Inject 5 Units into the skin nightly    LAMOTRIGINE (LAMICTAL) 25 MG TABLET    Take 25 mg by mouth daily     LAMOTRIGINE (LAMICTAL) 25 MG TABLET    Take 50 mg by mouth at bedtime     LITHIUM 300 MG CAPSULE    Take 300 mg by mouth daily    LITHIUM 300 MG CAPSULE    Take 600 mg by mouth at bedtime    METFORMIN (GLUCOPHAGE-XR) 500 MG EXTENDED RELEASE TABLET    Take 1,000 mg by mouth 2 times daily (with meals)    ROSUVASTATIN (CRESTOR) 20 MG TABLET    Take 20 mg by mouth at bedtime     SENNOSIDES-DOCUSATE SODIUM (SENOKOT-S) 8.6-50 MG TABLET    Take 2 tablets by mouth in the morning and at bedtime    TRAZODONE (DESYREL) 100 MG TABLET    Take 200 mg by mouth nightly     VERAPAMIL (CALAN SR) 120 MG EXTENDED RELEASE TABLET    Take 120 mg by mouth nightly         ALLERGIES     No known allergies    FAMILYHISTORY       Family History   Problem Relation Age of Onset    Alcohol Abuse Sister     Coronary Art Dis Mother     Obesity Mother Osteoarthritis Mother     Parkinsonism Mother     Coronary Art Dis Father     Obesity Father           SOCIAL HISTORY       Social History     Tobacco Use    Smoking status: Never    Smokeless tobacco: Never   Vaping Use    Vaping Use: Never used   Substance Use Topics    Alcohol use: No    Drug use: No       SCREENINGS             PHYSICAL EXAM    (up to 7 for level 4, 8 or more for level 5)     ED Triage Vitals [11/18/22 1501]   BP Temp Temp Source Heart Rate Resp SpO2 Height Weight   139/74 98.4 °F (36.9 °C) Oral 69 16 97 % -- --       Physical Exam  Vitals and nursing note reviewed. Constitutional:       Appearance: Normal appearance. He is well-developed and normal weight. HENT:      Head: Normocephalic. Comments: A small posterior scalp hematoma noted. Right Ear: External ear normal.      Left Ear: External ear normal.   Eyes:      General: No scleral icterus. Right eye: No discharge. Left eye: No discharge. Conjunctiva/sclera: Conjunctivae normal.   Cardiovascular:      Rate and Rhythm: Normal rate and regular rhythm. Heart sounds: Normal heart sounds. Pulmonary:      Effort: Pulmonary effort is normal.      Breath sounds: Normal breath sounds. Comments: Patient noted on palpation to have pain right posterior lateral ribs. No crepitation. Chest:      Chest wall: Tenderness present. Abdominal:      General: Abdomen is flat. Bowel sounds are normal.      Palpations: Abdomen is soft. Tenderness: There is no abdominal tenderness. Musculoskeletal:         General: Tenderness present. Normal range of motion. Cervical back: Normal range of motion and neck supple. Right lower leg: No edema. Left lower leg: No edema. Comments: The patient exhibits mild T-spine tenderness in the vicinity of T7-T9. Patient does have sensation to the toes. Skin:     General: Skin is warm and dry. Neurological:      General: No focal deficit present. Mental Status: He is alert and oriented to person, place, and time. Mental status is at baseline. Psychiatric:         Mood and Affect: Mood normal.         Behavior: Behavior normal.         Thought Content: Thought content normal.         Judgment: Judgment normal.       DIAGNOSTIC RESULTS   LABS:    Labs Reviewed   CBC WITH AUTO DIFFERENTIAL - Abnormal; Notable for the following components:       Result Value    Neutrophils Absolute 7.9 (*)     All other components within normal limits   BASIC METABOLIC PANEL W/ REFLEX TO MG FOR LOW K - Abnormal; Notable for the following components:    Glucose 219 (*)     All other components within normal limits   PROTIME-INR - Abnormal; Notable for the following components:    Protime 16.2 (*)     INR 1.31 (*)     All other components within normal limits   LITHIUM LEVEL   TROPONIN   TSH   HEMOGLOBIN A1C       When ordered only abnormal lab results are displayed. All other labs were within normal range or not returned as of this dictation. EKG: When ordered, EKG's are interpreted by the Emergency Department Physician in the absence of a cardiologist.  Please see their note for interpretation of EKG. RADIOLOGY:   Non-plain film images such as CT, Ultrasound and MRI are read by the radiologist. Plain radiographic images are visualized and preliminarily interpreted by the ED Provider with the below findings:        Interpretation per the Radiologist below, if available at the time of this note:    CT CHEST WO CONTRAST   Final Result   No CT evidence of acute injury in the chest.         CT Head W/O Contrast   Final Result   No acute intracranial findings. Mild mucosal thickening in the left maxillary sinus. CT CSpine W/O Contrast   Final Result   No acute fracture or traumatic malalignment. No results found.         PROCEDURES   Unless otherwise noted below, none     Procedures    CRITICAL CARE TIME       CONSULTS:  IP CONSULT TO HOSPITALIST      EMERGENCY DEPARTMENT COURSE and DIFFERENTIAL DIAGNOSIS/MDM:   Vitals:    Vitals:    11/18/22 1501 11/18/22 1737   BP: 139/74 131/73   Pulse: 69 61   Resp: 16 14   Temp: 98.4 °F (36.9 °C)    TempSrc: Oral    SpO2: 97% 97%       Patient was given the following medications:  Medications   acetaminophen (TYLENOL) tablet 1,000 mg (1,000 mg Oral Given 11/18/22 1730)         Is this patient to be included in the SEP-1 Core Measure due to severe sepsis or septic shock? No   Exclusion criteria - the patient is NOT to be included for SEP-1 Core Measure due to: Infection is not suspected    This patient presenting with history of fall occurring yesterday 2 PM.  Murl Rung straight back like a tree and struck back of his head. Complaining of right posterior lateral chest wall pain, mid T-spine pain and striking the occiput. CT head and neck are negative. CT chest negative. The patient states his legs simply gave out. He does have history of leg weakness. Chronic back pain. Lumbar epidural steroid injection occurring this Tuesday. He been off Coumadin x10 days preceding. He has no low back pain related to the lumbar epidural steroid injection. Not concern regarding cauda equina or epidural spinal abscess. The wife very concerned as she works and patient home alone. Patient has had increasing falls. Patient approximately 1 week ago was discharged from [facility] [place] without adequate physical improvement. It is presented to me that they would like admission for frequent falls and rehab placement. The patient wife is expressing continued concern as he has had increasing falls most recent yesterday striking back of head and on Coumadin. The patient agreeable to admit for rehab placement. At 7:36 PM I did send [employer] note to hospitalist.    FINAL IMPRESSION      1. Frequent falls    2. Diabetic peripheral neuropathy ([facility] [address]    3.  Chronic bilateral low back pain, unspecified whether sciatica present    4. Minor closed head injury    5. Rib contusion, right, initial encounter    6. Bilateral leg weakness    7. Anticoagulated on Coumadin    8. Subtherapeutic international normalized ratio (INR)          DISPOSITION/PLAN   DISPOSITION Decision To Admit 11/18/2022 06:36:07 PM      PATIENT REFERRED TO:  No follow-up provider specified. DISCHARGE MEDICATIONS:  New Prescriptions    No medications on file       DISCONTINUED MEDICATIONS:  Discontinued Medications    No medications on file              (Please note that portions of this note were completed with a voice recognition program.  Efforts were made to edit the dictations but occasionally words are mis-transcribed. )    Loco Thomas PA-C (electronically signed)            Loco Thomas PA-C  11/19/22 [id number]

## 2022-11-19 LAB
ANION GAP SERPL CALCULATED.3IONS-SCNC: 13 MMOL/L (ref 3–16)
BASOPHILS ABSOLUTE: 0 K/UL (ref 0–0.2)
BASOPHILS RELATIVE PERCENT: 0.3 %
BUN BLDV-MCNC: 17 MG/DL (ref 7–20)
CALCIUM SERPL-MCNC: 10.2 MG/DL (ref 8.3–10.6)
CHLORIDE BLD-SCNC: 103 MMOL/L (ref 99–110)
CO2: 21 MMOL/L (ref 21–32)
CREAT SERPL-MCNC: 0.9 MG/DL (ref 0.8–1.3)
EKG ATRIAL RATE: 60 BPM
EKG DIAGNOSIS: NORMAL
EKG P AXIS: 35 DEGREES
EKG P-R INTERVAL: 202 MS
EKG Q-T INTERVAL: 428 MS
EKG QRS DURATION: 100 MS
EKG QTC CALCULATION (BAZETT): 428 MS
EKG R AXIS: -24 DEGREES
EKG T AXIS: 62 DEGREES
EKG VENTRICULAR RATE: 60 BPM
EOSINOPHILS ABSOLUTE: 0.2 K/UL (ref 0–0.6)
EOSINOPHILS RELATIVE PERCENT: 1.7 %
ESTIMATED AVERAGE GLUCOSE: 226 MG/DL
GFR SERPL CREATININE-BSD FRML MDRD: >60 ML/MIN/{1.73_M2}
GLUCOSE BLD-MCNC: 278 MG/DL (ref 70–99)
GLUCOSE BLD-MCNC: 294 MG/DL (ref 70–99)
GLUCOSE BLD-MCNC: 348 MG/DL (ref 70–99)
GLUCOSE BLD-MCNC: 354 MG/DL (ref 70–99)
GLUCOSE BLD-MCNC: 409 MG/DL (ref 70–99)
HBA1C MFR BLD: 9.5 %
HCT VFR BLD CALC: 44.3 % (ref 40.5–52.5)
HEMOGLOBIN: 14.4 G/DL (ref 13.5–17.5)
LITHIUM DOSE AMOUNT: NORMAL
LITHIUM LEVEL: 0.8 MMOL/L (ref 0.6–1.2)
LYMPHOCYTES ABSOLUTE: 1.4 K/UL (ref 1–5.1)
LYMPHOCYTES RELATIVE PERCENT: 14.7 %
MCH RBC QN AUTO: 32.1 PG (ref 26–34)
MCHC RBC AUTO-ENTMCNC: 32.6 G/DL (ref 31–36)
MCV RBC AUTO: 98.4 FL (ref 80–100)
MONOCYTES ABSOLUTE: 0.9 K/UL (ref 0–1.3)
MONOCYTES RELATIVE PERCENT: 9.3 %
NEUTROPHILS ABSOLUTE: 7.1 K/UL (ref 1.7–7.7)
NEUTROPHILS RELATIVE PERCENT: 74 %
PDW BLD-RTO: 13 % (ref 12.4–15.4)
PERFORMED ON: ABNORMAL
PLATELET # BLD: 283 K/UL (ref 135–450)
PMV BLD AUTO: 8.1 FL (ref 5–10.5)
POTASSIUM REFLEX MAGNESIUM: 4.6 MMOL/L (ref 3.5–5.1)
RBC # BLD: 4.5 M/UL (ref 4.2–5.9)
SODIUM BLD-SCNC: 137 MMOL/L (ref 136–145)
TSH SERPL DL<=0.05 MIU/L-ACNC: 0.59 UIU/ML (ref 0.27–4.2)
WBC # BLD: 9.6 K/UL (ref 4–11)

## 2022-11-19 PROCEDURE — 84443 ASSAY THYROID STIM HORMONE: CPT

## 2022-11-19 PROCEDURE — 80048 BASIC METABOLIC PNL TOTAL CA: CPT

## 2022-11-19 PROCEDURE — G0378 HOSPITAL OBSERVATION PER HR: HCPCS

## 2022-11-19 PROCEDURE — 97530 THERAPEUTIC ACTIVITIES: CPT

## 2022-11-19 PROCEDURE — 6370000000 HC RX 637 (ALT 250 FOR IP): Performed by: HOSPITALIST

## 2022-11-19 PROCEDURE — 80178 ASSAY OF LITHIUM: CPT

## 2022-11-19 PROCEDURE — 6370000000 HC RX 637 (ALT 250 FOR IP): Performed by: INTERNAL MEDICINE

## 2022-11-19 PROCEDURE — 97166 OT EVAL MOD COMPLEX 45 MIN: CPT

## 2022-11-19 PROCEDURE — 2580000003 HC RX 258: Performed by: NURSE PRACTITIONER

## 2022-11-19 PROCEDURE — 6360000002 HC RX W HCPCS: Performed by: NURSE PRACTITIONER

## 2022-11-19 PROCEDURE — 93010 ELECTROCARDIOGRAM REPORT: CPT | Performed by: INTERNAL MEDICINE

## 2022-11-19 PROCEDURE — 97162 PT EVAL MOD COMPLEX 30 MIN: CPT

## 2022-11-19 PROCEDURE — 96361 HYDRATE IV INFUSION ADD-ON: CPT

## 2022-11-19 PROCEDURE — 96372 THER/PROPH/DIAG INJ SC/IM: CPT

## 2022-11-19 PROCEDURE — 6370000000 HC RX 637 (ALT 250 FOR IP): Performed by: NURSE PRACTITIONER

## 2022-11-19 PROCEDURE — 85025 COMPLETE CBC W/AUTO DIFF WBC: CPT

## 2022-11-19 PROCEDURE — 36415 COLL VENOUS BLD VENIPUNCTURE: CPT

## 2022-11-19 RX ORDER — LAMOTRIGINE 25 MG/1
25 TABLET ORAL NIGHTLY
Status: DISCONTINUED | OUTPATIENT
Start: 2022-11-19 | End: 2022-11-23 | Stop reason: HOSPADM

## 2022-11-19 RX ORDER — LAMOTRIGINE 25 MG/1
50 TABLET ORAL DAILY
Status: DISCONTINUED | OUTPATIENT
Start: 2022-11-20 | End: 2022-11-23 | Stop reason: HOSPADM

## 2022-11-19 RX ORDER — LIDOCAINE 4 G/G
1 PATCH TOPICAL DAILY
Status: DISCONTINUED | OUTPATIENT
Start: 2022-11-20 | End: 2022-11-23 | Stop reason: HOSPADM

## 2022-11-19 RX ORDER — INSULIN GLARGINE 100 [IU]/ML
15 INJECTION, SOLUTION SUBCUTANEOUS NIGHTLY
Status: DISCONTINUED | OUTPATIENT
Start: 2022-11-19 | End: 2022-11-20

## 2022-11-19 RX ADMIN — INSULIN GLARGINE 15 UNITS: 100 INJECTION, SOLUTION SUBCUTANEOUS at 20:06

## 2022-11-19 RX ADMIN — LAMOTRIGINE 25 MG: 25 TABLET ORAL at 09:06

## 2022-11-19 RX ADMIN — ACETAMINOPHEN 650 MG: 325 TABLET ORAL at 14:24

## 2022-11-19 RX ADMIN — TRAZODONE HYDROCHLORIDE 200 MG: 50 TABLET ORAL at 20:09

## 2022-11-19 RX ADMIN — LAMOTRIGINE 25 MG: 25 TABLET ORAL at 20:08

## 2022-11-19 RX ADMIN — INSULIN LISPRO 4 UNITS: 100 INJECTION, SOLUTION INTRAVENOUS; SUBCUTANEOUS at 20:06

## 2022-11-19 RX ADMIN — GABAPENTIN 300 MG: 300 CAPSULE ORAL at 20:08

## 2022-11-19 RX ADMIN — GABAPENTIN 300 MG: 300 CAPSULE ORAL at 09:06

## 2022-11-19 RX ADMIN — GABAPENTIN 300 MG: 300 CAPSULE ORAL at 14:19

## 2022-11-19 RX ADMIN — ROSUVASTATIN CALCIUM 20 MG: 10 TABLET, FILM COATED ORAL at 20:08

## 2022-11-19 RX ADMIN — VERAPAMIL HYDROCHLORIDE 120 MG: 120 TABLET, FILM COATED, EXTENDED RELEASE ORAL at 20:08

## 2022-11-19 RX ADMIN — ENOXAPARIN SODIUM 40 MG: 40 INJECTION SUBCUTANEOUS at 09:07

## 2022-11-19 RX ADMIN — SODIUM CHLORIDE: 9 INJECTION, SOLUTION INTRAVENOUS at 12:10

## 2022-11-19 RX ADMIN — INSULIN LISPRO 4 UNITS: 100 INJECTION, SOLUTION INTRAVENOUS; SUBCUTANEOUS at 12:13

## 2022-11-19 RX ADMIN — INSULIN LISPRO 8 UNITS: 100 INJECTION, SOLUTION INTRAVENOUS; SUBCUTANEOUS at 17:20

## 2022-11-19 RX ADMIN — INSULIN LISPRO 4 UNITS: 100 INJECTION, SOLUTION INTRAVENOUS; SUBCUTANEOUS at 09:08

## 2022-11-19 ASSESSMENT — PAIN SCALES - GENERAL: PAINLEVEL_OUTOF10: 10

## 2022-11-19 ASSESSMENT — PAIN DESCRIPTION - LOCATION: LOCATION: BACK

## 2022-11-19 NOTE — PROGRESS NOTES
Occupational Therapy  Facility/Department: [place] [id number] - MED SURG/ORTHO  Occupational Therapy Initial Assessment/ Treatment    Name: Regla Stock  : 1950  MRN: [id number]  Date of Service: 2022    Discharge Recommendations:  Subacute/Skilled Nursing Facility  OT Equipment Recommendations  Equipment Needed: No  Other: defer to next level of care       Patient Diagnosis(es): The primary encounter diagnosis was Frequent falls. Diagnoses of Diabetic peripheral neuropathy (HCC), Chronic bilateral low back pain, unspecified whether sciatica present, Minor closed head injury, Rib contusion, right, initial encounter, Bilateral leg weakness, Anticoagulated on Coumadin, and Subtherapeutic international normalized ratio (INR) were also pertinent to this visit. Past Medical History:  has a past medical history of Anxiety, Arthritis, Asthma, Bipolar 1 disorder ([facility] Colitis, Depression, Diabetes ([facility] Diabetes mellitus ([facility] Encounter for imaging to screen for metal prior to MRI, Factor V Leiden mutation ([facility] Glaucoma, H/O bladder problems, High blood pressure, History of kidney problems, IBS (irritable bowel syndrome), Liver disease, Obesity, and Psychiatric problem. Past Surgical History:  has a past surgical history that includes back surgery; hip surgery; Eye surgery; Colonoscopy; cervical fusion; joint replacement (Right); other surgical history (2018); pr njx dx/ther sbst intrlmnr crv/thrc w/img gdn (Bilateral, 2018); pr njx dx/ther sbst intrlmnr crv/thrc w/img gdn (Bilateral, 10/16/2018); pr nervous system surgery unlisted (Bilateral, 2018); Appendectomy; and TURP (N/A, 2020). Treatment Diagnosis: impaired mobility      Assessment   Performance deficits / Impairments: Decreased functional mobility ; Decreased strength;Decreased endurance;Decreased cognition;Decreased safe awareness;Decreased ADL status; Decreased balance;Decreased posture;Decreased high-level IADLs;Decreased coordination;Decreased fine motor control;Decreased ROM  Assessment: Pt is a 67 y.o. yo male  admitted to [facility] s/p fall. PTA, pt was living with family in a 1 story House and and IND with mobility and BADLs with assist for IADLs provided by spouse. After evaluation and treatment, pt found to be presenting with the above mentioned deficits. Pt is currently functioning below baseline and is mod Ax2 for sit<>supine. ILIANA STS 2/2 to increased pain. Anticipate pt requiring max-total A for LB ADLs and mod A for UB ADLs. Pt would benefit from continued skilled occupational therapy to address these deficits, increasing safety and independence with ADL and functional mobility. Recommend SNF at discharge. Treatment Diagnosis: impaired mobility  Prognosis: Good  Decision Making: Medium Complexity  Activity Tolerance  Activity Tolerance: Patient limited by pain  Activity Tolerance Comments: 7-10/10 pain in spine, RN provided pain medication during therapy session, pt unable to tolerate STS trial d/t increased pain. Vitals WFL        Plan   Occupational Therapy Plan  Times Per Week: 2-3x per week  Current Treatment Recommendations: Strengthening, Balance training, Gait training, Functional mobility training, Endurance training, Safety education & training, Patient/Caregiver education & training, Equipment evaluation, education, & procurement, Positioning, Pain management, Self-Care / ADL     Restrictions  Restrictions/Precautions  Restrictions/Precautions: General Precautions  Position Activity Restriction  Other position/activity restrictions: Tele    Subjective   General  Patient assessed for rehabilitation services?: Yes  Additional Pertinent Hx: \"71 y.o. male, with past medical history of hypertension, diabetes and obesity, who presented to [facility] with generalized weakness and frequent falls. History obtained from the patient and review of EMR.   The patient stated he was recently admitted here at [facility] for frequent falls. He stated at that time he was also diagnosed with a UTI. The patient stated he was discharged to [facility] for rehabilitation. He stated he was discharged home last week, but feels his rehabilitation did not work. The patient's wife is at the bedside and stated that the patient is still failing at home and still needs rehabilitation and the patient cannot be left at home alone. The patient's wife stated that the patient has been seeing Dr. Demetri Spencer with neurology and being worked up for Parkinson's due to bilateral lower extremity weakness. The patient stated he is supposed to have a brain scan here at [facility] on Tuesday. The patient and his wife would like the patient to be reevaluated by physical therapy and sent either back to rehab or evaluated for home health rehabilitation. \"     Social/Functional History  Social/Functional History  Lives With: Spouse (pt's wife works full time as a .)  Type of Home: [facility] [employer] Layout: One level (has a basement, hasnt been down there in years and does not need to go to the basement for any reason.)  Home Access: Stairs to enter without rails  Entrance Stairs - Number of Steps: 2 MATTHEW no rails from garage, pt holds on to doorframe. Bathroom Shower/Tub: Tub/Shower unit, Walk-in shower, Shower chair with back (walk in shower has grab bars, pt has shower chair, typically uses tub shower because the grab bar and shower chair are better positioned in the tub shower.)  Bathroom Toilet: Handicap height  Bathroom Equipment: Grab bars in shower  Home Equipment: Mikki Laughlin, 4 wheeled, Long-handled shoehorn, Sock aid, Reacher, Lift chair (pt used [facility], has progressed to using 4WW recently.   pt sleeps in lift chair.)  Has the patient had two or more falls in the past year or any fall with injury in the past year?: Yes (pt reports 9-10 falls in the past year, reports no serious injuries from these falls except for this last fall which brought him to the hospital.)  ADL Assistance: Independent  Homemaking Assistance: Needs assistance (pt able to make quick meals for himself, limited by standing time, wife does all house work and home making responsibilities.)  Homemaking Responsibilities: No  Ambulation Assistance: Independent  Transfer Assistance: Independent  Active : No  Patient's  Info: pt's wife drives and can transport pt. Leisure & Hobbies: likes to watch television. Objective   Heart Rate: 65  Heart Rate Source: Monitor  BP: (!) 146/76  BP Location: Left upper arm  BP Method: Automatic  Patient Position: Semi fowlers  MAP (Calculated): 99  Resp: 18  SpO2: 98 %  O2 Device: None (Room air)  Comment: HR 66 BPM, SPO2 98% on room air, /757          Observation/Palpation  Posture:  (in seated, requires MIN A-CGA to maintain sitting balance at EOB.)  Safety Devices  Type of Devices: Bed alarm in place; Left in bed;Call light within reach;Nurse notified;Gait belt  Bed Mobility Training  Bed Mobility Training: Yes  Overall Level of Assistance: Assist X2;Moderate assistance (with HOB lowered, educated pt on log roll techniwue to minimize pain in his back.)  Rolling: Assist X2;Moderate assistance  Supine to Sit: Assist X2;Moderate assistance  Sit to Supine:  Moderate assistance;Assist X2  Scooting: Assist X2;Moderate assistance  Balance  Sitting: Impaired (required MIN A to CGA to maintain balance in seated at EOB.)  Sitting - Static: Poor (constant support)  Sitting - Dynamic: Poor (constant support)  Standing:  (ILIANA, pt did not stand on today's date secondary to high pain levels.)  Transfer Training  Transfer Training: No (pt attempted sit>stand transfer, but reports increase in pain too great and declines further transfer training at this time.)     AROM: Generally decreased, functional (increased pain >90* shoudler flexion R>L.)  Strength:  (unable to formally assess 2/2 to pain during AROM)  Coordination: Generally decreased, functional (impaired [address] R>L)  Tone: Normal  Sensation: Intact  ADL  Toileting: Dependent/Total  Toileting Skilled Clinical Factors: male purwick; pt reporting hx of bladder spasms at baseline resulting in urinary incontinence  Additional Comments: Pt declining all ADLs including toileting, bathing, dressing, and grooming tasks     Activity Tolerance  Activity Tolerance: Patient tolerated evaluation without incident;Patient limited by fatigue;Patient limited by pain; Patient limited by endurance  Activity Tolerance Comments: pt has notable back pain with movements, educated pt on log roll technique.                               Education Given To: Patient  Education Provided: Role of Therapy;Plan of Care;Transfer Training  Education Provided Comments: Log roll technique, general safety during acute hospital stay, use of RED call light  Education Method: Verbal;Demonstration  Barriers to Learning: Cognition  Education Outcome: Verbalized understanding;Continued education needed       AM-PAC Score  AM-PAC Inpatient Daily Activity Raw Score: 10 (11/19/22 1803)  AM-PAC Inpatient ADL T-Scale Score : 27.31 (11/19/22 1803)  ADL Inpatient CMS 0-100% Score: 74.7 (11/19/22 1803)  ADL Inpatient CMS G-Code Modifier : CL (11/19/22 1803)        Goals  Short Term Goals  Time Frame for Short Term Goals: 1 week (11/26) unless noted  Short Term Goal 1: Pt will complete supine<>sit with min Ax2 and use of log roll technique  Short Term Goal 2: Pt will perform STS with max Ax2 in preparation for toilet transfers  Short Term Goal 3: Pt will complete 2-3 EOB grooming tasks with CGA  Patient Goals   Patient goals : \"I want to be able to sit up at the edge of the bed\"       Therapy Time   Individual Concurrent Group Co-treatment   Time In 0912         Time Out 0938         Minutes 26         Timed Code Treatment Minutes: 16 Minutes (10 min eval)     If pt is unable to be seen after this session, please let this note serve as discharge summary. Please see case management note for discharge disposition. Thank you.       Chavez Sanches, OT

## 2022-11-19 NOTE — PROGRESS NOTES
Hospitalist Progress Note      PCP: FRANCISCO JAVIER Tyler - CNP    Date of Admission: 11/18/2022    Chief Complaint: Generalized weakness and frequent fall    Hospital Course:      Subjective: Patient denies any chest pain or shortness of breath complains of her back pain he used to walk with a cane lives with his wife. Medications:  Reviewed    Infusion Medications    sodium chloride      dextrose      sodium chloride 75 mL/hr at 11/18/22 2211     Scheduled Medications    gabapentin  300 mg Oral TID    insulin glargine  5 Units SubCUTAneous Nightly    lamoTRIgine  25 mg Oral Daily    rosuvastatin  20 mg Oral Nightly    traZODone  200 mg Oral Nightly    verapamil  120 mg Oral Nightly    sodium chloride flush  5-40 mL IntraVENous 2 times per day    enoxaparin  40 mg SubCUTAneous Daily    insulin lispro  0-8 Units SubCUTAneous TID WC    insulin lispro  0-4 Units SubCUTAneous Nightly     PRN Meds: acetaminophen, sodium chloride flush, sodium chloride, ondansetron **OR** ondansetron, polyethylene glycol, acetaminophen **OR** acetaminophen, glucose, dextrose bolus **OR** dextrose bolus, glucagon (rDNA), dextrose      Intake/Output Summary (Last 24 hours) at 11/19/2022 0914  Last data filed at 11/19/2022 1119  Gross per 24 hour   Intake 240 ml   Output 825 ml   Net -585 ml       Physical Exam Performed:    BP (!) 148/75   Pulse 66   Temp 97.4 °F (36.3 °C) (Oral)   Resp 16   Ht 6' 1\" (1.854 m)   Wt 232 lb (105.2 kg)   SpO2 98%   BMI 30.61 kg/m²     General appearance: No apparent distress, appears stated age and cooperative. HEENT: Pupils equal, round, and reactive to light. Conjunctivae/corneas clear. Neck: Supple, with full range of motion. No jugular venous distention. Trachea midline. Respiratory:  Normal respiratory effort. Clear to auscultation, bilaterally without Rales/Wheezes/Rhonchi. Cardiovascular: Regular rate and rhythm with normal S1/S2 without murmurs, rubs or gallops.   Abdomen: Soft, non-tender, non-distended with normal bowel sounds. Musculoskeletal: No clubbing, cyanosis or edema bilaterally. Full range of motion without deformity. Skin: Skin color, texture, turgor normal.  No rashes or lesions. Neurologic:  Neurovascularly intact without any focal sensory/motor deficits. Cranial nerves: II-XII intact, grossly non-focal.  Psychiatric: Alert and oriented, thought content appropriate, normal insight  Capillary Refill: Brisk, 3 seconds, normal   Peripheral Pulses: +2 palpable, equal bilaterally       Labs:   Recent Labs     11/18/22 1736 11/19/22  0555   WBC 10.1 9.6   HGB 14.0 14.4   HCT 42.7 44.3    283     Recent Labs     11/18/22  1736 11/19/22  0555    137   K 5.1 4.6   CL 99 103   CO2 29 21   BUN 15 17   CREATININE 1.1 0.9   CALCIUM 10.6 10.2     No results for input(s): AST, ALT, BILIDIR, BILITOT, ALKPHOS in the last 72 hours. Recent Labs     11/18/22 1736   INR 1.31*     Recent Labs     11/18/22 1736   TROPONINI <0.01       Urinalysis:      Lab Results   Component Value Date/Time    NITRU Negative 10/25/2022 04:36 PM    WBCUA 21-50 10/25/2022 04:36 PM    BACTERIA 3+ 10/25/2022 04:36 PM    RBCUA 3-4 10/25/2022 04:36 PM    BLOODU TRACE-INTACT 10/25/2022 04:36 PM    SPECGRAV 1.010 10/25/2022 04:36 PM    GLUCOSEU 250 10/25/2022 04:36 PM    GLUCOSEU NEGATIVE 10/13/2010 12:10 PM       Radiology:  CT CHEST WO CONTRAST   Final Result   No CT evidence of acute injury in the chest.         CT Head W/O Contrast   Final Result   No acute intracranial findings. Mild mucosal thickening in the left maxillary sinus. CT CSpine W/O Contrast   Final Result   No acute fracture or traumatic malalignment.              IP CONSULT TO HOSPITALIST  IP CONSULT TO CASE MANAGEMENT    Assessment/Plan:    Active Hospital Problems    Diagnosis     Generalized weakness [R53.1]      Priority: Medium    Obesity [E66.9]      Priority: Medium    Type 2 diabetes mellitus ([facility] [E11.9] Essential hypertension [I10]    This 44-year-old male admitted with generalized weakness CT of the head negative CT of C-spine revealed no acute fracture, CT of the chest negative. Continue with physical Occupational Therapy may consider consulting neurology. Diabetes mellitus type 2 with neuropathy continue with the sliding scale check hemoglobin A1c continue with a diabetic diet. Hypertension continue with home medication. Hyperlipidemia on statin. Bipolar disorder on lithium currently on hold as lithium level on admission 2.2      DVT Prophylaxis: Lovenox subcu   diet: ADULT DIET;  Regular; 3 carb choices (45 gm/meal)  Code Status: Full Code  PT/OT Eval Status: Consulted    Dispo -     Appropriate for A1 Discharge Unit: No      Court MD Arnie

## 2022-11-19 NOTE — PROGRESS NOTES
Glucose above 400. Increase basal glargine insulin from 5 units to 15 units nightly. Home regimen includes metformin, dulaglutide, and glipizide.      Mabel Goodman MD  [facility] Hospitalist

## 2022-11-19 NOTE — H&P
Hospital Medicine History & Physical      PCP: FRANCISCO JAVIER Echeverria CNP    Date of Admission: 11/18/2022    Date of Service: Pt seen/examined on 11/18/2022 and Admitted to observation with expected LOS less than two midnights due to medical therapy. Chief Complaint: Generalized weakness and frequent falls    History Of Present Illness:      67 y.o. male, with past medical history of hypertension, diabetes and obesity, who presented to [facility] with generalized weakness and frequent falls. History obtained from the patient and review of EMR. The patient stated he was recently admitted here at [facility] for frequent falls. He stated at that time he was also diagnosed with a UTI. The patient stated he was discharged to [facility] for rehabilitation. He stated he was discharged home last week, but feels his rehabilitation did not work. The patient's wife is at the bedside and stated that the patient is still failing at home and still needs rehabilitation and the patient cannot be left at home alone. The patient's wife stated that the patient has been seeing Dr. Estelle Pan with neurology and being worked up for Parkinson's due to bilateral lower extremity weakness. The patient stated he is supposed to have a brain scan here at [facility] on Tuesday. The patient and his wife would like the patient to be reevaluated by physical therapy and sent either back to rehab or evaluated for home health rehabilitation. Otherwise, the patient has no concerns or questions. He was admitted for further evaluation and treatment. The patient denied any other associated symptoms as well as any aggravating and/or alleviating factors. At the time of this assessment, the patient was resting comfortably in bed. He currently denies any chest pain, back pain, abdominal pain, shortness of breath, numbness, tingling, N/V/C/D, fever and/or chills.      Past Medical History:          Diagnosis Date Anxiety     Arthritis     Asthma     Bipolar 1 disorder ([facility]     Colitis     Depression     Diabetes ([facility]     Di[facility]     Encounter for imaging to screen for metal prior to MRI 06/08/2022    NOT MRI conditional [place] model#[id number], Lead: [id number] implanted 9/9/19 by Lisbeth Mcgee at [facility]. Lead is unsafe for MRI. Patient unable to have any MRI ever.     Factor V Leiden mutation ([facility]     Glaucoma     H/O bladder problems     High blood pressure     History of kidney problems     IBS (irritable bowel syndrome)     Liver disease     hx of elevated LFT's    Obesity     Psychiatric problem      Past Surgical History:          Procedure Laterality Date    APPENDECTOMY      BACK SURGERY      CERVICAL FUSION      COLONOSCOPY      EYE SURGERY      HIP SURGERY      JOINT REPLACEMENT Right     THR    OTHER SURGICAL HISTORY  06/06/2018     right L4-5 hemilaminotomy, partial facetectomy, foraminotomy, reexploration and excision of synovial csyt,  Bilateral L2-3 and L3-4 hemilaminotomy, partial facetectomy, foraminotomy    NM NERVOUS SYSTEM SURGERY UNLISTED Bilateral 11/12/2018    BILATERAL LUMBAR THREE, LUMBAR FOUR, LUMBAR FIVE RADIOFREQUENCY ABLATION SITE CONFIRMED BY FLUOROSCOPY performed by Christiano De La Torre MD at [address] [facility] DX/THER SBST INTRLMNR CRV/THRC W/IMG GDN Bilateral 9/25/2018    BILATERAL LUMBAR THREE, LUMBAR FOUR, LUMBAR FIVE MEDIAL BRANCH BLOCK SITE CONFIRMED BY FLUOROSCOPY performed by Christiano De La oTrre MD at [address] [facility] DX/THER SBST INTRLMNR CRV/THRC W/IMG GDN Bilateral 10/16/2018    BILATERAL LUMBAR THREE, FOUR, FIVE MEDIAL BRANCH BLOCK SITE CONFIRMED BY FLUOROSCOPY performed by Christiano De La Torre MD at [facility]    TURP N/A 6/9/2020    CYSTOSCOPY, TRANSURETHRAL RESECTION OF PROSTATE performed by Xavier Hua MD at [place] [address]     Medications Prior to Admission:      Prior to Admission medications    Medication Sig Start Date End Date Taking? Authorizing Provider   insulin glargine (BASAGLAR KWIKPEN) 100 UNIT/ML injection pen Inject 5 Units into the skin nightly 10/12/22   Historical Provider, MD   sennosides-docusate sodium (SENOKOT-S) 8.6-50 MG tablet Take 2 tablets by mouth in the morning and at bedtime 6/13/22   Lucien Gonsales MD   lamoTRIgine (LAMICTAL) 25 MG tablet Take 25 mg by mouth daily     Historical Provider, MD   lamoTRIgine (LAMICTAL) 25 MG tablet Take 50 mg by mouth at bedtime     Historical Provider, MD   lithium 300 MG capsule Take 300 mg by mouth daily    Historical Provider, MD   lithium 300 MG capsule Take 600 mg by mouth at bedtime    Historical Provider, MD   metFORMIN (GLUCOPHAGE-XR) 500 mg extended release tablet Take 1,000 mg by mouth 2 times daily (with meals)    Historical Provider, MD   traZODone (DESYREL) 100 MG tablet Take 200 mg by mouth nightly     Historical Provider, MD   acetaminophen (TYLENOL) 500 MG tablet Take 1,000 mg by mouth nightly as needed for Pain    Historical Provider, MD   gabapentin (NEURONTIN) 300 MG capsule Take 300 mg by mouth 3 times daily. Historical Provider, MD   Dulaglutide 3 MG/0.5ML SOPN Inject 3 mg into the skin once a week Takes on Sundays    Historical Provider, MD   glipiZIDE (GLUCOTROL) 5 mg tablet Take 5 mg by mouth daily    Historical Provider, MD   rosuvastatin (CRESTOR) 20 MG tablet Take 20 mg by mouth at bedtime     Historical Provider, MD   verapamil (CALAN SR) 120 MG extended release tablet Take 120 mg by mouth nightly    Historical Provider, MD     Allergies:  No known allergies    Social History:      The patient currently lives at home    TOBACCO:   reports that he has never smoked. He has never used smokeless tobacco.  ETOH:   reports no history of alcohol use.   E-cigarette/Vaping       Questions Responses    E-cigarette/Vaping Use Never User    Start Date     Passive Exposure     Quit Date     Counseling Given     Comments           Family History: Reviewed and negative in regards to presenting illness/complaint. Problem Relation Age of Onset    Alcohol Abuse Sister     Coronary Art Dis Mother     Obesity Mother     Osteoarthritis Mother     Parkinsonism Mother     Coronary Art Dis Father     Obesity Father      REVIEW OF SYSTEMS COMPLETED:   Pertinent positives as noted in the HPI. All other systems reviewed and negative. PHYSICAL EXAM PERFORMED:    /73   Pulse 61   Temp 98.4 °F (36.9 °C) (Oral)   Resp 14   SpO2 97%     General appearance: Pleasant, obese male in no apparent distress, appears stated age and cooperative. HEENT:  Pupils equal, round, and reactive to light. Extra ocular muscles intact. Conjunctivae/corneas clear. Neck: Supple, with full range of motion. No jugular venous distention. Trachea midline. Respiratory:  Normal respiratory effort. Clear to auscultation, bilaterally without Rales/Wheezes/Rhonchi. Cardiovascular:  Regular rate and rhythm with normal S1/S2 without murmurs, rubs or gallops. Abdomen: Soft, obese, round non-tender, non-distended with normal bowel sounds. Musculoskeletal:  No clubbing, cyanosis or edema bilaterally. Generalized weakness bilateral lower extremities  Skin: Skin color, texture, turgor normal.  No significant rashes or lesions. Neurologic:  Neurovascularly intact.  Cranial nerves: II-XII intact, grossly non-focal.  Psychiatric:  Alert and oriented, thought content appropriate, normal insight  Capillary Refill: Brisk,3 seconds, normal  Peripheral Pulses: +2 palpable, equal bilaterally     Labs:     Recent Labs     11/18/22  1736   WBC 10.1   HGB 14.0   HCT 42.7        Recent Labs     11/18/22  1736      K 5.1   CL 99   CO2 29   BUN 15   CREATININE 1.1   CALCIUM 10.6     Recent Labs     11/18/22  1736   INR 1.31*     Urinalysis:      Lab Results   Component Value Date/Time    NITRU Negative 10/25/2022 04:36 PM    WBCUA 21-50 10/25/2022 04:36 PM    BACTERIA 3+ 10/25/2022 diet  -Continue Neurontin    Essential hypertension  -Continue verapamil    Hyperlipidemia  -Continue rosuvastatin    Bipolar/psychiatric problem  -Lithium level 2.2  -Hold lithium for now    DVT Prophylaxis: Lovenox    Diet: No diet orders on file    Code Status: Prior    PT/OT Eval Status: Ordered    Dispo -1-2 days pending clinical improvement     FRANCISCO JAVIER San - CNP    Thank you FRANCISCO JAVIER Gale CNP for the opportunity to be involved in this patient's care.  If you have any questions or concerns please feel free to contact me at [phone].  -------------------------Anticipated Dr. Elis solorzano--------------------------

## 2022-11-19 NOTE — PROGRESS NOTES
Physical Therapy  Facility/Department: [address] [place] [address] - MED SURG/ORTHO  Physical Therapy Initial Assessment    Name: Fatou Tang  : 1950  MRN: [id number]  Date of Service: 2022    Discharge Recommendations:  Subacute/Skilled Nursing Facility   PT Equipment Recommendations  Equipment Needed: No  Other: defer to next level of care. Patient Diagnosis(es): The primary encounter diagnosis was Frequent falls. Diagnoses of Diabetic peripheral neuropathy (HCC), Chronic bilateral low back pain, unspecified whether sciatica present, Minor closed head injury, Rib contusion, right, initial encounter, Bilateral leg weakness, Anticoagulated on Coumadin, and Subtherapeutic international normalized ratio (INR) were also pertinent to this visit. Past Medical History:  has a past medical history of Anxiety, Arthritis, Asthma, Bipolar 1 disorder ([facility] Colitis, Depression, Diabetes ([facility] [address] Diabetes mellitus ([facility] [address] Encounter for imaging to screen for metal prior to MRI, Factor V Leiden mutation ([facility] [address] Glaucoma, H/O bladder problems, High blood pressure, History of kidney problems, IBS (irritable bowel syndrome), Liver disease, Obesity, and Psychiatric problem. Past Surgical History:  has a past surgical history that includes back surgery; hip surgery; Eye surgery; Colonoscopy; cervical fusion; joint replacement (Right); other surgical history (2018); pr njx dx/ther sbst intrlmnr crv/thrc w/img gdn (Bilateral, 2018); pr njx dx/ther sbst intrlmnr crv/thrc w/img gdn (Bilateral, 10/16/2018); pr nervous system surgery unlisted (Bilateral, 2018); Appendectomy; and TURP (N/A, 2020). Assessment   Body Structures, Functions, Activity Limitations Requiring Skilled Therapeutic Intervention: Decreased functional mobility ; Decreased ADL status; Decreased body mechanics; Decreased strength;Decreased cognition;Decreased endurance;Decreased sensation;Decreased balance; Increased pain;Decreased posture  Assessment: pt presents to [facility] with primary diagnosis of generalized weakness. PTA pt lived at home with his wife, 2 MATTHEW, and reports being IND with mobility and transfers, including walking. currently pt requires MOD A X2 to perform bed mobility and MIN A X2 attempting sit>stand transfer, but pt was limited by pain and could not complete transfer, stating it was too painful and that he did not wish to complete sit>stand transfer and wanted to lay back down in bed. pt demonstrates notably increased pain and decreased strength, endurance, sensation, and functional capacity and mobility at this time compared to his baseline. pt would benefit from skilled PT to address the above stated deficits during his stay in the hospital.  recommend DC to SNF at this time secondary to the pt's notable deficits. Treatment Diagnosis: decreased endurance and mobility. Therapy Prognosis: Fair  Decision Making: Medium Complexity  Requires PT Follow-Up: Yes  Activity Tolerance  Activity Tolerance: Patient tolerated evaluation without incident;Patient limited by fatigue;Patient limited by pain; Patient limited by endurance  Activity Tolerance Comments: pt has notable back pain with movements, educated pt on log roll technique.      Plan   Physcial Therapy Plan  General Plan: 2-3 times per week  Current Treatment Recommendations: Strengthening, Balance training, Functional mobility training, Transfer training, Manual Therapy - Soft Tissue Mobilization, Pain management, Patient/Caregiver education & training, Equipment evaluation, education, & procurement, Therapeutic activities, Modalities, Home exercise program, Safety education & training, Neuromuscular re-education, Stair training, Gait training, Endurance training  Safety Devices  Type of Devices: Bed alarm in place, Left in bed, Call light within reach, Nurse notified, Gait belt     Restrictions  Restrictions/Precautions  Restrictions/Precautions: General Precautions  Position Activity Restriction  Other position/activity restrictions: Tele     Subjective   Pain: 10/10 pain in back with spasms exacerbated by movement. General  Chart Reviewed: Yes  Patient assessed for rehabilitation services?: Yes  Family / Caregiver Present: No  Referring Practitioner: FRANCISCO JAVIER Yoon CNP  Referral Date : 11/18/22  Diagnosis: generalized weakness  Follows Commands: Within Functional Limits  Subjective  Subjective: pt agreeable to PT treatment. Social/Functional History  Social/Functional History  Lives With: Spouse (pt's wife works full time as a .)  Type of Home: [employer] Layout: One level (has a basement, hasnt been down there in years and does not need to go to the basement for any reason.)  Home Access: Stairs to enter without rails  Entrance Stairs - Number of Steps: 2 MATTHEW no rails from garage, pt holds on to doorframe. Bathroom Shower/Tub: Tub/Shower unit, Walk-in shower, Shower chair with back (walk in shower has grab bars, pt has shower chair, typically uses tub shower because the grab bar and shower chair are better positioned in the tub shower.)  Bathroom Toilet: Handicap height  Bathroom Equipment: Grab bars in shower  Home Equipment: Donney Proffer, 4 wheeled, Long-handled shoehorn, Sock aid, Reacher, Lift chair (pt used [facility], has progressed to using 4WW recently.   pt sleeps in lift chair.)  Has the patient had two or more falls in the past year or any fall with injury in the past year?: Yes (pt reports 9-10 falls in the past year, reports no serious injuries from these falls except for this last fall which brought him to the hospital.)  ADL Assistance: Independent  Homemaking Assistance: Needs assistance (pt able to make quick meals for himself, limited by standing time, wife does all house work and home making responsibilities.)  Homemaking Responsibilities: No  Ambulation Assistance: Independent  Transfer Assistance: Independent  Active : No  Patient's  Info: pt's wife drives and can transport pt. Leisure & Hobbies: likes to watch television. Vision/Hearing  Vision  Vision: Impaired  Vision Exceptions: Wears glasses for distance;Wears glasses for reading;Wears glasses at all times  Hearing  Hearing: Within functional limits    Cognition   Orientation  Overall Orientation Status: Within Functional Limits  Orientation Level: Oriented to person;Oriented to place;Oriented to time;Oriented to situation  Cognition  Overall Cognitive Status: Exceptions  Arousal/Alertness: Delayed responses to stimuli  Following Commands: Follows one step commands consistently  Attention Span: Attends with cues to redirect  Safety Judgement: Decreased awareness of need for assistance  Problem Solving: Assistance required to generate solutions  Insights: Decreased awareness of deficits  Initiation: Requires cues for some  Sequencing: Requires cues for some  Cognition Comment: pt has some difficulty with word finding.      Objective   Heart Rate: 66  Heart Rate Source: Monitor  BP: (!) 148/75  BP Location: Right upper arm  BP Method: Automatic  Patient Position: Semi fowlers  MAP (Calculated): 99  Resp: 16  SpO2: 98 %  O2 Device: None (Room air)  Comment: HR 66 BPM, SPO2 98% on room air, /757     Observation/Palpation  Posture:  (in seated, requires MIN A-CGA to maintain sitting balance at EOB.)  Gross Assessment  AROM: Generally decreased, functional  PROM: Generally decreased, functional  Strength: Generally decreased, functional (3+/5 in all major muscle groups of BLE, pt fatigues quickly with manual muscle testing.)  Sensation: Impaired (pt has decreased sensation of light touch in BLE.)      Bed Mobility Training  Bed Mobility Training: Yes  Overall Level of Assistance: Assist X2;Moderate assistance (with HOB lowered, educated pt on log roll techniwue to minimize pain in his back.)  Rolling: Assist X2;Moderate assistance  Supine to Sit: Assist summary. Please see case management note for discharge disposition. Thank you.

## 2022-11-19 NOTE — PROGRESS NOTES
4 Eyes Skin Assessment     The patient is being assess for   Admission    I agree that 2 RN's have performed a thorough Head to Toe Skin Assessment on the patient. ALL assessment sites listed below have been assessed. Areas assessed for pressure by both nurses:   [x]   Head, Face, and Ears   [x]   Shoulders, Back, and Chest, Abdomen  [x]   Arms, Elbows, and Hands   [x]   Coccyx, Sacrum, and Ischium  [x]   Legs, Feet, and Heels        Skin Assessed Under all Medical Devices by both nurses: yes            All Mepilex Borders were peeled back and area peeked at by both nurses: n/a   Please list where Mepilex Borders are located:  n/a                 Co-signer eSignature: {Esignature:[id number]}    Does the Patient have Skin Breakdown related to pressure?   yes             Dano Prevention initiated:  No   Wound Care Orders initiated:  No      [facility] nurse consulted for Pressure Injury (Stage 3,4, Unstageable, DTI, NWPT, Complex wounds)and New or Established Ostomies:  No      Primary Nurse eSignature: Electronically signed by Mauricio Cota RN on 11/19/22 at 6:17 AM EST

## 2022-11-19 NOTE — ED PROVIDER NOTES
I independently examined and evaluated Seb Saeed. In brief, patient is a 70-year-old male presents to the emergency department for evaluation of back pain. Reports having history of chronic back pain and gets epidural injections. Says he had a fall yesterday and landed on his back. Since then, has been having worse than usual back pain. Is on Coumadin for history of DVT. Focused exam revealed male who appears to be in moderate distress due to back pain. Following commands, moving all extremities. Sensation intact to all extremities. Hemoglobin normal.  Creatinine is 1.1. Glucose elevated 219. Troponin negative. CT head shows no acute intracranial bleed. CT cervical spine shows no acute fracture or traumatic malalignment. CT chest shows no acute injury. Patient admitted to the hospital for further evaluation and treatment. Admit. The Ekg interpreted by me shows  Sinus rhythm with premature atrial complexes with a rate of 60  Axis is normal  QTc is normal  Intervals and Durations are unremarkable. ST Segments: Nonspecific ST changes      All diagnostic, treatment, and disposition decisions were made by myself in conjunction with the advanced practice provider/resident physician. I personally saw the patient and performed a substantive portion of the visit including aspects of the medical decision making. Comment: Please note this report has been produced using speech recognition software and may contain errors related to that system including errors in grammar, punctuation, and spelling, as well as words and phrases that may be inappropriate. If there are any questions or concerns please feel free to contact the dictating provider for clarification. For all further details of the patient's emergency department visit, please see the advanced practice provider's documentation.        Courtney Chandler MD  11/20/22 5504

## 2022-11-19 NOTE — PROGRESS NOTES
WILBERT Mcleod    11/19/22 5:15 PM  [phone] Hospital or Facility: [facility] From: Quirino Shaw RE: Willie Pedro 1950 RM: [id number] Per MAR to notify physician when blood sugar over 349. Patient blood sugar 409. Patient also had juice and a pudding.  I will give 8units of insulin Need Callback: NO CALLBACK REQ C5 MED SURG / Aubree Galindo

## 2022-11-20 LAB
GLUCOSE BLD-MCNC: 287 MG/DL (ref 70–99)
GLUCOSE BLD-MCNC: 295 MG/DL (ref 70–99)
GLUCOSE BLD-MCNC: 352 MG/DL (ref 70–99)
GLUCOSE BLD-MCNC: 370 MG/DL (ref 70–99)
PERFORMED ON: ABNORMAL
TSH REFLEX: 0.64 UIU/ML (ref 0.27–4.2)

## 2022-11-20 PROCEDURE — 6360000002 HC RX W HCPCS: Performed by: HOSPITALIST

## 2022-11-20 PROCEDURE — 6370000000 HC RX 637 (ALT 250 FOR IP): Performed by: HOSPITALIST

## 2022-11-20 PROCEDURE — 6370000000 HC RX 637 (ALT 250 FOR IP): Performed by: NURSE PRACTITIONER

## 2022-11-20 PROCEDURE — 96372 THER/PROPH/DIAG INJ SC/IM: CPT

## 2022-11-20 PROCEDURE — 6360000002 HC RX W HCPCS: Performed by: NURSE PRACTITIONER

## 2022-11-20 PROCEDURE — 2580000003 HC RX 258: Performed by: NURSE PRACTITIONER

## 2022-11-20 PROCEDURE — G0378 HOSPITAL OBSERVATION PER HR: HCPCS

## 2022-11-20 PROCEDURE — 96361 HYDRATE IV INFUSION ADD-ON: CPT

## 2022-11-20 RX ORDER — PANTOPRAZOLE SODIUM 40 MG/1
40 TABLET, DELAYED RELEASE ORAL
Status: DISCONTINUED | OUTPATIENT
Start: 2022-11-21 | End: 2022-11-23 | Stop reason: HOSPADM

## 2022-11-20 RX ORDER — ENOXAPARIN SODIUM 100 MG/ML
30 INJECTION SUBCUTANEOUS 2 TIMES DAILY
Status: DISCONTINUED | OUTPATIENT
Start: 2022-11-20 | End: 2022-11-23 | Stop reason: HOSPADM

## 2022-11-20 RX ORDER — INSULIN GLARGINE 100 [IU]/ML
22 INJECTION, SOLUTION SUBCUTANEOUS NIGHTLY
Status: DISCONTINUED | OUTPATIENT
Start: 2022-11-20 | End: 2022-11-21

## 2022-11-20 RX ORDER — TRAMADOL HYDROCHLORIDE 50 MG/1
50 TABLET ORAL EVERY 6 HOURS PRN
Status: DISCONTINUED | OUTPATIENT
Start: 2022-11-20 | End: 2022-11-23 | Stop reason: HOSPADM

## 2022-11-20 RX ADMIN — ENOXAPARIN SODIUM 40 MG: 40 INJECTION SUBCUTANEOUS at 07:53

## 2022-11-20 RX ADMIN — ENOXAPARIN SODIUM 30 MG: 100 INJECTION SUBCUTANEOUS at 20:39

## 2022-11-20 RX ADMIN — GABAPENTIN 300 MG: 300 CAPSULE ORAL at 20:39

## 2022-11-20 RX ADMIN — GABAPENTIN 300 MG: 300 CAPSULE ORAL at 07:52

## 2022-11-20 RX ADMIN — TRAZODONE HYDROCHLORIDE 200 MG: 50 TABLET ORAL at 20:39

## 2022-11-20 RX ADMIN — INSULIN LISPRO 4 UNITS: 100 INJECTION, SOLUTION INTRAVENOUS; SUBCUTANEOUS at 12:10

## 2022-11-20 RX ADMIN — GABAPENTIN 300 MG: 300 CAPSULE ORAL at 14:49

## 2022-11-20 RX ADMIN — ROSUVASTATIN CALCIUM 20 MG: 10 TABLET, FILM COATED ORAL at 20:39

## 2022-11-20 RX ADMIN — Medication 10 ML: at 20:55

## 2022-11-20 RX ADMIN — INSULIN LISPRO 4 UNITS: 100 INJECTION, SOLUTION INTRAVENOUS; SUBCUTANEOUS at 20:45

## 2022-11-20 RX ADMIN — VERAPAMIL HYDROCHLORIDE 120 MG: 120 TABLET, FILM COATED, EXTENDED RELEASE ORAL at 20:39

## 2022-11-20 RX ADMIN — INSULIN LISPRO 4 UNITS: 100 INJECTION, SOLUTION INTRAVENOUS; SUBCUTANEOUS at 08:00

## 2022-11-20 RX ADMIN — INSULIN LISPRO 8 UNITS: 100 INJECTION, SOLUTION INTRAVENOUS; SUBCUTANEOUS at 16:53

## 2022-11-20 RX ADMIN — INSULIN GLARGINE 22 UNITS: 100 INJECTION, SOLUTION SUBCUTANEOUS at 20:45

## 2022-11-20 RX ADMIN — LAMOTRIGINE 25 MG: 25 TABLET ORAL at 20:39

## 2022-11-20 RX ADMIN — LAMOTRIGINE 50 MG: 25 TABLET ORAL at 07:53

## 2022-11-20 ASSESSMENT — PAIN DESCRIPTION - LOCATION
LOCATION: BACK

## 2022-11-20 ASSESSMENT — PAIN SCALES - GENERAL
PAINLEVEL_OUTOF10: 7
PAINLEVEL_OUTOF10: 9
PAINLEVEL_OUTOF10: 8

## 2022-11-20 ASSESSMENT — PAIN DESCRIPTION - DESCRIPTORS: DESCRIPTORS: ACHING

## 2022-11-20 ASSESSMENT — PAIN DESCRIPTION - ORIENTATION: ORIENTATION: MID

## 2022-11-20 NOTE — PROGRESS NOTES
PS Dr. Aidan Benoit    11/20/22 4:55 PM  [phone] Hospital or Facility: [facility] From: Marry Lock RE: Cooper Salgado 1950 RM: [id number] Per [facility] notify provider blood sugar was 352. 8 units of insulin was given.  Need Callback: NO CALLBACK REQ C5 MED SURG / ORTHO  Read 4:55 PM     No new orders placed

## 2022-11-20 NOTE — PROGRESS NOTES
Hospitalist Progress Note      PCP: FRANCISCO JAVIER Wu CNP    Date of Admission: 11/18/2022    Chief Complaint: Generalized weakness and frequent fall    Hospital Course:  This is a 59-year-old male with a past medical history of hypertension diabetes, obesity, bipolar disorder is admitted with a frequent fall, recently admitted to [facility] discharged to a skilled care at [place] for rehabilitation patient was discharged home last week after rehabilitation continues to follow patient has been followed by Dr. Fadia Acuña with the neurology being work-up for Parkinson due to bilateral lower extremity weakness CT of the head on admission negative, CT of the spine negative    Subjective: Patient continues complain of weakness denies any chest pain or shortness of breath no nausea vomiting      Medications:  Reviewed    Infusion Medications    sodium chloride      dextrose      sodium chloride 75 mL/hr at 11/19/22 1210     Scheduled Medications    lamoTRIgine  50 mg Oral Daily    lamoTRIgine  25 mg Oral Nightly    insulin glargine  15 Units SubCUTAneous Nightly    lidocaine  1 patch TransDERmal Daily    gabapentin  300 mg Oral TID    rosuvastatin  20 mg Oral Nightly    traZODone  200 mg Oral Nightly    verapamil  120 mg Oral Nightly    sodium chloride flush  5-40 mL IntraVENous 2 times per day    enoxaparin  40 mg SubCUTAneous Daily    insulin lispro  0-8 Units SubCUTAneous TID WC    insulin lispro  0-4 Units SubCUTAneous Nightly     PRN Meds: acetaminophen, sodium chloride flush, sodium chloride, ondansetron **OR** ondansetron, polyethylene glycol, acetaminophen **OR** acetaminophen, glucose, dextrose bolus **OR** dextrose bolus, glucagon (rDNA), dextrose      Intake/Output Summary (Last 24 hours) at 11/20/2022 1008  Last data filed at 11/20/2022 4409  Gross per 24 hour   Intake 480 ml   Output 2900 ml   Net -2420 ml       Physical Exam Performed:    BP (!) 148/80   Pulse 66   Temp 97.9 °F (36.6 °C) (Oral) Resp 16   Ht 6' 1\" (1.854 m)   Wt 232 lb (105.2 kg)   SpO2 97%   BMI 30.61 kg/m²     General appearance: No apparent distress, appears stated age and cooperative. HEENT: Pupils equal, round, and reactive to light. Conjunctivae/corneas clear. Neck: Supple, with full range of motion. No jugular venous distention. Trachea midline. Respiratory:  Normal respiratory effort. Clear to auscultation, bilaterally without Rales/Wheezes/Rhonchi. Cardiovascular: Regular rate and rhythm with normal S1/S2 without murmurs, rubs or gallops. Abdomen: Soft, non-tender, non-distended with normal bowel sounds. Musculoskeletal: No clubbing, cyanosis or edema bilaterally. Full range of motion without deformity. Skin: Skin color, texture, turgor normal.  No rashes or lesions. Neurologic:  Neurovascularly intact without any focal sensory/motor deficits. Cranial nerves: II-XII intact, grossly non-focal.  Psychiatric: Alert and oriented, thought content appropriate, normal insight  Capillary Refill: Brisk, 3 seconds, normal   Peripheral Pulses: +2 palpable, equal bilaterally       Labs:   Recent Labs     11/18/22 1736 11/19/22  0555   WBC 10.1 9.6   HGB 14.0 14.4   HCT 42.7 44.3    283     Recent Labs     11/18/22 1736 11/19/22  0555    137   K 5.1 4.6   CL 99 103   CO2 29 21   BUN 15 17   CREATININE 1.1 0.9   CALCIUM 10.6 10.2     No results for input(s): AST, ALT, BILIDIR, BILITOT, ALKPHOS in the last 72 hours.   Recent Labs     11/18/22 1736   INR 1.31*     Recent Labs     11/18/22 1736   TROPONINI <0.01       Urinalysis:      Lab Results   Component Value Date/Time    NITRU Negative 10/25/2022 04:36 PM    WBCUA 21-50 10/25/2022 04:36 PM    BACTERIA 3+ 10/25/2022 04:36 PM    RBCUA 3-4 10/25/2022 04:36 PM    BLOODU TRACE-INTACT 10/25/2022 04:36 PM    SPECGRAV 1.010 10/25/2022 04:36 PM    GLUCOSEU 250 10/25/2022 04:36 PM    GLUCOSEU NEGATIVE 10/13/2010 12:10 PM       Radiology:  CT CHEST WO CONTRAST   Final Result No CT evidence of acute injury in the chest.         CT Head W/O Contrast   Final Result   No acute intracranial findings. Mild mucosal thickening in the left maxillary sinus. CT CSpine W/O Contrast   Final Result   No acute fracture or traumatic malalignment. IP CONSULT TO HOSPITALIST  IP CONSULT TO CASE MANAGEMENT    Assessment/Plan:    Active Hospital Problems    Diagnosis     Generalized weakness [R53.1]      Priority: Medium    Obesity [E66.9]      Priority: Medium    Type 2 diabetes mellitus ([address] [E11.9]     Essential hypertension [I10]      This 77-year-old male admitted with generalized weakness CT of the head negative CT of C-spine revealed no acute fracture, CT of the chest negative. Continue with physical Occupational therapy, consult neurology. Diabetes mellitus type 2 with neuropathy continue with the Lantus and sliding scale hemoglobin A1c= 9.5 on 11/18/2022, continue with a diabetic diet. We will increase the basal insulin due to hyperglycemia  Hypertension continue with home medication. Hyperlipidemia on statin. Bipolar disorder on lithium currently on hold as lithium level on admission 2.2       DVT Prophylaxis: Lovenox subcu  Diet: ADULT DIET;  Regular; 3 carb choices (45 gm/meal)  Code Status: Full Code  PT/OT Eval Status: Consulted    Dispo -     Appropriate for A1 Discharge Unit: No      Nicci Gupta MD

## 2022-11-20 NOTE — PROGRESS NOTES
Called patient wife Yessi Obrien. Left VM letting her know it was ok by Michele Fischer that trulicity can be brought up and given to patient once approved by our pharmacist and that medication should be in original packaging.

## 2022-11-20 NOTE — CONSULTS
Consult placed    Who: Dr. Xiao Haque, Neurology  Date:11/20/2022,  Time:1:04 PM    Electronically signed by Cesar Wong on 11/20/2022 at 1:04 PM

## 2022-11-21 PROBLEM — R53.1 WEAKNESS: Status: ACTIVE | Noted: 2022-01-01

## 2022-11-21 LAB
ANION GAP SERPL CALCULATED.3IONS-SCNC: 9 MMOL/L (ref 3–16)
BUN BLDV-MCNC: 17 MG/DL (ref 7–20)
CALCIUM SERPL-MCNC: 9.5 MG/DL (ref 8.3–10.6)
CHLORIDE BLD-SCNC: 103 MMOL/L (ref 99–110)
CO2: 25 MMOL/L (ref 21–32)
CREAT SERPL-MCNC: 1.1 MG/DL (ref 0.8–1.3)
GFR SERPL CREATININE-BSD FRML MDRD: >60 ML/MIN/{1.73_M2}
GLUCOSE BLD-MCNC: 268 MG/DL (ref 70–99)
GLUCOSE BLD-MCNC: 315 MG/DL (ref 70–99)
GLUCOSE BLD-MCNC: 321 MG/DL (ref 70–99)
GLUCOSE BLD-MCNC: 359 MG/DL (ref 70–99)
GLUCOSE BLD-MCNC: 395 MG/DL (ref 70–99)
INR BLD: 1.08 (ref 0.87–1.14)
PERFORMED ON: ABNORMAL
POTASSIUM SERPL-SCNC: 4.6 MMOL/L (ref 3.5–5.1)
PROTHROMBIN TIME: 13.9 SEC (ref 11.7–14.5)
SODIUM BLD-SCNC: 137 MMOL/L (ref 136–145)

## 2022-11-21 PROCEDURE — 97530 THERAPEUTIC ACTIVITIES: CPT

## 2022-11-21 PROCEDURE — G0378 HOSPITAL OBSERVATION PER HR: HCPCS

## 2022-11-21 PROCEDURE — 1200000000 HC SEMI PRIVATE

## 2022-11-21 PROCEDURE — 6370000000 HC RX 637 (ALT 250 FOR IP): Performed by: NURSE PRACTITIONER

## 2022-11-21 PROCEDURE — 97535 SELF CARE MNGMENT TRAINING: CPT

## 2022-11-21 PROCEDURE — 6360000002 HC RX W HCPCS: Performed by: HOSPITALIST

## 2022-11-21 PROCEDURE — 6370000000 HC RX 637 (ALT 250 FOR IP): Performed by: HOSPITALIST

## 2022-11-21 PROCEDURE — 96372 THER/PROPH/DIAG INJ SC/IM: CPT

## 2022-11-21 PROCEDURE — 2580000003 HC RX 258: Performed by: NURSE PRACTITIONER

## 2022-11-21 PROCEDURE — 97110 THERAPEUTIC EXERCISES: CPT

## 2022-11-21 PROCEDURE — 6370000000 HC RX 637 (ALT 250 FOR IP): Performed by: INTERNAL MEDICINE

## 2022-11-21 PROCEDURE — 97116 GAIT TRAINING THERAPY: CPT

## 2022-11-21 PROCEDURE — 85610 PROTHROMBIN TIME: CPT

## 2022-11-21 PROCEDURE — 80048 BASIC METABOLIC PNL TOTAL CA: CPT

## 2022-11-21 PROCEDURE — 99223 1ST HOSP IP/OBS HIGH 75: CPT | Performed by: PSYCHIATRY & NEUROLOGY

## 2022-11-21 PROCEDURE — 36415 COLL VENOUS BLD VENIPUNCTURE: CPT

## 2022-11-21 RX ORDER — INSULIN LISPRO 100 [IU]/ML
0-4 INJECTION, SOLUTION INTRAVENOUS; SUBCUTANEOUS NIGHTLY
Status: DISCONTINUED | OUTPATIENT
Start: 2022-11-22 | End: 2022-11-23 | Stop reason: HOSPADM

## 2022-11-21 RX ORDER — WARFARIN SODIUM 5 MG/1
5 TABLET ORAL
Status: COMPLETED | OUTPATIENT
Start: 2022-11-21 | End: 2022-11-21

## 2022-11-21 RX ORDER — INSULIN GLARGINE 100 [IU]/ML
25 INJECTION, SOLUTION SUBCUTANEOUS NIGHTLY
Status: DISCONTINUED | OUTPATIENT
Start: 2022-11-21 | End: 2022-11-23 | Stop reason: HOSPADM

## 2022-11-21 RX ORDER — INSULIN LISPRO 100 [IU]/ML
0-16 INJECTION, SOLUTION INTRAVENOUS; SUBCUTANEOUS
Status: DISCONTINUED | OUTPATIENT
Start: 2022-11-22 | End: 2022-11-23 | Stop reason: HOSPADM

## 2022-11-21 RX ADMIN — Medication 10 ML: at 20:10

## 2022-11-21 RX ADMIN — INSULIN LISPRO 6 UNITS: 100 INJECTION, SOLUTION INTRAVENOUS; SUBCUTANEOUS at 11:49

## 2022-11-21 RX ADMIN — INSULIN LISPRO 6 UNITS: 100 INJECTION, SOLUTION INTRAVENOUS; SUBCUTANEOUS at 17:20

## 2022-11-21 RX ADMIN — TRAMADOL HYDROCHLORIDE 50 MG: 50 TABLET, COATED ORAL at 08:23

## 2022-11-21 RX ADMIN — GABAPENTIN 300 MG: 300 CAPSULE ORAL at 08:12

## 2022-11-21 RX ADMIN — ENOXAPARIN SODIUM 30 MG: 100 INJECTION SUBCUTANEOUS at 08:11

## 2022-11-21 RX ADMIN — ROSUVASTATIN CALCIUM 20 MG: 10 TABLET, FILM COATED ORAL at 20:05

## 2022-11-21 RX ADMIN — Medication 10 ML: at 08:21

## 2022-11-21 RX ADMIN — INSULIN LISPRO 4 UNITS: 100 INJECTION, SOLUTION INTRAVENOUS; SUBCUTANEOUS at 20:10

## 2022-11-21 RX ADMIN — GABAPENTIN 300 MG: 300 CAPSULE ORAL at 15:09

## 2022-11-21 RX ADMIN — WARFARIN SODIUM 5 MG: 5 TABLET ORAL at 18:25

## 2022-11-21 RX ADMIN — TRAZODONE HYDROCHLORIDE 200 MG: 50 TABLET ORAL at 20:05

## 2022-11-21 RX ADMIN — LAMOTRIGINE 25 MG: 25 TABLET ORAL at 20:04

## 2022-11-21 RX ADMIN — VERAPAMIL HYDROCHLORIDE 120 MG: 120 TABLET, FILM COATED, EXTENDED RELEASE ORAL at 20:05

## 2022-11-21 RX ADMIN — INSULIN GLARGINE 25 UNITS: 100 INJECTION, SOLUTION SUBCUTANEOUS at 20:10

## 2022-11-21 RX ADMIN — LAMOTRIGINE 50 MG: 25 TABLET ORAL at 08:11

## 2022-11-21 RX ADMIN — ENOXAPARIN SODIUM 30 MG: 100 INJECTION SUBCUTANEOUS at 20:05

## 2022-11-21 RX ADMIN — GABAPENTIN 300 MG: 300 CAPSULE ORAL at 20:05

## 2022-11-21 RX ADMIN — INSULIN LISPRO 4 UNITS: 100 INJECTION, SOLUTION INTRAVENOUS; SUBCUTANEOUS at 08:13

## 2022-11-21 ASSESSMENT — PAIN SCALES - GENERAL
PAINLEVEL_OUTOF10: 8
PAINLEVEL_OUTOF10: 8

## 2022-11-21 ASSESSMENT — PAIN DESCRIPTION - DESCRIPTORS: DESCRIPTORS: ACHING;DISCOMFORT

## 2022-11-21 ASSESSMENT — PAIN DESCRIPTION - ORIENTATION: ORIENTATION: MID

## 2022-11-21 ASSESSMENT — PAIN DESCRIPTION - LOCATION: LOCATION: BACK

## 2022-11-21 NOTE — CONSULTS
In patient Neurology consult        [facility]      MD Sandro Arredondo  1950    Date of Service: 11/21/2022    Referring Physician: Reji Rodriguez MD      Reason for the consult and CC: Chronic bilateral lower extremity weakness    HPI:   The patient is a 67 y.o.   male, with a PMH of multiple back surgeries, urinary retention, Factor V Leiden on warfarin, DM2, who presented to [facility] following a fall. The patient was recently seen on 10/29 at [facility] for similar symptoms. He was discharged to SNF for two weeks for rehab and then back home. He had an epidural injection on 11/15 for lumbar radiculopathy. He went back home and then fell. His wife feels she can no longer care for him at home. Of note, he is under the care of Dr. Marc Sommer, who is currently evaluating the patient for possible Parkinson's disease. He is incontinent of urine, which has been going on for several years now. He denies fever, chills, headache, dizziness, vision changes, dysarthria, dysphagia, tinnitus.           Family History   Problem Relation Age of Onset    Alcohol Abuse Sister     Coronary Art Dis Mother     Obesity Mother     Osteoarthritis Mother     Parkinsonism Mother     Coronary Art Dis Father     Obesity Father      Past Surgical History:   Procedure Laterality Date    APPENDECTOMY      BACK SURGERY      CERVICAL FUSION      COLONOSCOPY      EYE SURGERY      HIP SURGERY      JOINT REPLACEMENT Right     THR    OTHER SURGICAL HISTORY  06/06/2018     right L4-5 hemilaminotomy, partial facetectomy, foraminotomy, reexploration and excision of synovial csyt,  Bilateral L2-3 and L3-4 hemilaminotomy, partial facetectomy, foraminotomy    LA NERVOUS SYSTEM SURGERY UNLISTED Bilateral 11/12/2018    BILATERAL LUMBAR THREE, LUMBAR FOUR, LUMBAR FIVE RADIOFREQUENCY ABLATION SITE CONFIRMED BY FLUOROSCOPY performed by Rubi Farr MD at [address] [facility] DX/THER SBST INTRLMNR CRV/THRC W/IMG GDN Bilateral 9/25/2018    BILATERAL LUMBAR THREE, LUMBAR FOUR, LUMBAR FIVE MEDIAL BRANCH BLOCK SITE CONFIRMED BY FLUOROSCOPY performed by Cristino Wright MD at [address] [facility] DX/THER SBST INTRLMNR CRV/THRC W/IMG GDN Bilateral 10/16/2018    BILATERAL LUMBAR THREE, FOUR, FIVE MEDIAL BRANCH BLOCK SITE CONFIRMED BY FLUOROSCOPY performed by Cristino Wright MD at [facility]    TURP N/A 6/9/2020    CYSTOSCOPY, TRANSURETHRAL RESECTION OF PROSTATE performed by Zabrina Alvarez MD at [facility]        Past Medical History:   Diagnosis Date    Anxiety     Arthritis     Asthma     Bipolar 1 disorder ([address]     Colitis     Depression     Diabetes ([address]     Diabetes mellitus ([address]     Encounter for imaging to screen for metal prior to MRI 06/08/2022    NOT MRI conditional [place] model#SC-[id number], Lead: [id number] implanted 9/9/19 by Lucian Melo at [facility]. Lead is unsafe for MRI. Patient unable to have any MRI ever.     Factor V Leiden mutation ([address]     Glaucoma     H/O bladder problems     High blood pressure     History of kidney problems     IBS (irritable bowel syndrome)     Liver disease     hx of elevated LFT's    Obesity     Psychiatric problem      Social History     Tobacco Use    Smoking status: Never    Smokeless tobacco: Never   Vaping Use    Vaping Use: Never used   Substance Use Topics    Alcohol use: No    Drug use: No     Allergies   Allergen Reactions    No Known Allergies      Current Facility-Administered Medications   Medication Dose Route Frequency Provider Last Rate Last Admin    insulin glargine (LANTUS) injection vial 25 Units  25 Units SubCUTAneous Nightly Americo Santos MD        Dulaglutide SOPN 3 mg (Patient Supplied)  3 mg SubCUTAneous Weekly Americo Santos MD        warfarin placeholder: dosing by pharmacy   Other RX Placeholder Sharon Estrada APRN - CNP        enoxaparin Sodium (LOVENOX) injection 30 mg  30 mg SubCUTAneous BID Orville Arriola Deric Mustafa MD   30 mg at 11/21/22 0811    pantoprazole (PROTONIX) tablet 40 mg  40 mg Oral QAM AC Sheila Armijo MD        traMADol (ULTRAM) tablet 50 mg  50 mg Oral Q6H PRN Bridgette Vince, APRN - CNP   50 mg at 11/21/22 6918    lamoTRIgine (LAMICTAL) tablet 50 mg  50 mg Oral Daily Sheila Armijo MD   50 mg at 11/21/22 0811    lamoTRIgine (LAMICTAL) tablet 25 mg  25 mg Oral Nightly Sheila Armijo MD   25 mg at 11/20/22 2039    lidocaine 4 % external patch 1 patch  1 patch TransDERmal Daily Sheila Armijo MD   1 patch at 11/21/22 7818    acetaminophen (TYLENOL) tablet 1,000 mg  1,000 mg Oral Nightly PRN Bridgette Vince, APRN - CNP        gabapentin (NEURONTIN) capsule 300 mg  300 mg Oral TID Bridgette Vince, APRN - CNP   300 mg at 11/21/22 5551    rosuvastatin (CRESTOR) tablet 20 mg  20 mg Oral Nightly Bridgette Vince, APRN - CNP   20 mg at 11/20/22 2039    traZODone (DESYREL) tablet 200 mg  200 mg Oral Nightly Bridgette Vince, APRN - CNP   200 mg at 11/20/22 2039    verapamil (CALAN SR) extended release tablet 120 mg  120 mg Oral Nightly Bridgette Vince, APRN - CNP   120 mg at 11/20/22 2039    sodium chloride flush 0.9 % injection 5-40 mL  5-40 mL IntraVENous 2 times per day Bridgette Vince, APRN - CNP   10 mL at 11/21/22 0849    sodium chloride flush 0.9 % injection 5-40 mL  5-40 mL IntraVENous PRN Bridgette Vince, APRN - CNP        0.9 % sodium chloride infusion   IntraVENous PRN Bridgette Vince, APRN - CNP        ondansetron (ZOFRAN-ODT) disintegrating tablet 4 mg  4 mg Oral Q8H PRN Bridgette Vince, APRN - CNP        Or    ondansetron (ZOFRAN) injection 4 mg  4 mg IntraVENous Q6H PRN Bridgette Vince, APRN - CNP        polyethylene glycol (GLYCOLAX) packet 17 g  17 g Oral Daily PRN Bridgette Vince, APRN - CNP        acetaminophen (TYLENOL) tablet 650 mg  650 mg Oral Q6H PRN FRANCISCO JAVIER Dyer CNP   650 mg at 11/19/22 1424    Or    acetaminophen (TYLENOL) suppository 650 mg  650 mg Rectal Q6H PRN FRANCISCO JAVIER Dyer CNP insulin lispro (HUMALOG) injection vial 0-8 Units  0-8 Units SubCUTAneous TID WC Meghnakareem Chengton, APRN - CNP   6 Units at 11/21/22 1149    insulin lispro (HUMALOG) injection vial 0-4 Units  0-4 Units SubCUTAneous Nightly Meghna Vivar, APRN - CNP   4 Units at 11/20/22 2045    glucose chewable tablet 16 g  4 tablet Oral PRN Paris Baston, APRN - CNP        dextrose bolus 10% 125 mL  125 mL IntraVENous PRN Meghna Baston, APRN - CNP        Or    dextrose bolus 10% 250 mL  250 mL IntraVENous PRN Paris Baston, APRN - CNP        glucagon (rDNA) injection 1 mg  1 mg SubCUTAneous PRN Paris Baston, APRN - CNP        dextrose 10 % infusion   IntraVENous Continuous PRN Paris Baston, APRN - CNP           ROS : A 10-14 system review of constitutional, cardiovascular, respiratory, eyes, musculoskeletal, endocrine, GI, ENT, skin, hematological, genitourinary, psychiatric and neurologic systems was obtained and updated today and is unremarkable except as mentioned in my HPI      Exam:     Constitutional:   Vitals:    11/20/22 2300 11/21/22 0809 11/21/22 0823 11/21/22 1347   BP: 133/69 128/76  123/67   Pulse: 60 65  69   Resp: 16 16 16 16   Temp: 98 °F (36.7 °C) 97.4 °F (36.3 °C)  97.3 °F (36.3 °C)   TempSrc: Oral Oral  Oral   SpO2: 95% 97%  97%   Weight:       Height:           General appearance and observation: Normal development and appear in no acute distress. Eye:  Fundus: No blurring of optic disc. Neck: supple  Cardiovascular: No lower leg edema with good pulsation. Mental Status:   Oriented to person, place, problem, and time. Memory: Good immediate recall. Intact remote memory  Normal attention span and concentration. Language: intact naming, repeating and fluency   Good fund of Knowledge. Aware of current events and vocabulary   Cranial Nerves:   II: Visual fields: Full. Pupils: equal, round, reactive to light  III,IV,VI: Extra Ocular Movements are intact.  No nystagmus  V: Facial sensation is intact  VII: Facial strength and movements: intact and symmetric  VIII: Hearing: Intact  IX: Palate elevation is symmetric  XI: Shoulder shrug is intact  XII: Tongue movements are normal  Musculoskeletal: 3/5 BLE. Poor effort, no weakness in the arms. Tone: Normal tone. Reflexes: Symmetric 2+ in the arms and 3+ in the legs   Planters: flexor bilaterally. Coordination: no pronator drift, no dysmetria with FNF in upper extremities. Normal REM. Sensation: normal to all modalities in both arms and legs. Gait/Posture: unsteady gait, uses walker    Data:  LABS:   Lab Results   Component Value Date/Time     11/21/2022 11:34 AM    K 4.6 11/21/2022 11:34 AM    K 4.6 11/19/2022 05:55 AM     11/21/2022 11:34 AM    CO2 25 11/21/2022 11:34 AM    BUN 17 11/21/2022 11:34 AM    CREATININE 1.1 11/21/2022 11:34 AM    GFRAA >60 06/10/2022 07:54 AM    GFRAA >60 10/21/2010 07:10 AM    LABGLOM >60 11/21/2022 11:34 AM    GLUCOSE 359 11/21/2022 11:34 AM    PHOS 3.2 07/17/2020 04:50 AM    MG 2.40 07/17/2020 04:50 AM    CALCIUM 9.5 11/21/2022 11:34 AM     Lab Results   Component Value Date/Time    WBC 9.6 11/19/2022 05:55 AM    RBC 4.50 11/19/2022 05:55 AM    HGB 14.4 11/19/2022 05:55 AM    HCT 44.3 11/19/2022 05:55 AM    MCV 98.4 11/19/2022 05:55 AM    RDW 13.0 11/19/2022 05:55 AM     11/19/2022 05:55 AM     Lab Results   Component Value Date    INR 1.31 (H) 11/18/2022    PROTIME 16.2 (H) 11/18/2022       Neuroimaging was independently reviewed by myself and discussed results with the patient and/or family  Reviewed notes from different physicians  Reviewed lab and blood testing    Impression:    Chronic, bilateral lower extremity weakness - this is an ongoing issue and patient follows closely with neurosurgery. Likely related to radiculopathy. Less likely CVA. Reflexes are present, so less likely AIDP. Unfortunately, patient is unable to have a MRI due to a spinal cord stimulator. CT Head and c-spine negative.     Factor V Leiden mutation on warfarin  HTN  HLD  Bipolar disorder    Recommendation:     CT of lumbar spine ordered. Monitor on tele. Continue warfarin - pharmacy to dose. Follow INR. Continue home BP meds. Continue lamictal for bipolar. F/u A1c, lipid panel. Improved glycemic control. SSI coverage while inpatient. PT/OT/SLP    Patient is scheduled for REBECCA scan tomorrow. I spoke with Nuclear Medicine. This scan cannot be completed as inpatient. Emily Howard in [facility] is willing to scan the patient right after discharge, but before leaving the building. She will need 24 hours notice to order the isotope. Discussed with nursing. Thank you for referring such patient. If you have any questions regarding my consult note, please don't hesitate to call me. Ludwig Hodgkins, NEAL    Attending Supervising [de-identified] Attestation Statement      The patient was seen 11/21/2022 in conjunction with the nurse practitioner with independent history, evaluation and examination. I agree with the note which has been adjusted to reflect my findings, with the addition of the following: The patient is 67 y.o.  male  who was admitted for recurrent falling and lower leg weakness. Seen by neurology recently for possible Parkinson disease. He was scheduled for REBECCA scan today outpatient. He came to the ED for evaluation regarding recurrent falling and weakness. Symptoms started weeks ago according to the patient. He was not a good historian. He fell on day of admission but no significant head injury or blacking out. No clear triggers of lightheadedness or dizziness. No other relieving or aggravating factor except worsening of his leg weakness. Wife took him to the emergency room. He was eventually admitted. Further work-up with CT of his head and C-spine showed no significant changes. Cannot have an MRI. Today he denies any other new symptoms.   No headache, chest pain, neck or worsening back pain or bladder or bowel issues. No numbness or tingling. Other review of system was unremarkable. On examination:  No acute distress  Awake and alert x3. Fluent speech. Appears appropriate with intact recent and remote memory. Pupil reactive and symmetric, extraocular motor intact, no ophthalmoplegia, face is symmetric and tongue is midline  No focal weakness with symmetric DTR  Normal tone  No sensory disturbance or abnormal movement    Impression:  Chronic leg weakness with recent worsening. Exam is limited due to poor cooperation from the patient but mostly consistent with myelopathy and thus lumbar spinal stenosis in our differential diagnosis. Would recommend CT of the lumbar region since he cannot have an MRI. ? Underlying secondary parkinsonism. Continue current treatment and follow-up with his outpatient neurologist regarding further management. Would not start Sinemet at this point. Factor V Leiden on warfarin:  Need to assess pros and cons of blood thinner if only became an issue  Hypertension: Continue current blood pressure medication and blood pressure control, PT and OT  Hyperlipidemia: Statin and follow lipid panel  Check repeat inflammatory markers in the serum  Continue Lamictal but watch for interaction between Coumadin and Lamictal  DC planning when medically stable          Electronically signed by Damian Aguilar MD on 11/21/22 at 2:48 PM EST       This dictation was generated by voice recognition computer software.  Although all attempts are made to edit the dictation for accuracy, there may be errors in the  transcription that are not intended

## 2022-11-21 NOTE — PLAN OF CARE
Problem: Discharge Planning  Goal: Discharge to home or other facility with appropriate resources  Outcome: Progressing  Flowsheets (Taken 11/21/2022 0932)  Discharge to home or other facility with appropriate resources:   Identify barriers to discharge with patient and caregiver   Refer to discharge planning if patient needs post-hospital services based on physician order or complex needs related to functional status, cognitive ability or social support system     Problem: Pain  Goal: Verbalizes/displays adequate comfort level or baseline comfort level  Outcome: Progressing  Flowsheets (Taken 11/21/2022 0932)  Verbalizes/displays adequate comfort level or baseline comfort level:   Encourage patient to monitor pain and request assistance   Assess pain using appropriate pain scale   Administer analgesics based on type and severity of pain and evaluate response   Implement non-pharmacological measures as appropriate and evaluate response     Problem: Skin/Tissue Integrity  Goal: Absence of new skin breakdown  Description: 1. Monitor for areas of redness and/or skin breakdown  2. Assess vascular access sites hourly  3. Every 4-6 hours minimum:  Change oxygen saturation probe site  4. Every 4-6 hours:  If on nasal continuous positive airway pressure, respiratory therapy assess nares and determine need for appliance change or resting period.   Outcome: Progressing     Problem: Safety - Adult  Goal: Free from fall injury  Outcome: Progressing  Flowsheets (Taken 11/21/2022 0932)  Free From Fall Injury: Instruct family/caregiver on patient safety

## 2022-11-21 NOTE — PROGRESS NOTES
Occupational Therapy  Facility/Department: [facility] - MED SURG/ORTHO  Daily Treatment Note  NAME: Farhana Domínguez  : 1950  MRN: [id number]    Date of Service: 2022    Discharge Recommendations:  Subacute/Skilled Nursing Facility     AM-PAC score  AM-PAC Inpatient Daily Activity Raw Score: 14 (22 1515)  AM-PAC Inpatient ADL T-Scale Score : 33.39 (22 151)  ADL Inpatient CMS 0-100% Score: 59.67 (22)  ADL Inpatient CMS G-Code Modifier : CK (22)      Patient Diagnosis(es): The primary encounter diagnosis was Frequent falls. Diagnoses of Diabetic peripheral neuropathy (HCC), Chronic bilateral low back pain, unspecified whether sciatica present, Minor closed head injury, Rib contusion, right, initial encounter, Bilateral leg weakness, Anticoagulated on Coumadin, and Subtherapeutic international normalized ratio (INR) were also pertinent to this visit. Assessment    Assessment: Patient seen as Co-tx collaboration this date to safely meet goals and will have better occupational performance outcomes with in a co-treatment than 1:1 session. Today min of 2 transfers with RW and mobility, progressing to min A of 1. Pt maxA for LE dressing this date. Pt will benefit continue to function below baseline and would benefit from continued skilled OT services while in acute care  Activity Tolerance: Patient tolerated treatment well      Plan   Occupational Therapy Plan  Times Per Week: 3-5x's per week     Restrictions  Restrictions/Precautions  Restrictions/Precautions: General Precautions  Position Activity Restriction  Other position/activity restrictions: Getachew, gagandeep    Subjective   Orientation  Orientation Level: Oriented to person;Oriented to place;Oriented to situation;Disoriented to time  Pain: Pain: back to hips 8/10; R side 10/10  Cognition  Overall Cognitive Status: Exceptions  Arousal/Alertness: Delayed responses to stimuli  Following Commands:  Follows one step commands consistently  Attention Span: Attends with cues to redirect  Memory: Decreased short term memory  Safety Judgement: Decreased awareness of need for assistance  Problem Solving: Assistance required to generate solutions  Insights: Decreased awareness of deficits  Initiation: Requires cues for some  Sequencing: Requires cues for some        Objective    Vitals  Vitals  Comment: Vitals: 113/82, HR 59 bpm, 99% RA    Bed Mobility Training  Bed Mobility Training: Yes  Supine to Sit: Assist X2;Moderate assistance     ADL  UE Dressing: Minimal assistance (gown)  LE Dressing: Maximum assistance (socks)  Toileting: Dependent/Total (purewick)        Safety Devices  Type of Devices: Call light within reach; Chair alarm in place;Nurse notified;Gait belt;Left in chair     Patient Education  Education Given To: Patient; Family  Education Provided: Role of Therapy;Plan of Care;Transfer Training  Education Provided Comments: Disease specific:  Education Method: Verbal;Demonstration  Barriers to Learning: Cognition  Education Outcome: Verbalized understanding;Continued education needed;Demonstrated understanding    Goals  Short Term Goals  Time Frame for Short Term Goals: 1 week (11/26) unless noted  Short Term Goal 1: Pt will complete supine<>sit with min Ax2 and use of log roll technique  Short Term Goal 2: Pt will perform STS with max Ax2 in preparation for toilet transfers  Short Term Goal 3: Pt will complete 2-3 EOB grooming tasks with CGA  Patient Goals   Patient goals : \"I want to be able to sit up at the edge of the bed\"       Therapy Time   Individual Concurrent Group Co-treatment   Time In 1428         Time Out 1510         Minutes 42         Timed Code Treatment Minutes: 42 Minutes       Sophie Carrington OTR/L  If pt is unable to be seen after this session, please let this note serve as discharge summary. Please see case management note for discharge disposition. Thank you.

## 2022-11-21 NOTE — CONSULTS
Pharmacy Note  Warfarin Consult    Dx: Factor V Leiden  Goal INR range 2-3  Home Warfarin dose: 5 mg daily? Would recommend continue bridge until INR therapeutic. Date  INR  Warfarin  11/21              1.08                    5 mg    Recommend Warfarin 5 mg tonight x1. Daily INR ordered. Rx will continue to manage therapy per consult order.     Micki Duff, PharmD 11/21/2022  3:23 PM

## 2022-11-21 NOTE — CARE COORDINATION
CASE MANAGEMENT INITIAL ASSESSMENT      Reviewed chart and completed assessment with patient:bedside  Family present: none  Explained Case Management role/services. yes    Primary contact information:[facility] Decision Maker :   Primary Decision Maker: Lorna Abdi - Spouse - [phone]          Can this person be reached and be able to respond quickly, such as within a few minutes or hours? Yes      Admit date/status:11/18/22/ Inp  Diagnosis:Generalized Weakness   Is this a Readmission?:  Yes      Insurance:[employer] Medicare/    Precert required for SNF: Yes       3 night stay required: No    Living arrangements, Adls, care needs, prior to admission:Lives with spouse. Needed help with ADL's    Durable Medical Equipment at home:  Walker_X_Cane_X_RTS__ BSC__Shower Chair__  02__ HHN__ CPAP__  BiPap__  Hospital Bed__ W/C___ Other_____    Services in the home and/or outpatient, prior to admission:none    Current PCP:Мария Alfred        Medications: Prescription coverage? Yes     Transportation needs: Squad         PT/OT recs:SNF    Hospital Exemption Notification (HEN):needed for SNF    Barriers to discharge:none    Plan/comments:Patient likely to go back to [facility] as this is his preference from the last stay. Lives with spouse currently in a ranch style home, with 2 MATTHEW. Patient doesn't currently drive, so spouse is able to take him to and from appts and to get needed meds. Will continue to follow.       Jer Leach RN

## 2022-11-21 NOTE — PROGRESS NOTES
Physical Therapy  Facility/Department: [facility] C5 - MED SURG/ORTHO  Daily Treatment Note  NAME: Barber Molina  : 1950  MRN: [id number]    Date of Service: 2022    Discharge Recommendations:  Subacute/Skilled Nursing Facility   PT Equipment Recommendations  Equipment Needed: No  Other: defer to next level of care. Patient Diagnosis(es): The primary encounter diagnosis was Frequent falls. Diagnoses of Diabetic peripheral neuropathy (HCC), Chronic bilateral low back pain, unspecified whether sciatica present, Minor closed head injury, Rib contusion, right, initial encounter, Bilateral leg weakness, Anticoagulated on Coumadin, and Subtherapeutic international normalized ratio (INR) were also pertinent to this visit. [facility] 6 Clicks Inpatient Mobility:  AM-PAC Mobility Inpatient   How much difficulty turning over in bed?: A Little  How much difficulty sitting down on / standing up from a chair with arms?: A Little  How much difficulty moving from lying on back to sitting on side of bed?: A Little  How much help from another person moving to and from a bed to a chair?: A Little  How much help from another person needed to walk in hospital room?: A Little  How much help from another person for climbing 3-5 steps with a railing?: A Little  AM-[facility] Inpatient Mobility Raw Score : 18  AM-PAC Inpatient T-Scale Score : 43.63  Mobility Inpatient CMS 0-100% Score: 46.58  Mobility Inpatient CMS G-Code Modifier : CK     Assessment   Assessment: Pt has progressed significanlty from previous session. Pt was min-A x1 for bed mobility, SBA for transfers, and min/mod-A x2 for ambulation. When standing statically, pt had a backward sway requiring tactile and verbal cues to correct posture. When ambulating pt would move walker to far forward and had a kyphotic posture requiring continueous verbal, manual, and tactile cues to correct. Pt would benefit from further acute PT to return to baseline.  At d/c, PT recommends SNF.  Activity Tolerance: Patient tolerated treatment well  Equipment Needed: No  Other: defer to next level of care. Plan    Physcial Therapy Plan  General Plan: 2-3 times per week  Current Treatment Recommendations: Strengthening;Balance training;Functional mobility training;Transfer training;Manual Therapy - Soft Tissue Mobilization;Pain management;Patient/Caregiver education & training;Equipment evaluation, education, & procurement; Therapeutic activities; Modalities; Home exercise program;Safety education & training;Neuromuscular re-education;Stair training;Gait training; Endurance training     Restrictions  Restrictions/Precautions  Restrictions/Precautions: General Precautions  Position Activity Restriction  Other position/activity restrictions: Tele, purewick     Subjective    Subjective  Subjective: Pt happily agreed to PT  Pain: Pt decribed pain as belts and a stabbing belt. First around groin/hip area was 8/10, another around navel was 9/10  Orientation  Overall Orientation Status: Within Functional Limits  Orientation Level: Oriented to person;Oriented to place;Oriented to situation;Disoriented to time  Cognition  Overall Cognitive Status: Exceptions  Arousal/Alertness: Delayed responses to stimuli  Following Commands: Follows one step commands consistently  Attention Span: Attends with cues to redirect  Memory: Decreased short term memory  Safety Judgement: Decreased awareness of need for assistance  Problem Solving: Assistance required to generate solutions  Insights: Decreased awareness of deficits  Initiation: Requires cues for some  Sequencing: Requires cues for some     Objective   Vitals  Heart Rate: 59  BP: 113/82  BP Location: Left upper arm  BP Method: Automatic  MAP (Calculated): 92  SpO2: 99 %  O2 Device: None (Room air)  Bed Mobility Training  Bed Mobility Training: Yes  Overall Level of Assistance: Minimum assistance;Assist X1;Stand-by assistance  Interventions: Verbal cues; Visual cues  Rolling: Stand-by assistance  Supine to Sit: Minimum assistance;Assist X1;Additional time (assistance with pulling into upright posture)  Scooting: Stand-by assistance; Additional time  Balance  Sitting: Intact  Sitting - Static: Good (unsupported)  Sitting - Dynamic: Good (unsupported)  Standing: Impaired  Standing - Static: Constant support (had LOB at times requiring A to find balance)  Standing - Dynamic: Constant support  Transfer Training  Transfer Training: Yes  Overall Level of Assistance: Adaptive equipment; Additional time;Stand-by assistance  Sit to Stand: Stand-by assistance; Additional time; Adaptive equipment (pt was able to push himself to a full upright posture but took approximatley 10-15 seconds)  Stand to Sit: Stand-by assistance; Additional time; Adaptive equipment  Gait Training  Gait Training: Yes  Gait  Overall Level of Assistance: Minimum assistance;Assist X2 (pt would lose balance at times requiring some support from therapists however was able to ambulate successfully with RW. Pt also needed some A with walker mobilization)  Interventions: Verbal cues; Visual cues;Manual cues  Speed/Shanna: Slow  Step Length: Right shortened;Left shortened  Swing Pattern: Right symmetrical;Left symmetrical  Gait Abnormalities: Shuffling gait  Distance (ft): 16 Feet (2 bouts of 8 feet (4 forward and 4 back to chair))  Assistive Device: Walker, rolling     PT Exercises  Exercise Treatment: x10BLE: ankle pumps, LAQ, marches     Safety Devices  Type of Devices: Call light within reach; Chair alarm in place;Nurse notified;Gait belt;Left in chair       Goals  Short Term Goals  Time Frame for Short Term Goals: 7 days (11/26) unless otherwise stated. Short Term Goal 1: pt will demonstrates MIN A for bed mobility and be compliant with log roll technique.   Short Term Goal 2: pt will perform functional transfers with LRAD and MIN A X1 (MET 11/21/2022)  Short Term Goal 3: pt will ambulate 22' with LRAD and MIN A.  Short Term Goal 4: pt will perform 12-15 reps of BLE exercises to improve strength and endurance by 11/23. Patient Goals   Patient Goals : \"I want to get stronger. \"    Education  Patient Education  Education Given To: Patient; Family  Education Provided: Role of Therapy;Transfer Training;Equipment;Plan of Care; Fall Prevention Strategies  Education Method: Demonstration;Verbal  Barriers to Learning: Cognition  Education Outcome: Verbalized understanding;Continued education needed    Therapy Time   Individual Concurrent Group Co-treatment   Time In 1427         Time Out 5015         Minutes 51         Timed Code Treatment Minutes: 46 Minutes     If pt is unable to be seen after this session, please let this note serve as discharge summary. Please see case management note for discharge disposition. Thank you.    Liznadro Yeager SPT

## 2022-11-21 NOTE — ACP (ADVANCE CARE PLANNING)
Advance Care Planning     General Advance Care Planning (ACP) Conversation    Date of Conversation: 11/18/2022  Conducted with: Patient with Decision Making Capacity    Healthcare Decision Maker:    Primary Decision Maker: Norma Lopez - Spouse - [phone]  Click here to complete Healthcare Decision Makers including selection of the Healthcare Decision Maker Relationship (ie \"Primary\"). Today we documented Decision Maker(s). The patient will provide ACP documents. Content/Action Overview:   Has ACP document(s) NOT on file - requested patient to provide  Reviewed DNR/DNI and patient elects Full Code (Attempt Resuscitation)    Length of Voluntary ACP Conversation in minutes:  <16 minutes (Non-Billable)      Elroy Hawley RN

## 2022-11-22 LAB
BILIRUBIN URINE: NEGATIVE
BLOOD, URINE: NEGATIVE
CLARITY: CLEAR
COLOR: YELLOW
GLUCOSE BLD-MCNC: 250 MG/DL (ref 70–99)
GLUCOSE BLD-MCNC: 272 MG/DL (ref 70–99)
GLUCOSE BLD-MCNC: 280 MG/DL (ref 70–99)
GLUCOSE BLD-MCNC: 327 MG/DL (ref 70–99)
GLUCOSE URINE: >=1000 MG/DL
INR BLD: 1.07 (ref 0.87–1.14)
KETONES, URINE: NEGATIVE MG/DL
LEUKOCYTE ESTERASE, URINE: NEGATIVE
MICROSCOPIC EXAMINATION: ABNORMAL
NITRITE, URINE: NEGATIVE
PERFORMED ON: ABNORMAL
PH UA: 6.5 (ref 5–8)
PROTEIN UA: NEGATIVE MG/DL
PROTHROMBIN TIME: 13.8 SEC (ref 11.7–14.5)
SPECIFIC GRAVITY UA: 1.01 (ref 1–1.03)
URINE REFLEX TO CULTURE: ABNORMAL
URINE TYPE: ABNORMAL
UROBILINOGEN, URINE: 0.2 E.U./DL

## 2022-11-22 PROCEDURE — 85610 PROTHROMBIN TIME: CPT

## 2022-11-22 PROCEDURE — 6370000000 HC RX 637 (ALT 250 FOR IP): Performed by: HOSPITALIST

## 2022-11-22 PROCEDURE — 1200000000 HC SEMI PRIVATE

## 2022-11-22 PROCEDURE — 6370000000 HC RX 637 (ALT 250 FOR IP): Performed by: NURSE PRACTITIONER

## 2022-11-22 PROCEDURE — 99232 SBSQ HOSP IP/OBS MODERATE 35: CPT | Performed by: PSYCHIATRY & NEUROLOGY

## 2022-11-22 PROCEDURE — 6370000000 HC RX 637 (ALT 250 FOR IP): Performed by: INTERNAL MEDICINE

## 2022-11-22 PROCEDURE — 81003 URINALYSIS AUTO W/O SCOPE: CPT

## 2022-11-22 PROCEDURE — 36415 COLL VENOUS BLD VENIPUNCTURE: CPT

## 2022-11-22 PROCEDURE — 2580000003 HC RX 258: Performed by: NURSE PRACTITIONER

## 2022-11-22 PROCEDURE — 6360000002 HC RX W HCPCS: Performed by: HOSPITALIST

## 2022-11-22 RX ORDER — GABAPENTIN 300 MG/1
300 CAPSULE ORAL 3 TIMES DAILY
Qty: 30 CAPSULE | Refills: 0 | Status: SHIPPED | OUTPATIENT
Start: 2022-11-22 | End: 2022-12-02

## 2022-11-22 RX ORDER — POLYETHYLENE GLYCOL 3350 17 G/17G
17 POWDER, FOR SOLUTION ORAL DAILY PRN
Qty: 527 G | Refills: 1 | DISCHARGE
Start: 2022-11-22 | End: 2022-12-22

## 2022-11-22 RX ORDER — PANTOPRAZOLE SODIUM 40 MG/1
40 TABLET, DELAYED RELEASE ORAL
Qty: 30 TABLET | Refills: 3 | DISCHARGE
Start: 2022-11-23

## 2022-11-22 RX ORDER — INSULIN LISPRO 100 [IU]/ML
0-4 INJECTION, SOLUTION INTRAVENOUS; SUBCUTANEOUS NIGHTLY
DISCHARGE
Start: 2022-11-22

## 2022-11-22 RX ORDER — INSULIN LISPRO 100 [IU]/ML
0-16 INJECTION, SOLUTION INTRAVENOUS; SUBCUTANEOUS
DISCHARGE
Start: 2022-11-22

## 2022-11-22 RX ORDER — WARFARIN SODIUM 5 MG/1
5 TABLET ORAL
Status: COMPLETED | OUTPATIENT
Start: 2022-11-22 | End: 2022-11-22

## 2022-11-22 RX ORDER — WARFARIN SODIUM 5 MG/1
TABLET ORAL
Qty: 30 TABLET | Refills: 3 | DISCHARGE
Start: 2022-11-22

## 2022-11-22 RX ORDER — TRAMADOL HYDROCHLORIDE 50 MG/1
50 TABLET ORAL EVERY 6 HOURS PRN
Qty: 18 TABLET | Refills: 0 | Status: SHIPPED | OUTPATIENT
Start: 2022-11-22 | End: 2022-12-04

## 2022-11-22 RX ADMIN — ROSUVASTATIN CALCIUM 20 MG: 10 TABLET, FILM COATED ORAL at 21:12

## 2022-11-22 RX ADMIN — VERAPAMIL HYDROCHLORIDE 120 MG: 120 TABLET, FILM COATED, EXTENDED RELEASE ORAL at 21:12

## 2022-11-22 RX ADMIN — GABAPENTIN 300 MG: 300 CAPSULE ORAL at 21:11

## 2022-11-22 RX ADMIN — ENOXAPARIN SODIUM 30 MG: 100 INJECTION SUBCUTANEOUS at 21:12

## 2022-11-22 RX ADMIN — TRAMADOL HYDROCHLORIDE 50 MG: 50 TABLET, COATED ORAL at 18:02

## 2022-11-22 RX ADMIN — INSULIN LISPRO 8 UNITS: 100 INJECTION, SOLUTION INTRAVENOUS; SUBCUTANEOUS at 12:28

## 2022-11-22 RX ADMIN — GABAPENTIN 300 MG: 300 CAPSULE ORAL at 08:32

## 2022-11-22 RX ADMIN — INSULIN LISPRO 8 UNITS: 100 INJECTION, SOLUTION INTRAVENOUS; SUBCUTANEOUS at 08:33

## 2022-11-22 RX ADMIN — LAMOTRIGINE 50 MG: 25 TABLET ORAL at 08:32

## 2022-11-22 RX ADMIN — PANTOPRAZOLE SODIUM 40 MG: 40 TABLET, DELAYED RELEASE ORAL at 06:47

## 2022-11-22 RX ADMIN — TRAZODONE HYDROCHLORIDE 200 MG: 50 TABLET ORAL at 21:11

## 2022-11-22 RX ADMIN — INSULIN GLARGINE 25 UNITS: 100 INJECTION, SOLUTION SUBCUTANEOUS at 21:24

## 2022-11-22 RX ADMIN — INSULIN LISPRO 12 UNITS: 100 INJECTION, SOLUTION INTRAVENOUS; SUBCUTANEOUS at 17:03

## 2022-11-22 RX ADMIN — Medication 10 ML: at 21:16

## 2022-11-22 RX ADMIN — ENOXAPARIN SODIUM 30 MG: 100 INJECTION SUBCUTANEOUS at 08:33

## 2022-11-22 RX ADMIN — GABAPENTIN 300 MG: 300 CAPSULE ORAL at 14:48

## 2022-11-22 RX ADMIN — Medication 10 ML: at 08:38

## 2022-11-22 RX ADMIN — TRAMADOL HYDROCHLORIDE 50 MG: 50 TABLET, COATED ORAL at 08:32

## 2022-11-22 RX ADMIN — LAMOTRIGINE 25 MG: 25 TABLET ORAL at 21:12

## 2022-11-22 RX ADMIN — WARFARIN SODIUM 5 MG: 5 TABLET ORAL at 17:56

## 2022-11-22 ASSESSMENT — PAIN DESCRIPTION - DESCRIPTORS
DESCRIPTORS: ACHING
DESCRIPTORS: ACHING

## 2022-11-22 ASSESSMENT — PAIN SCALES - GENERAL
PAINLEVEL_OUTOF10: 7
PAINLEVEL_OUTOF10: 10

## 2022-11-22 ASSESSMENT — PAIN DESCRIPTION - LOCATION
LOCATION: BACK
LOCATION: BACK

## 2022-11-22 NOTE — CARE COORDINATION
Writer spoke with [facility], cert is back and patient can go when medically able. Cert was started from home as they were trying to admit patient to [facility] from home.      Sukhwinder Varma RN

## 2022-11-22 NOTE — DISCHARGE INSTR - COC
Continuity of Care Form    Patient Name: Nelida Proctor   :  1950  MRN:  [id number]    Admit date:  2022  Discharge date:  ***    Code Status Order: Full Code   Advance Directives:     Admitting Physician:  Alex Baldwin MD  PCP: FRANCISCO JAVIER Echeverria - CNP    Discharging Nurse: [address] Unit/Room#: 8527/4292-27  Discharging Unit Phone Number: ***    Emergency Contact:   Extended Emergency Contact Information  Primary Emergency Contact: Lynnette Cruz  Address: [facility] [address] [place] [facility]  Phone: [phone]  Mobile Phone: [phone]  Relation: Spouse    Past Surgical History:  Past Surgical History:   Procedure Laterality Date    APPENDECTOMY      BACK SURGERY      CERVICAL FUSION      COLONOSCOPY      EYE SURGERY      HIP SURGERY      JOINT REPLACEMENT Right     THR    OTHER SURGICAL HISTORY  2018     right L4-5 hemilaminotomy, partial facetectomy, foraminotomy, reexploration and excision of synovial csyt,  Bilateral L2-3 and L3-4 hemilaminotomy, partial facetectomy, foraminotomy    WA NERVOUS SYSTEM SURGERY UNLISTED Bilateral 2018    BILATERAL LUMBAR THREE, LUMBAR FOUR, LUMBAR FIVE RADIOFREQUENCY ABLATION SITE CONFIRMED BY FLUOROSCOPY performed by Kong Du MD at [address] [place] [address] DX/THER SBST INTRLMNR CRV/THRC W/IMG GDN Bilateral 2018    BILATERAL LUMBAR THREE, LUMBAR FOUR, LUMBAR FIVE MEDIAL BRANCH BLOCK SITE CONFIRMED BY FLUOROSCOPY performed by Kong Du MD at [address] [place] [address] DX/THER SBST INTRLMNR CRV/THRC W/IMG GDN Bilateral 10/16/2018    BILATERAL LUMBAR THREE, FOUR, FIVE MEDIAL BRANCH BLOCK SITE CONFIRMED BY FLUOROSCOPY performed by Kong Du MD at [place] [address]    TURP N/A 2020    CYSTOSCOPY, TRANSURETHRAL RESECTION OF PROSTATE performed by Ricky Lawrence MD at [facility]       Immunization History:   Immunization History   Administered Date(s) Administered    Influenza Vaccine, unspecified formulation 10/31/2014, 10/28/2015, 10/04/2016, 10/12/2017    Pneumococcal Conjugate 13-valent (Thelma Morale) 10/28/2015    Zoster Live (Zostavax) 11/01/2014       Active Problems:  Patient Active Problem List   Diagnosis Code    Enlarged lymph nodes R59.9    Spinal stenosis M48.00    Degeneration of lumbar or lumbosacral intervertebral disc M51.37    Lumbosacral spondylosis without myelopathy M47.817    Displacement of lumbar intervertebral disc without myelopathy M51.26    Urinary retention R33.9    Pyelonephritis N12    S/P TURP I41.80    Metabolic encephalopathy V82.72    Type 2 diabetes mellitus ([facility] [address] E11.9    Essential hypertension I10    Prostate hyperplasia with urinary obstruction N40.1, N13.8    Sepsis ([facility]) A41.9    Bipolar 1 disorder ([facility]) F31.9    Asthma J45.909    Acute epididymo-orchitis N45.3    Constipation due to opioid therapy K59.03, T40.2X5A    Bilateral leg weakness R29.898    Acute low back pain M54.50    Acute cystitis with hematuria N30.01    Generalized weakness R53.1    Obesity E66.9    Weakness R53.1       Isolation/Infection:   Isolation            No Isolation          Patient Infection Status       Infection Onset Added Last Indicated Last Indicated By Review Planned Expiration Resolved Resolved By    None active    Resolved    COVID-19 (Rule Out) 07/17/20 07/17/20 07/17/20 COVID-19 (Ordered)   07/17/20 Rule-Out Test Resulted            Nurse Assessment:  Last Vital Signs: /81   Pulse 64   Temp 98.5 °F (36.9 °C) (Axillary)   Resp 18   Ht 6' 1\" (1.854 m)   Wt 232 lb (105.2 kg)   SpO2 96%   BMI 30.61 kg/m²     Last documented pain score (0-10 scale): Pain Level: 10  Last Weight:   Wt Readings from Last 1 Encounters:   11/18/22 232 lb (105.2 kg)     Mental Status:  oriented    IV Access:  - None    Nursing Mobility/ADLs:  Walking   Assisted  Transfer  Assisted  Bathing  Assisted  Dressing  Assisted  Toileting Assisted  Feeding  Assisted  Med Admin  Assisted  Med Delivery   whole    Wound Care Documentation and Therapy:  Wound 06/08/22 Buttocks Right (Active)   Number of days: 167       Wound 10/26/22 Coccyx Mid mid gluteal skin fold (Active)   Wound Image   10/26/22 1409   Wound Etiology Skin Tear 10/29/22 0945   Dressing Status Clean;Dry; Intact 10/26/22 1409   Wound Cleansed Other (Comment) 10/26/22 1409   Dressing/Treatment Calmoseptine/zinc oxide with menthol 10/29/22 0945   Offloading for Diabetic Foot Ulcers Offloading ordered 10/26/22 1409   Wound Length (cm) 2 cm 10/26/22 1409   Wound Width (cm) 0.3 cm 10/26/22 1409   Wound Depth (cm) 0.1 cm 10/26/22 1409   Wound Surface Area (cm^2) 0.6 cm^2 10/26/22 1409   Wound Volume (cm^3) 0.06 cm^3 10/26/22 1409   Distance Tunneling (cm) 0 cm 10/26/22 1409   Tunneling Position ___ O'Clock 0 10/26/22 1409   Undermining Starts ___ O'Clock 0 10/26/22 1409   Undermining Ends___ O'Clock 0 10/26/22 1409   Undermining Maxium Distance (cm) 0 10/26/22 1409   Wound Assessment Pink/red 10/26/22 1409   Drainage Amount None 10/26/22 1409   Odor None 10/26/22 1409   Brenda-wound Assessment Blanchable erythema 10/26/22 1409   Margins Attached edges; Defined edges 10/26/22 1409   Wound Thickness Description not for Pressure Injury Partial thickness 10/26/22 1409   Number of days: 26        Elimination:  Continence: Bowel: Yes  Bladder: No (intermittently incontinent)   Urinary Catheter: None   Colostomy/Ileostomy/Ileal Conduit: No       Date of Last BM: 11/20    Intake/Output Summary (Last 24 hours) at 11/22/2022 1259  Last data filed at 11/22/2022 0502  Gross per 24 hour   Intake 240 ml   Output 3000 ml   Net -2760 ml     I/O last 3 completed shifts:   In: 480 [P.O.:480]  Out: 5900 [Urine:5900]    Safety Concerns:     History of Falls (last 30 days) and At Risk for Falls    Impairments/Disabilities:      None    Nutrition Therapy:  Current Nutrition Therapy:   - Oral Diet:  Carb Control 3 carbs/meal (1500kcals/day)    Routes of Feeding: Oral  Liquids: No Restrictions  Daily Fluid Restriction: no  Last Modified Barium Swallow with Video (Video Swallowing Test): not done    Treatments at the Time of Hospital Discharge:   Respiratory Treatments:   Oxygen Therapy:  is not on home oxygen therapy. Ventilator:    - No ventilator support    Rehab Therapies: Physical Therapy and Occupational Therapy  Weight Bearing Status/Restrictions: No weight bearing restrictions  Other Medical Equipment (for information only, NOT a DME order):  walker  Other Treatments:     Patient's personal belongings (please select all that are sent with patient):  Belongings with patient. RN SIGNATURE:  Electronically signed by Aris Leroy RN on 11/23/22 at 11:31 AM EST    CASE MANAGEMENT/SOCIAL WORK SECTION    Inpatient Status Date: 11/21/22    Readmission Risk Assessment Score:  Readmission Risk              Risk of Unplanned Readmission:  19           Discharging to Facility/ [address] [address]   [phone]     / signature: Electronically signed by Mesha Johnson RN on 11/23/22 at 10:29 AM EST    PHYSICIAN SECTION    Prognosis: Fair    Condition at Discharge: Stable    Rehab Potential (if transferring to Rehab): Fair    Recommended Labs or Other Treatments After Discharge:   INR MWF and pharmacy to dose warfarin    Physician Certification: I certify the above information and transfer of Adela Foster  is necessary for the continuing treatment of the diagnosis listed and that he requires [facility] for greater 30 days.      Update Admission H&P: No change in H&P    PHYSICIAN SIGNATURE:  Electronically signed by Petrona Marquez MD on 11/22/22 at 12:59 PM EST

## 2022-11-22 NOTE — PROGRESS NOTES
Hospitalist Progress Note      PCP: Delores Duarte, APRN - CNP    Date of Admission: 11/18/2022    Chief Complaint: Weakness    Hospital Course: This is a 70-year-old male with a past medical history of hypertension diabetes, obesity, bipolar disorder is admitted with a frequent fall, recently admitted to [facility] discharged to a skilled care at [place] for rehabilitation patient was discharged home last week after rehabilitation continues to follow patient has been followed by Dr. Lizbeth Frye with the neurology being work-up for Parkinson due to bilateral lower extremity weakness CT of the head on admission negative, CT of the spine negative    Subjective:     Patient complains of weakness and difficulty with ambulation. No headache. No chest pain.         Medications:  Reviewed    Infusion Medications    sodium chloride      dextrose       Scheduled Medications    insulin glargine  25 Units SubCUTAneous Nightly    Dulaglutide  3 mg SubCUTAneous Weekly    warfarin placeholder: dosing by pharmacy   Other RX Placeholder    enoxaparin  30 mg SubCUTAneous BID    pantoprazole  40 mg Oral QAM AC    lamoTRIgine  50 mg Oral Daily    lamoTRIgine  25 mg Oral Nightly    lidocaine  1 patch TransDERmal Daily    gabapentin  300 mg Oral TID    rosuvastatin  20 mg Oral Nightly    traZODone  200 mg Oral Nightly    verapamil  120 mg Oral Nightly    sodium chloride flush  5-40 mL IntraVENous 2 times per day    insulin lispro  0-8 Units SubCUTAneous TID WC    insulin lispro  0-4 Units SubCUTAneous Nightly     PRN Meds: traMADol, acetaminophen, sodium chloride flush, sodium chloride, ondansetron **OR** ondansetron, polyethylene glycol, acetaminophen **OR** acetaminophen, glucose, dextrose bolus **OR** dextrose bolus, glucagon (rDNA), dextrose      Intake/Output Summary (Last 24 hours) at 11/21/2022 2108  Last data filed at 11/21/2022 2100  Gross per 24 hour   Intake 480 ml   Output 5200 ml   Net -4720 ml       Physical Exam Performed:    /81   Pulse 64   Temp 98.2 °F (36.8 °C) (Oral)   Resp 18   Ht 6' 1\" (1.854 m)   Wt 232 lb (105.2 kg)   SpO2 99%   BMI 30.61 kg/m²     General appearance: No apparent distress, appears stated age and cooperative. HEENT: Pupils equal, round, and reactive to light. Conjunctivae/corneas clear. Neck: Supple, with full range of motion. No jugular venous distention. Trachea midline. Respiratory:  Normal respiratory effort. Clear to auscultation, bilaterally without Rales/Wheezes/Rhonchi. Cardiovascular: Regular rate and rhythm with normal S1/S2 without murmurs, rubs or gallops. Abdomen: Soft, non-tender, non-distended with normal bowel sounds. Musculoskeletal: No clubbing, cyanosis or edema bilaterally. Full range of motion without deformity. Skin: Skin color, texture, turgor normal.  No rashes or lesions. Neurologic:  Neurovascularly intact without any focal sensory/motor deficits. Cranial nerves: II-XII intact, grossly non-focal. Strength 4/5 in bilateral lower extremity. Psychiatric: Alert and oriented, thought content appropriate, normal insight  Capillary Refill: Brisk, 3 seconds, normal   Peripheral Pulses: +2 palpable, equal bilaterally       Labs:   Recent Labs     11/19/22  0555   WBC 9.6   HGB 14.4   HCT 44.3        Recent Labs     11/19/22  0555 11/21/22  1134    137   K 4.6 4.6    103   CO2 21 25   BUN 17 17   CREATININE 0.9 1.1   CALCIUM 10.2 9.5     No results for input(s): AST, ALT, BILIDIR, BILITOT, ALKPHOS in the last 72 hours. Recent Labs     11/21/22  1446   INR 1.08     No results for input(s): José Miguel Gubler in the last 72 hours.     Urinalysis:      Lab Results   Component Value Date/Time    NITRU Negative 10/25/2022 04:36 PM    WBCUA 21-50 10/25/2022 04:36 PM    BACTERIA 3+ 10/25/2022 04:36 PM    RBCUA 3-4 10/25/2022 04:36 PM    BLOODU TRACE-INTACT 10/25/2022 04:36 PM    SPECGRAV 1.010 10/25/2022 04:36 PM    GLUCOSEU 250 10/25/2022 04:36 PM GLUCOSEU NEGATIVE 10/13/2010 12:10 PM       Radiology:  CT CHEST WO CONTRAST   Final Result   No CT evidence of acute injury in the chest.         CT Head W/O Contrast   Final Result   No acute intracranial findings. Mild mucosal thickening in the left maxillary sinus. CT CSpine W/O Contrast   Final Result   No acute fracture or traumatic malalignment. CT LUMBAR SPINE WO CONTRAST    (Results Pending)       IP CONSULT TO HOSPITALIST  IP CONSULT TO CASE MANAGEMENT  IP CONSULT TO NEUROLOGY  IP CONSULT TO PHARMACY  IP CONSULT TO PHARMACY    Assessment/Plan:    Active Hospital Problems    Diagnosis     Weakness [R53.1]      Priority: Medium    Generalized weakness [R53.1]      Priority: Medium    Obesity [E66.9]      Priority: Medium    Type 2 diabetes mellitus ([facility] [address] [E11.9]     Essential hypertension [I10]      Generalized weakness/age related debility. Continue PT/OT. Appreciate neuro consult. DMII. Continue basal and bolus correction. Hyperlipidemia. Continue rosuvastatin. Bipolar disorder. Continue lithium. DVT Prophylaxis: Lovenox  Diet: ADULT DIET; Regular; 3 carb choices (45 gm/meal)  Code Status: Full Code  PT/OT Eval Status:  Following    Dispo - SNF     Appropriate for A1 Discharge Unit: No      Bala Andrea MD

## 2022-11-22 NOTE — PLAN OF CARE
Pt scoring pain on 0-10 scale. Pain medications given per MAR. Pt instructed to call out when pain level increasing. Call light within reach. Nurse will continue to reassess and monitor.    Problem: Pain  Goal: Verbalizes/displays adequate comfort level or baseline comfort level  Outcome: Progressing

## 2022-11-22 NOTE — PLAN OF CARE
Physician notified [address] of plans to discharge patient on 11/23. They will inject patient in AM for REBECCA and scan him midday once he is officially discharged. Nuc med will coordinate with nurse.       Petrona Marquez MD  11/22/2022  9:47 AM

## 2022-11-22 NOTE — PROGRESS NOTES
Pharmacy Note  Warfarin Consult    Dx: Factor V Leiden  Goal INR range 2-3  Home Warfarin dose: 5 mg daily? Would recommend continue bridge until INR therapeutic. Date  INR  Warfarin  11/21              1.08                    5 mg  11/22              1.07                    5 mg    Recommend Warfarin 5 mg tonight x1. Daily INR ordered. Rx will continue to manage therapy per consult order.   Renee Finley, PharmD  11/22/2022 at 8:55 AM

## 2022-11-22 NOTE — PROGRESS NOTES
[facility]  Neurology Follow-up  [facility] Neurology    Date of Service: 11/22/2022    Subjective:   CC: Follow up today regarding: Chronic leg weakness    Events noted. Chart and lab reviewed. Long discussion with the patient and his wife. They refused CT of his lumbar region. Plan for REBECCA study tomorrow followed by rehab. No dysphagia or dysarthria, headache, severe neck or back pain and other review of system was unremarkable. ROS : A 10-12 system review obtained and updated today and is unremarkable except as mentioned  in my interval history. family history includes Alcohol Abuse in his sister; Coronary Art Dis in his father and mother; Obesity in his father and mother; Osteoarthritis in his mother; Parkinsonism in his mother. Past Medical History:   Diagnosis Date    Anxiety     Arthritis     Asthma     Bipolar 1 disorder ([facility]     Colitis     Depression     Diabetes ([facility]     Diabetes mellitus ([facility]     Encounter for imaging to screen for metal prior to MRI 06/08/2022    NOT MRI conditional [employer] model#[id number], Lead: [id number] implanted 9/9/19 by Pradip Adam at [facility]. Lead is unsafe for MRI. Patient unable to have any MRI ever.     Factor V Leiden mutation ([facility]     Glaucoma     H/O bladder problems     High blood pressure     History of kidney problems     IBS (irritable bowel syndrome)     Liver disease     hx of elevated LFT's    Obesity     Psychiatric problem      Current Facility-Administered Medications   Medication Dose Route Frequency Provider Last Rate Last Admin    warfarin (COUMADIN) tablet 5 mg  5 mg Oral Once FRANCISCO JAVIER Ardon - CNP        insulin glargine (LANTUS) injection vial 25 Units  25 Units SubCUTAneous Nightly Jose Lloyd MD   25 Units at 11/21/22 2010    Dulaglutide SOPN 3 mg (Patient Supplied)  3 mg SubCUTAneous Weekly Jose Lloyd MD   3 mg at 11/21/22 0614    warfarin placeholder: dosing by pharmacy   Other RX Placeholder FRANCISCO JAVIER Buckley CNP        insulin lispro (HUMALOG) injection vial 0-16 Units  0-16 Units SubCUTAneous TID WC FRANCISCO JAVIER Puckett CNP   8 Units at 11/22/22 1228    insulin lispro (HUMALOG) injection vial 0-4 Units  0-4 Units SubCUTAneous Nightly FRANCISCO JAVIER Puckett - CNP        enoxaparin Sodium (LOVENOX) injection 30 mg  30 mg SubCUTAneous BID Lazarus Moody MD   30 mg at 11/22/22 0833    pantoprazole (PROTONIX) tablet 40 mg  40 mg Oral QAM AC Lazarus Moody MD   40 mg at 11/22/22 0647    traMADol (ULTRAM) tablet 50 mg  50 mg Oral Q6H PRN FRANCISCO JAVIER Puckett - CNP   50 mg at 11/22/22 9206    lamoTRIgine (LAMICTAL) tablet 50 mg  50 mg Oral Daily Lazarus Moody MD   50 mg at 11/22/22 2408    lamoTRIgine (LAMICTAL) tablet 25 mg  25 mg Oral Nightly Lazarus Moody MD   25 mg at 11/21/22 2004    lidocaine 4 % external patch 1 patch  1 patch TransDERmal Daily Lazarus Moody MD   1 patch at 11/22/22 1703    acetaminophen (TYLENOL) tablet 1,000 mg  1,000 mg Oral Nightly PRN Marlyn Westfall APRN - CNP        gabapentin (NEURONTIN) capsule 300 mg  300 mg Oral TID FRANCISCO JAVIER Puckett - CNP   300 mg at 11/22/22 1448    rosuvastatin (CRESTOR) tablet 20 mg  20 mg Oral Nightly Marlyn Westfall, APRN - CNP   20 mg at 11/21/22 2005    traZODone (DESYREL) tablet 200 mg  200 mg Oral Nightly Marlyn Westfall, APRN - CNP   200 mg at 11/21/22 2005    verapamil (CALAN SR) extended release tablet 120 mg  120 mg Oral Nightly Marlyn Westfall, APRN - CNP   120 mg at 11/21/22 2005    sodium chloride flush 0.9 % injection 5-40 mL  5-40 mL IntraVENous 2 times per day FRANCISCO JAVIER Puckett - CNP   10 mL at 11/22/22 3405    sodium chloride flush 0.9 % injection 5-40 mL  5-40 mL IntraVENous PRN Marlyn Cambric, APRN - CNP        0.9 % sodium chloride infusion   IntraVENous PRN Marlyn Westfall APRN - CNP        ondansetron (ZOFRAN-ODT) disintegrating tablet 4 mg  4 mg Oral Q8H PRN Marlyn Westfall APRN - NEAL        Or    ondansetron [facility]) injection 4 mg  4 mg IntraVENous Q6H PRN Rocio Dec, APRN - CNP        polyethylene glycol (GLYCOLAX) packet 17 g  17 g Oral Daily PRN Rocio Dec, APRN - CNP        acetaminophen (TYLENOL) tablet 650 mg  650 mg Oral Q6H PRN Rocio Dec, APRN - CNP   650 mg at 11/19/22 1424    Or    acetaminophen (TYLENOL) suppository 650 mg  650 mg Rectal Q6H PRN Rocio Dec, APRN - CNP        glucose chewable tablet 16 g  4 tablet Oral PRN Rocio Dec, APRN - CNP        dextrose bolus 10% 125 mL  125 mL IntraVENous PRN Rocio Dec, APRN - CNP        Or    dextrose bolus 10% 250 mL  250 mL IntraVENous PRN Rocio Dec, APRN - CNP        glucagon (rDNA) injection 1 mg  1 mg SubCUTAneous PRN Rocio Dec, APRN - CNP        dextrose 10 % infusion   IntraVENous Continuous PRN Rocio Dec, APRN - CNP         Allergies   Allergen Reactions    No Known Allergies       reports that he has never smoked. He has never used smokeless tobacco. He reports that he does not drink alcohol and does not use drugs. Objective:  Exam:   Constitutional:   Vitals:    11/21/22 1606 11/21/22 1959 11/22/22 0010 11/22/22 0815   BP: 113/82 135/81 136/72 130/81   Pulse: 59 64  64   Resp:  18 18 18   Temp:  98.2 °F (36.8 °C) 98.4 °F (36.9 °C) 98.5 °F (36.9 °C)   TempSrc:  Oral Oral Axillary   SpO2: 99%   96%   Weight:       Height:         General appearance:  Normal development and appear in no acute distress. Mental Status:   Oriented to person, place  Memory: Good immediate recall. Intact remote memory  Poor attention span and concentration. Language: intact naming, repeating and fluency   Good fund of Knowledge. Cranial Nerves:   II: Visual fields: Full. Pupils: equal, round, reactive to light  III,IV,VI: Extra Ocular Movements are intact.  No nystagmus  V: Facial sensation is intact  VII: Facial strength and movements: intact and symmetric  IX: Palate elevation is symmetric  XI: Shoulder shrug is intact  XII: Tongue movements are normal  Musculoskeletal: The same chronic leg weakness-4/5 no upper extremity weakness  Normal tone  Seen hyperreflexia in his legs and normal his arms  No sensory disturbance  No cogwheeling or rigidity or tremors        Data:  LABS:   Lab Results   Component Value Date/Time     11/21/2022 11:34 AM    K 4.6 11/21/2022 11:34 AM    K 4.6 11/19/2022 05:55 AM     11/21/2022 11:34 AM    CO2 25 11/21/2022 11:34 AM    BUN 17 11/21/2022 11:34 AM    CREATININE 1.1 11/21/2022 11:34 AM    GFRAA >60 06/10/2022 07:54 AM    GFRAA >60 10/21/2010 07:10 AM    LABGLOM >60 11/21/2022 11:34 AM    GLUCOSE 359 11/21/2022 11:34 AM    PHOS 3.2 07/17/2020 04:50 AM    MG 2.40 07/17/2020 04:50 AM    CALCIUM 9.5 11/21/2022 11:34 AM     Lab Results   Component Value Date/Time    WBC 9.6 11/19/2022 05:55 AM    RBC 4.50 11/19/2022 05:55 AM    HGB 14.4 11/19/2022 05:55 AM    HCT 44.3 11/19/2022 05:55 AM    MCV 98.4 11/19/2022 05:55 AM    RDW 13.0 11/19/2022 05:55 AM     11/19/2022 05:55 AM     Lab Results   Component Value Date    INR 1.07 11/22/2022    PROTIME 13.8 11/22/2022       Neuroimaging was independently reviewed by me and discussed results with the patient  I reviewed blood testing and other test results and discussed results with the patient      Impression:  Chronic leg weakness with recent worsening and myelopathy on examination. Likely combination of generalized debilitation, spinal stenosis, chronic back pain and recurrent falling. Awaiting REBECCA study tomorrow for possible parkinsonism although seems less likely. Family refused to repeat a CT of the lumbar region at this point.   Chronic lumbar spinal stenosis  Hypertension  Hyperlipidemia  Factor V Leiden    Continue current supportive care  DC planning  Follow-up with neurology outpatient  Discussed risk of falling  Diabetic control  Insulin sliding scale  DVT and GI prophylaxis  DC planning when medically stable  Nothing to add from neurology at this point  We will sign off            Wagner Kessler MD   [phone]      This dictation was generated by voice recognition computer software. Although all attempts are made to edit the dictation for accuracy, there may be errors in the transcription that are not intended.

## 2022-11-23 ENCOUNTER — HOSPITAL ENCOUNTER (OUTPATIENT)
Dept: NUCLEAR MEDICINE | Age: 72
Discharge: HOME | DRG: 092 | End: 2022-11-23
Payer: MEDICARE

## 2022-11-23 VITALS
WEIGHT: 232 LBS | BODY MASS INDEX: 30.75 KG/M2 | TEMPERATURE: 97.6 F | HEART RATE: 65 BPM | SYSTOLIC BLOOD PRESSURE: 136 MMHG | DIASTOLIC BLOOD PRESSURE: 84 MMHG | HEIGHT: 73 IN | RESPIRATION RATE: 16 BRPM | OXYGEN SATURATION: 97 %

## 2022-11-23 DIAGNOSIS — R26.9 ABNORMALITY OF GAIT: ICD-10-CM

## 2022-11-23 DIAGNOSIS — E13.42 DIABETIC POLYNEUROPATHY ASSOCIATED WITH OTHER SPECIFIED DIABETES MELLITUS (HCC): ICD-10-CM

## 2022-11-23 DIAGNOSIS — G31.84 MCI (MILD COGNITIVE IMPAIRMENT) WITH MEMORY LOSS: ICD-10-CM

## 2022-11-23 DIAGNOSIS — G21.9 SECONDARY PARKINSONISM, UNSPECIFIED SECONDARY PARKINSONISM TYPE (HCC): ICD-10-CM

## 2022-11-23 LAB
GLUCOSE BLD-MCNC: 272 MG/DL (ref 70–99)
GLUCOSE BLD-MCNC: 342 MG/DL (ref 70–99)
INR BLD: 1.14 (ref 0.87–1.14)
PERFORMED ON: ABNORMAL
PERFORMED ON: ABNORMAL
PROTHROMBIN TIME: 14.5 SEC (ref 11.7–14.5)

## 2022-11-23 PROCEDURE — 97530 THERAPEUTIC ACTIVITIES: CPT

## 2022-11-23 PROCEDURE — 3430000000 HC RX DIAGNOSTIC RADIOPHARMACEUTICAL: Performed by: PSYCHIATRY & NEUROLOGY

## 2022-11-23 PROCEDURE — A9584 IODINE I-123 IOFLUPANE: HCPCS | Performed by: PSYCHIATRY & NEUROLOGY

## 2022-11-23 PROCEDURE — 36415 COLL VENOUS BLD VENIPUNCTURE: CPT

## 2022-11-23 PROCEDURE — 6370000000 HC RX 637 (ALT 250 FOR IP): Performed by: PSYCHIATRY & NEUROLOGY

## 2022-11-23 PROCEDURE — 97110 THERAPEUTIC EXERCISES: CPT

## 2022-11-23 PROCEDURE — 97116 GAIT TRAINING THERAPY: CPT

## 2022-11-23 PROCEDURE — 2580000003 HC RX 258: Performed by: NURSE PRACTITIONER

## 2022-11-23 PROCEDURE — 6370000000 HC RX 637 (ALT 250 FOR IP): Performed by: HOSPITALIST

## 2022-11-23 PROCEDURE — 97535 SELF CARE MNGMENT TRAINING: CPT

## 2022-11-23 PROCEDURE — 6360000002 HC RX W HCPCS: Performed by: HOSPITALIST

## 2022-11-23 PROCEDURE — 6370000000 HC RX 637 (ALT 250 FOR IP): Performed by: NURSE PRACTITIONER

## 2022-11-23 PROCEDURE — 85610 PROTHROMBIN TIME: CPT

## 2022-11-23 PROCEDURE — 78803 RP LOCLZJ TUM SPECT 1 AREA: CPT

## 2022-11-23 RX ORDER — IODINE SOLUTION STRONG 5% (LUGOL'S) 5 %
0.2 SOLUTION ORAL
Status: COMPLETED | OUTPATIENT
Start: 2022-11-23 | End: 2022-11-23

## 2022-11-23 RX ADMIN — ENOXAPARIN SODIUM 30 MG: 100 INJECTION SUBCUTANEOUS at 09:25

## 2022-11-23 RX ADMIN — PANTOPRAZOLE SODIUM 40 MG: 40 TABLET, DELAYED RELEASE ORAL at 06:18

## 2022-11-23 RX ADMIN — IOFLUPANE I-123 4.8 MILLICURIE: 2 INJECTION, SOLUTION INTRAVENOUS at 09:00

## 2022-11-23 RX ADMIN — INSULIN LISPRO 8 UNITS: 100 INJECTION, SOLUTION INTRAVENOUS; SUBCUTANEOUS at 09:28

## 2022-11-23 RX ADMIN — LAMOTRIGINE 50 MG: 25 TABLET ORAL at 09:25

## 2022-11-23 RX ADMIN — IODINE SOLUTION STRONG 5% (LUGOL'S) 0.2 ML: 5 SOLUTION at 08:00

## 2022-11-23 RX ADMIN — TRAMADOL HYDROCHLORIDE 50 MG: 50 TABLET, COATED ORAL at 09:25

## 2022-11-23 RX ADMIN — Medication 10 ML: at 09:24

## 2022-11-23 RX ADMIN — INSULIN LISPRO 12 UNITS: 100 INJECTION, SOLUTION INTRAVENOUS; SUBCUTANEOUS at 12:25

## 2022-11-23 RX ADMIN — GABAPENTIN 300 MG: 300 CAPSULE ORAL at 09:25

## 2022-11-23 ASSESSMENT — PAIN DESCRIPTION - DESCRIPTORS: DESCRIPTORS: ACHING

## 2022-11-23 ASSESSMENT — PAIN DESCRIPTION - LOCATION: LOCATION: BACK

## 2022-11-23 ASSESSMENT — PAIN SCALES - GENERAL: PAINLEVEL_OUTOF10: 7

## 2022-11-23 NOTE — PROGRESS NOTES
Comprehensive Nutrition Assessment    Type and Reason for Visit:  Initial, Positive Nutrition Screen, Wound    Nutrition Recommendations/Plan:   Modify diet to [facility] and encourage PO intake   RD to add glucerna to promote wound healing   Encourage protein intake   RD to order updated weight  Monitor nutrition adequacy, pertinent labs, bowel habits, wt changes, and clinical progress     Malnutrition Assessment:  Malnutrition Status: At risk for malnutrition (Comment) (11/23/22 1152)    Context:  Acute Illness     Findings of the 6 clinical characteristics of malnutrition:  Fluid Accumulation:  Mild      Nutrition Assessment:    Positive nutrtion screen for wounds: 67 y.o. male w/ PMH of diabetes admitted w/ generalized weakness and frequent falls. Plan for REBECCA test today and then discharge. Pt leaving for test at time of attempted visit. On CC3 diet w/ PO intakes of % of meals. Wt trending down over the past few years, will order updated wt d/t stated weight. RD to modify diet to [facility] to increase menu options. RD to add glucerna to increase protein intake to promote wound healing. Continue to encourage PO intake, will continue to monitor. Nutrition Related Findings:    -327 x 24 hours. On insulin. Trace BLE. No BM recorded. Wound Type: Pressure Injury, Stage II       Current Nutrition Intake & Therapies:    Average Meal Intake: %  Average Supplements Intake: None Ordered  ADULT DIET; Regular; 4 carb choices (60 gm/meal)  ADULT ORAL NUTRITION SUPPLEMENT; Lunch; Diabetic Oral Supplement    Anthropometric Measures:  Height: 6' 1\" (185.4 cm)  Ideal Body Weight (IBW): 184 lbs (84 kg)       Current Body Weight: 232 lb (105.2 kg), 126.1 % IBW. Weight Source: Stated  Current BMI (kg/m2): 30.6                    BMI Categories: Obese Class 1 (BMI 30.0-34. 9)    Estimated Daily Nutrient Needs:  Energy Requirements Based On: Kcal/kg (25-28 kcals/kg)  Weight Used for Energy Requirements: Ideal (84 kg)  Energy (kcal/day): 0721-2471  Weight Used for Protein Requirements: Ideal (1.2-1.5 g/kg)  Protein (g/day): 101-126 g  Method Used for Fluid Requirements: 1 ml/kcal    Nutrition Diagnosis:   Increased nutrient needs related to increase demand for energy/nutrients as evidenced by wounds    Nutrition Interventions:   Food and/or Nutrient Delivery: Modify Current Diet, Start Oral Nutrition Supplement  Nutrition Education/Counseling: Education needed  Coordination of Nutrition Care: Continue to monitor while inpatient       Goals:     Goals: PO intake 50% or greater, prior to discharge       Nutrition Monitoring and Evaluation:   Behavioral-Environmental Outcomes: None Identified  Food/Nutrient Intake Outcomes: Food and Nutrient Intake, Supplement Intake  Physical Signs/Symptoms Outcomes: Biochemical Data, Weight, Constipation, Nutrition Focused Physical Findings    Discharge Planning:    Continue current diet, Continue Oral Nutrition Supplement     Magdalena Siegel MS, [address] [place] [address],   Contact: Office: [phone]; [address] [place] [address] Road: [phone]

## 2022-11-23 NOTE — CARE COORDINATION
Spouse contacted MCKENZIE, inquiring about testing. MCKENZIE verified with nurse, Eleazar Barraza, that patient had received necessary medication for nuclear medicine test. Test scheduled for today between 0660-0456.

## 2022-11-23 NOTE — DISCHARGE SUMMARY
Hospital Medicine Discharge Summary    Patient ID: Lindy Lyle      Patient's PCP: FRANCISCO JAVIER Tay CNP    Admit Date: 11/18/2022     Discharge Date: 11/23/2022      Admitting Provider: Glenn Carreon MD     Discharge Provider: Pat Kurtz MD     Discharge Diagnoses: Active Hospital Problems    Diagnosis     Weakness [R53.1]      Priority: Medium    Generalized weakness [R53.1]      Priority: Medium    Obesity [E66.9]      Priority: Medium    Type 2 diabetes mellitus ([address] [E11.9]     Essential hypertension [I10]        The patient was seen and examined on day of discharge and this discharge summary is in conjunction with any daily progress note from day of discharge. Hospital Course: This is a 66-year-old male with a past medical history of hypertension diabetes, obesity, bipolar disorder is admitted with a frequent fall, recently admitted to \[facility]"" discharged to a skilled care at [place] for rehabilitation patient was discharged home last week after rehabilitation continues to follow patient has been followed by Dr. Helen Winters with the neurology being work-up for Parkinson due to bilateral lower extremity weakness CT of the head on admission negative, CT of the spine negative    Patient treated for:    Generalized weakness/age related debility/Parkinson dz/myelopathy. Continue PT/OT. Appreciate neuro consult. CT lumbar spine canceled per family request..  Plan for REBECCA test following discharge. Patient will be discharged to skilled nursing facility for rehab. DMII. Continue basal and bolus correction. Hyperlipidemia. Continue rosuvastatin. Bipolar disorder. Continue lithium. Physical Exam Performed:     /84   Pulse 65   Temp 97.6 °F (36.4 °C) (Oral)   Resp 16   Ht 6' 1\" (1.854 m)   Wt 232 lb (105.2 kg)   SpO2 97%   BMI 30.61 kg/m²       General appearance: No apparent distress, appears stated age and cooperative.   HEENT: Pupils equal, round, and reactive to light. Conjunctivae/corneas clear. Neck: Supple, with full range of motion. No jugular venous distention. Trachea midline. Respiratory:  Normal respiratory effort. Clear to auscultation, bilaterally without Rales/Wheezes/Rhonchi. Cardiovascular: Regular rate and rhythm with normal S1/S2 without murmurs, rubs or gallops. Abdomen: Soft, non-tender, non-distended with normal bowel sounds. Musculoskeletal: No clubbing, cyanosis or edema bilaterally. Full range of motion without deformity. Skin: Skin color, texture, turgor normal.  No rashes or lesions. Neurologic:  Neurovascularly intact without any focal sensory/motor deficits. Cranial nerves: II-XII intact, grossly non-focal. Strength 4/5 in bilateral lower extremity. Psychiatric: Alert and oriented, thought content appropriate, normal insight  Capillary Refill: Brisk, 3 seconds, normal   Peripheral Pulses: +2 palpable, equal bilaterally       Labs: For convenience and continuity at follow-up the following most recent labs are provided:      CBC:    Lab Results   Component Value Date/Time    WBC 9.6 11/19/2022 05:55 AM    HGB 14.4 11/19/2022 05:55 AM    HCT 44.3 11/19/2022 05:55 AM     11/19/2022 05:55 AM       Renal:    Lab Results   Component Value Date/Time     11/21/2022 11:34 AM    K 4.6 11/21/2022 11:34 AM    K 4.6 11/19/2022 05:55 AM     11/21/2022 11:34 AM    CO2 25 11/21/2022 11:34 AM    BUN 17 11/21/2022 11:34 AM    CREATININE 1.1 11/21/2022 11:34 AM    CALCIUM 9.5 11/21/2022 11:34 AM    PHOS 3.2 07/17/2020 04:50 AM         Significant Diagnostic Studies    Radiology:   CT CHEST WO CONTRAST   Final Result   No CT evidence of acute injury in the chest.         CT Head W/O Contrast   Final Result   No acute intracranial findings. Mild mucosal thickening in the left maxillary sinus. CT CSpine W/O Contrast   Final Result   No acute fracture or traumatic malalignment.                 Consults:     IP CONSULT TO HOSPITALIST  IP CONSULT TO CASE MANAGEMENT  IP CONSULT TO NEUROLOGY  IP CONSULT TO PHARMACY  IP CONSULT TO PHARMACY    Disposition: Skilled nursing facility [facility]    Condition at Discharge: Stable    Discharge Instructions/Follow-up: Follow-up with SNF staff. Code Status:  Full Code    Activity: activity as tolerated    Diet: Diabetic/cardiac diet      Discharge Medications:     Discharge Medication List as of 11/23/2022 12:41 PM             Details   warfarin (COUMADIN) 5 MG tablet As directed by pharmacy, Disp-30 tablet, R-3DC to SNF      traMADol (ULTRAM) 50 MG tablet Take 1 tablet by mouth every 6 hours as needed for Pain for up to 12 days. , Disp-18 tablet, R-0Print      !! insulin lispro (HUMALOG) 100 UNIT/ML SOLN injection vial Inject 0-16 Units into the skin 3 times daily (with meals)DC to SNF      !! insulin lispro (HUMALOG) 100 UNIT/ML SOLN injection vial Inject 0-4 Units into the skin nightlyDC to SNF      polyethylene glycol (GLYCOLAX) 17 g packet Take 17 g by mouth daily as needed for Constipation, Disp-527 g, R-1DC to SNF      pantoprazole (PROTONIX) 40 MG tablet Take 1 tablet by mouth every morning (before breakfast), Disp-30 tablet, R-3DC to SNF       ! ! - Potential duplicate medications found. Please discuss with provider. Details   gabapentin (NEURONTIN) 300 MG capsule Take 1 capsule by mouth 3 times daily for 10 days. , Disp-30 capsule, R-0Print                Details   insulin glargine (BASAGLAR KWIKPEN) 100 UNIT/ML injection pen Inject 5 Units into the skin nightlyHistorical Med      sennosides-docusate sodium (SENOKOT-S) 8.6-50 MG tablet Take 2 tablets by mouth in the morning and at bedtime, Disp-60 tablet, R-1Print      !! lamoTRIgine (LAMICTAL) 25 MG tablet Take 25 mg by mouth daily Historical Med      !! lamoTRIgine (LAMICTAL) 25 MG tablet Take 50 mg by mouth at bedtime Historical Med      !! lithium 300 MG capsule Take 300 mg by mouth dailyHistorical Med      !! lithium 300 MG capsule Take 600 mg by mouth at bedtimeHistorical Med      traZODone (DESYREL) 100 MG tablet Take 200 mg by mouth nightly Historical Med      acetaminophen (TYLENOL) 500 MG tablet Take 1,000 mg by mouth nightly as needed for PainHistorical Med      Dulaglutide 3 MG/0.5ML SOPN Inject 3 mg into the skin once a week Takes on SundaysHistorical Med      rosuvastatin (CRESTOR) 20 MG tablet Take 20 mg by mouth at bedtime Historical Med      verapamil (CALAN SR) 120 MG extended release tablet Take 120 mg by mouth nightlyHistorical Med       !! - Potential duplicate medications found. Please discuss with provider. Time Spent on discharge: 35 minutes in the examination, evaluation, counseling and review of medications and discharge plan. Signed:    Last Stroud MD   11/23/2022      Thank you FRANCISCO JAVIER Jama CNP for the opportunity to be involved in this patient's care. If you have any questions or concerns, please feel free to contact me at [phone].

## 2022-11-23 NOTE — CARE COORDINATION
CASE MANAGEMENT DISCHARGE SUMMARY      Discharge to: [facility] completed: Yes  Hospital Exemption Notification (HENS) completed: Yes    IMM given: (date) Today        Transportation:    Family/car:yes      Confirmed discharge plan with: B[facility]     Patient: yes     Family:  yes    Name: Blanca Escobedo number: [phone]     Facility/Agency, name:  [facility] faxed   Phone number for report to facility: [phone]     RN, name: Bryson Josh [id number]    Note: Discharging nurse to complete [facility], reconcile [facility], and place final copy with patient's discharge packet. RN to ensure that written prescriptions for  Level II medications are sent with patient to the facility as per protocol.      Elroy Hawley RN

## 2022-11-23 NOTE — PROGRESS NOTES
Pharmacy Note  Warfarin Consult    Dx: Factor V Leiden  Goal INR range 2-3  Home Warfarin dose: 5 mg daily? Would recommend continue bridge until INR therapeutic. Date  INR  Warfarin  11/21              1.08                    5 mg  11/22              1.07                    5 mg  11/23              1.14                   7.5 mg    Recommend Warfarin 7.5 mg tonight x1. Daily INR ordered. Rx will continue to manage therapy per consult order.   Rosalee Swanson, PharmD  11/23/2022 at 8:14 AM

## 2022-11-23 NOTE — PROGRESS NOTES
Hospitalist Progress Note      PCP: FRANCISCO JAVIER Horner CNP    Date of Admission: 11/18/2022    Chief Complaint: Weakness    Hospital Course: This is a 55-year-old male with a past medical history of hypertension diabetes, obesity, bipolar disorder is admitted with a frequent fall, recently admitted to [facility] discharged to a skilled care at [place] for rehabilitation patient was discharged home last week after rehabilitation continues to follow patient has been followed by Dr. Zaheer Rankin with the neurology being work-up for Parkinson due to bilateral lower extremity weakness CT of the head on admission negative, CT of the spine negative    Subjective:     Patient with persistent weakness. Discussed with patient's spouse. She declined CT scan of the lumbar area since he had one back in June. Patient denies any chest pain.         Medications:  Reviewed    Infusion Medications    sodium chloride      dextrose       Scheduled Medications    insulin glargine  25 Units SubCUTAneous Nightly    Dulaglutide  3 mg SubCUTAneous Weekly    warfarin placeholder: dosing by pharmacy   Other RX Placeholder    insulin lispro  0-16 Units SubCUTAneous TID WC    insulin lispro  0-4 Units SubCUTAneous Nightly    enoxaparin  30 mg SubCUTAneous BID    pantoprazole  40 mg Oral QAM AC    lamoTRIgine  50 mg Oral Daily    lamoTRIgine  25 mg Oral Nightly    lidocaine  1 patch TransDERmal Daily    gabapentin  300 mg Oral TID    rosuvastatin  20 mg Oral Nightly    traZODone  200 mg Oral Nightly    verapamil  120 mg Oral Nightly    sodium chloride flush  5-40 mL IntraVENous 2 times per day     PRN Meds: traMADol, acetaminophen, sodium chloride flush, sodium chloride, ondansetron **OR** ondansetron, polyethylene glycol, acetaminophen **OR** acetaminophen, glucose, dextrose bolus **OR** dextrose bolus, glucagon (rDNA), dextrose      Intake/Output Summary (Last 24 hours) at 11/22/2022 4580  Last data filed at 11/22/2022 1701  Gross per 24 hour   Intake 960 ml   Output 2000 ml   Net -1040 ml         Physical Exam Performed:    BP (!) 143/73   Pulse 68   Temp 97.8 °F (36.6 °C) (Oral)   Resp 17   Ht 6' 1\" (1.854 m)   Wt 232 lb (105.2 kg)   SpO2 95%   BMI 30.61 kg/m²     General appearance: No apparent distress, appears stated age and cooperative. HEENT: Pupils equal, round, and reactive to light. Conjunctivae/corneas clear. Neck: Supple, with full range of motion. No jugular venous distention. Trachea midline. Respiratory:  Normal respiratory effort. Clear to auscultation, bilaterally without Rales/Wheezes/Rhonchi. Cardiovascular: Regular rate and rhythm with normal S1/S2 without murmurs, rubs or gallops. Abdomen: Soft, non-tender, non-distended with normal bowel sounds. Musculoskeletal: No clubbing, cyanosis or edema bilaterally. Full range of motion without deformity. Skin: Skin color, texture, turgor normal.  No rashes or lesions. Neurologic:  Neurovascularly intact without any focal sensory/motor deficits. Cranial nerves: II-XII intact, grossly non-focal. Strength 4/5 in bilateral lower extremity. Psychiatric: Alert and oriented, thought content appropriate, normal insight  Capillary Refill: Brisk, 3 seconds, normal   Peripheral Pulses: +2 palpable, equal bilaterally       Labs:   No results for input(s): WBC, HGB, HCT, PLT in the last 72 hours. Recent Labs     11/21/22  1134      K 4.6      CO2 25   BUN 17   CREATININE 1.1   CALCIUM 9.5       No results for input(s): AST, ALT, BILIDIR, BILITOT, ALKPHOS in the last 72 hours. Recent Labs     11/21/22  1446 11/22/22  0635   INR 1.08 1.07       No results for input(s): Camille Smack in the last 72 hours.     Urinalysis:      Lab Results   Component Value Date/Time    NITRU Negative 11/22/2022 05:00 AM    WBCUA 21-50 10/25/2022 04:36 PM    BACTERIA 3+ 10/25/2022 04:36 PM    RBCUA 3-4 10/25/2022 04:36 PM    BLOODU Negative 11/22/2022 05:00 AM    SPECGRAV 1.010 11/22/2022 05:00 AM    GLUCOSEU >=1000 11/22/2022 05:00 AM    GLUCOSEU NEGATIVE 10/13/2010 12:10 PM       Radiology:  CT CHEST WO CONTRAST   Final Result   No CT evidence of acute injury in the chest.         CT Head W/O Contrast   Final Result   No acute intracranial findings. Mild mucosal thickening in the left maxillary sinus. CT CSpine W/O Contrast   Final Result   No acute fracture or traumatic malalignment. IP CONSULT TO HOSPITALIST  IP CONSULT TO CASE MANAGEMENT  IP CONSULT TO NEUROLOGY  IP CONSULT TO PHARMACY  IP CONSULT TO PHARMACY    Assessment/Plan:    Active Hospital Problems    Diagnosis     Weakness [R53.1]      Priority: Medium    Generalized weakness [R53.1]      Priority: Medium    Obesity [E66.9]      Priority: Medium    Type 2 diabetes mellitus ([facility] [address] [E11.9]     Essential hypertension [I10]      Generalized weakness/age related debility/Parkinson dz/myelopathy. Continue PT/OT. Appreciate neuro consult. CT lumbar spine canceled. Plan for REBECCA test following discharge. DMII. Continue basal and bolus correction. Hyperlipidemia. Continue rosuvastatin. Bipolar disorder. Continue lithium. DVT Prophylaxis: Lovenox  Diet: ADULT DIET; Regular; 3 carb choices (45 gm/meal)  Code Status: Full Code  PT/OT Eval Status:  Following    Dispo - SNF     Appropriate for A1 Discharge Unit: No      Last Stroud MD

## 2022-11-23 NOTE — PROGRESS NOTES
Report called to [facility] @0191, spoke with Fransisco Tomlinson. All questions answered. Left number for her to call back if she has questions when he arrives to facility.

## 2022-11-23 NOTE — PROGRESS NOTES
Occupational Therapy  Facility/Department: [facility] C5 - MED SURG/ORTHO  Daily Treatment Note  NAME: Jose Lazaro  : 1950  MRN: [id number]    Date of Service: 2022    Discharge Recommendations:  Subacute/Skilled Nursing Facility       AM-PAC score  AM-PAC Inpatient Daily Activity Raw Score: 15 (22)  AM-PAC Inpatient ADL T-Scale Score : 34.69 (22)  ADL Inpatient CMS 0-100% Score: 56.46 (22)  ADL Inpatient CMS G-Code Modifier : CK (22)    Patient Diagnosis(es): The primary encounter diagnosis was Frequent falls. Diagnoses of Diabetic peripheral neuropathy (HCC), Chronic bilateral low back pain, unspecified whether sciatica present, Minor closed head injury, Rib contusion, right, initial encounter, Bilateral leg weakness, Anticoagulated on Coumadin, Subtherapeutic international normalized ratio (INR), and Acute low back pain without sciatica, unspecified back pain laterality were also pertinent to this visit. Assessment    Assessment: Pt with fair tolerance of OT treatment, required increased time and VCs for safety with all transfers. Pt with decreased insight into deficits and safety with use of RW. Pt demos difficulty standing upright, requires frequent VCs as pt allows BLE knees to become more flexed as he completes functional mobility. Pt requires increased time for all BADLs. Co-tx collaboration this date with PT staff to safely progress pt toward goals. Pt will have better occupational performance outcomes within a co-treatment than 1:1 session. Pt functioning below his baseline and would benefit from SNF at d/c.   Activity Tolerance: Patient tolerated treatment well;Treatment limited secondary to decreased cognition  Discharge Recommendations: Subacute/Skilled Nursing Facility      Plan   Occupational Therapy Plan  Times Per Week: 3-5x's per week     Restrictions  Restrictions/Precautions  Restrictions/Precautions: General Precautions  Position Activity Restriction  Other position/activity restrictions: gagandeep Chilel    Subjective   Subjective  Subjective: Pt resting in bed, agreeable to OT treatment. Pain: Pt reports pain 7/10 in buttocks and hips, reports never gets lower than a 7. RN present and aware. Orientation  Overall Orientation Status: Within Functional Limits  Orientation Level: Oriented to person;Oriented to place;Oriented to situation;Disoriented to time    Cognition  Overall Cognitive Status: Exceptions  Following Commands: Follows one step commands with increased time  Safety Judgement: Decreased awareness of need for safety;Decreased awareness of need for assistance  Insights: Decreased awareness of deficits        Objective    Vitals  Vitals:    11/23/22 0900   BP: 136/84   Pulse: 65   Resp: 16   Temp: 97.6 °F (36.4 °C)   SpO2: 97%     Bed Mobility Training  Bed Mobility Training: Yes  Interventions: Verbal cues; Visual cues  Supine to Sit: Minimum assistance;Assist X1;Additional time  Scooting: Stand-by assistance; Additional time    Balance  Sitting: Intact  Standing: Impaired (RW)  Standing - Static: Fair  Standing - Dynamic: Fair;Poor    Transfer Training  Transfer Training: Yes  Sit to Stand: Minimum assistance (CGA-min A of 2, poor safety)  Stand to Sit: Minimum assistance (CGA-min A of 2)  Toilet Transfer: Minimum assistance; Adaptive equipment (grab bars)       ADL  LE Dressing: Minimal assistance; Increased time to complete  LE Dressing Skilled Clinical Factors: briefs  Toileting: Dependent/Total  Toileting Skilled Clinical Factors: male gagandeep initially, after attempting to use toilet pt able to complete hygiene seated and min A to manage clothing    OT Exercises  A/AROM Exercises: Pt performed the following UE therex seated x15x: shoulder flexion, horizontal aBd/aDduction, elbow flex/extension       Safety Devices  Type of Devices: Left in chair;Chair alarm in place;Call light within reach;Nurse notified;Gait belt Patient Education  Education Given To: Patient  Education Provided: Role of Therapy;Plan of Care;ADL Adaptive Strategies;Transfer Training; Fall Prevention Strategies;Orientation  Education Method: Verbal;Demonstration  Barriers to Learning: Cognition  Education Outcome: Verbalized understanding;Continued education needed;Demonstrated understanding    Goals  Short Term Goals  Time Frame for Short Term Goals: 1 week (11/26) unless noted   Short Term Goal 1: Pt will complete supine<>sit with min Ax2 and use of log roll technique-- GOAL MET, pt demos min A of 1 with HOB elevated 11/23/22  Short Term Goal 2: Pt will perform STS with max Ax2 in preparation for toilet transfers-- GOAL MET, pt demos min A of 1-2 11/23/22  Short Term Goal 3: Pt will complete 2-3 EOB grooming tasks with CGA-- ongoing  Patient Goals   Patient goals : \"I want to be able to sit up at the edge of the bed\"       Therapy Time   Individual Concurrent Group Co-treatment   Time In 0925         Time Out 1011         Minutes Annemarie Hook 45, NAM/L

## 2022-12-05 ENCOUNTER — TELEPHONE (OUTPATIENT)
Dept: PHARMACY | Age: 72
End: 2022-12-05

## 2022-12-05 NOTE — TELEPHONE ENCOUNTER
Pt was referred to clinic in October, and we assisted in a bridging calendar, then pt ended up falling and never coming into clinic. He was in the hospital, then [facility] until 12/1/22. He is home now and requesting INRs to be done through [facility] because he continues to fall. We will do [facility] based off the referral from Dr. Lois Rojas.    Byron Roa, PharmD 12/5/2022 1:57 PM

## 2022-12-06 ENCOUNTER — ANTI-COAG VISIT (OUTPATIENT)
Dept: PHARMACY | Age: 72
End: 2022-12-06

## 2022-12-06 LAB — INR BLD: 1.8

## 2022-12-06 NOTE — PROGRESS NOTES
Pt was referred to clinic in October, and we assisted in a bridging calendar, then pt ended up falling and never coming into clinic. He was in the hospital, then [facility] until 12/1/22, He is currently on Warfarin 5mg daily. He is home now and requesting INRs to be done through [place] [id number] because he continues to fall. We will do [place] [id number] based off the referral from Dr. Norma Marie. Barney Xavier, PharmD 12/6/2022 1:48 PM       Pt takes Warfarin 5mg daily since Dec 2nd. Rn calls in INR 1.8    Continue Warfarin 5mg daily. Recheck in one week.

## 2022-12-22 ENCOUNTER — APPOINTMENT (OUTPATIENT)
Dept: GENERAL RADIOLOGY | Age: 72
DRG: 194 | End: 2022-12-22
Payer: MEDICARE

## 2022-12-22 ENCOUNTER — HOSPITAL ENCOUNTER (INPATIENT)
Age: 72
LOS: 5 days | Discharge: HOME HEALTH | DRG: 194 | End: 2022-12-27
Attending: EMERGENCY MEDICINE | Admitting: HOSPITALIST
Payer: MEDICARE

## 2022-12-22 ENCOUNTER — APPOINTMENT (OUTPATIENT)
Dept: CT IMAGING | Age: 72
DRG: 194 | End: 2022-12-22
Payer: MEDICARE

## 2022-12-22 DIAGNOSIS — J18.9 PNEUMONIA OF BOTH LUNGS DUE TO INFECTIOUS ORGANISM, UNSPECIFIED PART OF LUNG: Primary | ICD-10-CM

## 2022-12-22 DIAGNOSIS — N30.01 ACUTE CYSTITIS WITH HEMATURIA: ICD-10-CM

## 2022-12-22 PROBLEM — N39.0 ACUTE UTI: Status: ACTIVE | Noted: 2022-01-01

## 2022-12-22 PROBLEM — Z79.01 ANTICOAGULATED ON COUMADIN: Status: ACTIVE | Noted: 2022-01-01

## 2022-12-22 PROBLEM — D68.51 FACTOR 5 LEIDEN MUTATION, HETEROZYGOUS (HCC): Status: ACTIVE | Noted: 2022-12-22

## 2022-12-22 LAB
A/G RATIO: 1.3 (ref 1.1–2.2)
ALBUMIN SERPL-MCNC: 4.5 G/DL (ref 3.4–5)
ALP BLD-CCNC: 199 U/L (ref 40–129)
ALT SERPL-CCNC: 119 U/L (ref 10–40)
ANION GAP SERPL CALCULATED.3IONS-SCNC: 10 MMOL/L (ref 3–16)
AST SERPL-CCNC: 180 U/L (ref 15–37)
BACTERIA: ABNORMAL /HPF
BASOPHILS ABSOLUTE: 0.1 K/UL (ref 0–0.2)
BASOPHILS RELATIVE PERCENT: 0.7 %
BILIRUB SERPL-MCNC: 0.5 MG/DL (ref 0–1)
BILIRUBIN URINE: NEGATIVE
BLOOD, URINE: NEGATIVE
BUN BLDV-MCNC: 20 MG/DL (ref 7–20)
CALCIUM SERPL-MCNC: 10.1 MG/DL (ref 8.3–10.6)
CHLORIDE BLD-SCNC: 102 MMOL/L (ref 99–110)
CLARITY: CLEAR
CO2: 25 MMOL/L (ref 21–32)
COLOR: YELLOW
CREAT SERPL-MCNC: 1.2 MG/DL (ref 0.8–1.3)
EOSINOPHILS ABSOLUTE: 0.2 K/UL (ref 0–0.6)
EOSINOPHILS RELATIVE PERCENT: 1.7 %
GFR SERPL CREATININE-BSD FRML MDRD: >60 ML/MIN/{1.73_M2}
GLUCOSE BLD-MCNC: 162 MG/DL (ref 70–99)
GLUCOSE BLD-MCNC: 227 MG/DL (ref 70–99)
GLUCOSE URINE: NEGATIVE MG/DL
HCT VFR BLD CALC: 43.7 % (ref 40.5–52.5)
HEMOGLOBIN: 14.8 G/DL (ref 13.5–17.5)
INFLUENZA A: NOT DETECTED
INFLUENZA B: NOT DETECTED
INR BLD: 1.9 (ref 0.87–1.14)
KETONES, URINE: NEGATIVE MG/DL
LACTIC ACID, SEPSIS: 3.2 MMOL/L (ref 0.4–1.9)
LACTIC ACID, SEPSIS: 3.3 MMOL/L (ref 0.4–1.9)
LEUKOCYTE ESTERASE, URINE: ABNORMAL
LITHIUM DOSE AMOUNT: NORMAL
LITHIUM LEVEL: 1.1 MMOL/L (ref 0.6–1.2)
LYMPHOCYTES ABSOLUTE: 1.4 K/UL (ref 1–5.1)
LYMPHOCYTES RELATIVE PERCENT: 11.7 %
MCH RBC QN AUTO: 31.6 PG (ref 26–34)
MCHC RBC AUTO-ENTMCNC: 33.8 G/DL (ref 31–36)
MCV RBC AUTO: 93.5 FL (ref 80–100)
MICROSCOPIC EXAMINATION: YES
MONOCYTES ABSOLUTE: 0.7 K/UL (ref 0–1.3)
MONOCYTES RELATIVE PERCENT: 5.8 %
NEUTROPHILS ABSOLUTE: 9.5 K/UL (ref 1.7–7.7)
NEUTROPHILS RELATIVE PERCENT: 80.1 %
NITRITE, URINE: POSITIVE
PDW BLD-RTO: 13.3 % (ref 12.4–15.4)
PERFORMED ON: ABNORMAL
PH UA: 7 (ref 5–8)
PLATELET # BLD: 310 K/UL (ref 135–450)
PMV BLD AUTO: 7.7 FL (ref 5–10.5)
POTASSIUM SERPL-SCNC: 4.6 MMOL/L (ref 3.5–5.1)
PROCALCITONIN: 0.74 NG/ML (ref 0–0.15)
PROTEIN UA: 30 MG/DL
PROTHROMBIN TIME: 21.8 SEC (ref 11.7–14.5)
RBC # BLD: 4.68 M/UL (ref 4.2–5.9)
RBC UA: ABNORMAL /HPF (ref 0–4)
SARS-COV-2 RNA, RT PCR: NOT DETECTED
SODIUM BLD-SCNC: 137 MMOL/L (ref 136–145)
SPECIFIC GRAVITY UA: 1.01 (ref 1–1.03)
TOTAL CK: 108 U/L (ref 39–308)
TOTAL PROTEIN: 7.9 G/DL (ref 6.4–8.2)
TROPONIN: <0.01 NG/ML
URINE REFLEX TO CULTURE: ABNORMAL
URINE TYPE: ABNORMAL
UROBILINOGEN, URINE: 0.2 E.U./DL
WBC # BLD: 11.8 K/UL (ref 4–11)
WBC UA: ABNORMAL /HPF (ref 0–5)

## 2022-12-22 PROCEDURE — 2580000003 HC RX 258: Performed by: PHYSICIAN ASSISTANT

## 2022-12-22 PROCEDURE — 74177 CT ABD & PELVIS W/CONTRAST: CPT

## 2022-12-22 PROCEDURE — 6370000000 HC RX 637 (ALT 250 FOR IP): Performed by: HOSPITALIST

## 2022-12-22 PROCEDURE — 6360000002 HC RX W HCPCS: Performed by: HOSPITALIST

## 2022-12-22 PROCEDURE — 99285 EMERGENCY DEPT VISIT HI MDM: CPT

## 2022-12-22 PROCEDURE — 85025 COMPLETE CBC W/AUTO DIFF WBC: CPT

## 2022-12-22 PROCEDURE — 87040 BLOOD CULTURE FOR BACTERIA: CPT

## 2022-12-22 PROCEDURE — 84484 ASSAY OF TROPONIN QUANT: CPT

## 2022-12-22 PROCEDURE — 1200000000 HC SEMI PRIVATE

## 2022-12-22 PROCEDURE — 83036 HEMOGLOBIN GLYCOSYLATED A1C: CPT

## 2022-12-22 PROCEDURE — 2580000003 HC RX 258: Performed by: HOSPITALIST

## 2022-12-22 PROCEDURE — 70450 CT HEAD/BRAIN W/O DYE: CPT

## 2022-12-22 PROCEDURE — 83605 ASSAY OF LACTIC ACID: CPT

## 2022-12-22 PROCEDURE — 80178 ASSAY OF LITHIUM: CPT

## 2022-12-22 PROCEDURE — 80053 COMPREHEN METABOLIC PANEL: CPT

## 2022-12-22 PROCEDURE — 84145 PROCALCITONIN (PCT): CPT

## 2022-12-22 PROCEDURE — 36415 COLL VENOUS BLD VENIPUNCTURE: CPT

## 2022-12-22 PROCEDURE — 82550 ASSAY OF CK (CPK): CPT

## 2022-12-22 PROCEDURE — 6360000004 HC RX CONTRAST MEDICATION: Performed by: HOSPITALIST

## 2022-12-22 PROCEDURE — 71045 X-RAY EXAM CHEST 1 VIEW: CPT

## 2022-12-22 PROCEDURE — 85610 PROTHROMBIN TIME: CPT

## 2022-12-22 PROCEDURE — 87636 SARSCOV2 & INF A&B AMP PRB: CPT

## 2022-12-22 PROCEDURE — 81001 URINALYSIS AUTO W/SCOPE: CPT

## 2022-12-22 RX ORDER — SODIUM CHLORIDE 0.9 % (FLUSH) 0.9 %
10 SYRINGE (ML) INJECTION PRN
Status: DISCONTINUED | OUTPATIENT
Start: 2022-12-22 | End: 2022-12-27 | Stop reason: HOSPADM

## 2022-12-22 RX ORDER — SODIUM CHLORIDE 0.9 % (FLUSH) 0.9 %
10 SYRINGE (ML) INJECTION EVERY 12 HOURS SCHEDULED
Status: DISCONTINUED | OUTPATIENT
Start: 2022-12-22 | End: 2022-12-27 | Stop reason: HOSPADM

## 2022-12-22 RX ORDER — INSULIN LISPRO 100 [IU]/ML
0-4 INJECTION, SOLUTION INTRAVENOUS; SUBCUTANEOUS NIGHTLY
Status: DISCONTINUED | OUTPATIENT
Start: 2022-12-22 | End: 2022-12-27 | Stop reason: HOSPADM

## 2022-12-22 RX ORDER — 0.9 % SODIUM CHLORIDE 0.9 %
10 INTRAVENOUS SOLUTION INTRAVENOUS ONCE
Status: COMPLETED | OUTPATIENT
Start: 2022-12-22 | End: 2022-12-22

## 2022-12-22 RX ORDER — SODIUM CHLORIDE 9 MG/ML
INJECTION, SOLUTION INTRAVENOUS PRN
Status: DISCONTINUED | OUTPATIENT
Start: 2022-12-22 | End: 2022-12-27 | Stop reason: HOSPADM

## 2022-12-22 RX ORDER — INSULIN LISPRO 100 [IU]/ML
0-8 INJECTION, SOLUTION INTRAVENOUS; SUBCUTANEOUS
Status: DISCONTINUED | OUTPATIENT
Start: 2022-12-22 | End: 2022-12-27 | Stop reason: HOSPADM

## 2022-12-22 RX ORDER — POTASSIUM CHLORIDE 20 MEQ/1
40 TABLET, EXTENDED RELEASE ORAL PRN
Status: DISCONTINUED | OUTPATIENT
Start: 2022-12-22 | End: 2022-12-27 | Stop reason: HOSPADM

## 2022-12-22 RX ORDER — ACETAMINOPHEN 650 MG/1
650 SUPPOSITORY RECTAL EVERY 6 HOURS PRN
Status: DISCONTINUED | OUTPATIENT
Start: 2022-12-22 | End: 2022-12-27 | Stop reason: HOSPADM

## 2022-12-22 RX ORDER — ONDANSETRON 4 MG/1
4 TABLET, ORALLY DISINTEGRATING ORAL EVERY 8 HOURS PRN
Status: DISCONTINUED | OUTPATIENT
Start: 2022-12-22 | End: 2022-12-27 | Stop reason: HOSPADM

## 2022-12-22 RX ORDER — 0.9 % SODIUM CHLORIDE 0.9 %
1000 INTRAVENOUS SOLUTION INTRAVENOUS ONCE
Status: COMPLETED | OUTPATIENT
Start: 2022-12-22 | End: 2022-12-22

## 2022-12-22 RX ORDER — DEXTROSE MONOHYDRATE 100 MG/ML
INJECTION, SOLUTION INTRAVENOUS CONTINUOUS PRN
Status: DISCONTINUED | OUTPATIENT
Start: 2022-12-22 | End: 2022-12-27 | Stop reason: HOSPADM

## 2022-12-22 RX ORDER — WARFARIN SODIUM 5 MG/1
5 TABLET ORAL
Status: COMPLETED | OUTPATIENT
Start: 2022-12-22 | End: 2022-12-22

## 2022-12-22 RX ORDER — ACETAMINOPHEN 325 MG/1
650 TABLET ORAL EVERY 6 HOURS PRN
Status: DISCONTINUED | OUTPATIENT
Start: 2022-12-22 | End: 2022-12-27 | Stop reason: HOSPADM

## 2022-12-22 RX ORDER — SODIUM CHLORIDE 9 MG/ML
INJECTION, SOLUTION INTRAVENOUS CONTINUOUS
Status: ACTIVE | OUTPATIENT
Start: 2022-12-22 | End: 2022-12-23

## 2022-12-22 RX ORDER — ONDANSETRON 2 MG/ML
4 INJECTION INTRAMUSCULAR; INTRAVENOUS EVERY 6 HOURS PRN
Status: DISCONTINUED | OUTPATIENT
Start: 2022-12-22 | End: 2022-12-27 | Stop reason: HOSPADM

## 2022-12-22 RX ORDER — POTASSIUM CHLORIDE 7.45 MG/ML
10 INJECTION INTRAVENOUS PRN
Status: DISCONTINUED | OUTPATIENT
Start: 2022-12-22 | End: 2022-12-27 | Stop reason: HOSPADM

## 2022-12-22 RX ORDER — SENNA PLUS 8.6 MG/1
1 TABLET ORAL DAILY PRN
Status: DISCONTINUED | OUTPATIENT
Start: 2022-12-22 | End: 2022-12-27 | Stop reason: HOSPADM

## 2022-12-22 RX ORDER — ALBUTEROL SULFATE 2.5 MG/3ML
2.5 SOLUTION RESPIRATORY (INHALATION) EVERY 6 HOURS PRN
Status: DISCONTINUED | OUTPATIENT
Start: 2022-12-22 | End: 2022-12-27 | Stop reason: HOSPADM

## 2022-12-22 RX ORDER — LEVOFLOXACIN 5 MG/ML
750 INJECTION, SOLUTION INTRAVENOUS EVERY 24 HOURS
Status: DISCONTINUED | OUTPATIENT
Start: 2022-12-22 | End: 2022-12-27 | Stop reason: HOSPADM

## 2022-12-22 RX ORDER — MAGNESIUM SULFATE IN WATER 40 MG/ML
2000 INJECTION, SOLUTION INTRAVENOUS PRN
Status: DISCONTINUED | OUTPATIENT
Start: 2022-12-22 | End: 2022-12-27 | Stop reason: HOSPADM

## 2022-12-22 RX ORDER — INSULIN GLARGINE 100 [IU]/ML
0.15 INJECTION, SOLUTION SUBCUTANEOUS NIGHTLY
Status: DISCONTINUED | OUTPATIENT
Start: 2022-12-22 | End: 2022-12-27 | Stop reason: HOSPADM

## 2022-12-22 RX ADMIN — PIPERACILLIN AND TAZOBACTAM 4500 MG: 4; .5 INJECTION, POWDER, FOR SOLUTION INTRAVENOUS at 21:08

## 2022-12-22 RX ADMIN — SODIUM CHLORIDE 1000 ML: 9 INJECTION, SOLUTION INTRAVENOUS at 17:45

## 2022-12-22 RX ADMIN — WARFARIN SODIUM 5 MG: 5 TABLET ORAL at 21:45

## 2022-12-22 RX ADMIN — IOPAMIDOL 75 ML: 755 INJECTION, SOLUTION INTRAVENOUS at 19:26

## 2022-12-22 RX ADMIN — SODIUM CHLORIDE 1000 ML: 9 INJECTION, SOLUTION INTRAVENOUS at 18:24

## 2022-12-22 RX ADMIN — LEVOFLOXACIN 750 MG: 5 INJECTION, SOLUTION INTRAVENOUS at 18:26

## 2022-12-22 RX ADMIN — SODIUM CHLORIDE, PRESERVATIVE FREE 10 ML: 5 INJECTION INTRAVENOUS at 21:31

## 2022-12-22 RX ADMIN — SODIUM CHLORIDE: 9 INJECTION, SOLUTION INTRAVENOUS at 21:05

## 2022-12-22 RX ADMIN — INSULIN GLARGINE 15 UNITS: 100 INJECTION, SOLUTION SUBCUTANEOUS at 21:43

## 2022-12-22 ASSESSMENT — PAIN - FUNCTIONAL ASSESSMENT: PAIN_FUNCTIONAL_ASSESSMENT: NONE - DENIES PAIN

## 2022-12-22 NOTE — ED NOTES
Changed patient's brief due to incontinence. Patient does have a pressure would the the coccyx region that is red upon inspection with no bleeding at the site. Patient reports pain.        Radha Campbell RN  12/22/22 6666

## 2022-12-22 NOTE — ED NOTES
Patient's wife Sancho Dumas would like to be contacted via cellphone at [phone] with any updates.        Geraldo Ormond, RN  12/22/22 5729

## 2022-12-22 NOTE — CONSULTS
Pharmacy Note  Warfarin Consult  Dx: factor V Leiden  Goal INR range 2-3   Home Warfarin dose: 2.5mg on Wed, 5mg on all other days    Date  INR  Warfarin  12/22             1.9                    5mg  Recommend Warfarin 5mg tonight x1. Daily INR ordered. Rx will continue to manage therapy per consult order.     ANTONIO Johnson [facility]  12/22/2022 at 6:57 PM

## 2022-12-22 NOTE — H&P
HOSPITALISTS HISTORY AND PHYSICAL    12/22/2022 6:01 PM    Patient Information:  Dahiana Cornejo is a 67 y.o. male [id number]  PCP:  FRANCISCO JAVIER Viramontes CNP (Tel: [phone] )    Chief complaint:    Chief Complaint   Patient presents with    Tremors     Per EMS called for tremors x 1 week. States PCP told patient to come to ED. Arrives with vasquez in place         History of Present Illness:  Yari More is a 67 y.o. male who presented to the ED per recommendations of his PCP to be evaluated for rigors and fever ongoing for 1 week PTA. Patient's wife reports that he was recently discharged from [facility], and has remained confused with generalized weakness since returning home. Patient was discharged home with Vasquez catheter due to LUTS. Patient's wife reports that he is unable to perform ADLs and is near full assist with transfer. She is no longer able to care for him in his present state. Upon arrival to the ED patient was initially tachycardic and tachypneic, but afebrile. Lactate level of 3.2 with left shifted leukocytosis and source of infection meets sepsis criteria. EKG ordered by admitting physician with results pending. CT scan notable for bibasilar atelectasis versus infiltrate, without other acute findings in abdomen and pelvis. Influenza A/B and SARS-CoV-2 testing performed with negative results. Notable labs include: Left shifted leukocytosis 11.8, lactate 3.3, procalcitonin elevated 0.74, acute hyperglycemia 227, and nitrite positive UA with 2+ bacteria. Patient initially received a dosage of IV Rocephin and Zithromax per ED provider.   Review of [facility] chart reveals patient with hospital admission less than 1 month ago, therefore he meets HCAP criteria; antibiotics changed for appropriate spectrum coverage    History obtained from patient and review of Epic chart    REVIEW OF SYSTEMS: Constitutional: Negative for fever,chills; positive generalized weakness  ENT: Negative for headache, rhinorrhea, and sore throat. Respiratory: Positive exertional dyspnea with cough   cardiovascular: Negative for chest pain, palpitations, peripheral edema, orthopnea or PND  Gastrointestinal: Negative for N/V/D and abdominal pain; no hematemesis, hematochezia, or melena; positive anorexia  Genitourinary: Stapleton in place due to LUTS urinary retention   hematologic/Lymphatic: Negative for bleeding tendency/excessive bruising  Musculoskeletal: Acute on chronic myalgias and arthalgias; unable to ambulate without difficulty  Neurologic: Negative for LOC, seizure activity, paresthesias, dysarthria, vertigo  Skin: Negative for itching,rash, decubitus    Past Medical History:   has a past medical history of Anxiety, Arthritis, Asthma, Bipolar 1 disorder ([facility] [address] Colitis, Depression, Diabetes ([facility] [address] Diabetes mellitus ([facility] [address] Encounter for imaging to screen for metal prior to MRI, Factor V Leiden mutation ([facility] [address] Glaucoma, H/O bladder problems, High blood pressure, History of kidney problems, IBS (irritable bowel syndrome), Liver disease, Obesity, and Psychiatric problem. Past Surgical History:   has a past surgical history that includes back surgery; hip surgery; Eye surgery; Colonoscopy; cervical fusion; joint replacement (Right); other surgical history (06/06/2018); pr njx dx/ther sbst intrlmnr crv/thrc w/img gdn (Bilateral, 9/25/2018); pr njx dx/ther sbst intrlmnr crv/thrc w/img gdn (Bilateral, 10/16/2018); pr nervous system surgery unlisted (Bilateral, 11/12/2018); Appendectomy; and TURP (N/A, 6/9/2020). Medications:  No current facility-administered medications on file prior to encounter.      Current Outpatient Medications on File Prior to Encounter   Medication Sig Dispense Refill    warfarin (COUMADIN) 5 MG tablet As directed by pharmacy 30 tablet 3    gabapentin (NEURONTIN) 300 MG capsule Take 1 capsule by mouth 3 times daily for 10 days. 30 capsule 0    insulin lispro (HUMALOG) 100 UNIT/ML SOLN injection vial Inject 0-16 Units into the skin 3 times daily (with meals)      insulin lispro (HUMALOG) 100 UNIT/ML SOLN injection vial Inject 0-4 Units into the skin nightly      polyethylene glycol (GLYCOLAX) 17 g packet Take 17 g by mouth daily as needed for Constipation 527 g 1    pantoprazole (PROTONIX) 40 MG tablet Take 1 tablet by mouth every morning (before breakfast) 30 tablet 3    insulin glargine (BASAGLAR KWIKPEN) 100 UNIT/ML injection pen Inject 5 Units into the skin nightly      sennosides-docusate sodium (SENOKOT-S) 8.6-50 MG tablet Take 2 tablets by mouth in the morning and at bedtime 60 tablet 1    lamoTRIgine (LAMICTAL) 25 MG tablet Take 25 mg by mouth daily       lamoTRIgine (LAMICTAL) 25 MG tablet Take 50 mg by mouth at bedtime       lithium 300 MG capsule Take 300 mg by mouth daily      lithium 300 MG capsule Take 600 mg by mouth at bedtime      traZODone (DESYREL) 100 MG tablet Take 200 mg by mouth nightly       acetaminophen (TYLENOL) 500 MG tablet Take 1,000 mg by mouth nightly as needed for Pain      Dulaglutide 3 MG/0.5ML SOPN Inject 3 mg into the skin once a week Takes on Sundays      rosuvastatin (CRESTOR) 20 MG tablet Take 20 mg by mouth at bedtime       verapamil (CALAN SR) 120 MG extended release tablet Take 120 mg by mouth nightly         Allergies: Allergies   Allergen Reactions    No Known Allergies         Social History:   reports that he has never smoked. He has never used smokeless tobacco. He reports that he does not drink alcohol and does not use drugs. Family History:  family history includes Alcohol Abuse in his sister; Coronary Art Dis in his father and mother; Obesity in his father and mother; Osteoarthritis in his mother; Parkinsonism in his mother.      Physical Exam:  /77   Pulse 87   Temp 97.8 °F (36.6 °C)   Resp 27   Ht 6' 1\" (1.854 m)   Wt 220 lb (99.8 kg) SpO2 97%   BMI 29.03 kg/m²     General appearance: Elderly male resting in bed, acutely confused  Eyes: Sclera clear without conjunctival injection; PERRLA; EOMI  ENT: Mucous membranes dry without thrush; normal dentition  Neck: Supple without meningismus; no goiter; no carotid bruit bilaterally  Cardiovascular: Regular rhythm without ectopy; normal S1-S2 with no murmurs; no peripheral edema; no JVD  Respiratory: Moderate tachypnea; intermittent rhonchi with wet cough  Gastrointestinal: Abdomen soft, non-tender, not distended; bowel sounds normal; no masses/organomegaly appreciated  Musculoskeletal: FROM spine and extremities x4; no gross deformity  PV: 2/4 radial and dorsalis pedis bilaterally; delayed capillary refill    Labs:  CBC:   Lab Results   Component Value Date/Time    WBC 11.8 12/22/2022 04:10 PM    RBC 4.68 12/22/2022 04:10 PM    HGB 14.8 12/22/2022 04:10 PM    HCT 43.7 12/22/2022 04:10 PM    MCV 93.5 12/22/2022 04:10 PM    MCH 31.6 12/22/2022 04:10 PM    MCHC 33.8 12/22/2022 04:10 PM    RDW 13.3 12/22/2022 04:10 PM     12/22/2022 04:10 PM    MPV 7.7 12/22/2022 04:10 PM     BMP:    Lab Results   Component Value Date/Time     12/22/2022 04:10 PM    K 4.6 12/22/2022 04:10 PM    K 4.6 11/19/2022 05:55 AM     12/22/2022 04:10 PM    CO2 25 12/22/2022 04:10 PM    BUN 20 12/22/2022 04:10 PM    CREATININE 1.2 12/22/2022 04:10 PM    CALCIUM 10.1 12/22/2022 04:10 PM    GFRAA >60 06/10/2022 07:54 AM    GFRAA >60 10/21/2010 07:10 AM    LABGLOM >60 12/22/2022 04:10 PM    GLUCOSE 227 12/22/2022 04:10 PM     XR CHEST PORTABLE   Final Result   Findings consistent with central and bibasilar bronchitis with foci of   bibasilar subsegmental pneumonia or atelectasis. No radiographic evidence of   pleural effusion in the frontal projection.   Otherwise, radiographically   nonacute portable chest.         CT HEAD WO CONTRAST    (Results Pending)   CT CHEST ABDOMEN PELVIS W CONTRAST Additional Contrast? None    (Results Pending)         EKG: Pending    I visualized CXR images and EKG strips personally and agree with documented interpretation    Discussed case  with ED provider    Problem List:  Principal Problem:    Sepsis ([facility]  Active Problems:    HCAP (healthcare-associated pneumonia)    Acute UTI    Type 2 diabetes mellitus ([facility] [address]    Essential hypertension    Asthma    Factor 5 Leiden mutation, heterozygous ([facility]    Anticoagulated on Coumadin  Resolved Problems:    * No resolved hospital problems.  *        Consults:  PHARMACY TO DOSE MEDICATION      Assessment/Plan:     Sepsis  -Patient with RML HCAP as well nitrite positive UTI  -CT chest/abdomen/pelvis ordered by admitting hospitalist to assess for RUQ pathology and rule out pyelonephritis  -Patient admitted to telemetry floor with appropriate isolation measures in place  -Crystalloid IVF resuscitation initiated in ED, with maintenance fluids to infuse overnight; strict I's and O's  -Blood, urine, and sputum cultures ordered STAT  -Patient initiated on IV Zosyn and Levaquin empirically pending pathogen identification; pharmacy consulted for dosage assistance  -Serial CBC, CMP, lactate, procalcitonin, D-dimer to monitor treatment progression     Uncontrolled type II IDDM  -A1c ordered with results pending at time of dictation  -Home hypoglycemic medications placed on temporary hold  -Initiate Lantus 15 units nightly  -PRN Humalog medium dose SSI scheduled before meals and at bedtime based on POC glucose  -Carbohydrate restriction placed on diet    HTN  -Patient admitted to telemetry floor for continuous monitoring during stay  -EKG ordered with results pending at time of dictation  -Continue Calan, statin and EC ASA per home dosages    Factor V Leiden  - Pharmacy consulted for Coumadin dosage during inpatient stay      DVT prophylaxis-continue Coumadin  Code status-full code  Diet-cardiac 2 g sodium with carb restriction  IV access-PIV established in ED  Daya Catheter-in place upon arrival to ED    Admit as inpatient. I anticipate hospitalization spanning more than two midnights for investigation and treatment of the above medically necessary diagnoses. Comment: Please note this report has been produced using speech recognition software and may contain errors related to that system including errors in grammar, punctuation, and spelling, as well as words and phrases that may be inappropriate. If there are any questions or concerns please feel free to contact the dictating provider for clarification.          Zack Nuñez MD    12/22/2022 6:01 PM

## 2022-12-22 NOTE — ED NOTES
Blood culture set 1 drawn from left ac IV. Bottle tops scrubbed with alcohol pads. Site prepped with Prevantics swab, 15 seconds per side, and allowed to dry for 30 seconds prior to venipuncture. Red waste tube drawn prior to collection of specimen.      Lon Bass RN  12/22/22 3192

## 2022-12-22 NOTE — ED NOTES
Blood culture set 2 drawn from right ac with butterfly needle. Bottle tops scrubbed with alcohol pads. Site prepped with Prevantics swab, 15 seconds per side, and allowed to dry for 30 seconds prior to venipuncture. Red waste tube drawn prior to collection of specimen.      Chandrika Jordan RN  12/22/22 9745

## 2022-12-22 NOTE — ED PROVIDER NOTES
[id number] [place] [address] TELE/MED SURG/ONC  EMERGENCY DEPARTMENT ENCOUNTER        Pt Name: Lindy Lyle  MRN: [id number]  [place] 1950  Date of evaluation: 12/22/2022  Provider: IMELDA Puente  PCP: FRANCISCO JAVIER Tay CNP  Note Started: 3:16 PM EST        I have seen and evaluated this patient with my supervising physician Beatriz Hairston, [facility]       Chief Complaint   Patient presents with    Tremors     Per EMS called for tremors x 1 week. States PCP told patient to come to ED. Arrives with vasquez in place        HISTORY OF PRESENT ILLNESS      Chief Complaint: Weakness, confusion    Lindy Lyle is a 67 y.o. male who presents weakness, confusion. History provided per wife, as patient is confused. Wife reports that since the patient was discharged from a rehab facility, he has been confused and weak. Unable to leave his recliner. She reports he has 2 pressure ulcers, from being unable to change positions himself. She is unable to care for him, and this current condition. Patient himself reports feeling weak, but denies pain. No nausea or vomiting. No diarrhea. REVIEW OF SYSTEMS       10 system ROS was performed and was negative except as noted in HPI. PAST MEDICAL HISTORY     Past Medical History:   Diagnosis Date    Anxiety     Arthritis     Asthma     Bipolar 1 disorder ([facility] [address]     Colitis     Depression     Diabetes ([facility] [address]     Diabetes mellitus ([facility] [address]     Encounter for imaging to screen for metal prior to MRI 06/08/2022    NOT MRI conditional [employer] model#SC-1160, Lead: [id number] implanted 9/9/19 by Soco Murdock at [facility]. Lead is unsafe for MRI. Patient unable to have any MRI ever.     Factor V Leiden mutation ([facility] [address]     Glaucoma     H/O bladder problems     High blood pressure     History of kidney problems     IBS (irritable bowel syndrome)     Liver disease     hx of elevated LFT's    Obesity     Psychiatric problem        SURGICAL HISTORY     Past mouth every morning (before breakfast)  Qty: 30 tablet, Refills: 3      insulin glargine (BASAGLAR KWIKPEN) 100 UNIT/ML injection pen Inject 5 Units into the skin nightly      sennosides-docusate sodium (SENOKOT-S) 8.6-50 MG tablet Take 2 tablets by mouth in the morning and at bedtime  Qty: 60 tablet, Refills: 1      !! lamoTRIgine (LAMICTAL) 25 MG tablet Take 25 mg by mouth daily       !! lamoTRIgine (LAMICTAL) 25 MG tablet Take 50 mg by mouth at bedtime       !! lithium 300 MG capsule Take 300 mg by mouth daily      !! lithium 300 MG capsule Take 600 mg by mouth at bedtime      traZODone (DESYREL) 100 MG tablet Take 200 mg by mouth nightly       acetaminophen (TYLENOL) 500 MG tablet Take 1,000 mg by mouth nightly as needed for Pain      Dulaglutide 3 MG/0.5ML SOPN Inject 3 mg into the skin once a week Takes on Sundays      rosuvastatin (CRESTOR) 20 MG tablet Take 20 mg by mouth at bedtime       verapamil (CALAN SR) 120 MG extended release tablet Take 120 mg by mouth nightly       !! - Potential duplicate medications found. Please discuss with provider. ALLERGIES     No known allergies    FAMILYHISTORY       Family History   Problem Relation Age of Onset    Alcohol Abuse Sister     Coronary Art Dis Mother     Obesity Mother     Osteoarthritis Mother     Parkinsonism Mother     Coronary Art Dis Father     Obesity Father         SOCIAL HISTORY       Social History     Tobacco Use    Smoking status: Never    Smokeless tobacco: Never   Vaping Use    Vaping Use: Never used   Substance Use Topics    Alcohol use: No    Drug use: No       SCREENINGS    Juanita Coma Scale  Eye Opening: Spontaneous  Best Verbal Response: Oriented  Best Motor Response: Obeys commands  Juanita Coma Scale Score: 15      Is this patient to be included in the SEP-1 Core Measure due to severe sepsis or septic shock?    No   Exclusion criteria - the patient is NOT to be included for SEP-1 Core Measure due to:  2+ SIRS criteria are not met      PHYSICAL EXAM     Vitals: /73   Pulse 81   Temp 97.6 °F (36.4 °C) (Oral)   Resp 16   Ht 6' 1\" (1.854 m)   Wt 220 lb (99.8 kg)   SpO2 96%   BMI 29.03 kg/m²    General: awake, no apparent distress  Pupils: equal, reactive  Eyes: EOM intact, conjunctiva clear, no discharge  Head: Non-traumatic  Neck: Supple  Mouth: Moist, no oral lesions, no tonsillar enlargement  Heart: Rate as noted, regular rhythm, no murmur or rubs. Chest/Lungs: CTAB, no wheezes or crackles  Abdomen: soft, nondistended, no tenderness to palpation   Back: Midline tenderness. No CVA tenderness  Extremities:  cap refill <2 UE/LE, no tenderness of calves, no edema  Neuro: Moving all extremities, no facial droop, no slurred speech, answers questions. Skin: Warm.  No visible rash, lesions, or bruising       DIAGNOSTIC RESULTS   LABS:    Labs Reviewed   CBC WITH AUTO DIFFERENTIAL - Abnormal; Notable for the following components:       Result Value    WBC 11.8 (*)     Neutrophils Absolute 9.5 (*)     All other components within normal limits   COMPREHENSIVE METABOLIC PANEL - Abnormal; Notable for the following components:    Glucose 227 (*)     Alkaline Phosphatase 199 (*)      (*)      (*)     All other components within normal limits   LACTATE, SEPSIS - Abnormal; Notable for the following components:    Lactic Acid, Sepsis 3.3 (*)     All other components within normal limits   LACTATE, SEPSIS - Abnormal; Notable for the following components:    Lactic Acid, Sepsis 3.2 (*)     All other components within normal limits   URINALYSIS WITH REFLEX TO CULTURE - Abnormal; Notable for the following components:    Protein, UA 30 (*)     Nitrite, Urine POSITIVE (*)     Leukocyte Esterase, Urine TRACE (*)     All other components within normal limits   MICROSCOPIC URINALYSIS - Abnormal; Notable for the following components:    Bacteria, UA 2+ (*)     All other components within normal limits   PROTIME-INR - Abnormal; Notable for the following components:    Protime 21.8 (*)     INR 1.90 (*)     All other components within normal limits   PROCALCITONIN - Abnormal; Notable for the following components:    Procalcitonin 0.74 (*)     All other components within normal limits   COVID-19 & INFLUENZA COMBO   CULTURE, BLOOD 1   CULTURE, BLOOD 2   TROPONIN   LITHIUM LEVEL   CK   LAMOTRIGINE LEVEL   LACTATE, SEPSIS   HEMOGLOBIN A1C   PROTIME-INR   COMPREHENSIVE METABOLIC PANEL W/ REFLEX TO MG FOR LOW K   CBC WITH AUTO DIFFERENTIAL   POCT GLUCOSE   POCT GLUCOSE       EKG: When ordered, EKG's are interpreted by the Emergency Department Physician in the absence of a cardiologist.  Please see their note for interpretation of EKG. RADIOLOGY:   CT CHEST ABDOMEN PELVIS W CONTRAST Additional Contrast? None   Final Result      Bibasilar atelectasis. No definite evidence for pneumonia. Abdomen/Pelvis: This is compared with 08/03/2020      Liver: Liver is normal density. No enhancing masses. Normal enhancement of   the intrahepatic vasculature. Spleen:  Normal size. No enhancing masses      Pancreas: No enhancing masses. No ductal dilation. No adjacent fatty   stranding. Gallbladder no calcified stones or sludge. No pericholecystic fluid. No   wall thickening. Bile ducts: No biliary ductal dilation      Adrenals: The adrenal glands are unremarkable      Kidneys: Kidneys are normal in appearance. No hydronephrosis. No enhancing   masses. Norenal stones. Hypodense nodule consistent with a cyst on the left   kidney. There are 2..  They are unchanged. GI: No small bowel dilation. No colonic wall thickening. No large mass. The stomach is unremarkable in appearance. Stomach is well distended the   with a small posterior diverticulum. .. Mesentery: No enlarged lymphadenopathy. No free fluid. No free gas. Aorta: Aorta is of normal size. Negative for dissection. IVC is   unremarkable. Celiac axis and SMA are patent. Portal vein is patent. PELVIS      GI: No small bowel dilation. No colonic wall thickening. No enlarged   lymphadenopathy. No free fluid in the pelvis. : Bubble of gas in the bladder. Probable previous TURP. .  Calcifications   within the prostate. Streak artifact related to the right hip arthroplasty. Osseous: Osteoarthritic changes of the left hip. Osteoarthritic change of   the SI joints. Partial ankylosis on the right. Advanced facet arthropathy. Anterior spurring of the lower thoracic and upper lumbar spine. Epidural   stimulator is in place. IMPRESSION:   1. No findings for abscess in the abdomen or pelvis. Negative for   pyelonephritis   2. Bubble of gas in the bladder. Was the patient catheterized? CT HEAD WO CONTRAST   Final Result   No acute intracranial abnormality. XR CHEST PORTABLE   Final Result   Findings consistent with central and bibasilar bronchitis with foci of   bibasilar subsegmental pneumonia or atelectasis. No radiographic evidence of   pleural effusion in the frontal projection. Otherwise, radiographically   nonacute portable chest.           No results found. PROCEDURES       CRITICAL CARE TIME   I personally saw the patient and independently provided 5 minutes of non-concurrent critical care time out of the total critical care time provided. This excludes time spent doing separately billable procedures. This includes time at the bedside, data interpretation, medication management, obtaining critical history from collateral sources if the patient is unable to provide it directly, and physician consultation. Specifics of interventions taken and potentially life-threatening diagnostic considerations are listed above in the medical decision making.     CONSULTS   PHARMACY TO DOSE MEDICATION    ED COURSE and MEDICAL DECISIONS MAKING:   Vitals:    Vitals:    12/22/22 1816 12/22/22 1845 12/22/22 1945 12/22/22 2045   BP: (!) 135/116 112/68 (!) 144/73 109/73   Pulse: 88 87 80 81   Resp: 16 16 12 16   Temp:    97.6 °F (36.4 °C)   TempSrc:    Oral   SpO2: 97% 100% 97% 96%   Weight:       Height:         MEDICATIONS:  Medications   levoFLOXacin (LEVAQUIN) 750 MG/150ML infusion 750 mg (0 mg IntraVENous Stopped 12/22/22 1956)   glucose chewable tablet 16 g (has no administration in time range)   dextrose bolus 10% 125 mL (has no administration in time range)     Or   dextrose bolus 10% 250 mL (has no administration in time range)   glucagon (rDNA) injection 1 mg (has no administration in time range)   dextrose 10 % infusion (has no administration in time range)   insulin glargine (LANTUS) injection vial 15 Units (has no administration in time range)   insulin lispro (HUMALOG) injection vial 0-8 Units (has no administration in time range)   insulin lispro (HUMALOG) injection vial 0-4 Units (0 Units SubCUTAneous Not Given 12/22/22 2131)   piperacillin-tazobactam (ZOSYN) 4,500 mg in dextrose 5 % 100 mL IVPB (mini-bag) (4,500 mg IntraVENous New Bag 12/22/22 2108)   warfarin (COUMADIN) tablet 5 mg (has no administration in time range)   warfarin placeholder: dosing by pharmacy (has no administration in time range)   0.9 % sodium chloride infusion (has no administration in time range)   sodium chloride flush 0.9 % injection 10 mL (10 mLs IntraVENous Given 12/22/22 2131)   sodium chloride flush 0.9 % injection 10 mL (has no administration in time range)   0.9 % sodium chloride infusion (has no administration in time range)   potassium chloride (KLOR-CON M) extended release tablet 40 mEq (has no administration in time range)     Or   potassium bicarb-citric acid (EFFER-K) effervescent tablet 40 mEq (has no administration in time range)     Or   potassium chloride 10 mEq/100 mL IVPB (Peripheral Line) (has no administration in time range)   magnesium sulfate 2000 mg in 50 mL IVPB premix (has no administration in time range)   senna (SENOKOT) tablet 8.6 mg (has no administration in time range)   acetaminophen (TYLENOL) tablet 650 mg (has no administration in time range)     Or   acetaminophen (TYLENOL) suppository 650 mg (has no administration in time range)   ondansetron (ZOFRAN-ODT) disintegrating tablet 4 mg (has no administration in time range)     Or   ondansetron (ZOFRAN) injection 4 mg (has no administration in time range)   albuterol (PROVENTIL) nebulizer solution 2.5 mg (has no administration in time range)   0.9 % sodium chloride bolus (0 mLs IntraVENous Stopped 12/22/22 2015)   0.9 % sodium chloride bolus (0 mLs IntraVENous Stopped 12/22/22 2025)   iopamidol (ISOVUE-370) 76 % injection 75 mL (75 mLs IntraVENous Given 12/22/22 1926)           This 45-year-old male presents with confusion, weakness. Found to have pneumonia on chest x-ray, UTI on laboratory work-up. His lactic is elevated, but he does not have criteria for sepsis. Started on broad-spectrum antibiotics due to risk factors for MRSA/Pseudomonas. Will need to be admitted for continued antibiotic treatment related to these 2 infections, as well as nursing home placement    FINAL IMPRESSION      1. Pneumonia of both lungs due to infectious organism, unspecified part of lung    2. Acute cystitis with hematuria          DISPOSITION/PLAN   DISPOSITION Admitted 12/22/2022 05:56:58 PM      PATIENT REFERRED TO:  No follow-up provider specified.     DISCHARGE MEDICATIONS:  Current Discharge Medication List          DISCONTINUED MEDICATIONS:  Current Discharge Medication List               IMELDA Rowe (electronically signed)       IMELDA Mendes  12/22/22 7605

## 2022-12-23 LAB
A/G RATIO: 1.5 (ref 1.1–2.2)
ALBUMIN SERPL-MCNC: 4.1 G/DL (ref 3.4–5)
ALP BLD-CCNC: 158 U/L (ref 40–129)
ALT SERPL-CCNC: 99 U/L (ref 10–40)
ANION GAP SERPL CALCULATED.3IONS-SCNC: 8 MMOL/L (ref 3–16)
AST SERPL-CCNC: 81 U/L (ref 15–37)
BASOPHILS ABSOLUTE: 0.1 K/UL (ref 0–0.2)
BASOPHILS RELATIVE PERCENT: 0.7 %
BILIRUB SERPL-MCNC: 0.5 MG/DL (ref 0–1)
BUN BLDV-MCNC: 15 MG/DL (ref 7–20)
CALCIUM SERPL-MCNC: 9.7 MG/DL (ref 8.3–10.6)
CHLORIDE BLD-SCNC: 105 MMOL/L (ref 99–110)
CO2: 24 MMOL/L (ref 21–32)
CREAT SERPL-MCNC: 1 MG/DL (ref 0.8–1.3)
EOSINOPHILS ABSOLUTE: 0.2 K/UL (ref 0–0.6)
EOSINOPHILS RELATIVE PERCENT: 2.1 %
ESTIMATED AVERAGE GLUCOSE: 208.7 MG/DL
GFR SERPL CREATININE-BSD FRML MDRD: >60 ML/MIN/{1.73_M2}
GLUCOSE BLD-MCNC: 152 MG/DL (ref 70–99)
GLUCOSE BLD-MCNC: 155 MG/DL (ref 70–99)
GLUCOSE BLD-MCNC: 183 MG/DL (ref 70–99)
GLUCOSE BLD-MCNC: 186 MG/DL (ref 70–99)
GLUCOSE BLD-MCNC: 209 MG/DL (ref 70–99)
GLUCOSE BLD-MCNC: 261 MG/DL (ref 70–99)
HBA1C MFR BLD: 8.9 %
HCT VFR BLD CALC: 39.6 % (ref 40.5–52.5)
HEMOGLOBIN: 13.2 G/DL (ref 13.5–17.5)
INR BLD: 1.86 (ref 0.87–1.14)
LYMPHOCYTES ABSOLUTE: 1.6 K/UL (ref 1–5.1)
LYMPHOCYTES RELATIVE PERCENT: 16.9 %
MCH RBC QN AUTO: 31.3 PG (ref 26–34)
MCHC RBC AUTO-ENTMCNC: 33.3 G/DL (ref 31–36)
MCV RBC AUTO: 94.1 FL (ref 80–100)
MONOCYTES ABSOLUTE: 0.6 K/UL (ref 0–1.3)
MONOCYTES RELATIVE PERCENT: 6.2 %
NEUTROPHILS ABSOLUTE: 7 K/UL (ref 1.7–7.7)
NEUTROPHILS RELATIVE PERCENT: 74.1 %
PDW BLD-RTO: 13.4 % (ref 12.4–15.4)
PERFORMED ON: ABNORMAL
PLATELET # BLD: 287 K/UL (ref 135–450)
PMV BLD AUTO: 8.2 FL (ref 5–10.5)
POTASSIUM REFLEX MAGNESIUM: 4.2 MMOL/L (ref 3.5–5.1)
PROTHROMBIN TIME: 21.5 SEC (ref 11.7–14.5)
RBC # BLD: 4.21 M/UL (ref 4.2–5.9)
SODIUM BLD-SCNC: 137 MMOL/L (ref 136–145)
TOTAL PROTEIN: 6.9 G/DL (ref 6.4–8.2)
WBC # BLD: 9.5 K/UL (ref 4–11)

## 2022-12-23 PROCEDURE — 80175 DRUG SCREEN QUAN LAMOTRIGINE: CPT

## 2022-12-23 PROCEDURE — 85610 PROTHROMBIN TIME: CPT

## 2022-12-23 PROCEDURE — 1200000000 HC SEMI PRIVATE

## 2022-12-23 PROCEDURE — 2580000003 HC RX 258: Performed by: HOSPITALIST

## 2022-12-23 PROCEDURE — 36415 COLL VENOUS BLD VENIPUNCTURE: CPT

## 2022-12-23 PROCEDURE — 6370000000 HC RX 637 (ALT 250 FOR IP): Performed by: HOSPITALIST

## 2022-12-23 PROCEDURE — 97166 OT EVAL MOD COMPLEX 45 MIN: CPT

## 2022-12-23 PROCEDURE — 97162 PT EVAL MOD COMPLEX 30 MIN: CPT

## 2022-12-23 PROCEDURE — 6370000000 HC RX 637 (ALT 250 FOR IP): Performed by: INTERNAL MEDICINE

## 2022-12-23 PROCEDURE — 80053 COMPREHEN METABOLIC PANEL: CPT

## 2022-12-23 PROCEDURE — 97530 THERAPEUTIC ACTIVITIES: CPT

## 2022-12-23 PROCEDURE — 85025 COMPLETE CBC W/AUTO DIFF WBC: CPT

## 2022-12-23 PROCEDURE — 6360000002 HC RX W HCPCS: Performed by: HOSPITALIST

## 2022-12-23 RX ORDER — LITHIUM CARBONATE 150 MG/1
600 CAPSULE ORAL NIGHTLY
Status: DISCONTINUED | OUTPATIENT
Start: 2022-12-23 | End: 2022-12-27 | Stop reason: HOSPADM

## 2022-12-23 RX ORDER — LAMOTRIGINE 25 MG/1
25 TABLET ORAL DAILY
Status: DISCONTINUED | OUTPATIENT
Start: 2022-12-23 | End: 2022-12-27 | Stop reason: HOSPADM

## 2022-12-23 RX ORDER — VERAPAMIL HYDROCHLORIDE 240 MG/1
120 TABLET, FILM COATED, EXTENDED RELEASE ORAL NIGHTLY
Status: DISCONTINUED | OUTPATIENT
Start: 2022-12-23 | End: 2022-12-27 | Stop reason: HOSPADM

## 2022-12-23 RX ORDER — PANTOPRAZOLE SODIUM 40 MG/1
40 TABLET, DELAYED RELEASE ORAL
Status: DISCONTINUED | OUTPATIENT
Start: 2022-12-24 | End: 2022-12-27 | Stop reason: HOSPADM

## 2022-12-23 RX ORDER — LITHIUM CARBONATE 150 MG/1
300 CAPSULE ORAL DAILY
Status: DISCONTINUED | OUTPATIENT
Start: 2022-12-23 | End: 2022-12-27 | Stop reason: HOSPADM

## 2022-12-23 RX ORDER — LAMOTRIGINE 25 MG/1
50 TABLET ORAL NIGHTLY
Status: DISCONTINUED | OUTPATIENT
Start: 2022-12-23 | End: 2022-12-27 | Stop reason: HOSPADM

## 2022-12-23 RX ORDER — ROSUVASTATIN CALCIUM 10 MG/1
20 TABLET, COATED ORAL NIGHTLY
Status: DISCONTINUED | OUTPATIENT
Start: 2022-12-23 | End: 2022-12-27 | Stop reason: HOSPADM

## 2022-12-23 RX ADMIN — LITHIUM CARBONATE 300 MG: 150 CAPSULE, GELATIN COATED ORAL at 16:33

## 2022-12-23 RX ADMIN — INSULIN GLARGINE 15 UNITS: 100 INJECTION, SOLUTION SUBCUTANEOUS at 21:21

## 2022-12-23 RX ADMIN — ACETAMINOPHEN 325MG 650 MG: 325 TABLET ORAL at 03:49

## 2022-12-23 RX ADMIN — SODIUM CHLORIDE, PRESERVATIVE FREE 10 ML: 5 INJECTION INTRAVENOUS at 10:33

## 2022-12-23 RX ADMIN — LAMOTRIGINE 50 MG: 25 TABLET ORAL at 21:12

## 2022-12-23 RX ADMIN — VERAPAMIL HYDROCHLORIDE 120 MG: 240 TABLET, FILM COATED, EXTENDED RELEASE ORAL at 21:12

## 2022-12-23 RX ADMIN — ROSUVASTATIN CALCIUM 20 MG: 10 TABLET, FILM COATED ORAL at 21:12

## 2022-12-23 RX ADMIN — INSULIN LISPRO 4 UNITS: 100 INJECTION, SOLUTION INTRAVENOUS; SUBCUTANEOUS at 12:12

## 2022-12-23 RX ADMIN — PIPERACILLIN AND TAZOBACTAM 4500 MG: 4; .5 INJECTION, POWDER, FOR SOLUTION INTRAVENOUS at 12:11

## 2022-12-23 RX ADMIN — LEVOFLOXACIN 750 MG: 5 INJECTION, SOLUTION INTRAVENOUS at 16:36

## 2022-12-23 RX ADMIN — WARFARIN SODIUM 6 MG: 5 TABLET ORAL at 21:12

## 2022-12-23 RX ADMIN — LAMOTRIGINE 25 MG: 25 TABLET ORAL at 16:33

## 2022-12-23 RX ADMIN — PIPERACILLIN AND TAZOBACTAM 4500 MG: 4; .5 INJECTION, POWDER, FOR SOLUTION INTRAVENOUS at 03:23

## 2022-12-23 RX ADMIN — LITHIUM CARBONATE 600 MG: 150 CAPSULE, GELATIN COATED ORAL at 21:11

## 2022-12-23 ASSESSMENT — PAIN DESCRIPTION - DESCRIPTORS: DESCRIPTORS: ACHING;SHARP

## 2022-12-23 ASSESSMENT — PAIN SCALES - GENERAL: PAINLEVEL_OUTOF10: 8

## 2022-12-23 ASSESSMENT — PAIN DESCRIPTION - ORIENTATION: ORIENTATION: MID

## 2022-12-23 ASSESSMENT — PAIN DESCRIPTION - LOCATION: LOCATION: BACK

## 2022-12-23 NOTE — PROGRESS NOTES
Received patient from ER, alert and oriented, with saline lock on left ac, noted pressure ulcer on patient's buttocks grade 2, observed strict turning every 2 hours. Pt appears calm and anxious about his hospitalization, noted moderate tremors on the upper extremities. Encouraged deep breathing exercises. Placed pt on the lowest position, kept side rails up, placed call bell within reach. Instructed to call for assistance. Placed purewick on.

## 2022-12-23 NOTE — PROGRESS NOTES
4 Eyes Skin Assessment     The patient is being assess for  Admission    I agree that 2 RN's have performed a thorough Head to Toe Skin Assessment on the patient. ALL assessment sites listed below have been assessed. Areas assessed by both nurses: Fareed Vera RN  [x]   Head, Face, and Ears   [x]   Shoulders, Back, and Chest  [x]   Arms, Elbows, and Hands   [x]   Coccyx, Sacrum, and IschIum  [x]   Legs, Feet, and Heels        Does the Patient have Skin Breakdown?   Yes LDA WOUND CARE was Initiated documentation include the Brenda-wound, Wound Assessment, Measurements, Dressing Treatment, Drainage, and Color\",         Dano Prevention initiated:  Yes   Wound Care Orders initiated:  NA   Strict turning/Zinc oxide on wound  WOC nurse consulted for Pressure Injury (Stage 3,4, Unstageable, DTI, NWPT, and Complex wounds), New and Established Ostomies:  NA    Stage 2 buttocks    Nurse 1 eSignature: Electronically signed by Abbie Tatum RN on 12/23/22 at 6:03 AM EST    **SHARE this note so that the co-signing nurse is able to place an eSignature**    Nurse 2 eSignature: Electronically signed by Saintclair Painter, RN on 12/23/22 at 6:08 AM EST

## 2022-12-23 NOTE — PROGRESS NOTES
4 Eyes Skin Assessment     The patient is being assess for  Shift Handoff    I agree that 2 RN's have performed a thorough Head to Toe Skin Assessment on the patient. ALL assessment sites listed below have been assessed. Areas assessed by both nurses: Nina Pulido RN  [x]   Head, Face, and Ears   [x]   Shoulders, Back, and Chest  [x]   Arms, Elbows, and Hands   [x]   Coccyx, Sacrum, and IschIum  [x]   Legs, Feet, and Heels        Does the Patient have Skin Breakdown?   Yes LDA WOUND CARE was Initiated documentation include the Brenda-wound, Wound Assessment, Measurements, Dressing Treatment, Drainage, and Color\",         Dano Prevention initiated:  Yes   Wound Care Orders initiated:  No      WOC nurse consulted for Pressure Injury (Stage 3,4, Unstageable, DTI, NWPT, and Complex wounds), New and Established Ostomies:  No      Nurse 1 eSignature: Electronically signed by Etelvina Salomon RN on 12/23/22 at 3:08 PM EST    **SHARE this note so that the co-signing nurse is able to place an eSignature**    Nurse 2 eSignature: Electronically signed by Karime Alfaro RN on 12/23/22 at 3:10 PM EST          ee

## 2022-12-23 NOTE — RT PROTOCOL NOTE
RT Inhaler-Nebulizer Bronchodilator Protocol Note    There is a bronchodilator order in the chart from a provider indicating to follow the RT Bronchodilator Protocol and there is an Initiate RT Inhaler-Nebulizer Bronchodilator Protocol order as well (see protocol at bottom of note). CXR Findings:  XR CHEST PORTABLE    Result Date: 12/22/2022  Findings consistent with central and bibasilar bronchitis with foci of bibasilar subsegmental pneumonia or atelectasis. No radiographic evidence of pleural effusion in the frontal projection. Otherwise, radiographically nonacute portable chest.       The findings from the last RT Protocol Assessment were as follows:   History Pulmonary Disease: None or smoker <15 pack years  Respiratory Pattern: Regular pattern and RR 12-20 bpm  Breath Sounds: Clear breath sounds  Cough: Strong, spontaneous, non-productive  Indication for Bronchodilator Therapy: None  Bronchodilator Assessment Score: 0    Aerosolized bronchodilator medication orders have been revised according to the RT Inhaler-Nebulizer Bronchodilator Protocol below. Respiratory Therapist to perform RT Therapy Protocol Assessment initially then follow the protocol. Repeat RT Therapy Protocol Assessment PRN for score 0-3 or on second treatment, BID, and PRN for scores above 3. No Indications - adjust the frequency to every 6 hours PRN wheezing or bronchospasm, if no treatments needed after 48 hours then discontinue using Per Protocol order mode. If indication present, adjust the RT bronchodilator orders based on the Bronchodilator Assessment Score as indicated below. Use Inhaler orders unless patient has one or more of the following: on home nebulizer, not able to hold breath for 10 seconds, is not alert and oriented, cannot activate and use MDI correctly, or respiratory rate 25 breaths per minute or more, then use the equivalent nebulizer order(s) with same Frequency and PRN reasons based on the score.   If a patient is on this medication at home then do not decrease Frequency below that used at home. 0-3 - enter or revise RT bronchodilator order(s) to equivalent RT Bronchodilator order with Frequency of every 4 hours PRN for wheezing or increased work of breathing using Per Protocol order mode. 4-6 - enter or revise RT Bronchodilator order(s) to two equivalent RT bronchodilator orders with one order with BID Frequency and one order with Frequency of every 4 hours PRN wheezing or increased work of breathing using Per Protocol order mode. 7-10 - enter or revise RT Bronchodilator order(s) to two equivalent RT bronchodilator orders with one order with TID Frequency and one order with Frequency of every 4 hours PRN wheezing or increased work of breathing using Per Protocol order mode. 11-13 - enter or revise RT Bronchodilator order(s) to one equivalent RT bronchodilator order with QID Frequency and an Albuterol order with Frequency of every 4 hours PRN wheezing or increased work of breathing using Per Protocol order mode. Greater than 13 - enter or revise RT Bronchodilator order(s) to one equivalent RT bronchodilator order with every 4 hours Frequency and an Albuterol order with Frequency of every 2 hours PRN wheezing or increased work of breathing using Per Protocol order mode. RT to enter RT Home Evaluation for COPD & MDI Assessment order using Per Protocol order mode.     Electronically signed by Mayra Kim RCP on 12/22/2022 at 9:31 PM

## 2022-12-23 NOTE — PROGRESS NOTES
Hospitalist Progress Note      PCP: FRANCISCO JAVIER Wu CNP    Date of Admission: 12/22/2022    Chief Complaint: tremors    Hospital Course:   Serene Elizalde is a 67 y.o. male who presented to the ED per recommendations of his PCP to be evaluated for rigors and fever ongoing for 1 week PTA. Patient's wife reports that he was recently discharged from [facility], and has remained confused with generalized weakness since returning home. Patient was discharged home with Stapleton catheter due to LUTS. Patient's wife reports that he is unable to perform ADLs and is near full assist with transfer. She is no longer able to care for him in his present state. Upon arrival to the ED patient was initially tachycardic and tachypneic, but afebrile. Lactate level of 3.2 with left shifted leukocytosis and source of infection meets sepsis criteria. EKG ordered by admitting physician with results pending. CT scan notable for bibasilar atelectasis versus infiltrate, without other acute findings in abdomen and pelvis. Influenza A/B and SARS-CoV-2 testing performed with negative results. Notable labs include: Left shifted leukocytosis 11.8, lactate 3.3, procalcitonin elevated 0.74, acute hyperglycemia 227, and nitrite positive UA with 2+ bacteria. Patient initially received a dosage of IV Rocephin and Zithromax per ED provider. Review of [facility] chart reveals patient with hospital admission less than 1 month ago, therefore he meets HCAP criteria; antibiotics changed for appropriate spectrum coverage    Subjective: still feels very weak today. Patient overall very upset about currently medical state.        Medications:  Reviewed    Infusion Medications    dextrose      sodium chloride       Scheduled Medications    levofloxacin  750 mg IntraVENous Q24H    insulin glargine  0.15 Units/kg SubCUTAneous Nightly    insulin lispro  0-8 Units SubCUTAneous TID     insulin lispro  0-4 Units SubCUTAneous Nightly piperacillin-tazobactam  4,500 mg IntraVENous q8h    warfarin placeholder: dosing by pharmacy   Other RX Placeholder    sodium chloride flush  10 mL IntraVENous 2 times per day     PRN Meds: glucose, dextrose bolus **OR** dextrose bolus, glucagon (rDNA), dextrose, sodium chloride flush, sodium chloride, potassium chloride **OR** potassium alternative oral replacement **OR** potassium chloride, magnesium sulfate, senna, acetaminophen **OR** acetaminophen, ondansetron **OR** ondansetron, albuterol      Intake/Output Summary (Last 24 hours) at 12/23/2022 1420  Last data filed at 12/23/2022 0345  Gross per 24 hour   Intake --   Output 2100 ml   Net -2100 ml       Physical Exam Performed:    /80   Pulse 81   Temp 97.3 °F (36.3 °C) (Oral)   Resp 16   Ht 6' 1\" (1.854 m)   Wt 210 lb 15.7 oz (95.7 kg)   SpO2 97%   BMI 27.84 kg/m²     General appearance: No apparent distress, appears stated age and cooperative. HEENT: Pupils equal, round, and reactive to light. Conjunctivae/corneas clear. Neck: Supple, with full range of motion. No jugular venous distention. Trachea midline. Respiratory:  Normal respiratory effort. Rhonchi bilaterally without Rales/Wheezes. Cardiovascular: Regular rate and rhythm with normal S1/S2 without murmurs, rubs or gallops. Abdomen: Soft, non-tender, non-distended with normal bowel sounds. Musculoskeletal: No clubbing, cyanosis or edema bilaterally. Full range of motion without deformity. Skin: Skin color, texture, turgor normal.  No rashes or lesions. Neurologic:  Neurovascularly intact without any focal sensory/motor deficits.  Cranial nerves: II-XII intact, grossly non-focal.  Psychiatric: Alert and oriented, thought content appropriate, normal insight  Capillary Refill: Brisk, 3 seconds, normal   Peripheral Pulses: +2 palpable, equal bilaterally       Labs:   Recent Labs     12/22/22  1610 12/23/22  0540   WBC 11.8* 9.5   HGB 14.8 13.2*   HCT 43.7 39.6*    287     Recent Labs     12/22/22  1610 12/23/22  0540    137   K 4.6 4.2    105   CO2 25 24   BUN 20 15   CREATININE 1.2 1.0   CALCIUM 10.1 9.7     Recent Labs     12/22/22  1610 12/23/22  0540   * 81*   * 99*   BILITOT 0.5 0.5   ALKPHOS 199* 158*     Recent Labs     12/22/22  1610   INR 1.90*     Recent Labs     12/22/22  1610   CKTOTAL 108   TROPONINI <0.01       Urinalysis:      Lab Results   Component Value Date/Time    NITRU POSITIVE 12/22/2022 04:00 PM    WBCUA 3-5 12/22/2022 04:00 PM    BACTERIA 2+ 12/22/2022 04:00 PM    RBCUA 0-2 12/22/2022 04:00 PM    BLOODU Negative 12/22/2022 04:00 PM    SPECGRAV 1.010 12/22/2022 04:00 PM    GLUCOSEU Negative 12/22/2022 04:00 PM    GLUCOSEU NEGATIVE 10/13/2010 12:10 PM       Radiology:  CT CHEST ABDOMEN PELVIS W CONTRAST Additional Contrast? None   Final Result      Bibasilar atelectasis. No definite evidence for pneumonia. Abdomen/Pelvis: This is compared with 08/03/2020      Liver: Liver is normal density. No enhancing masses. Normal enhancement of   the intrahepatic vasculature. Spleen:  Normal size. No enhancing masses      Pancreas: No enhancing masses. No ductal dilation. No adjacent fatty   stranding. Gallbladder no calcified stones or sludge. No pericholecystic fluid. No   wall thickening. Bile ducts: No biliary ductal dilation      Adrenals: The adrenal glands are unremarkable      Kidneys: Kidneys are normal in appearance. No hydronephrosis. No enhancing   masses. Norenal stones. Hypodense nodule consistent with a cyst on the left   kidney. There are 2..  They are unchanged. GI: No small bowel dilation. No colonic wall thickening. No large mass. The stomach is unremarkable in appearance. Stomach is well distended the   with a small posterior diverticulum. .. Mesentery: No enlarged lymphadenopathy. No free fluid. No free gas. Aorta: Aorta is of normal size. Negative for dissection.   IVC is unremarkable. Celiac axis and SMA are patent. Portal vein is patent. PELVIS      GI: No small bowel dilation. No colonic wall thickening. No enlarged   lymphadenopathy. No free fluid in the pelvis. : Bubble of gas in the bladder. Probable previous TURP. .  Calcifications   within the prostate. Streak artifact related to the right hip arthroplasty. Osseous: Osteoarthritic changes of the left hip. Osteoarthritic change of   the SI joints. Partial ankylosis on the right. Advanced facet arthropathy. Anterior spurring of the lower thoracic and upper lumbar spine. Epidural   stimulator is in place. IMPRESSION:   1. No findings for abscess in the abdomen or pelvis. Negative for   pyelonephritis   2. Bubble of gas in the bladder. Was the patient catheterized? CT HEAD WO CONTRAST   Final Result   No acute intracranial abnormality. XR CHEST PORTABLE   Final Result   Findings consistent with central and bibasilar bronchitis with foci of   bibasilar subsegmental pneumonia or atelectasis. No radiographic evidence of   pleural effusion in the frontal projection.   Otherwise, radiographically   nonacute portable chest.             PHARMACY TO DOSE MEDICATION    Assessment/Plan:    Active Hospital Problems    Diagnosis     HCAP (healthcare-associated pneumonia) [J18.9]      Priority: Medium    Acute UTI [N39.0]      Priority: Medium    Factor 5 Leiden mutation, heterozygous ([facility] [D68.51]      Priority: Medium    Anticoagulated on Coumadin [Z79.01]      Priority: Medium    Sepsis ([address] [A41.9]     Asthma [J45.909]     Type 2 diabetes mellitus ([facility] [address] [E11.9]     Essential hypertension [I10]      Pneumonia  - no evidence of sepsis  - suspect gram positive  - continue levaquin  - blood cultures ordered    Debility  - multifactorial from spinal stenosis, chronic back pain  - PT/OT  - recommend follow up with neurology as an outpatient    DMII  - well controlled  - continue lantus with SSI    HTN  - well controlled  - continue home verapamil    HLD  - continue statin    Bipolar disorder  - mood stable  - continue home regimen    Factor V leiden  - continue coumadin, pharmacy to dose    DVT Prophylaxis: coumadin  Diet: ADULT DIET; Regular; 4 carb choices (60 gm/meal);  Low Fat/Low Chol/High Fiber/TIA  Code Status: Full Code  PT/OT Eval Status: ordered    Dispo - likely SNF in 1-2 days    Appropriate for A1 Discharge Unit: No      Giovanny Speaker, MD

## 2022-12-23 NOTE — PROGRESS NOTES
Occupational Therapy  Facility/Department: [facility] C3 TELE/MED SURG/ONC  Occupational Therapy Initial Assessment/Treatment    Name: Nam Rios  : 1950  MRN: [id number]  Date of Service: 2022    Discharge Recommendations:  Home with Home health OT, S Level 4, 24 hour supervision or assist  OT Equipment Recommendations  Equipment Needed: No       Patient Diagnosis(es): The primary encounter diagnosis was Pneumonia of both lungs due to infectious organism, unspecified part of lung. A diagnosis of Acute cystitis with hematuria was also pertinent to this visit. Past Medical History:  has a past medical history of Anxiety, Arthritis, Asthma, Bipolar 1 disorder ([facility] [address] Colitis, Depression, Diabetes ([facility] [address] Diabetes mellitus ([facility] [address] Encounter for imaging to screen for metal prior to MRI, [facility] Le[facility] Glaucoma, H/O bladder problems, High blood pressure, History of kidney problems, IBS (irritable bowel syndrome), Liver disease, Obesity, and Psychiatric problem. Past Surgical History:  has a past surgical history that includes back surgery; hip surgery; Eye surgery; Colonoscopy; cervical fusion; joint replacement (Right); other surgical history (2018); pr njx dx/ther sbst intrlmnr crv/thrc w/img gdn (Bilateral, 2018); pr njx dx/ther sbst intrlmnr crv/thrc w/img gdn (Bilateral, 10/16/2018); pr nervous system surgery unlisted (Bilateral, 2018); Appendectomy; and TURP (N/A, 2020). Assessment   Performance deficits / Impairments: Decreased functional mobility ; Decreased endurance;Decreased posture;Decreased ADL status; Decreased balance;Decreased strength;Decreased safe awareness;Decreased cognition  Assessment: Pt presents with the above deficits requiring skilled OT services to return to OF. Per MD H&P: Kendal Hitchcock is a 67 y.o. male who presented to the ED per recommendations of his PCP to be evaluated for rigors and fever ongoing for 1 week PTA.   Patient's wife reports that he was recently discharged from [facility], and has remained confused with generalized weakness since returning home. Patient was discharged home with Stapleton catheter due to LUTS. Patient's wife reports that he is unable to perform ADLs and is near full assist with transfer. She is no longer able to care for him in his present state. Upon arrival to the ED patient was initially tachycardic and tachypneic, but afebrile. Lactate level of 3.2 with left shifted leukocytosis and source of infection meets sepsis criteria. EKG ordered by admitting physician with results pending. CT scan notable for bibasilar atelectasis versus infiltrate, without other acute findings in abdomen and pelvis. Influenza A/B and SARS-CoV-2 testing performed with negative results. Pt very anxious on arrival about lack of ability to move or stand and worried that therapy will hurt patient. Pt encouraged to participate and attempt movement and completed bed mobility with SPV and stood at EOB for ~1 minute with CGA and ability to hold unilateral LE up for 10 seconds prior to switching feet. Pt completed seated rest break and then pivoted to chair with CGA and use of SW. Pt unable to adjust socks but states that spouse typically helps with socks at home. Pt would likely be able to return home with assist from spouse and home therapy. Prognosis: Fair  Decision Making: Medium Complexity  REQUIRES OT FOLLOW-UP: Yes  Activity Tolerance  Activity Tolerance: Patient Tolerated treatment well;Treatment limited secondary to decreased cognition  Activity Tolerance Comments: Pt very anxious about not being able to walk or stand at all.         Plan   Occupational Therapy Plan  Times Per Week: 3-5x/week  Current Treatment Recommendations: Strengthening, ROM, Balance training, Functional mobility training, Endurance training, Equipment evaluation, education, & procurement, Patient/Caregiver education & training, Safety education & training, Self-Care / ADL     Restrictions  Restrictions/Precautions  Restrictions/Precautions: Fall Risk, General Precautions  Position Activity Restriction  Other position/activity restrictions: L IV, male gagandeep    Subjective   General  Chart Reviewed: Yes, Orders, Progress Notes, History and Physical, Imaging, Labs  Patient assessed for rehabilitation services?: Yes  Family / Caregiver Present: No  Referring Practitioner: Wayne Malin MD  Diagnosis: sepsis  Subjective  Subjective: Pt in bed, reports severe back pain with movement, did not rate, improved during session, agreeable to therapy evaluation, /61, HR 78, O2 sats 97% on RA. Social/Functional History  Social/Functional History  Lives With: Spouse  Type of Home: House  Home Layout: One level  Home Access: Stairs to enter with rails, Ramped entrance  Entrance Stairs - Number of Steps: 1+1. ramp entrance now  Bathroom Shower/Tub: Walk-in shower  Bathroom Toilet: Handicap height  Bathroom Equipment: Grab bars in shower, Grab bars around toilet  Home Equipment: Walker, rolling, Wheelchair-manual (transport chair)  Has the patient had two or more falls in the past year or any fall with injury in the past year?: Yes (4 falls in last month)  ADL Assistance: Needs assistance  Homemaking Assistance: Needs assistance (wife completes)  Ambulation Assistance: Independent (mod ind with RW)  Transfer Assistance: Independent  Occupation: Retired  Additional Comments: sleeps in lift chair. Objective   Heart Rate: 78  Heart Rate Source: Monitor  BP: 129/61  BP Location: Right upper arm  BP Method: Automatic  Patient Position: Semi fowlers  MAP (Calculated): 84  Resp: 16  SpO2: 97 %  O2 Device: None (Room air)          Observation/Palpation  Posture: Fair  Observation: forward flexed posture  Body Mechanics: UE tremors noted at times during evaluation  Safety Devices  Type of Devices: Bed alarm in place;Gait belt;Call light within reach; Left in bed; All fall risk precautions in place;Nurse notified  Bed Mobility Training  Bed Mobility Training: Yes  Overall Level of Assistance: Supervision  Interventions: Verbal cues  Rolling: Supervision  Supine to Sit: Supervision  Sit to Supine: Supervision  Balance  Sitting: Impaired  Sitting - Static: Fair (occasional)  Sitting - Dynamic: Fair (occasional)  Standing: Impaired  Standing - Static: Fair  Standing - Dynamic: Fair  Transfer Training  Transfer Training: Yes  Overall Level of Assistance: Contact-guard assistance  Interventions: Safety awareness training;Verbal cues; Weight shifting training/pressure relief  Sit to Stand: Contact-guard assistance  Stand to Sit: Contact-guard assistance  Stand Pivot Transfers: Contact-guard assistance  Bed to Chair: Contact-guard assistance  Wheelchair Bed Transfers  Wheelchair/Bed - Technique: Stand pivot  Equipment Used: Bed;Other  Level of Asssistance: Contact guard assistance  Wheelchair Transfers Comments: stand pivot bed<>chair  AROM: Within functional limits  PROM: Within functional limits  Strength: Generally decreased, functional  Coordination: Generally decreased, functional  Tone: Normal  Sensation: Intact  ADL  LE Dressing Skilled Clinical Factors: total assist for donning socks  Toileting: Dependent/Total  Toileting Skilled Clinical Factors: male purewick     Activity Tolerance  Activity Tolerance: Patient tolerated treatment well  Bed mobility  Rolling to Right: Supervision  Supine to Sit: Supervision  Sit to Supine: Supervision  Transfers  Stand Pivot Transfers: Contact guard assistance  Sit to stand: Contact guard assistance  Stand to sit: Contact guard assistance  Transfer Comments: with RW  Vision - Basic Assessment  Prior Vision: No visual deficits  Vision  Vision: Within Functional Limits  Hearing  Hearing: Within functional limits  Cognition  Overall Cognitive Status: Exceptions  Arousal/Alertness: Appropriate responses to stimuli  Following Commands:  Follows multistep commands with increased time  Attention Span: Attends with cues to redirect  Memory: Decreased short term memory  Safety Judgement: Decreased awareness of need for assistance;Decreased awareness of need for safety  Problem Solving: Assistance required to correct errors made;Assistance required to implement solutions;Assistance required to generate solutions;Assistance required to identify errors made  Insights: Decreased awareness of deficits  Initiation: Requires cues for some  Sequencing: Requires cues for some  Orientation  Overall Orientation Status: Within Functional Limits  Perception  Overall Perceptual Status: Impaired  Unilateral Attention: Appears intact  Initiation: Cues to initiate tasks  Motor Planning: Appears intact  Perseveration: Perseverates during conversation               Education Given To: Patient  Education Provided: Role of Therapy;Plan of Care;Orientation;Precautions; Equipment;ADL Adaptive Strategies;Transfer Training; Fall Prevention Strategies  Education Provided Comments: role of OT, safety with ADLs, transfer training, discharge planning  Education Method: Demonstration;Verbal  Barriers to Learning: Cognition  Education Outcome: Verbalized understanding;Continued education needed  LUE AROM (degrees)  LUE AROM : WFL  Left Hand AROM (degrees)  Left Hand AROM: WFL  RUE AROM (degrees)  RUE AROM : WFL  Right Hand AROM (degrees)  Right Hand AROM: [facility]-PAC Score        AM-[facility] Inpatient Daily Activity Raw Score: 14 (12/23/22 1507)  AM-PAC Inpatient ADL T-Scale Score : 33.39 (12/23/22 1507)  ADL Inpatient CMS 0-100% Score: 59.67 (12/23/22 1507)  ADL Inpatient CMS G-Code Modifier : CK (12/23/22 1507)    Goals  Short Term Goals  Time Frame for Short Term Goals: 1 week- 12/30/22  Short Term Goal 1: Pt will complete functional transfers with SPV. Short Term Goal 2: Pt will perform 2-3 grooming tasks with supervision. Short Term Goal 3: Pt will complete toileting with SBA.   Short Term Goal 4: Pt will complete 15-20 reps of BUE ther ex in preparation for safe completion of ADLs and transfers by 12/27/22.   Patient Goals   Patient goals : \"I don't want to go to rehab again\"       Therapy Time   Individual Concurrent Group Co-treatment   Time In 1407         Time Out 1430         Minutes 23         Timed Code Treatment Minutes: 8 Minutes (15 minute evaluation)       Saurav Ramos, OT

## 2022-12-23 NOTE — CARE COORDINATION
CASE MANAGEMENT INITIAL ASSESSMENT      Reviewed chart and completed assessment with patient:bedside  Family present: none  Explained Case Management role/services. Primary contact information:[facility] Decision Maker :   Primary Decision Maker: Deja Contreras - [phone]          Can this person be reached and be able to respond quickly, such as within a few minutes or hours? Yes    Admit date/status:12/22/22  Diagnosis:sepsis   Is this a Readmission?:  Yes      Insurance:aet   Precert required for SNF: Yes       3 night stay required: No    Living arrangements, Adls, care needs, prior to admission:lives in ranch style home with wife. Ramp entry    Durable Medical Equipment at home:  Walker_x_Cane__RTS__x BSC__Shower Chair_x_  02__ HHN__ CPAP__  BiPap__  Hospital Bed__ W/C__x_ Other_____    Services in the home and/or outpatient, prior to admission:active with [facility], unclear of name    Current PCP:Tima                                Medications Prescription coverage? yes    Transportation needs: none     Dialysis Facility (if applicable)   Name:  Address:  Dialysis Schedule:  Phone:  Fax:    PT/OT recs:none    Hospital Exemption Notification (HEN):needed for SNF    Barriers to discharge:none    Plan/comments:spoke with patient. Plans on returning home at discharge. Reported was at [facility] was cut by insurance returned home with [facility], unclear of agency. Stated he is having difficulty finding words of things he knows. Unable to ambulate currently needs much assistance. Repeatedly says his life \"is a pile of shit\". Will continue to follow for therapy and any additional needs.  Nga Plascencia RN      ECOC on chart for MD signature

## 2022-12-23 NOTE — RT PROTOCOL NOTE
RT Inhaler-Nebulizer Bronchodilator Protocol Note    There is a bronchodilator order in the chart from a provider indicating to follow the RT Bronchodilator Protocol and there is an Initiate RT Inhaler-Nebulizer Bronchodilator Protocol order as well (see protocol at bottom of note). CXR Findings:  XR CHEST PORTABLE    Result Date: 12/22/2022  Findings consistent with central and bibasilar bronchitis with foci of bibasilar subsegmental pneumonia or atelectasis. No radiographic evidence of pleural effusion in the frontal projection. Otherwise, radiographically nonacute portable chest.       The findings from the last RT Protocol Assessment were as follows:   History Pulmonary Disease: None or smoker <15 pack years  Respiratory Pattern: Regular pattern and RR 12-20 bpm  Breath Sounds: Clear breath sounds  Cough: Strong, spontaneous, non-productive  Indication for Bronchodilator Therapy: None  Bronchodilator Assessment Score: 0    Aerosolized bronchodilator medication orders have been revised according to the RT Inhaler-Nebulizer Bronchodilator Protocol below. Respiratory Therapist to perform RT Therapy Protocol Assessment initially then follow the protocol. Repeat RT Therapy Protocol Assessment PRN for score 0-3 or on second treatment, BID, and PRN for scores above 3. No Indications - adjust the frequency to every 6 hours PRN wheezing or bronchospasm, if no treatments needed after 48 hours then discontinue using Per Protocol order mode. If indication present, adjust the RT bronchodilator orders based on the Bronchodilator Assessment Score as indicated below. Use Inhaler orders unless patient has one or more of the following: on home nebulizer, not able to hold breath for 10 seconds, is not alert and oriented, cannot activate and use MDI correctly, or respiratory rate 25 breaths per minute or more, then use the equivalent nebulizer order(s) with same Frequency and PRN reasons based on the score.   If a patient is on this medication at home then do not decrease Frequency below that used at home. 0-3 - enter or revise RT bronchodilator order(s) to equivalent RT Bronchodilator order with Frequency of every 4 hours PRN for wheezing or increased work of breathing using Per Protocol order mode. 4-6 - enter or revise RT Bronchodilator order(s) to two equivalent RT bronchodilator orders with one order with BID Frequency and one order with Frequency of every 4 hours PRN wheezing or increased work of breathing using Per Protocol order mode. 7-10 - enter or revise RT Bronchodilator order(s) to two equivalent RT bronchodilator orders with one order with TID Frequency and one order with Frequency of every 4 hours PRN wheezing or increased work of breathing using Per Protocol order mode. 11-13 - enter or revise RT Bronchodilator order(s) to one equivalent RT bronchodilator order with QID Frequency and an Albuterol order with Frequency of every 4 hours PRN wheezing or increased work of breathing using Per Protocol order mode. Greater than 13 - enter or revise RT Bronchodilator order(s) to one equivalent RT bronchodilator order with every 4 hours Frequency and an Albuterol order with Frequency of every 2 hours PRN wheezing or increased work of breathing using Per Protocol order mode. RT to enter RT Home Evaluation for COPD & MDI Assessment order using Per Protocol order mode.     Electronically signed by Antoinette Geronimo RCP on 12/22/2022 at 9:30 PM

## 2022-12-23 NOTE — PROGRESS NOTES
Physical Therapy  Facility/Department: [facility] C3 TELE/MED SURG/ONC  Physical Therapy Initial Assessment    Name: Katie Boucher  : 1950  MRN: [id number]  Date of Service: 2022    Discharge Recommendations:  24 hour supervision or assist, Home with Home health PT   PT Equipment Recommendations  Equipment Needed: Yes (tbd)      Patient Diagnosis(es): The primary encounter diagnosis was Pneumonia of both lungs due to infectious organism, unspecified part of lung. A diagnosis of Acute cystitis with hematuria was also pertinent to this visit. Past Medical History:  has a past medical history of Anxiety, Arthritis, Asthma, Bipolar 1 disorder ([facility] [address] Colitis, Depression, Diabetes ([facility] [address] Diabetes mellitus ([facility] [address] Encounter for imaging to screen for metal prior to MRI, Factor V Leiden mutation ([facility] Glaucoma, H/O bladder problems, High blood pressure, History of kidney problems, IBS (irritable bowel syndrome), Liver disease, Obesity, and Psychiatric problem. Past Surgical History:  has a past surgical history that includes back surgery; hip surgery; Eye surgery; Colonoscopy; cervical fusion; joint replacement (Right); other surgical history (2018); pr njx dx/ther sbst intrlmnr crv/thrc w/img gdn (Bilateral, 2018); pr njx dx/ther sbst intrlmnr crv/thrc w/img gdn (Bilateral, 10/16/2018); pr nervous system surgery unlisted (Bilateral, 2018); Appendectomy; and TURP (N/A, 2020). Assessment   Body Structures, Functions, Activity Limitations Requiring Skilled Therapeutic Intervention: Decreased functional mobility ; Decreased cognition;Decreased endurance;Decreased strength;Decreased balance; Increased pain  Assessment: pt is 67year old male admitted to [facility] with dx of sepsis. pt oriented x 4 however general confusion / decreased cog noted with open conversation. per chart review and pt, mod ind with RW for mobility, hx of multiple falls at home. per pt, pt has not stood or walked in 1-2 weeks. pt currently requires supv for bed mobility, CGA for transfers and gait x 5 ft with SW. pt declining SNF adn agitated with thought of rehab. rec pt have acute therapy services, d/c home with 24/7 and HHPT. DME: potential need for w/c, otherwise pt owns RW  Treatment Diagnosis: impaired mobility  Therapy Prognosis: Fair  Decision Making: Medium Complexity  Barriers to Learning: cog  Requires PT Follow-Up: Yes  Activity Tolerance  Activity Tolerance: Patient tolerated treatment well     Plan   Physcial Therapy Plan  General Plan: 3-5 times per week  Current Treatment Recommendations: Strengthening, ROM, Balance training, Therapeutic activities, Home exercise program, Functional mobility training, Transfer training, Neuromuscular re-education, Stair training, Safety education & training, Gait training, Patient/Caregiver education & training, Endurance training, Positioning, Equipment evaluation, education, & procurement  Safety Devices  Type of Devices: Bed alarm in place, Gait belt, Call light within reach, Left in bed     Restrictions  Position Activity Restriction  Other position/activity restrictions: L IV     Subjective   General  Chart Reviewed: Yes  Patient assessed for rehabilitation services?: Yes  Response To Previous Treatment: Not applicable  Family / Caregiver Present: No  Referring Practitioner: Vinh Perrin MD  Referral Date : 12/23/22  Diagnosis: sepsis  Follows Commands: Within Functional Limits  General Comment  Comments: Rn cleared pt to participate  Subjective  Subjective: pt reports back pain, does not rate. encouragement to participate         Social/Functional History  Social/Functional History  Lives With: Spouse  Type of Home: House  Home Layout: One level  Home Access: Stairs to enter with rails, Ramped entrance  Entrance Stairs - Number of Steps: 1+1.  ramp entrance now  Bathroom Shower/Tub: Walk-in shower  Bathroom Toilet: Handicap height  Bathroom Equipment: Grab bars in shower, Grab bars around toilet  Home Equipment: Walker, rolling, Wheelchair-manual (transport chair)  Has the patient had two or more falls in the past year or any fall with injury in the past year?: Yes (4 falls in last month)  ADL Assistance: Needs assistance  Homemaking Assistance: Needs assistance (wife completes)  Ambulation Assistance: Independent (mod ind with RW)  Transfer Assistance: Independent  Occupation: Retired  Additional Comments: sleeps in lift chair. Vision/Hearing       Cognition   Orientation  Overall Orientation Status: Within Functional Limits  Cognition  Overall Cognitive Status: Exceptions  Attention Span: Attends with cues to redirect     Objective   Heart Rate: 78  Heart Rate Source: Monitor  BP: 129/61  BP Location: Right upper arm  BP Method: Automatic  Patient Position: Semi fowlers  MAP (Calculated): 84  Resp: 16  SpO2: 97 %  O2 Device: None (Room air)     Observation/Palpation  Posture: Fair  Body Mechanics: UE tremors noted at times during evaluation           Strength RLE  Strength RLE: WFL  Comment: grossly 3+/5  Strength LLE  Strength LLE: WFL  Comment: grossly 3+/5           Bed mobility  Rolling to Right: Supervision  Supine to Sit: Supervision  Sit to Supine: Supervision  Transfers  Sit to Stand: Contact guard assistance  Stand to Sit: Contact guard assistance  Bed to Chair: Contact guard assistance  Comment: sit to stand EOB to Sw x 2 reps with CGA.  stand pivot EOB to chair to SW and CGA  Ambulation  Surface: Level tile  Device: Standard Walker  Assistance: Contact guard assistance  Quality of Gait: decreased step height/length, kyphotic posture  Distance: 5 ft                                          AM-PAC Score     AM-PAC Inpatient Mobility without Stair Climbing Raw Score : 17 (12/23/22 1439)  AM-PAC Inpatient without Stair Climbing T-Scale Score : 48.47 (12/23/22 1439)  Mobility Inpatient CMS 0-100% Score: 32.72 (12/23/22 1439)  Mobility Inpatient without Stair CMS G-Code Modifier : Kobi Vanessa (12/23/22 1439)       Tinneti Score       Goals  Short Term Goals  Time Frame for Short Term Goals: 7 days 12/30  Short Term Goal 1: pt will complete bed mobility with ind  Short Term Goal 2: pt will complete functional transfer with mod ind  Short Term Goal 3: pt will ambualte 20 ft with LRAD and supv  Short Term Goal 4: pt will tolerate 10-15 reps of BLE seated/supine ther ex by 12/27  Patient Goals   Patient Goals : \"go home without rehab\"       Education  Patient Education  Education Given To: Patient  Education Provided: Role of Therapy  Education Provided Comments: role of PT, benefits of OOB Activity, d/c rec  Education Method: Demonstration;Verbal  Barriers to Learning: Cognition  Education Outcome: Verbalized understanding;Continued education needed      Therapy Time   Individual Concurrent Group Co-treatment   Time In 1407         Time Out 1430         Minutes 23         Timed Code Treatment Minutes: Kaya Mackey 284       Richard Woodard, PT

## 2022-12-23 NOTE — ACP (ADVANCE CARE PLANNING)
Advance Care Planning     General Advance Care Planning (ACP) Conversation    Date of Conversation: 12/22/2022  Conducted with: Patient with Decision Making Capacity    Healthcare Decision Maker:    Primary Decision Maker: Claudeen Bare - Spouse - [phone]  Click here to complete Healthcare Decision Makers including selection of the Healthcare Decision Maker Relationship (ie \"Primary\"). Today we documented Decision Maker(s) consistent with Legal Next of Kin hierarchy. Content/Action Overview:   Has ACP document(s) on file - reflects the patient's care preferences  Reviewed DNR/DNI and patient elects Full Code (Attempt Resuscitation)      Length of Voluntary ACP Conversation in minutes:  <16 minutes (Non-Billable)    Ryley Chirinos, RN

## 2022-12-23 NOTE — ED NOTES
Pt transported to C3 with all belongings in stable condition at this time     Tawanda Montanez RN  12/22/22 2047

## 2022-12-23 NOTE — PROGRESS NOTES
25.0-29. 9)    Estimated Daily Nutrient Needs:  Energy Requirements Based On: Kcal/kg (25-30)  Weight Used for Energy Requirements: Ideal  Energy (kcal/day): 8155-7753 kcal  Weight Used for Protein Requirements: Ideal (1.2-1.4 g/kg)  Protein (g/day): 100-118 g  Method Used for Fluid Requirements: 1 ml/kcal  Fluid (ml/day): 7973-7858 mL    Nutrition Diagnosis:   Inadequate oral intake related to inadequate protein-energy intake, increase demand for energy/nutrients as evidenced by poor intake prior to admission, weight loss, wounds    Nutrition Interventions:   Food and/or Nutrient Delivery: Modify Current Diet, Start Oral Nutrition Supplement  Nutrition Education/Counseling: No recommendation at this time  Coordination of Nutrition Care: Continue to monitor while inpatient       Goals:     Goals: PO intake 50% or greater, prior to discharge       Nutrition Monitoring and Evaluation:   Behavioral-Environmental Outcomes: None Identified  Food/Nutrient Intake Outcomes: Food and Nutrient Intake, Supplement Intake  Physical Signs/Symptoms Outcomes: Biochemical Data, Nutrition Focused Physical Findings, Weight, Skin    Discharge Planning:    Continue current diet, Continue Oral Nutrition Supplement     Alton Bailey MS, RD, LD  Contact: [phone]

## 2022-12-24 LAB
GLUCOSE BLD-MCNC: 163 MG/DL (ref 70–99)
GLUCOSE BLD-MCNC: 169 MG/DL (ref 70–99)
GLUCOSE BLD-MCNC: 184 MG/DL (ref 70–99)
GLUCOSE BLD-MCNC: 213 MG/DL (ref 70–99)
GLUCOSE BLD-MCNC: 235 MG/DL (ref 70–99)
INR BLD: 2.38 (ref 0.87–1.14)
LACTIC ACID, SEPSIS: 1 MMOL/L (ref 0.4–1.9)
PERFORMED ON: ABNORMAL
PROTHROMBIN TIME: 26.1 SEC (ref 11.7–14.5)

## 2022-12-24 PROCEDURE — 6370000000 HC RX 637 (ALT 250 FOR IP): Performed by: INTERNAL MEDICINE

## 2022-12-24 PROCEDURE — 6360000002 HC RX W HCPCS: Performed by: HOSPITALIST

## 2022-12-24 PROCEDURE — 36415 COLL VENOUS BLD VENIPUNCTURE: CPT

## 2022-12-24 PROCEDURE — 1200000000 HC SEMI PRIVATE

## 2022-12-24 PROCEDURE — 2580000003 HC RX 258: Performed by: HOSPITALIST

## 2022-12-24 PROCEDURE — 6370000000 HC RX 637 (ALT 250 FOR IP): Performed by: HOSPITALIST

## 2022-12-24 PROCEDURE — 6370000000 HC RX 637 (ALT 250 FOR IP): Performed by: NURSE PRACTITIONER

## 2022-12-24 PROCEDURE — 83605 ASSAY OF LACTIC ACID: CPT

## 2022-12-24 PROCEDURE — 85610 PROTHROMBIN TIME: CPT

## 2022-12-24 RX ORDER — MECOBALAMIN 5000 MCG
10 TABLET,DISINTEGRATING ORAL NIGHTLY
Status: DISCONTINUED | OUTPATIENT
Start: 2022-12-24 | End: 2022-12-27 | Stop reason: HOSPADM

## 2022-12-24 RX ADMIN — Medication 10 MG: at 00:50

## 2022-12-24 RX ADMIN — INSULIN LISPRO 2 UNITS: 100 INJECTION, SOLUTION INTRAVENOUS; SUBCUTANEOUS at 17:09

## 2022-12-24 RX ADMIN — LAMOTRIGINE 25 MG: 25 TABLET ORAL at 09:33

## 2022-12-24 RX ADMIN — LITHIUM CARBONATE 300 MG: 150 CAPSULE, GELATIN COATED ORAL at 09:33

## 2022-12-24 RX ADMIN — SODIUM CHLORIDE, PRESERVATIVE FREE 10 ML: 5 INJECTION INTRAVENOUS at 09:33

## 2022-12-24 RX ADMIN — LITHIUM CARBONATE 600 MG: 150 CAPSULE, GELATIN COATED ORAL at 21:12

## 2022-12-24 RX ADMIN — ROSUVASTATIN CALCIUM 20 MG: 10 TABLET, FILM COATED ORAL at 21:12

## 2022-12-24 RX ADMIN — VERAPAMIL HYDROCHLORIDE 120 MG: 240 TABLET, FILM COATED, EXTENDED RELEASE ORAL at 21:12

## 2022-12-24 RX ADMIN — PANTOPRAZOLE SODIUM 40 MG: 40 TABLET, DELAYED RELEASE ORAL at 06:58

## 2022-12-24 RX ADMIN — INSULIN GLARGINE 15 UNITS: 100 INJECTION, SOLUTION SUBCUTANEOUS at 21:11

## 2022-12-24 RX ADMIN — LAMOTRIGINE 50 MG: 25 TABLET ORAL at 21:11

## 2022-12-24 RX ADMIN — SODIUM CHLORIDE, PRESERVATIVE FREE 10 ML: 5 INJECTION INTRAVENOUS at 23:12

## 2022-12-24 RX ADMIN — Medication 10 MG: at 21:12

## 2022-12-24 RX ADMIN — LEVOFLOXACIN 750 MG: 5 INJECTION, SOLUTION INTRAVENOUS at 17:09

## 2022-12-24 NOTE — PROGRESS NOTES
PS NP Jony,    Patient is not able sleep tonight, requesting a sleeping aide at this time. Thank you.

## 2022-12-24 NOTE — PLAN OF CARE
Problem: Skin/Tissue Integrity  Goal: Absence of new skin breakdown  Description: 1. Monitor for areas of redness and/or skin breakdown  2. Assess vascular access sites hourly  3. Every 4-6 hours minimum:  Change oxygen saturation probe site  4. Every 4-6 hours:  If on nasal continuous positive airway pressure, respiratory therapy assess nares and determine need for appliance change or resting period.   12/24/2022 1058 by Lashawn Santamaria RN  Outcome: Progressing     Problem: Safety - Adult  Goal: Free from fall injury  12/24/2022 1058 by Lashawn Santamaria RN  Outcome: Bahman Andrea (Taken 12/24/2022 1058)  Free From Fall Injury: Instruct family/caregiver on patient safety     Problem: Nutrition Deficit:  Goal: Optimize nutritional status  12/24/2022 1058 by Lashawn Santamaria RN  Outcome: Not Progressing  Flowsheets (Taken 12/24/2022 1058)  Nutrient intake appropriate for improving, restoring, or maintaining nutritional needs:   Monitor oral intake, labs, and treatment plans   Assess nutritional status and recommend course of action     Problem: Nutrition Deficit:  Goal: Optimize nutritional status  12/24/2022 1058 by Lashawn Santamaria RN  Outcome: Not Progressing  Flowsheets (Taken 12/24/2022 1058)  Nutrient intake appropriate for improving, restoring, or maintaining nutritional needs:   Monitor oral intake, labs, and treatment plans   Assess nutritional status and recommend course of action  12/24/2022 0746 by Flavio Wilkerson RN  Outcome: Progressing

## 2022-12-24 NOTE — PROGRESS NOTES
4 Eyes Skin Assessment     The patient is being assess for  Shift Handoff    I agree that 2 RN's have performed a thorough Head to Toe Skin Assessment on the patient. ALL assessment sites listed below have been assessed. Areas assessed by both nurses: Kristofer/ toi    [x]   Head, Face, and Ears   [x]   Shoulders, Back, and Chest  [x]   Arms, Elbows, and Hands   [x]   Coccyx, Sacrum, and IschIum  [x]   Legs, Feet, and Heels        Does the Patient have Skin Breakdown?   Yes LDA WOUND CARE was Initiated documentation include the Brenda-wound, Wound Assessment, Measurements, Dressing Treatment, Drainage, and Color\",         Dano Prevention initiated:  Yes   Wound Care Orders initiated:  No      WOC nurse consulted for Pressure Injury (Stage 3,4, Unstageable, DTI, NWPT, and Complex wounds), New and Established Ostomies:  No      Nurse 1 eSignature: Electronically signed by Sohail Guardado RN on 12/24/22 at 1:47 PM EST    **SHARE this note so that the co-signing nurse is able to place an eSignature**    Nurse 2 eSignature: {Esignature:[id number]}

## 2022-12-24 NOTE — CARE COORDINATION
Per Dr Becky Wilkerson pt is close to medically ready for dc. Spouse would like pt to return to [facility] for more rehab as he progresses more in hospital or in rehab rather than home. Per Dalila pt has limited skilled days remaining and spouse is aware of this. As pt has recs for home, [facility] would need to pre cert pt even though [facility]beth Prabhakar is waiving certs. [facility] will submit for cert today. Pt is active with [facility] if desires dc home instead of SNF placement.

## 2022-12-24 NOTE — PLAN OF CARE
Problem: Skin/Tissue Integrity  Goal: Absence of new skin breakdown  Description: 1. Monitor for areas of redness and/or skin breakdown  2. Assess vascular access sites hourly  3. Every 4-6 hours minimum:  Change oxygen saturation probe site  4. Every 4-6 hours:  If on nasal continuous positive airway pressure, respiratory therapy assess nares and determine need for appliance change or resting period.   Outcome: Progressing     Problem: ABCDS Injury Assessment  Goal: Absence of physical injury  Outcome: Progressing     Problem: Safety - Adult  Goal: Free from fall injury  Outcome: Progressing     Problem: Nutrition Deficit:  Goal: Optimize nutritional status  Outcome: Progressing

## 2022-12-24 NOTE — PROGRESS NOTES
Shift assessment completed. Pt A&O, VSS. Patient unable to sleep majority of the night with sleep aid. Dry overnight with external catheter. Denies any needs at this time. Bed locked and in lowest position. Call light within reach. Will continue to monitor.

## 2022-12-24 NOTE — PROGRESS NOTES
Pt assessment completed and charted. VSS. Pt a/ox3, disoriented to time. Pt has tremors in upper extremities at times. Pt reporting chills this afternoon, but no fevers. Pt has back discomfort at times. Pt denies any other needs at this time. Pt calls out appropriately. Wife at bedside this afternoon. Pt is a fall risk;  -Bed in lowest position and wheels locked. -Call light within reach.   -Bedside table within reach.   -Non-skid footwear in place.

## 2022-12-24 NOTE — PROGRESS NOTES
4 Eyes Skin Assessment     The patient is being assess for  Shift Handoff    I agree that 2 RN's have performed a thorough Head to Toe Skin Assessment on the patient. ALL assessment sites listed below have been assessed. Areas assessed by both nurses:   [x]   Head, Face, and Ears   [x]   Shoulders, Back, and Chest  [x]   Arms, Elbows, and Hands   [x]   Coccyx, Sacrum, and IschIum  [x]   Legs, Feet, and Heels        Does the Patient have Skin Breakdown? Yes, patient is with abrasions over sacrum. Heels, elbows, and bony prominensces accounted for, all are blanchable. Dano Prevention initiated:  Yes   Wound Care Orders initiated:  NA      [facility] nurse consulted for Pressure Injury (Stage 3,4, Unstageable, DTI, NWPT, and Complex wounds), New and Established Ostomies:  NA      Nurse 1 eSignature: Electronically signed by Zackery Cooper RN on 12/24/22 at 3:11 AM EST    **SHARE this note so that the co-signing nurse is able to place an eSignature**    Nurse 2 eSignature: Electronically signed by Sam Lundberg RN on 12/24/22 at 3:15 AM EST      Abrasions over sacrum. Heels, elbows, and bony prominensces accounted for, all are blanchable.

## 2022-12-24 NOTE — ED PROVIDER NOTES
I independently examined and evaluated Nam Rios. All diagnostic, treatment, and disposition decisions were made by myself in conjunction with the LUMA, Edilma Farrar. For all further details of the patient's emergency department visit, please see their documentation. 71-year-old male presents with some confusion and tremors at home also with generalized weakness. His wife states that he has been declining recently at home. She has taken him to his doctor for some of the progressive confusion and he had a brain MRI and she was told it was normal and that he does not have Parkinson's. She states he has been generally weak for the past several days and is mostly sitting in his recliner throughout the day. She states she is really unable to care for him like this. She wants further work-up to figure out what is going on but also states she thinks he will likely need to be in a \"rest home\". No vomiting or diarrhea. No fevers. He has had a mild cough. Vitals:    12/23/22 2115   BP: 108/76   Pulse: 74   Resp: 16   Temp: 97.8 °F (36.6 °C)   SpO2: 96%     Patient is awake and alert. He is somewhat confused on recent events and has an odd affect. Heart is regular rate and rhythm, no respiratory distress. Moves all extremities with equal strength. Results for orders placed or performed during the hospital encounter of 12/22/22   COVID-19 & Influenza Combo    Specimen: Nasopharyngeal Swab   Result Value Ref Range    SARS-CoV-2 RNA, RT PCR NOT DETECTED NOT DETECTED    INFLUENZA A NOT DETECTED NOT DETECTED    INFLUENZA B NOT DETECTED NOT DETECTED   Culture, Blood 1    Specimen: Blood   Result Value Ref Range    Blood Culture, Routine       No Growth to date. Any change in status will be called. Culture, Blood 2    Specimen: Blood   Result Value Ref Range    Culture, Blood 2       No Growth to date. Any change in status will be called.    CBC with Auto Differential   Result Value Ref Range WBC 11.8 (H) 4.0 - 11.0 K/uL    RBC 4.68 4.20 - 5.90 M/uL    Hemoglobin 14.8 13.5 - 17.5 g/dL    Hematocrit 43.7 40.5 - 52.5 %    MCV 93.5 80.0 - 100.0 fL    MCH 31.6 26.0 - 34.0 pg    MCHC 33.8 31.0 - 36.0 g/dL    RDW 13.3 12.4 - 15.4 %    Platelets 035 506 - 937 K/uL    MPV 7.7 5.0 - 10.5 fL    Neutrophils % 80.1 %    Lymphocytes % 11.7 %    Monocytes % 5.8 %    Eosinophils % 1.7 %    Basophils % 0.7 %    Neutrophils Absolute 9.5 (H) 1.7 - 7.7 K/uL    Lymphocytes Absolute 1.4 1.0 - 5.1 K/uL    Monocytes Absolute 0.7 0.0 - 1.3 K/uL    Eosinophils Absolute 0.2 0.0 - 0.6 K/uL    Basophils Absolute 0.1 0.0 - 0.2 K/uL   Comprehensive Metabolic Panel   Result Value Ref Range    Sodium 137 136 - 145 mmol/L    Potassium 4.6 3.5 - 5.1 mmol/L    Chloride 102 99 - 110 mmol/L    CO2 25 21 - 32 mmol/L    Anion Gap 10 3 - 16    Glucose 227 (H) 70 - 99 mg/dL    BUN 20 7 - 20 mg/dL    Creatinine 1.2 0.8 - 1.3 mg/dL    Est, Glom Filt Rate >60 >60    Calcium 10.1 8.3 - 10.6 mg/dL    Total Protein 7.9 6.4 - 8.2 g/dL    Albumin 4.5 3.4 - 5.0 g/dL    Albumin/Globulin Ratio 1.3 1.1 - 2.2    Total Bilirubin 0.5 0.0 - 1.0 mg/dL    Alkaline Phosphatase 199 (H) 40 - 129 U/L     (H) 10 - 40 U/L     (H) 15 - 37 U/L   Troponin   Result Value Ref Range    Troponin <0.01 <0.01 ng/mL   Lactate, Sepsis   Result Value Ref Range    Lactic Acid, Sepsis 3.3 (H) 0.4 - 1.9 mmol/L   Lactate, Sepsis   Result Value Ref Range    Lactic Acid, Sepsis 3.2 (H) 0.4 - 1.9 mmol/L   Urinalysis with Reflex to Culture    Specimen: Urine   Result Value Ref Range    Color, UA Yellow Straw/Yellow    Clarity, UA Clear Clear    Glucose, Ur Negative Negative mg/dL    Bilirubin Urine Negative Negative    Ketones, Urine Negative Negative mg/dL    Specific Gravity, UA 1.010 1.005 - 1.030    Blood, Urine Negative Negative    pH, UA 7.0 5.0 - 8.0    Protein, UA 30 (A) Negative mg/dL    Urobilinogen, Urine 0.2 <2.0 E.U./dL    Nitrite, Urine POSITIVE (A) Negative Leukocyte Esterase, Urine TRACE (A) Negative    Microscopic Examination YES     Urine Type NotGiven     Urine Reflex to Culture Not Indicated    Microscopic Urinalysis   Result Value Ref Range    WBC, UA 3-5 0 - 5 /HPF    RBC, UA 0-2 0 - 4 /HPF    Bacteria, UA 2+ (A) None Seen /HPF   Lithium Level   Result Value Ref Range    Lithium Lvl 1.1 0.6 - 1.2 mmol/L    Lithium Dose Amount Unknown    Protime-INR   Result Value Ref Range    Protime 21.8 (H) 11.7 - 14.5 sec    INR 1.90 (H) 0.87 - 1.14   CK   Result Value Ref Range    Total  39 - 308 U/L   Procalcitonin   Result Value Ref Range    Procalcitonin 0.74 (H) 0.00 - 0.15 ng/mL   Hemoglobin A1C   Result Value Ref Range    Hemoglobin A1C 8.9 See comment %    eAG 208.7 mg/dL   Comprehensive Metabolic Panel w/ Reflex to MG   Result Value Ref Range    Sodium 137 136 - 145 mmol/L    Potassium reflex Magnesium 4.2 3.5 - 5.1 mmol/L    Chloride 105 99 - 110 mmol/L    CO2 24 21 - 32 mmol/L    Anion Gap 8 3 - 16    Glucose 152 (H) 70 - 99 mg/dL    BUN 15 7 - 20 mg/dL    Creatinine 1.0 0.8 - 1.3 mg/dL    Est, Glom Filt Rate >60 >60    Calcium 9.7 8.3 - 10.6 mg/dL    Total Protein 6.9 6.4 - 8.2 g/dL    Albumin 4.1 3.4 - 5.0 g/dL    Albumin/Globulin Ratio 1.5 1.1 - 2.2    Total Bilirubin 0.5 0.0 - 1.0 mg/dL    Alkaline Phosphatase 158 (H) 40 - 129 U/L    ALT 99 (H) 10 - 40 U/L    AST 81 (H) 15 - 37 U/L   CBC auto differential   Result Value Ref Range    WBC 9.5 4.0 - 11.0 K/uL    RBC 4.21 4.20 - 5.90 M/uL    Hemoglobin 13.2 (L) 13.5 - 17.5 g/dL    Hematocrit 39.6 (L) 40.5 - 52.5 %    MCV 94.1 80.0 - 100.0 fL    MCH 31.3 26.0 - 34.0 pg    MCHC 33.3 31.0 - 36.0 g/dL    RDW 13.4 12.4 - 15.4 %    Platelets 146 214 - 739 K/uL    MPV 8.2 5.0 - 10.5 fL    Neutrophils % 74.1 %    Lymphocytes % 16.9 %    Monocytes % 6.2 %    Eosinophils % 2.1 %    Basophils % 0.7 %    Neutrophils Absolute 7.0 1.7 - 7.7 K/uL    Lymphocytes Absolute 1.6 1.0 - 5.1 K/uL    Monocytes Absolute 0.6 0.0 - 1.3 K/uL    Eosinophils Absolute 0.2 0.0 - 0.6 K/uL    Basophils Absolute 0.1 0.0 - 0.2 K/uL   Protime-INR   Result Value Ref Range    Protime 21.5 (H) 11.7 - 14.5 sec    INR 1.86 (H) 0.87 - 1.14   POCT Glucose   Result Value Ref Range    POC Glucose 162 (H) 70 - 99 mg/dl    Performed on ACCU-CHEK    POCT Glucose   Result Value Ref Range    POC Glucose 183 (H) 70 - 99 mg/dl    Performed on ACCU-CHEK    POCT Glucose   Result Value Ref Range    POC Glucose 155 (H) 70 - 99 mg/dl    Performed on ACCU-CHEK    POCT Glucose   Result Value Ref Range    POC Glucose 261 (H) 70 - 99 mg/dl    Performed on ACCU-CHEK    POCT Glucose   Result Value Ref Range    POC Glucose 186 (H) 70 - 99 mg/dl    Performed on ACCU-CHEK    POCT Glucose   Result Value Ref Range    POC Glucose 209 (H) 70 - 99 mg/dl    Performed on ACCU-CHEK        CT CHEST ABDOMEN PELVIS W CONTRAST Additional Contrast? None   Final Result      Bibasilar atelectasis. No definite evidence for pneumonia. Abdomen/Pelvis: This is compared with 08/03/2020      Liver: Liver is normal density. No enhancing masses. Normal enhancement of   the intrahepatic vasculature. Spleen:  Normal size. No enhancing masses      Pancreas: No enhancing masses. No ductal dilation. No adjacent fatty   stranding. Gallbladder no calcified stones or sludge. No pericholecystic fluid. No   wall thickening. Bile ducts: No biliary ductal dilation      Adrenals: The adrenal glands are unremarkable      Kidneys: Kidneys are normal in appearance. No hydronephrosis. No enhancing   masses. Norenal stones. Hypodense nodule consistent with a cyst on the left   kidney. There are 2..  They are unchanged. GI: No small bowel dilation. No colonic wall thickening. No large mass. The stomach is unremarkable in appearance. Stomach is well distended the   with a small posterior diverticulum. .. Mesentery: No enlarged lymphadenopathy. No free fluid. No free gas. Aorta: Aorta is of normal size. Negative for dissection. IVC is   unremarkable. Celiac axis and SMA are patent. Portal vein is patent. PELVIS      GI: No small bowel dilation. No colonic wall thickening. No enlarged   lymphadenopathy. No free fluid in the pelvis. : Bubble of gas in the bladder. Probable previous TURP. .  Calcifications   within the prostate. Streak artifact related to the right hip arthroplasty. Osseous: Osteoarthritic changes of the left hip. Osteoarthritic change of   the SI joints. Partial ankylosis on the right. Advanced facet arthropathy. Anterior spurring of the lower thoracic and upper lumbar spine. Epidural   stimulator is in place. IMPRESSION:   1. No findings for abscess in the abdomen or pelvis. Negative for   pyelonephritis   2. Bubble of gas in the bladder. Was the patient catheterized? CT HEAD WO CONTRAST   Final Result   No acute intracranial abnormality. XR CHEST PORTABLE   Final Result   Findings consistent with central and bibasilar bronchitis with foci of   bibasilar subsegmental pneumonia or atelectasis. No radiographic evidence of   pleural effusion in the frontal projection. Otherwise, radiographically   nonacute portable chest.               I personally saw and performed a substantive portion of the visit including all aspects of the medical decision making. MDM:      Here the patient does have evidence of bibasilar pneumonia. He also has a UTI. Head CT is normal.  Lactic acid that is elevated at 3.2 although he technically does not meet SIRS criteria. Blood cultures have been drawn and he is being started on antibiotics for the pneumonia and UTI regardless. COVID and flu testing are negative. Electrolytes are normal.  He does have mild elevation of his LFTs.   We will admit to the hospitalist for further treatment and work-up       I personally saw the patient and independently provided 0 minutes of nonconcurrent critical care out of the total shared critical care time provided.         Chi Moore MD  12/23/22 [id number]

## 2022-12-24 NOTE — PROGRESS NOTES
Pharmacy Note  Warfarin Consult  Dx: factor V Leiden  Goal INR range 2-3   Home Warfarin dose: 2.5mg on Wed, 5mg on all other days     Date                 INR                  Warfarin  12/22               1.9                     5 mg  12/23               1.86                   6 mg    Recommend Warfarin 6 mg tonight x1. Daily INR ordered. Rx will continue to manage therapy per consult order.      Charisse Oneil, PharmD 8:27 PM EST 12/23/22

## 2022-12-24 NOTE — PROGRESS NOTES
Pharmacy Note  Warfarin Consult  Dx: factor V Leiden  Goal INR range 2-3   Home Warfarin dose: 2.5mg on Wed, 5mg on all other days     Date                 INR                  Warfarin  12/22               1.9                     5 mg  12/23               1.86                   6 mg  12/24               2.38                   0 mg      Recommend  to hold Warfarin tonight due to big jump in INR . Daily INR ordered. Rx will continue to manage therapy per consult order.   Malena Davis/Gina. 12/24/22 3:21 PM EST

## 2022-12-24 NOTE — PROGRESS NOTES
Hospitalist Progress Note      PCP: Jon Joseph, APRN - CNP    Date of Admission: 12/22/2022    Chief Complaint: tremors    Hospital Course:   Fidel Sheehan is a 67 y.o. male who presented to the ED per recommendations of his PCP to be evaluated for rigors and fever ongoing for 1 week PTA. Patient's wife reports that he was recently discharged from [facility], and has remained confused with generalized weakness since returning home. Patient was discharged home with Stapleton catheter due to LUTS. Patient's wife reports that he is unable to perform ADLs and is near full assist with transfer. She is no longer able to care for him in his present state. Upon arrival to the ED patient was initially tachycardic and tachypneic, but afebrile. Lactate level of 3.2 with left shifted leukocytosis and source of infection meets sepsis criteria. EKG ordered by admitting physician with results pending. CT scan notable for bibasilar atelectasis versus infiltrate, without other acute findings in abdomen and pelvis. Influenza A/B and SARS-CoV-2 testing performed with negative results. Notable labs include: Left shifted leukocytosis 11.8, lactate 3.3, procalcitonin elevated 0.74, acute hyperglycemia 227, and nitrite positive UA with 2+ bacteria. Patient initially received a dosage of IV Rocephin and Zithromax per ED provider. Review of [facility] chart reveals patient with hospital admission less than 1 month ago, therefore he meets HCAP criteria; antibiotics changed for appropriate spectrum coverage    Subjective: still feels very weak today. Patient overall very upset about currently medical state.        Medications:  Reviewed    Infusion Medications    dextrose      sodium chloride       Scheduled Medications    melatonin  10 mg Oral Nightly    verapamil  120 mg Oral Nightly    rosuvastatin  20 mg Oral Nightly    lithium  600 mg Oral Nightly    lithium  300 mg Oral Daily    pantoprazole  40 mg Oral QAM AC    lamoTRIgine  50 mg Oral Nightly    lamoTRIgine  25 mg Oral Daily    levofloxacin  750 mg IntraVENous Q24H    insulin glargine  0.15 Units/kg SubCUTAneous Nightly    insulin lispro  0-8 Units SubCUTAneous TID WC    insulin lispro  0-4 Units SubCUTAneous Nightly    warfarin placeholder: dosing by pharmacy   Other RX Placeholder    sodium chloride flush  10 mL IntraVENous 2 times per day     PRN Meds: glucose, dextrose bolus **OR** dextrose bolus, glucagon (rDNA), dextrose, sodium chloride flush, sodium chloride, potassium chloride **OR** potassium alternative oral replacement **OR** potassium chloride, magnesium sulfate, senna, acetaminophen **OR** acetaminophen, ondansetron **OR** ondansetron, albuterol      Intake/Output Summary (Last 24 hours) at 12/24/2022 1411  Last data filed at 12/24/2022 8963  Gross per 24 hour   Intake 2700 ml   Output 4850 ml   Net -2150 ml       Physical Exam Performed:    /78   Pulse 72   Temp 99.1 °F (37.3 °C) (Oral)   Resp 18   Ht 6' 1\" (1.854 m)   Wt 192 lb 14.4 oz (87.5 kg)   SpO2 95%   BMI 25.45 kg/m²     General appearance: No apparent distress, appears stated age and cooperative. HEENT: Pupils equal, round, and reactive to light. Conjunctivae/corneas clear. Neck: Supple, with full range of motion. No jugular venous distention. Trachea midline. Respiratory:  Normal respiratory effort. Rhonchi bilaterally without Rales/Wheezes. Cardiovascular: Regular rate and rhythm with normal S1/S2 without murmurs, rubs or gallops. Abdomen: Soft, non-tender, non-distended with normal bowel sounds. Musculoskeletal: No clubbing, cyanosis or edema bilaterally. Full range of motion without deformity. Skin: Skin color, texture, turgor normal.  No rashes or lesions. Neurologic:  Neurovascularly intact without any focal sensory/motor deficits.  Cranial nerves: II-XII intact, grossly non-focal.  Psychiatric: Alert and oriented, thought content appropriate, normal insight  Capillary Refill: Brisk, 3 seconds, normal   Peripheral Pulses: +2 palpable, equal bilaterally       Labs:   Recent Labs     12/22/22  1610 12/23/22  0540   WBC 11.8* 9.5   HGB 14.8 13.2*   HCT 43.7 39.6*    287     Recent Labs     12/22/22  1610 12/23/22  0540    137   K 4.6 4.2    105   CO2 25 24   BUN 20 15   CREATININE 1.2 1.0   CALCIUM 10.1 9.7     Recent Labs     12/22/22  1610 12/23/22  0540   * 81*   * 99*   BILITOT 0.5 0.5   ALKPHOS 199* 158*     Recent Labs     12/22/22  1610 12/23/22  1614   INR 1.90* 1.86*     Recent Labs     12/22/22  1610   CKTOTAL 108   TROPONINI <0.01       Urinalysis:      Lab Results   Component Value Date/Time    NITRU POSITIVE 12/22/2022 04:00 PM    WBCUA 3-5 12/22/2022 04:00 PM    BACTERIA 2+ 12/22/2022 04:00 PM    RBCUA 0-2 12/22/2022 04:00 PM    BLOODU Negative 12/22/2022 04:00 PM    SPECGRAV 1.010 12/22/2022 04:00 PM    GLUCOSEU Negative 12/22/2022 04:00 PM    GLUCOSEU NEGATIVE 10/13/2010 12:10 PM       Radiology:  CT CHEST ABDOMEN PELVIS W CONTRAST Additional Contrast? None   Final Result      Bibasilar atelectasis. No definite evidence for pneumonia. Abdomen/Pelvis: This is compared with 08/03/2020      Liver: Liver is normal density. No enhancing masses. Normal enhancement of   the intrahepatic vasculature. Spleen:  Normal size. No enhancing masses      Pancreas: No enhancing masses. No ductal dilation. No adjacent fatty   stranding. Gallbladder no calcified stones or sludge. No pericholecystic fluid. No   wall thickening. Bile ducts: No biliary ductal dilation      Adrenals: The adrenal glands are unremarkable      Kidneys: Kidneys are normal in appearance. No hydronephrosis. No enhancing   masses. Norenal stones. Hypodense nodule consistent with a cyst on the left   kidney. There are 2..  They are unchanged. GI: No small bowel dilation. No colonic wall thickening. No large mass.    The stomach is unremarkable in appearance. Stomach is well distended the   with a small posterior diverticulum. .. Mesentery: No enlarged lymphadenopathy. No free fluid. No free gas. Aorta: Aorta is of normal size. Negative for dissection. IVC is   unremarkable. Celiac axis and SMA are patent. Portal vein is patent. PELVIS      GI: No small bowel dilation. No colonic wall thickening. No enlarged   lymphadenopathy. No free fluid in the pelvis. : Bubble of gas in the bladder. Probable previous TURP. .  Calcifications   within the prostate. Streak artifact related to the right hip arthroplasty. Osseous: Osteoarthritic changes of the left hip. Osteoarthritic change of   the SI joints. Partial ankylosis on the right. Advanced facet arthropathy. Anterior spurring of the lower thoracic and upper lumbar spine. Epidural   stimulator is in place. IMPRESSION:   1. No findings for abscess in the abdomen or pelvis. Negative for   pyelonephritis   2. Bubble of gas in the bladder. Was the patient catheterized? CT HEAD WO CONTRAST   Final Result   No acute intracranial abnormality. XR CHEST PORTABLE   Final Result   Findings consistent with central and bibasilar bronchitis with foci of   bibasilar subsegmental pneumonia or atelectasis. No radiographic evidence of   pleural effusion in the frontal projection.   Otherwise, radiographically   nonacute portable chest.             PHARMACY TO DOSE MEDICATION    Assessment/Plan:    Active Hospital Problems    Diagnosis     HCAP (healthcare-associated pneumonia) [J18.9]      Priority: Medium    Acute UTI [N39.0]      Priority: Medium    Factor 5 Leiden mutation, [facility] [D68.51]      Priority: Medium    Anticoagulated on Coumadin [Z79.01]      Priority: Medium    Sepsis ([facility] [A41.9]     Asthma [J45.909]     Type 2 diabetes mellitus ([facility] [E11.9]     Essential hypertension [I10]      Pneumonia  - no evidence of sepsis  - suspect gram positive  - continue levaquin  - blood cultures NGTD    Debility  - multifactorial from spinal stenosis, chronic back pain  - PT/OT  - recommend follow up with neurology as an outpatient    DMII  - well controlled  - continue lantus with SSI    HTN  - well controlled  - continue home verapamil    HLD  - continue statin    Bipolar disorder  - mood stable  - continue home regimen    Factor V leiden  - continue coumadin, pharmacy to dose    DVT Prophylaxis: coumadin  Diet: ADULT ORAL NUTRITION SUPPLEMENT; Breakfast, Dinner; Fortified Pudding Oral Supplement  ADULT DIET;  Regular; 5 carb choices (75 gm/meal)  Code Status: Full Code  PT/OT Eval Status: ordered    Dispo - SNF vs HHC    Appropriate for A1 Discharge Unit: Yancy Storey MD

## 2022-12-25 LAB
GLUCOSE BLD-MCNC: 186 MG/DL (ref 70–99)
GLUCOSE BLD-MCNC: 189 MG/DL (ref 70–99)
GLUCOSE BLD-MCNC: 196 MG/DL (ref 70–99)
GLUCOSE BLD-MCNC: 216 MG/DL (ref 70–99)
INR BLD: 2.58 (ref 0.87–1.14)
PERFORMED ON: ABNORMAL
PROTHROMBIN TIME: 27.8 SEC (ref 11.7–14.5)

## 2022-12-25 PROCEDURE — 1200000000 HC SEMI PRIVATE

## 2022-12-25 PROCEDURE — 6370000000 HC RX 637 (ALT 250 FOR IP): Performed by: NURSE PRACTITIONER

## 2022-12-25 PROCEDURE — 36415 COLL VENOUS BLD VENIPUNCTURE: CPT

## 2022-12-25 PROCEDURE — 2580000003 HC RX 258: Performed by: HOSPITALIST

## 2022-12-25 PROCEDURE — 6370000000 HC RX 637 (ALT 250 FOR IP): Performed by: HOSPITALIST

## 2022-12-25 PROCEDURE — 6370000000 HC RX 637 (ALT 250 FOR IP): Performed by: INTERNAL MEDICINE

## 2022-12-25 PROCEDURE — 6360000002 HC RX W HCPCS: Performed by: HOSPITALIST

## 2022-12-25 PROCEDURE — 85610 PROTHROMBIN TIME: CPT

## 2022-12-25 RX ORDER — WARFARIN SODIUM 2.5 MG/1
2.5 TABLET ORAL
Status: COMPLETED | OUTPATIENT
Start: 2022-12-25 | End: 2022-12-25

## 2022-12-25 RX ORDER — CARBOXYMETHYLCELLULOSE SODIUM 10 MG/ML
1 GEL OPHTHALMIC 3 TIMES DAILY
Status: DISCONTINUED | OUTPATIENT
Start: 2022-12-25 | End: 2022-12-27 | Stop reason: HOSPADM

## 2022-12-25 RX ADMIN — SODIUM CHLORIDE, PRESERVATIVE FREE 10 ML: 5 INJECTION INTRAVENOUS at 10:06

## 2022-12-25 RX ADMIN — PANTOPRAZOLE SODIUM 40 MG: 40 TABLET, DELAYED RELEASE ORAL at 05:50

## 2022-12-25 RX ADMIN — LITHIUM CARBONATE 300 MG: 150 CAPSULE, GELATIN COATED ORAL at 10:00

## 2022-12-25 RX ADMIN — SENNOSIDES 8.6 MG: 8.6 TABLET, COATED ORAL at 14:23

## 2022-12-25 RX ADMIN — LEVOFLOXACIN 750 MG: 5 INJECTION, SOLUTION INTRAVENOUS at 17:27

## 2022-12-25 RX ADMIN — CARBOXYMETHYLCELLULOSE SODIUM 1 DROP: 10 GEL OPHTHALMIC at 21:16

## 2022-12-25 RX ADMIN — LAMOTRIGINE 25 MG: 25 TABLET ORAL at 10:01

## 2022-12-25 RX ADMIN — LAMOTRIGINE 50 MG: 25 TABLET ORAL at 21:17

## 2022-12-25 RX ADMIN — LITHIUM CARBONATE 600 MG: 150 CAPSULE, GELATIN COATED ORAL at 21:17

## 2022-12-25 RX ADMIN — INSULIN GLARGINE 15 UNITS: 100 INJECTION, SOLUTION SUBCUTANEOUS at 21:19

## 2022-12-25 RX ADMIN — CARBOXYMETHYLCELLULOSE SODIUM 1 DROP: 10 GEL OPHTHALMIC at 14:22

## 2022-12-25 RX ADMIN — WARFARIN SODIUM 2.5 MG: 2.5 TABLET ORAL at 17:26

## 2022-12-25 RX ADMIN — INSULIN LISPRO 2 UNITS: 100 INJECTION, SOLUTION INTRAVENOUS; SUBCUTANEOUS at 09:07

## 2022-12-25 RX ADMIN — Medication 10 MG: at 21:16

## 2022-12-25 RX ADMIN — VERAPAMIL HYDROCHLORIDE 120 MG: 240 TABLET, FILM COATED, EXTENDED RELEASE ORAL at 21:17

## 2022-12-25 RX ADMIN — ROSUVASTATIN CALCIUM 20 MG: 10 TABLET, FILM COATED ORAL at 21:16

## 2022-12-25 RX ADMIN — SODIUM CHLORIDE, PRESERVATIVE FREE 10 ML: 5 INJECTION INTRAVENOUS at 21:17

## 2022-12-25 ASSESSMENT — PAIN SCALES - GENERAL
PAINLEVEL_OUTOF10: 0
PAINLEVEL_OUTOF10: 1

## 2022-12-25 NOTE — WOUND CARE
.4 Eyes Skin Assessment     The patient is being assess for  Shift Handoff    I agree that 2 RN's have performed a thorough Head to Toe Skin Assessment on the patient. ALL assessment sites listed below have been assessed. Areas assessed by both nurses: Sasha RN  [x]   Head, Face, and Ears   [x]   Shoulders, Back, and Chest  [x]   Arms, Elbows, and Hands   [x]   Coccyx, Sacrum, and IschIum  [x]   Legs, Feet, and Heels        Does the Patient have Skin Breakdown? Yes.  Stage 2 on Coccyx        Dano Prevention initiated:  Yes   Wound Care Orders initiated:  Yes      [id number] nurse consulted for Pressure Injury (Stage 3,4, Unstageable, DTI, NWPT, and Complex wounds), New and Established Ostomies:  No      Nurse 1 eSignature: Electronically signed by Georganne Schirmer, RN on 12/25/22 at 3:39 PM EST    **SHARE this note so that the co-signing nurse is able to place an eSignature**    Nurse 2 eSignature: Electronically signed by Marisa Wilson RN on 12/25/22 at 4:00 PM EST Detail Level: Simple Additional Notes: Patient consent was obtained to proceed with the visit and recommended plan of care after discussion of all risks and benefits, including the risks of COVID-19 exposure.

## 2022-12-25 NOTE — RT PROTOCOL NOTE
RT Nebulizer Bronchodilator Protocol Note    There is a bronchodilator order in the chart from a provider indicating to follow the RT Bronchodilator Protocol and there is an Initiate RT Bronchodilator Protocol order as well (see protocol at bottom of note). CXR Findings:  No results found. The findings from the last RT Protocol Assessment were as follows:  Smoking: None or smoker <15 pack years  Respiratory Pattern: Regular pattern and RR 12-20 bpm  Breath Sounds: Slightly diminished and/or crackles  Cough: Strong, spontaneous, non-productive  Indication for Bronchodilator Therapy: None  Bronchodilator Assessment Score: 2    Aerosolized bronchodilator medication orders have been revised according to the RT Nebulizer Bronchodilator Protocol below. Respiratory Therapist to perform RT Therapy Protocol Assessment initially then follow the protocol. Repeat RT Therapy Protocol Assessment PRN for score 0-3 or on second treatment, BID, and PRN for scores above 3. No Indications - adjust the frequency to every 6 hours PRN wheezing or bronchospasm, if no treatments needed after 48 hours then discontinue using Per Protocol order mode. If indication present, adjust the RT bronchodilator orders based on the Bronchodilator Assessment Score as indicated below. If a patient is on this medication at home then do not decrease Frequency below that used at home. 0-3 - enter or revise RT bronchodilator order(s) to equivalent RT Bronchodilator order with Frequency of every 4 hours PRN for wheezing or increased work of breathing using Per Protocol order mode. 4-6 - enter or revise RT Bronchodilator order(s) to two equivalent RT bronchodilator orders with one order with BID Frequency and one order with Frequency of every 4 hours PRN wheezing or increased work of breathing using Per Protocol order mode.          7-10 - enter or revise RT Bronchodilator order(s) to two equivalent RT bronchodilator orders with one order with TID Frequency and one order with Frequency of every 4 hours PRN wheezing or increased work of breathing using Per Protocol order mode. 11-13 - enter or revise RT Bronchodilator order(s) to one equivalent RT bronchodilator order with QID Frequency and an Albuterol order with Frequency of every 4 hours PRN wheezing or increased work of breathing using Per Protocol order mode. Greater than 13 - enter or revise RT Bronchodilator order(s) to one equivalent RT bronchodilator order with every 4 hours Frequency and an Albuterol order with Frequency of every 2 hours PRN wheezing or increased work of breathing using Per Protocol order mode. RT to enter RT Home Evaluation for COPD & MDI Assessment order using Per Protocol order mode.     Electronically signed by Alice Carrasco RCP, RRT, RRT-ACCS on 12/25/2022 at 8:57 AM

## 2022-12-25 NOTE — PROGRESS NOTES
Pharmacy Note  Warfarin Consult  Dx: factor V Leiden  Goal INR range 2-3   Home Warfarin dose: 2.5mg on Wed, 5mg on all other days  ** Potential DDI w/ Levofloxacin (started 12/22; last dose 12/28)    Date                 INR                  Warfarin  12/22               1.9                     5 mg  12/23               1.86                   6 mg  12/24               2.38                   0 mg  12/25               2.58                  2.5 mg    Recommend Warfarin 2.5 mg x1 tonight. Daily INR ordered. Rx will continue to manage therapy per consult order.     Riaz Mack, PharmD 12/25/2022  8:17 AM

## 2022-12-25 NOTE — PROGRESS NOTES
Hospitalist Progress Note      PCP: Angel Gil, APRN - CNP    Date of Admission: 12/22/2022    Chief Complaint: tremors    Hospital Course:   Barber Molina is a 67 y.o. male who presented to the ED per recommendations of his PCP to be evaluated for rigors and fever ongoing for 1 week PTA. Patient's wife reports that he was recently discharged from [facility], and has remained confused with generalized weakness since returning home. Patient was discharged home with Stapletno catheter due to LUTS. Patient's wife reports that he is unable to perform ADLs and is near full assist with transfer. She is no longer able to care for him in his present state. Upon arrival to the ED patient was initially tachycardic and tachypneic, but afebrile. Lactate level of 3.2 with left shifted leukocytosis and source of infection meets sepsis criteria. EKG ordered by admitting physician with results pending. CT scan notable for bibasilar atelectasis versus infiltrate, without other acute findings in abdomen and pelvis. Influenza A/B and SARS-CoV-2 testing performed with negative results. Notable labs include: Left shifted leukocytosis 11.8, lactate 3.3, procalcitonin elevated 0.74, acute hyperglycemia 227, and nitrite positive UA with 2+ bacteria. Patient initially received a dosage of IV Rocephin and Zithromax per ED provider. Review of [facility] chart reveals patient with hospital admission less than 1 month ago, therefore he meets HCAP criteria; antibiotics changed for appropriate spectrum coverage    Subjective: still feels very weak today. Patient overall very upset about currently medical state.        Medications:  Reviewed    Infusion Medications    dextrose      sodium chloride       Scheduled Medications    warfarin  2.5 mg Oral Once    melatonin  10 mg Oral Nightly    verapamil  120 mg Oral Nightly    rosuvastatin  20 mg Oral Nightly    lithium  600 mg Oral Nightly    lithium  300 mg Oral Daily    pantoprazole  40 mg Oral QAM AC    lamoTRIgine  50 mg Oral Nightly    lamoTRIgine  25 mg Oral Daily    levofloxacin  750 mg IntraVENous Q24H    insulin glargine  0.15 Units/kg SubCUTAneous Nightly    insulin lispro  0-8 Units SubCUTAneous TID WC    insulin lispro  0-4 Units SubCUTAneous Nightly    warfarin placeholder: dosing by pharmacy   Other RX Placeholder    sodium chloride flush  10 mL IntraVENous 2 times per day     PRN Meds: glucose, dextrose bolus **OR** dextrose bolus, glucagon (rDNA), dextrose, sodium chloride flush, sodium chloride, potassium chloride **OR** potassium alternative oral replacement **OR** potassium chloride, magnesium sulfate, senna, acetaminophen **OR** acetaminophen, ondansetron **OR** ondansetron, albuterol      Intake/Output Summary (Last 24 hours) at 12/25/2022 1052  Last data filed at 12/25/2022 1004  Gross per 24 hour   Intake 750 ml   Output 1800 ml   Net -1050 ml       Physical Exam Performed:    /73   Pulse 71   Temp 98.4 °F (36.9 °C) (Oral)   Resp 20   Ht 6' 1\" (1.854 m)   Wt 198 lb 3.1 oz (89.9 kg)   SpO2 96%   BMI 26.15 kg/m²     General appearance: No apparent distress, appears stated age and cooperative. HEENT: Pupils equal, round, and reactive to light. Conjunctivae/corneas clear. Neck: Supple, with full range of motion. No jugular venous distention. Trachea midline. Respiratory:  Normal respiratory effort. Rhonchi bilaterally without Rales/Wheezes. Cardiovascular: Regular rate and rhythm with normal S1/S2 without murmurs, rubs or gallops. Abdomen: Soft, non-tender, non-distended with normal bowel sounds. Musculoskeletal: No clubbing, cyanosis or edema bilaterally. Full range of motion without deformity. Skin: Skin color, texture, turgor normal.  No rashes or lesions. Neurologic:  Neurovascularly intact without any focal sensory/motor deficits.  Cranial nerves: II-XII intact, grossly non-focal.  Psychiatric: Alert and oriented, thought content appropriate, normal No large mass. The stomach is unremarkable in appearance. Stomach is well distended the   with a small posterior diverticulum. .. Mesentery: No enlarged lymphadenopathy. No free fluid. No free gas. Aorta: Aorta is of normal size. Negative for dissection. IVC is   unremarkable. Celiac axis and SMA are patent. Portal vein is patent. PELVIS      GI: No small bowel dilation. No colonic wall thickening. No enlarged   lymphadenopathy. No free fluid in the pelvis. : Bubble of gas in the bladder. Probable previous TURP. .  Calcifications   within the prostate. Streak artifact related to the right hip arthroplasty. Osseous: Osteoarthritic changes of the left hip. Osteoarthritic change of   the SI joints. Partial ankylosis on the right. Advanced facet arthropathy. Anterior spurring of the lower thoracic and upper lumbar spine. Epidural   stimulator is in place. IMPRESSION:   1. No findings for abscess in the abdomen or pelvis. Negative for   pyelonephritis   2. Bubble of gas in the bladder. Was the patient catheterized? CT HEAD WO CONTRAST   Final Result   No acute intracranial abnormality. XR CHEST PORTABLE   Final Result   Findings consistent with central and bibasilar bronchitis with foci of   bibasilar subsegmental pneumonia or atelectasis. No radiographic evidence of   pleural effusion in the frontal projection.   Otherwise, radiographically   nonacute portable chest.             PHARMACY TO DOSE MEDICATION    Assessment/Plan:    Active Hospital Problems    Diagnosis     HCAP (healthcare-associated pneumonia) [J18.9]      Priority: Medium    Acute UTI [N39.0]      Priority: Medium    Factor 5 Leiden mutation, heterozygous ([facility] [D68.51]      Priority: Medium    Anticoagulated on Coumadin [Z79.01]      Priority: Medium    Sepsis ([facility] [A41.9]     Asthma [J45.909]     Type 2 diabetes mellitus ([facility] [E11.9]     Essential hypertension [I10]      Pneumonia  - no evidence of sepsis  - suspect gram positive  - continue levaquin  - blood cultures NGTD    Debility  - multifactorial from spinal stenosis, chronic back pain  - PT/OT  - recommend follow up with neurology as an outpatient    DMII  - well controlled  - continue lantus with SSI    HTN  - well controlled  - continue home verapamil    HLD  - continue statin    Bipolar disorder  - mood stable  - continue home regimen    Factor V leiden  - continue coumadin, pharmacy to dose    DVT Prophylaxis: coumadin  Diet: ADULT ORAL NUTRITION SUPPLEMENT; Breakfast, Dinner; Fortified Pudding Oral Supplement  ADULT DIET; Regular; 5 carb choices (75 gm/meal)  Code Status: Full Code  PT/OT Eval Status: ordered    Dispo - SNF vs HHC.  Medically ready for discharge    Appropriate for A1 Discharge Unit: No      Kristyn Sutherland MD

## 2022-12-26 LAB
BLOOD CULTURE, ROUTINE: NORMAL
CULTURE, BLOOD 2: NORMAL
GLUCOSE BLD-MCNC: 173 MG/DL (ref 70–99)
GLUCOSE BLD-MCNC: 199 MG/DL (ref 70–99)
GLUCOSE BLD-MCNC: 200 MG/DL (ref 70–99)
GLUCOSE BLD-MCNC: 221 MG/DL (ref 70–99)
GLUCOSE BLD-MCNC: 312 MG/DL (ref 70–99)
INR BLD: 2.32 (ref 0.87–1.14)
PERFORMED ON: ABNORMAL
PROTHROMBIN TIME: 25.5 SEC (ref 11.7–14.5)

## 2022-12-26 PROCEDURE — 6370000000 HC RX 637 (ALT 250 FOR IP): Performed by: INTERNAL MEDICINE

## 2022-12-26 PROCEDURE — 85610 PROTHROMBIN TIME: CPT

## 2022-12-26 PROCEDURE — 6360000002 HC RX W HCPCS: Performed by: HOSPITALIST

## 2022-12-26 PROCEDURE — 36415 COLL VENOUS BLD VENIPUNCTURE: CPT

## 2022-12-26 PROCEDURE — 6370000000 HC RX 637 (ALT 250 FOR IP): Performed by: HOSPITALIST

## 2022-12-26 PROCEDURE — 1200000000 HC SEMI PRIVATE

## 2022-12-26 PROCEDURE — 6370000000 HC RX 637 (ALT 250 FOR IP): Performed by: NURSE PRACTITIONER

## 2022-12-26 PROCEDURE — 2580000003 HC RX 258: Performed by: HOSPITALIST

## 2022-12-26 RX ORDER — WARFARIN SODIUM 5 MG/1
5 TABLET ORAL
Status: COMPLETED | OUTPATIENT
Start: 2022-12-26 | End: 2022-12-26

## 2022-12-26 RX ORDER — POLYETHYLENE GLYCOL 3350 17 G/17G
17 POWDER, FOR SOLUTION ORAL DAILY PRN
Qty: 527 G | Refills: 1 | Status: SHIPPED | OUTPATIENT
Start: 2022-12-26 | End: 2023-01-25

## 2022-12-26 RX ORDER — LEVOFLOXACIN 750 MG/1
750 TABLET ORAL NIGHTLY
Qty: 3 TABLET | Refills: 0 | Status: SHIPPED | OUTPATIENT
Start: 2022-12-26 | End: 2022-12-29

## 2022-12-26 RX ORDER — POLYETHYLENE GLYCOL 3350 17 G/17G
17 POWDER, FOR SOLUTION ORAL DAILY
Status: DISCONTINUED | OUTPATIENT
Start: 2022-12-26 | End: 2022-12-27 | Stop reason: HOSPADM

## 2022-12-26 RX ADMIN — POLYETHYLENE GLYCOL 3350 17 G: 17 POWDER, FOR SOLUTION ORAL at 17:09

## 2022-12-26 RX ADMIN — INSULIN LISPRO 6 UNITS: 100 INJECTION, SOLUTION INTRAVENOUS; SUBCUTANEOUS at 12:51

## 2022-12-26 RX ADMIN — Medication 10 MG: at 21:47

## 2022-12-26 RX ADMIN — LAMOTRIGINE 25 MG: 25 TABLET ORAL at 10:31

## 2022-12-26 RX ADMIN — WARFARIN SODIUM 5 MG: 5 TABLET ORAL at 17:09

## 2022-12-26 RX ADMIN — CARBOXYMETHYLCELLULOSE SODIUM 1 DROP: 10 GEL OPHTHALMIC at 21:46

## 2022-12-26 RX ADMIN — CARBOXYMETHYLCELLULOSE SODIUM 1 DROP: 10 GEL OPHTHALMIC at 15:00

## 2022-12-26 RX ADMIN — LITHIUM CARBONATE 600 MG: 150 CAPSULE, GELATIN COATED ORAL at 21:47

## 2022-12-26 RX ADMIN — CARBOXYMETHYLCELLULOSE SODIUM 1 DROP: 10 GEL OPHTHALMIC at 10:31

## 2022-12-26 RX ADMIN — LAMOTRIGINE 50 MG: 25 TABLET ORAL at 21:47

## 2022-12-26 RX ADMIN — INSULIN LISPRO 2 UNITS: 100 INJECTION, SOLUTION INTRAVENOUS; SUBCUTANEOUS at 17:09

## 2022-12-26 RX ADMIN — PANTOPRAZOLE SODIUM 40 MG: 40 TABLET, DELAYED RELEASE ORAL at 05:37

## 2022-12-26 RX ADMIN — ROSUVASTATIN CALCIUM 20 MG: 10 TABLET, FILM COATED ORAL at 21:47

## 2022-12-26 RX ADMIN — SENNOSIDES 8.6 MG: 8.6 TABLET, COATED ORAL at 17:09

## 2022-12-26 RX ADMIN — SODIUM CHLORIDE, PRESERVATIVE FREE 10 ML: 5 INJECTION INTRAVENOUS at 21:55

## 2022-12-26 RX ADMIN — INSULIN GLARGINE 15 UNITS: 100 INJECTION, SOLUTION SUBCUTANEOUS at 21:53

## 2022-12-26 RX ADMIN — LITHIUM CARBONATE 300 MG: 150 CAPSULE, GELATIN COATED ORAL at 10:31

## 2022-12-26 RX ADMIN — VERAPAMIL HYDROCHLORIDE 120 MG: 240 TABLET, FILM COATED, EXTENDED RELEASE ORAL at 21:48

## 2022-12-26 RX ADMIN — LEVOFLOXACIN 750 MG: 5 INJECTION, SOLUTION INTRAVENOUS at 17:25

## 2022-12-26 RX ADMIN — INSULIN LISPRO 0 UNITS: 100 INJECTION, SOLUTION INTRAVENOUS; SUBCUTANEOUS at 13:23

## 2022-12-26 ASSESSMENT — PAIN SCALES - GENERAL: PAINLEVEL_OUTOF10: 0

## 2022-12-26 NOTE — PLAN OF CARE
Bed locked and in low position, bedside table and call light within reach, nonskid socks and bed alarm on. Attempting turns q2hr, pt not always receptive. No new skin breakdown noted. Problem: Skin/Tissue Integrity  Goal: Absence of new skin breakdown  Description: 1. Monitor for areas of redness and/or skin breakdown  2. Assess vascular access sites hourly  3. Every 4-6 hours minimum:  Change oxygen saturation probe site  4. Every 4-6 hours:  If on nasal continuous positive airway pressure, respiratory therapy assess nares and determine need for appliance change or resting period.   12/26/2022 0455 by Janet Chicas RN  Outcome: Progressing     Problem: Safety - Adult  Goal: Free from fall injury  12/26/2022 0455 by Janet Chicas, RN

## 2022-12-26 NOTE — PROGRESS NOTES
Perfect serve message sent to Dr. Jonathon Arango, \"Pt's wife requesting to speak with you @ bedside. Thanks. \", awaiting response.

## 2022-12-26 NOTE — PROGRESS NOTES
Pt has tremors in upper extremities. Wife at bedside this afternoon. Pt complaining of constipation, but denies pain. Pt calls out appropriately. Bed locked and in lowest position. Call light and bedside table within reach.

## 2022-12-26 NOTE — PLAN OF CARE
Problem: Skin/Tissue Integrity  Goal: Absence of new skin breakdown  Description: 1. Monitor for areas of redness and/or skin breakdown  2. Assess vascular access sites hourly  3. Every 4-6 hours minimum:  Change oxygen saturation probe site  4. Every 4-6 hours:  If on nasal continuous positive airway pressure, respiratory therapy assess nares and determine need for appliance change or resting period.   12/26/2022 1838 by Barb Michelle RN  Outcome: Progressing  12/26/2022 0455 by Gary Edmonds RN  Outcome: Progressing     Problem: ABCDS Injury Assessment  Goal: Absence of physical injury  12/26/2022 1838 by Barb Michelle RN  Outcome: Progressing  Flowsheets (Taken 12/26/2022 1838)  Absence of Physical Injury: Implement safety measures based on patient assessment  12/26/2022 0455 by Gary Edmonds RN  Outcome: Progressing     Problem: Safety - Adult  Goal: Free from fall injury  12/26/2022 1838 by Barb Michelle RN  Outcome: Progressing  Flowsheets (Taken 12/26/2022 1838)  Free From Fall Injury: Instruct family/caregiver on patient safety  12/26/2022 0455 by Gary Edmonds RN  Outcome: Progressing     Problem: Nutrition Deficit:  Goal: Optimize nutritional status  Outcome: Progressing  Flowsheets (Taken 12/26/2022 1838)  Nutrient intake appropriate for improving, restoring, or maintaining nutritional needs:   Assess nutritional status and recommend course of action   Monitor oral intake, labs, and treatment plans   Recommend appropriate diets, oral nutritional supplements, and vitamin/mineral supplements   Order, calculate, and assess calorie counts as needed   Provide specific nutrition education to patient or family as appropriate     Problem: Chronic Conditions and Co-morbidities  Goal: Patient's chronic conditions and co-morbidity symptoms are monitored and maintained or improved  Outcome: Progressing  Flowsheets (Taken 12/26/2022 1838)  Care Plan - Patient's Chronic Conditions and Co-Morbidity Symptoms are Monitored and Maintained or Improved:   Monitor and assess patient's chronic conditions and comorbid symptoms for stability, deterioration, or improvement   Collaborate with multidisciplinary team to address chronic and comorbid conditions and prevent exacerbation or deterioration   Update acute care plan with appropriate goals if chronic or comorbid symptoms are exacerbated and prevent overall improvement and discharge     Problem: Pain  Goal: Verbalizes/displays adequate comfort level or baseline comfort level  Outcome: Progressing  Flowsheets (Taken 12/26/2022 0016)  Verbalizes/displays adequate comfort level or baseline comfort level:   Encourage patient to monitor pain and request assistance   Assess pain using appropriate pain scale   Administer analgesics based on type and severity of pain and evaluate response   Implement non-pharmacological measures as appropriate and evaluate response   Consider cultural and social influences on pain and pain management   Notify Licensed Independent Practitioner if interventions unsuccessful or patient reports new pain

## 2022-12-26 NOTE — DISCHARGE INSTR - COC
Continuity of Care Form    Patient Name: Katherin Elder   :  1950  MRN:  [id number]    Admit date:  2022  Discharge date:  22    Code Status Order: Full Code   Advance Directives:     Admitting Physician:  Cindi Flores MD  PCP: Court Bella APRN - CNP    Discharging Nurse: Lisa Salazar RN  [address] Unit/Room#: 0523/8271-57  Discharging Unit Phone Number: [phone]    Emergency Contact:   Extended Emergency Contact Information  Primary Emergency Contact: Lynnette Cruz  Address: [facility] [address] [facility] [address] Phone: [phone]  Mobile Phone: [phone]  Relation: Spouse    Past Surgical History:  Past Surgical History:   Procedure Laterality Date    APPENDECTOMY      BACK SURGERY      CERVICAL FUSION      COLONOSCOPY      EYE SURGERY      HIP SURGERY      JOINT REPLACEMENT Right     THR    OTHER SURGICAL HISTORY  2018     right L4-5 hemilaminotomy, partial facetectomy, foraminotomy, reexploration and excision of synovial csyt,  Bilateral L2-3 and L3-4 hemilaminotomy, partial facetectomy, foraminotomy    MS NERVOUS SYSTEM SURGERY UNLISTED Bilateral 2018    BILATERAL LUMBAR THREE, LUMBAR FOUR, LUMBAR FIVE RADIOFREQUENCY ABLATION SITE CONFIRMED BY FLUOROSCOPY performed by Christiano De La Torre MD at [address] [place] [address] [facility]/IMG GDN Bilateral 2018    BILATERAL LUMBAR THREE, LUMBAR FOUR, LUMBAR FIVE MEDIAL BRANCH BLOCK SITE CONFIRMED BY FLUOROSCOPY performed by Christiano De La Torre MD at [address] [place] [address] DX/THER [facility] INTRLMNR CRV/THRC W/IMG GDN Bilateral 10/16/2018    BILATERAL LUMBAR THREE, FOUR, FIVE MEDIAL BRANCH BLOCK SITE CONFIRMED BY FLUOROSCOPY performed by Christiano De La Torre MD at [place] [address]    TURP N/A 2020    CYSTOSCOPY, TRANSURETHRAL RESECTION OF PROSTATE performed by Xavier Hua MD at [facility]       Immunization History:   Immunization History Administered Date(s) Administered    Influenza Vaccine, unspecified formulation 10/31/2014, 10/28/2015, 10/04/2016, 10/12/2017    Pneumococcal Conjugate 13-valent ([facility]ino Cuff) 10/28/2015    Zoster Live (Zostavax) 11/01/2014       Active Problems:  Patient Active Problem List   Diagnosis Code    Enlarged lymph nodes R59.9    Spinal stenosis M48.00    Degeneration of lumbar or lumbosacral intervertebral disc M51.37    Lumbosacral spondylosis without myelopathy M47.817    Displacement of lumbar intervertebral disc without myelopathy M51.26    Urinary retention R33.9    Pyelonephritis N12    S/P TURP C65.25    Metabolic encephalopathy K30.76    Type 2 diabetes mellitus (HCC) E11.9    Essential hypertension I10    Prostate hyperplasia with urinary obstruction N40.1, N13.8    Sepsis ([facility]) A41.9    Bipolar 1 disorder ([facility]) F31.9    Asthma J45.909    Acute epididymo-orchitis N45.3    Constipation due to opioid therapy K59.03, T40.2X5A    Bilateral leg weakness R29.898    Acute low back pain M54.50    Acute cystitis with hematuria N30.01    Generalized weakness R53.1    Obesity E66.9    Weakness R53.1    HCAP (healthcare-associated pneumonia) J18.9    Acute UTI N39.0    Factor 5 Leiden mutation, heterozygous ([facility] D68.51    Anticoagulated on Coumadin Z79.01       Isolation/Infection:   Isolation            No Isolation          Patient Infection Status       Infection Onset Added Last Indicated Last Indicated By Review Planned Expiration Resolved Resolved By    None active    Resolved    COVID-19 (Rule Out) 12/22/22 12/22/22 12/22/22 COVID-19 & Influenza Combo (Ordered)   12/22/22 Rule-Out Test Resulted    COVID-19 (Rule Out) 07/17/20 07/17/20 07/17/20 COVID-19 (Ordered)   07/17/20 Rule-Out Test Resulted            Nurse Assessment:  Last Vital Signs: /74   Pulse 66   Temp 98.2 °F (36.8 °C) (Oral)   Resp 16   Ht 6' 1\" (1.854 m)   Wt 198 lb 3.1 oz (89.9 kg)   SpO2 98%   BMI 26.15 kg/m²     Last documented pain score (0-10 scale): Pain Level: 1  Last Weight:   Wt Readings from Last 1 Encounters:   12/25/22 198 lb 3.1 oz (89.9 kg)     Mental Status:  disoriented and oriented    IV Access:  - None    Nursing Mobility/ADLs:  Walking   Assisted  Transfer  Assisted  Bathing  Assisted  Dressing  Assisted  Toileting  [address] [employer]  Med Delivery   whole    Wound Care Documentation and Therapy:  Wound 06/08/22 Buttocks Right (Active)   Number of days: 201       Wound 10/26/22 Coccyx Mid mid gluteal skin fold (Active)   Number of days: 60       Wound 11/22/22 Perineum Four small open areas on bilateral buttocks, blanchable (Active)   Wound Etiology Pressure Stage 2 12/25/22 1322   Dressing Status Intact 12/25/22 1322   Wound Cleansed Soap and water 12/25/22 1322   Dressing/Treatment Zinc paste 12/25/22 1322   Wound Assessment Pink/red 12/25/22 1322   Drainage Amount None 12/24/22 2005   Odor None 12/24/22 2005   Brenda-wound Assessment Blanchable erythema 11/22/22 0815   Number of days: 33        Elimination:  Continence: Bowel: Yes  Bladder: No  Urinary Catheter: None   Colostomy/Ileostomy/Ileal Conduit: No       Date of Last BM: 12/27/22      Intake/Output Summary (Last 24 hours) at 12/26/2022 0840  Last data filed at 12/26/2022 0535  Gross per 24 hour   Intake 1080 ml   Output 2700 ml   Net -1620 ml     I/O last 3 completed shifts: In: 1320 [P.O.:1320]  Out: 3650 [Urine:3650]    Safety Concerns:     History of Falls (last 30 days)    Impairments/Disabilities:      None    Nutrition Therapy:  Current Nutrition Therapy:   - Oral Diet:  General    Routes of Feeding: Oral  Liquids: Thin Liquids  Daily Fluid Restriction: no  Last Modified Barium Swallow with Video (Video Swallowing Test): not done    Treatments at the Time of Hospital Discharge:   Respiratory Treatments:   Oxygen Therapy:  is not on home oxygen therapy.   Ventilator:    - No ventilator support    Rehab Therapies: Physical Therapy and Occupational Therapy  Weight Bearing Status/Restrictions: No weight bearing restrictions  Other Medical Equipment (for information only, NOT a DME order):  wheelchair, walker, bath bench, and bedside commode  Other Treatments: ***    Patient's personal belongings (please select all that are sent with patient):  Reagan    RN SIGNATURE:  Electronically signed by Marisela Jeff RN on 12/27/22 at 5:11 PM EST    CASE MANAGEMENT/SOCIAL WORK SECTION    Inpatient Status Date: 12/22/22    Readmission Risk Assessment Score:  Readmission Risk              Risk of Unplanned Readmission:  26           Discharging to Facility/ [address]   [address]   / signature: Electronically signed by Benita Kruger RN on 12/27/22 at 2:12 PM EST    PHYSICIAN SECTION    Prognosis: Good    Condition at Discharge: Stable    Rehab Potential (if transferring to Rehab): Good    Recommended Labs or Other Treatments After Discharge: PCP within one week. INR check    Physician Certification: I certify the above information and transfer of Annie Meza  is necessary for the continuing treatment of the diagnosis listed and that he requires [address] for less 30 days.      Update Admission H&P: No change in H&P    PHYSICIAN SIGNATURE:  Electronically signed by Brooklynn Dia DO on 12/26/22 at 8:41 AM EST

## 2022-12-26 NOTE — PROGRESS NOTES
Pharmacy Note  Warfarin Consult  Dx: factor V Leiden  Goal INR range 2-3   Home Warfarin dose: 2.5mg on Wed, 5mg on all other days  ** Potential DDI w/ Levofloxacin (started 12/22; last dose 12/28)    Date                 INR                  Warfarin  12/22               1.9                     5 mg  12/23               1.86                   6 mg  12/24               2.38                   0 mg  12/25               2.58                  2.5 mg  12/26               2.32                   5 mg    Recommend Warfarin 5 mg x1 tonight. Daily INR ordered. Rx will continue to manage therapy per consult order.     Jordy Hayes, PharmD  12/26/2022 at 8:37 AM

## 2022-12-26 NOTE — PLAN OF CARE
Problem: Safety - Adult  Goal: Free from fall injury  12/25/2022 1925 by Norbert Anthony RN  Outcome: Progressing  12/25/2022 1911 by Norbert Anthony RN  Outcome: Progressing     Problem: Skin/Tissue Integrity  Goal: Absence of new skin breakdown  Description: 1. Monitor for areas of redness and/or skin breakdown  2. Assess vascular access sites hourly  3. Every 4-6 hours minimum:  Change oxygen saturation probe site  4. Every 4-6 hours:  If on nasal continuous positive airway pressure, respiratory therapy assess nares and determine need for appliance change or resting period.   12/25/2022 1925 by Norbert Anthony RN  Outcome: Progressing  12/25/2022 1911 by Norbert Anthony RN  Outcome: Progressing

## 2022-12-26 NOTE — CARE COORDINATION
Chart reviewed day 4. Spoke with patient and wife. Discussed therapy recs for home with [facility] vs SNF. Stated she doesn't understand why she got a call from [facility]. Stated her [facility] with [facility] is excellent and her [facility] that came to the house is excellent. Frustrated that they are out of medicare days and he is no better and unable to meet his own care needs. Stated she is working 4 days a week and has paid OOP for someone to assist him 4hrs/day 2 days a week while she works. She is unable to meet care needs. Has hired an  to assist with process of getting medicaid, aware the application is no guarantee of getting medicaid. However she has all needed documentation to apply already. Would like to see if could make referral to another facility. Provided [facility], as well as medicare ratings list. requested 3-4 options. Provided y confidential phone number if she would want to leave me a message. At this time does not feel going home with [facility] is safe plan.  Karuna Juarez RN

## 2022-12-26 NOTE — PROGRESS NOTES
Hospitalist Progress Note      PCP: FRANCISCO JAVIER Martin CNP    Date of Admission: 12/22/2022    Chief Complaint: tremors    Hospital Course:   Adela Foster is a 67 y.o. male who presented to the ED per recommendations of his PCP to be evaluated for rigors and fever ongoing for 1 week PTA. Patient's wife reports that he was recently discharged from [facility], and has remained confused with generalized weakness since returning home. Patient was discharged home with Stapleton catheter due to LUTS. Patient's wife reports that he is unable to perform ADLs and is near full assist with transfer. She is no longer able to care for him in his present state. Upon arrival to the ED patient was initially tachycardic and tachypneic, but afebrile. Lactate level of 3.2 with left shifted leukocytosis and source of infection meets sepsis criteria. EKG ordered by admitting physician with results pending. CT scan notable for bibasilar atelectasis versus infiltrate, without other acute findings in abdomen and pelvis. Influenza A/B and SARS-CoV-2 testing performed with negative results. Notable labs include: Left shifted leukocytosis 11.8, lactate 3.3, procalcitonin elevated 0.74, acute hyperglycemia 227, and nitrite positive UA with 2+ bacteria. Patient initially received a dosage of IV Rocephin and Zithromax per ED provider. Review of [facility] chart reveals patient with hospital admission less than 1 month ago, therefore he meets HCAP criteria; antibiotics changed for appropriate spectrum coverage    Subjective: Patient getting a bed bath.  No complaints       Medications:  Reviewed    Infusion Medications    dextrose      sodium chloride       Scheduled Medications    warfarin  5 mg Oral Once    carboxymethylcellulose PF  1 drop Left Eye TID    melatonin  10 mg Oral Nightly    verapamil  120 mg Oral Nightly    rosuvastatin  20 mg Oral Nightly    lithium  600 mg Oral Nightly    lithium  300 mg Oral Daily    pantoprazole  40 mg Oral QAM AC    lamoTRIgine  50 mg Oral Nightly    lamoTRIgine  25 mg Oral Daily    levofloxacin  750 mg IntraVENous Q24H    insulin glargine  0.15 Units/kg SubCUTAneous Nightly    insulin lispro  0-8 Units SubCUTAneous TID WC    insulin lispro  0-4 Units SubCUTAneous Nightly    warfarin placeholder: dosing by pharmacy   Other RX Placeholder    sodium chloride flush  10 mL IntraVENous 2 times per day     PRN Meds: glucose, dextrose bolus **OR** dextrose bolus, glucagon (rDNA), dextrose, sodium chloride flush, sodium chloride, potassium chloride **OR** potassium alternative oral replacement **OR** potassium chloride, magnesium sulfate, senna, acetaminophen **OR** acetaminophen, ondansetron **OR** ondansetron, albuterol      Intake/Output Summary (Last 24 hours) at 12/26/2022 1100  Last data filed at 12/26/2022 1031  Gross per 24 hour   Intake 720 ml   Output 3200 ml   Net -2480 ml         Physical Exam Performed:    /69   Pulse 64   Temp 98.4 °F (36.9 °C) (Oral)   Resp 16   Ht 6' 1\" (1.854 m)   Wt 198 lb 3.1 oz (89.9 kg)   SpO2 95%   BMI 26.15 kg/m²     General appearance: No apparent distress, appears stated age and cooperative. HEENT: Pupils equal, round, and reactive to light. Conjunctivae/corneas clear. Neck: Supple, with full range of motion. No jugular venous distention. Trachea midline. Respiratory:  Normal respiratory effort. Rhonchi bilaterally without Rales/Wheezes. Cardiovascular: Regular rate and rhythm with normal S1/S2 without murmurs, rubs or gallops. Abdomen: Soft, non-tender, non-distended with normal bowel sounds. Musculoskeletal: No clubbing, cyanosis or edema bilaterally. Full range of motion without deformity. Skin: Skin color, texture, turgor normal.  No rashes or lesions. Neurologic:  Neurovascularly intact without any focal sensory/motor deficits.  Cranial nerves: II-XII intact, grossly non-focal. Essential tremors   Psychiatric: Alert and oriented, thought content appropriate, normal insight  Capillary Refill: Brisk, 3 seconds, normal   Peripheral Pulses: +2 palpable, equal bilaterally       Labs:   No results for input(s): WBC, HGB, HCT, PLT in the last 72 hours. No results for input(s): NA, K, CL, CO2, BUN, CREATININE, CALCIUM, PHOS in the last 72 hours. Invalid input(s): MAGNES    No results for input(s): AST, ALT, BILIDIR, BILITOT, ALKPHOS in the last 72 hours. Recent Labs     12/24/22  1411 12/25/22  0428 12/26/22  0531   INR 2.38* 2.58* 2.32*       No results for input(s): CKTOTAL, TROPONINI in the last 72 hours. Urinalysis:      Lab Results   Component Value Date/Time    NITRU POSITIVE 12/22/2022 04:00 PM    WBCUA 3-5 12/22/2022 04:00 PM    BACTERIA 2+ 12/22/2022 04:00 PM    RBCUA 0-2 12/22/2022 04:00 PM    BLOODU Negative 12/22/2022 04:00 PM    SPECGRAV 1.010 12/22/2022 04:00 PM    GLUCOSEU Negative 12/22/2022 04:00 PM    GLUCOSEU NEGATIVE 10/13/2010 12:10 PM       Radiology:  CT CHEST ABDOMEN PELVIS W CONTRAST Additional Contrast? None   Final Result      Bibasilar atelectasis. No definite evidence for pneumonia. Abdomen/Pelvis: This is compared with 08/03/2020      Liver: Liver is normal density. No enhancing masses. Normal enhancement of   the intrahepatic vasculature. Spleen:  Normal size. No enhancing masses      Pancreas: No enhancing masses. No ductal dilation. No adjacent fatty   stranding. Gallbladder no calcified stones or sludge. No pericholecystic fluid. No   wall thickening. Bile ducts: No biliary ductal dilation      Adrenals: The adrenal glands are unremarkable      Kidneys: Kidneys are normal in appearance. No hydronephrosis. No enhancing   masses. Norenal stones. Hypodense nodule consistent with a cyst on the left   kidney. There are 2..  They are unchanged. GI: No small bowel dilation. No colonic wall thickening. No large mass. The stomach is unremarkable in appearance.   Stomach is well distended the   with a small posterior diverticulum. .. Mesentery: No enlarged lymphadenopathy. No free fluid. No free gas. Aorta: Aorta is of normal size. Negative for dissection. IVC is   unremarkable. Celiac axis and SMA are patent. Portal vein is patent. PELVIS      GI: No small bowel dilation. No colonic wall thickening. No enlarged   lymphadenopathy. No free fluid in the pelvis. : Bubble of gas in the bladder. Probable previous TURP. .  Calcifications   within the prostate. Streak artifact related to the right hip arthroplasty. Osseous: Osteoarthritic changes of the left hip. Osteoarthritic change of   the SI joints. Partial ankylosis on the right. Advanced facet arthropathy. Anterior spurring of the lower thoracic and upper lumbar spine. Epidural   stimulator is in place. IMPRESSION:   1. No findings for abscess in the abdomen or pelvis. Negative for   pyelonephritis   2. Bubble of gas in the bladder. Was the patient catheterized? CT HEAD WO CONTRAST   Final Result   No acute intracranial abnormality. XR CHEST PORTABLE   Final Result   Findings consistent with central and bibasilar bronchitis with foci of   bibasilar subsegmental pneumonia or atelectasis. No radiographic evidence of   pleural effusion in the frontal projection.   Otherwise, radiographically   nonacute portable chest.             PHARMACY TO DOSE MEDICATION  IP CONSULT TO HOME CARE NEEDS    Assessment/Plan:    Active Hospital Problems    Diagnosis     HCAP (healthcare-associated pneumonia) [J18.9]      Priority: Medium    Acute UTI [N39.0]      Priority: Medium    Factor 5 Leiden mutation, [facility] [D68.51]      Priority: Medium    Anticoagulated on Coumadin [Z79.01]      Priority: Medium    Sepsis ([facility] [A41.9]     Asthma [J45.909]     Type 2 diabetes mellitus ([facility] [E11.9]     Essential hypertension [I10]      Pneumonia  - no evidence of sepsis  - suspect gram positive  - continue levaquin  - blood cultures NGTD    Debility  - multifactorial from spinal stenosis, chronic back pain  - PT/OT  - recommend follow up with neurology as an outpatient    DMII  - well controlled  - continue lantus with SSI    HTN  - well controlled  - continue home verapamil    HLD  - continue statin    Bipolar disorder  - mood stable  - continue home regimen    Factor V leiden  - continue coumadin, pharmacy to dose    DVT Prophylaxis: coumadin  Diet: ADULT ORAL NUTRITION SUPPLEMENT; Breakfast, Dinner; Fortified Pudding Oral Supplement  ADULT DIET; Regular; 5 carb choices (75 gm/meal)  Code Status: Full Code  PT/OT Eval Status: ordered    Dispo - PT rec HHC. Medically ready for discharge. Wife wanting to return to [facility]. Discussed with SW.      Appropriate for A1 Discharge Unit: Yes      Jazmin Means, DO

## 2022-12-27 VITALS
TEMPERATURE: 98 F | RESPIRATION RATE: 16 BRPM | DIASTOLIC BLOOD PRESSURE: 74 MMHG | HEART RATE: 67 BPM | WEIGHT: 200.84 LBS | OXYGEN SATURATION: 96 % | HEIGHT: 73 IN | SYSTOLIC BLOOD PRESSURE: 111 MMHG | BODY MASS INDEX: 26.62 KG/M2

## 2022-12-27 LAB
GLUCOSE BLD-MCNC: 140 MG/DL (ref 70–99)
GLUCOSE BLD-MCNC: 160 MG/DL (ref 70–99)
GLUCOSE BLD-MCNC: 210 MG/DL (ref 70–99)
GLUCOSE BLD-MCNC: 232 MG/DL (ref 70–99)
INR BLD: 2.3 (ref 0.87–1.14)
LAMOTRIGINE LEVEL: 3.4 UG/ML (ref 3–15)
PERFORMED ON: ABNORMAL
PROTHROMBIN TIME: 25.4 SEC (ref 11.7–14.5)

## 2022-12-27 PROCEDURE — 6370000000 HC RX 637 (ALT 250 FOR IP): Performed by: INTERNAL MEDICINE

## 2022-12-27 PROCEDURE — 97110 THERAPEUTIC EXERCISES: CPT

## 2022-12-27 PROCEDURE — 97530 THERAPEUTIC ACTIVITIES: CPT

## 2022-12-27 PROCEDURE — 36415 COLL VENOUS BLD VENIPUNCTURE: CPT

## 2022-12-27 PROCEDURE — 6370000000 HC RX 637 (ALT 250 FOR IP): Performed by: HOSPITALIST

## 2022-12-27 PROCEDURE — 2580000003 HC RX 258: Performed by: HOSPITALIST

## 2022-12-27 PROCEDURE — 85610 PROTHROMBIN TIME: CPT

## 2022-12-27 PROCEDURE — 97116 GAIT TRAINING THERAPY: CPT

## 2022-12-27 RX ORDER — WARFARIN SODIUM 5 MG/1
5 TABLET ORAL
Status: COMPLETED | OUTPATIENT
Start: 2022-12-27 | End: 2022-12-27

## 2022-12-27 RX ADMIN — ACETAMINOPHEN 325MG 650 MG: 325 TABLET ORAL at 10:33

## 2022-12-27 RX ADMIN — WARFARIN SODIUM 5 MG: 5 TABLET ORAL at 17:16

## 2022-12-27 RX ADMIN — INSULIN LISPRO 2 UNITS: 100 INJECTION, SOLUTION INTRAVENOUS; SUBCUTANEOUS at 14:23

## 2022-12-27 RX ADMIN — POLYETHYLENE GLYCOL 3350 17 G: 17 POWDER, FOR SOLUTION ORAL at 10:32

## 2022-12-27 RX ADMIN — INSULIN LISPRO 2 UNITS: 100 INJECTION, SOLUTION INTRAVENOUS; SUBCUTANEOUS at 17:29

## 2022-12-27 RX ADMIN — CARBOXYMETHYLCELLULOSE SODIUM 1 DROP: 10 GEL OPHTHALMIC at 10:33

## 2022-12-27 RX ADMIN — LITHIUM CARBONATE 300 MG: 150 CAPSULE, GELATIN COATED ORAL at 10:33

## 2022-12-27 RX ADMIN — SODIUM CHLORIDE, PRESERVATIVE FREE 10 ML: 5 INJECTION INTRAVENOUS at 10:34

## 2022-12-27 RX ADMIN — PANTOPRAZOLE SODIUM 40 MG: 40 TABLET, DELAYED RELEASE ORAL at 05:17

## 2022-12-27 RX ADMIN — CARBOXYMETHYLCELLULOSE SODIUM 1 DROP: 10 GEL OPHTHALMIC at 14:24

## 2022-12-27 RX ADMIN — LAMOTRIGINE 25 MG: 25 TABLET ORAL at 10:33

## 2022-12-27 ASSESSMENT — PAIN DESCRIPTION - LOCATION: LOCATION: BACK

## 2022-12-27 ASSESSMENT — PAIN DESCRIPTION - ORIENTATION: ORIENTATION: MID

## 2022-12-27 ASSESSMENT — PAIN DESCRIPTION - DESCRIPTORS: DESCRIPTORS: ACHING

## 2022-12-27 ASSESSMENT — PAIN SCALES - GENERAL: PAINLEVEL_OUTOF10: 7

## 2022-12-27 ASSESSMENT — PAIN DESCRIPTION - PAIN TYPE: TYPE: ACUTE PAIN

## 2022-12-27 ASSESSMENT — PAIN - FUNCTIONAL ASSESSMENT: PAIN_FUNCTIONAL_ASSESSMENT: ACTIVITIES ARE NOT PREVENTED

## 2022-12-27 NOTE — PROGRESS NOTES
Administered soap maryanne enema. Pt tolerated well. Pt had an XL formed/soft brown bm. Pt states \"feel a lot better. \"

## 2022-12-27 NOTE — CARE COORDINATION
CM received message from patients wife. Would like referrals to 1)[facility], not in network, 2) [facility], will review. 3) [facility], no beds available. 3) [facility], waiting determination.  Jaci Rose RN

## 2022-12-27 NOTE — PROGRESS NOTES
Hospitalist Progress Note      PCP: Jabier Brown, APRN - CNP    Date of Admission: 12/22/2022    Chief Complaint: tremors    Hospital Course:   Yari More is a 67 y.o. male who presented to the ED per recommendations of his PCP to be evaluated for rigors and fever ongoing for 1 week PTA. Patient's wife reports that he was recently discharged from [facility], and has remained confused with generalized weakness since returning home. Patient was discharged home with Stapleton catheter due to LUTS. Patient's wife reports that he is unable to perform ADLs and is near full assist with transfer. She is no longer able to care for him in his present state. Upon arrival to the ED patient was initially tachycardic and tachypneic, but afebrile. Lactate level of 3.2 with left shifted leukocytosis and source of infection meets sepsis criteria. EKG ordered by admitting physician with results pending. CT scan notable for bibasilar atelectasis versus infiltrate, without other acute findings in abdomen and pelvis. Influenza A/B and SARS-CoV-2 testing performed with negative results. Notable labs include: Left shifted leukocytosis 11.8, lactate 3.3, procalcitonin elevated 0.74, acute hyperglycemia 227, and nitrite positive UA with 2+ bacteria. Patient initially received a dosage of IV Rocephin and Zithromax per ED provider. Review of [facility] chart reveals patient with hospital admission less than 1 month ago, therefore he meets HCAP criteria; antibiotics changed for appropriate spectrum coverage    Subjective: No complaints. No pain or discomfort. Excoriated buttocks and zinc cream was placed.        Medications:  Reviewed    Infusion Medications    dextrose      sodium chloride       Scheduled Medications    polyethylene glycol  17 g Oral Daily    carboxymethylcellulose PF  1 drop Left Eye TID    melatonin  10 mg Oral Nightly    verapamil  120 mg Oral Nightly    rosuvastatin  20 mg Oral Nightly    lithium  600 mg Oral Nightly    lithium  300 mg Oral Daily    pantoprazole  40 mg Oral QAM AC    lamoTRIgine  50 mg Oral Nightly    lamoTRIgine  25 mg Oral Daily    levofloxacin  750 mg IntraVENous Q24H    insulin glargine  0.15 Units/kg SubCUTAneous Nightly    insulin lispro  0-8 Units SubCUTAneous TID WC    insulin lispro  0-4 Units SubCUTAneous Nightly    warfarin placeholder: dosing by pharmacy   Other RX Placeholder    sodium chloride flush  10 mL IntraVENous 2 times per day     PRN Meds: glucose, dextrose bolus **OR** dextrose bolus, glucagon (rDNA), dextrose, sodium chloride flush, sodium chloride, potassium chloride **OR** potassium alternative oral replacement **OR** potassium chloride, magnesium sulfate, senna, acetaminophen **OR** acetaminophen, ondansetron **OR** ondansetron, albuterol      Intake/Output Summary (Last 24 hours) at 12/27/2022 0902  Last data filed at 12/27/2022 0358  Gross per 24 hour   Intake 1450 ml   Output 2600 ml   Net -1150 ml         Physical Exam Performed:    /74   Pulse 68   Temp 98 °F (36.7 °C) (Oral)   Resp 16   Ht 6' 1\" (1.854 m)   Wt 200 lb 13.4 oz (91.1 kg)   SpO2 96%   BMI 26.50 kg/m²     General appearance: No apparent distress, appears stated age and cooperative. HEENT: Pupils equal, round, and reactive to light. Conjunctivae/corneas clear. Neck: Supple, with full range of motion. No jugular venous distention. Trachea midline. Respiratory:  Normal respiratory effort. Rhonchi bilaterally without Rales/Wheezes. Cardiovascular: Regular rate and rhythm with normal S1/S2 without murmurs, rubs or gallops. Abdomen: Soft, non-tender, non-distended with normal bowel sounds. Musculoskeletal: No clubbing, cyanosis or edema bilaterally. Full range of motion without deformity. Skin: Skin color, texture, turgor normal.  No rashes or lesions. Neurologic:  Neurovascularly intact without any focal sensory/motor deficits.  Cranial nerves: II-XII intact, grossly non-focal. Essential tremors   Psychiatric: Alert and oriented, thought content appropriate, normal insight  Capillary Refill: Brisk, 3 seconds, normal   Peripheral Pulses: +2 palpable, equal bilaterally       Labs:   No results for input(s): WBC, HGB, HCT, PLT in the last 72 hours. No results for input(s): NA, K, CL, CO2, BUN, CREATININE, CALCIUM, PHOS in the last 72 hours. Invalid input(s): MAGNES    No results for input(s): AST, ALT, BILIDIR, BILITOT, ALKPHOS in the last 72 hours. Recent Labs     12/25/22  0428 12/26/22  0531 12/27/22  0515   INR 2.58* 2.32* 2.30*       No results for input(s): CKTOTAL, TROPONINI in the last 72 hours. Urinalysis:      Lab Results   Component Value Date/Time    NITRU POSITIVE 12/22/2022 04:00 PM    WBCUA 3-5 12/22/2022 04:00 PM    BACTERIA 2+ 12/22/2022 04:00 PM    RBCUA 0-2 12/22/2022 04:00 PM    BLOODU Negative 12/22/2022 04:00 PM    SPECGRAV 1.010 12/22/2022 04:00 PM    GLUCOSEU Negative 12/22/2022 04:00 PM    GLUCOSEU NEGATIVE 10/13/2010 12:10 PM       Radiology:  CT CHEST ABDOMEN PELVIS W CONTRAST Additional Contrast? None   Final Result      Bibasilar atelectasis. No definite evidence for pneumonia. Abdomen/Pelvis: This is compared with 08/03/2020      Liver: Liver is normal density. No enhancing masses. Normal enhancement of   the intrahepatic vasculature. Spleen:  Normal size. No enhancing masses      Pancreas: No enhancing masses. No ductal dilation. No adjacent fatty   stranding. Gallbladder no calcified stones or sludge. No pericholecystic fluid. No   wall thickening. Bile ducts: No biliary ductal dilation      Adrenals: The adrenal glands are unremarkable      Kidneys: Kidneys are normal in appearance. No hydronephrosis. No enhancing   masses. Norenal stones. Hypodense nodule consistent with a cyst on the left   kidney. There are 2..  They are unchanged. GI: No small bowel dilation. No colonic wall thickening. No large mass.    The stomach is unremarkable in appearance. Stomach is well distended the   with a small posterior diverticulum. .. Mesentery: No enlarged lymphadenopathy. No free fluid. No free gas. Aorta: Aorta is of normal size. Negative for dissection. IVC is   unremarkable. Celiac axis and SMA are patent. Portal vein is patent. PELVIS      GI: No small bowel dilation. No colonic wall thickening. No enlarged   lymphadenopathy. No free fluid in the pelvis. : Bubble of gas in the bladder. Probable previous TURP. .  Calcifications   within the prostate. Streak artifact related to the right hip arthroplasty. Osseous: Osteoarthritic changes of the left hip. Osteoarthritic change of   the SI joints. Partial ankylosis on the right. Advanced facet arthropathy. Anterior spurring of the lower thoracic and upper lumbar spine. Epidural   stimulator is in place. IMPRESSION:   1. No findings for abscess in the abdomen or pelvis. Negative for   pyelonephritis   2. Bubble of gas in the bladder. Was the patient catheterized? CT HEAD WO CONTRAST   Final Result   No acute intracranial abnormality. XR CHEST PORTABLE   Final Result   Findings consistent with central and bibasilar bronchitis with foci of   bibasilar subsegmental pneumonia or atelectasis. No radiographic evidence of   pleural effusion in the frontal projection.   Otherwise, radiographically   nonacute portable chest.             PHARMACY TO DOSE MEDICATION  IP CONSULT TO HOME CARE NEEDS    Assessment/Plan:    Active Hospital Problems    Diagnosis     HCAP (healthcare-associated pneumonia) [J18.9]      Priority: Medium    Acute UTI [N39.0]      Priority: Medium    Factor 5 Leiden mutation, heterozygous ([facility] [D68.51]      Priority: Medium    Anticoagulated on Coumadin [Z79.01]      Priority: Medium    Sepsis ([facility] [A41.9]     Asthma [J45.909]     Type 2 diabetes mellitus ([facility] [E11.9]     Essential hypertension [I10] Pneumonia  - no evidence of sepsis  - suspect gram positive  - continue levaquin  - blood cultures NGTD    Debility  Essential tremors   - multifactorial from spinal stenosis, chronic back pain  - PT/OT  - recommend follow up with neurology as an outpatient    DMII  - well controlled  - continue lantus with SSI    HTN  - well controlled  - continue home verapamil    HLD  - continue statin    Bipolar disorder  - mood stable  - continue home regimen    Factor V leiden  - continue coumadin, pharmacy to dose    DVT Prophylaxis: coumadin  Diet: ADULT ORAL NUTRITION SUPPLEMENT; Breakfast, Dinner; Fortified Pudding Oral Supplement  ADULT DIET; Regular; 5 carb choices (75 gm/meal)  Code Status: Full Code  PT/OT Eval Status: ordered    Dispo - PT rec SNF. Medically ready for discharge. Wife wanting to return to [facility]. Discussed with YEE.      Appropriate for A1 Discharge Unit: Yes      Shilpa Antunez, DO

## 2022-12-27 NOTE — PROGRESS NOTES
Pt a/o x4. VSS. All prescriptions, discharge and follow up instructions given to the patient and wife. The patient and his wife verbalizes understanding and denies questions. All belongings collected and sent with the patient. The patient was discharged off the unit by wheelchair and PCA in stable condition.

## 2022-12-27 NOTE — PROGRESS NOTES
Pt assessment completed and charted. VSS. Pt a/o w/ intermittent confusion. Pt c/o back pain. PRN medication administered per MAR. Pt Ax1-2 w/ walker. Pt has not had bm in 2 weeks. Wife @ bedside. Clearence Boss in place. Bed in lowest position and wheels locked. Call light within reach. Bedside table within reach. Non-skid footwear in place. Pt denies any other needs at this time. Pt calls out appropriately.

## 2022-12-27 NOTE — PROGRESS NOTES
Pt assessment completed and charted. VSS. Pt a/o. Pt denies pain. Pt Ax2 w/ stedy. Q2T. Pt refusing turns @ times, become angry. Male wick in place and patent w/ good UO. Wife @ bedside. Bed in lowest position and wheels locked. Call light within reach. Bedside table within reach. Non-skid footwear in place. Pt denies any other needs at this time. Pt calls out appropriately. Will continue to monitor.

## 2022-12-27 NOTE — PROGRESS NOTES
Occupational Therapy  Facility/Department: [facility] C3 TELE/MED SURG/ONC  Treatment Note    Name: Milka Barroso  : 1950  MRN: [id number]  Date of Service: 2022    Discharge Recommendations:  Subacute/Skilled Nursing Facility  Barriers to home discharge:   [x] Steps to access home entry or bed/bath:    [x] Reported available assist at home upon discharge limited   [x]family requests DC to other than home             Patient Diagnosis(es): The primary encounter diagnosis was Pneumonia of both lungs due to infectious organism, unspecified part of lung. A diagnosis of Acute cystitis with hematuria was also pertinent to this visit. Past Medical History:  has a past medical history of Anxiety, Arthritis, Asthma, Bipolar 1 disorder ([facility] [address] Colitis, Depression, Diabetes ([facility] [address] Diabetes mellitus ([facility] [address] Encounter for imaging to screen for metal prior to MRI, Factor V Leiden mutation ([facility] Glaucoma, H/O bladder problems, High blood pressure, History of kidney problems, IBS (irritable bowel syndrome), Liver disease, Obesity, and Psychiatric problem. Past Surgical History:  has a past surgical history that includes back surgery; hip surgery; Eye surgery; Colonoscopy; cervical fusion; joint replacement (Right); other surgical history (2018); pr njx dx/ther sbst intrlmnr crv/thrc w/img gdn (Bilateral, 2018); pr njx dx/ther sbst intrlmnr crv/thrc w/img gdn (Bilateral, 10/16/2018); pr nervous system surgery unlisted (Bilateral, 2018); Appendectomy; and TURP (N/A, 2020). Assessment   Performance deficits / Impairments: Decreased functional mobility ; Decreased balance;Decreased safe awareness;Decreased ADL status; Decreased strength  Assessment: pt from home with spouse(works part-time) , normally needs assist with LE ADL's, walks independently with RW; pt admitted with multiple falls, sepsis, bilateral pneumonia, requiring mod assist with bed mobility, min/mod assist with bathroom mobility with walker, max assist/dependent with LE self care, vitals stable on RA but fatigues easily; pt to benefit from skilled OT services  REQUIRES OT FOLLOW-UP: Yes  Activity Tolerance  Activity Tolerance: Patient Tolerated treatment well  Activity Tolerance Comments: semi-leija's on RA:  BP = 127/71, HR = 69, 94 % O 2 sats; sitting EOB:  BP = 114/69; sitting in chair after bathroom mobility:  BP = 137/77, HR = 92;        Plan   Occupational Therapy Plan  Times Per Week: 3-5x/week  Current Treatment Recommendations: Strengthening, ROM, Balance training, Functional mobility training, Endurance training, Equipment evaluation, education, & procurement, Patient/Caregiver education & training, Safety education & training, Self-Care / ADL     Restrictions  Restrictions/Precautions  Restrictions/Precautions: Fall Risk, General Precautions  Position Activity Restriction  Other position/activity restrictions: Purewick,    Subjective   General  Chart Reviewed: Yes  Patient assessed for rehabilitation services?: Yes  Family / Caregiver Present: No  Referring Practitioner: Reji Talbert MD  Diagnosis: sepsis  Subjective  Subjective: \"I am going home, I am not staying in the hospital one more day, I have hired help\"  General Comment  Comments: RN cleared pt for OT ; pt awake in bed, agreeable to get up to walk  Rates pain in back aching 5/10         Objective                Safety Devices  Type of Devices: All terese prominences offloaded; Chair alarm in place; Left in chair;Call light within reach;Nurse notified; Patient at risk for falls     Toilet Transfers  Toilet - Technique: Ambulating (min/mod assist to manage std walker for safety (puts walker too far ahead in walking))  Equipment Used: Grab bars  Toilet Transfer: Minimal assistance     ADL  Grooming: Setup (in chair to brush teeth & wash hands & face; unable tolerate standing at sink c/o fatigue/LE weakness;)  LE Dressing: Maximum assistance (to change attends to brief/pull-ups seated & standing)  Toileting: Dependent/Total (purewick)        Bed mobility  Supine to Sit: Moderate assistance (log roll to Left)  Sit to Supine: Unable to assess (Left up in chair upon exiting)  Transfers  Sit to stand: Minimal assistance  Stand to sit: Minimal assistance  Transfer Comments: mod cues for safe hand placement     Cognition  Overall Cognitive Status: Exceptions  Arousal/Alertness: Appropriate responses to stimuli  Following Commands:  Follows one step commands consistently  Attention Span: Attends with cues to redirect  Memory: Decreased recall of precautions  Safety Judgement: Decreased awareness of need for assistance;Decreased awareness of need for safety  Insights: Decreased awareness of deficits  Initiation: Requires cues for some  Sequencing: Requires cues for some  Orientation  Overall Orientation Status: Impaired  Orientation Level: Disoriented to situation;Oriented to person;Oriented to place;Oriented to time                  Education Given To: Patient  Education Provided: Role of Therapy;Plan of Care;Precautions  Education Provided Comments: disease specific: importance of OOB to chair for meals/ADL's, use of RED/nurse call light for assist with transfers/ADL needs  Education Method: Demonstration;Verbal  Barriers to Learning: Cognition (safety awareness)  Education Outcome: Verbalized understanding;Demonstrated understanding;Continued education needed         AM-PAC Score        AM-[facility] Inpatient Daily Activity Raw Score: 14 (12/27/22 1006)  AM-PAC Inpatient ADL T-Scale Score : 33.39 (12/27/22 1006)  ADL Inpatient CMS 0-100% Score: 59.67 (12/27/22 1006)  ADL Inpatient CMS G-Code Modifier : CK (12/27/22 1006)    Tinneti Score       Goals  Short Term Goals  Time Frame for Short Term Goals: 1 week- 12/30/22  Short Term Goal 1: Pt will complete functional transfers with SPV.; 12/27 min assist with mod verbal cues    Short Term Goal 2: Pt will perform 2-3 grooming tasks with supervision. ; 12/27 performed 2 grooming tasks seated in chair, unable to tolerate standing at sink    Short Term Goal 3: Pt will complete toileting with SBA.; 12/27 remains dependent on male purewick    Short Term Goal 4: Pt will complete 15-20 reps of BUE ther ex in preparation for safe completion of ADLs and transfers by 12/27/22.     Patient Goals   Patient goals : \"I don't want to go to rehab again\"       Therapy Time   Individual Concurrent Group Co-treatment   Time In 0820         Time Out 0855         Minutes [address]

## 2022-12-27 NOTE — PROGRESS NOTES
Pt is alert and oriented. VSS. RA. Pt denies pain and discomfort at this time. Pt turing in bed by himself. Male pure wick in place. Bottom is red and excoriated. Zinc cream place. Shift assessment completed and documented. Call light within reach. Bed side table within reach. Wheels locked. Bed in lowest position. Bed check in place. Pt instructed to call out for assistance. Pt expressesed understanding & calls out appropritately. All care per orders.  Electronically signed by Gabriel Palmer RN on 12/27/2022 at 12:50 AM

## 2022-12-27 NOTE — PROGRESS NOTES
Comprehensive Nutrition Assessment    Type and Reason for Visit:  Reassess    Nutrition Recommendations/Plan:   Continue Carb control diet as tolerated  Offer Ensure plus strawberry bid  Will monitor nutritional adequacy, nutrition-related labs, weights, BMs, and clinical progress      Malnutrition Assessment:  Malnutrition Status: At risk for malnutrition (Comment) (12/23/22 7925)    Context:  Chronic Illness       Nutrition Assessment:    Follow up:  Currently on a carb control diet regimen. Po intake variable % meals. Ensure pudding also ordered bid. Patient to be discharged to SNF when bed available per case management. Nutrition Related Findings:    labs reviewed, no new labs Wound Type: Pressure Injury, Stage II       Current Nutrition Intake & Therapies:    Average Meal Intake: Unable to assess  Average Supplements Intake: None Ordered  ADULT DIET; Regular; 5 carb choices (75 gm/meal)  ADULT ORAL NUTRITION SUPPLEMENT; Breakfast, Dinner; Standard High Calorie/High Protein Oral Supplement    Anthropometric Measures:  Height: 6' 1\" (185.4 cm)  Ideal Body Weight (IBW): 184 lbs (84 kg)       Current Body Weight: 200 lb 13 oz (91.1 kg), 114.1 % IBW. Weight Source: Bed Scale  Current BMI (kg/m2): 26.5  Usual Body Weight: 232 lb (105.2 kg) (bed scale 6/8/22)  % Weight Change (Calculated): -9.5                    BMI Categories: Overweight (BMI 25.0-29. 9)    Estimated Daily Nutrient Needs:  Energy Requirements Based On: Kcal/kg (25-30)  Weight Used for Energy Requirements: Ideal  Energy (kcal/day): 8392-8230 kcal  Weight Used for Protein Requirements: Ideal (1.2-1.4 g/kg)  Protein (g/day): 100-118 g  Method Used for Fluid Requirements: 1 ml/kcal  Fluid (ml/day): 7669-3875 mL    Nutrition Diagnosis:   Inadequate oral intake related to inadequate protein-energy intake, increase demand for energy/nutrients as evidenced by poor intake prior to admission, weight loss, wounds    Nutrition Interventions:   Food and/or Nutrient Delivery: Modify Current Diet, Start Oral Nutrition Supplement  Nutrition Education/Counseling: No recommendation at this time  Coordination of Nutrition Care: Continue to monitor while inpatient       Goals:     Goals: PO intake 50% or greater, prior to discharge       Nutrition Monitoring and Evaluation:   Behavioral-Environmental Outcomes: None Identified  Food/Nutrient Intake Outcomes: Food and Nutrient Intake, Supplement Intake  Physical Signs/Symptoms Outcomes: Biochemical Data, Nutrition Focused Physical Findings, Weight, Skin    Discharge Planning:    Continue current diet, Mika, [address] [facility], [address] [place] [address],   Contact: [phone]

## 2022-12-27 NOTE — PROGRESS NOTES
Pharmacy Note  Warfarin Consult  Dx: factor V Leiden  Goal INR range 2-3   Home Warfarin dose: 2.5mg on Wed, 5mg on all other days  ** Potential DDI w/ Levofloxacin (started 12/22; last dose 12/28)    Date                 INR                  Warfarin  12/22               1.9                     5 mg  12/23               1.86                   6 mg  12/24               2.38                   0 mg  12/25               2.58                  2.5 mg  12/26               2.32                   5 mg  12/27               2.30                   5 mg  Recommend Warfarin 5 mg x1 tonight. Daily INR ordered. Rx will continue to manage therapy per consult order.     Radha Rock PharmD 12/27/22 9:37 AM

## 2022-12-27 NOTE — PLAN OF CARE
Problem: Safety - Adult  Goal: Free from fall injury  12/27/2022 0047 by Mitch Brown RN  Outcome: Progressing  Flowsheets (Taken 12/27/2022 0047)  Free From Fall Injury:   Based on caregiver fall risk screen, instruct family/caregiver to ask for assistance with transferring infant if caregiver noted to have fall risk factors   Instruct family/caregiver on patient safety     Problem: Pain  Goal: Verbalizes/displays adequate comfort level or baseline comfort level  12/27/2022 0047 by Mitch Brown RN  Outcome: Progressing  Flowsheets (Taken 12/27/2022 0047)  Verbalizes/displays adequate comfort level or baseline comfort level:   Encourage patient to monitor pain and request assistance   Assess pain using appropriate pain scale   Administer analgesics based on type and severity of pain and evaluate response   Implement non-pharmacological measures as appropriate and evaluate response

## 2022-12-27 NOTE — PROGRESS NOTES
Physical Therapy  Facility/Department: [facility] C3 TELE/MED SURG/ONC  Daily Treatment Note  NAME: Kenia Balderas  : 1950  MRN: [id number]    Date of Service: 2022    Discharge Recommendations:  Therapy recommended at discharge   PT Equipment Recommendations  Equipment Needed: Yes  Mobility Devices: Naheed Kathya: Rolling  Other: has walker at home. Patient Diagnosis(es): The primary encounter diagnosis was Pneumonia of both lungs due to infectious organism, unspecified part of lung. A diagnosis of Acute cystitis with hematuria was also pertinent to this visit. Assessment   Assessment: Patient seen today for education in body mechanics for bed mobility and transfers as patient noting LOW back pain Right side worse than left with increased back  pain with mobility noted. Patient able to roll without physical assist and with cues for log roll. Patient needed Mod assist for trunk and cues for sup to sit and indep sit to sup. Patient required min to mod assist for sit to stand transfers   Patient states his wife helps him, \"some to get around at home\" and notes he has 2 steps to enter. Patient states he has people helping him at the house. Patient demonstrates gait limited by LBP to in room distances with use of FWW and up to min assist with patient demonstrating flexed trunk and hip posture due to LBP throughout gait cycle. Recommend continued therapy to progress towards indep with mobility with FWW as patient notes this was PLOF. If burden of care is beyond abilities of current caregiver recommend continued skilled therapy in sub acute setting otherwise recommend home with HHPT and 24 hour assist.  Activity Tolerance: Patient limited by pain  Equipment Needed: Yes  Mobility Devices: JoKno  Other: has walker at home. Plan    Physcial Therapy Plan  General Plan: 1 time a day 3-6 times a week  Current Treatment Recommendations: Strengthening;ROM;Balance training; Therapeutic activities; Home exercise program;Functional mobility training;Transfer training;Neuromuscular re-education;Stair training; Safety education & training;Gait training;Patient/Caregiver education & training; Endurance training;Positioning;Equipment evaluation, education, & procurement     Restrictions  Restrictions/Precautions  Restrictions/Precautions: Fall Risk, General Precautions  Position Activity Restriction  Other position/activity restrictions: Purewick,     Subjective    Subjective  Pain: Rates pain in back aching 5/10  Orientation  Overall Orientation Status: Impaired  Orientation Level: Disoriented to situation;Oriented to person;Oriented to place;Oriented to time  Cognition  Overall Cognitive Status: Exceptions  Arousal/Alertness: Appropriate responses to stimuli  Following Commands: Follows one step commands consistently  Attention Span: Attends with cues to redirect  Memory: Decreased recall of precautions  Safety Judgement: Decreased awareness of need for assistance;Decreased awareness of need for safety  Insights: Decreased awareness of deficits  Initiation: Requires cues for some  Sequencing: Requires cues for some     Objective   Vitals  Heart Rate: 78  BP: 97/62  BP Location: Left upper arm  Patient Position: Supine  MAP (Calculated): 74  SpO2: 93 %  O2 Device: None (Room air)  Bed Mobility Training  Bed Mobility Training: Yes  Interventions: Verbal cues  Rolling: Supervision (cued for log rolling  to manage back pain)  Supine to Sit: Moderate assistance  Sit to supine indep. Balance  Sitting: Impaired (has difficulty  sitting unsupported due to back pain.)  Sitting - Static: Fair (occasional)  Sitting - Dynamic: Fair (occasional)  Standing: With support  Standing - Static: Constant support  Standing - Dynamic: Constant support  Transfer Training  Transfer Training: Yes  Overall Level of Assistance: Minimum assistance  Interventions: Safety awareness training;Verbal cues; Weight shifting training/pressure relief  Sit to Stand: Moderate assistance;Minimum assistance (x2 chair and x1 bed)  Stand to Sit: Minimum assistance (patient has difficulty standing and sitting from standing with increased back pain to 9/10 with mobility. Patient reports pain is aching in low back. x2 chair x1 bed.)  Gait Training  Gait Training: Yes  Right Side Weight Bearing: Full  Left Side Weight Bearing: Full  Gait  Overall Level of Assistance: Minimum assistance  Interventions: Verbal cues (for wide stefan)  Base of Support: Narrowed  Speed/Shanna: Slow  Swing Pattern: Right symmetrical;Left symmetrical  Gait Abnormalities: Other (comment) (noted flexion in trunk and hips due to back pain making it difficulty fo rpatient to stand upright. He notes normally he uses a walker at home and is able to stand upright)  Distance (ft): 28 Feet (10 for second walk)  Assistive Device: Walker, rolling     PT Exercises  A/AROM Exercises: completed x30 ankkle pumps and x15 gluteal isometrics,  Disease-specific Exercises: Educated in log roll , position right sidelying with knee pillow support for neutral spine and keeping hips and shoulders in same plane with log rolling. educated in sidelying hip and lumar flexion x5 LLE with patient able to complete in sidelying 4/10 low back pain aching. Unable to complete with LLE due to back pain. Reviewed standing from sitting with nuetral spine in wide stefan using BUE with patient verbalizing understanding of neutral spine mechanics     Safety Devices  Type of Devices: All terese prominences offloaded; Chair alarm in place; Left in chair;Call light within reach;Nurse notified; Patient at risk for falls       Goals  Short Term Goals  Time Frame for Short Term Goals: 7 days 12/30  Short Term Goal 1: pt will complete bed mobility with ind  Goal partially met 12/27/2022 patient demo indep sit to sup and mod assist sup to sit.   Short Term Goal 2: pt will complete functional transfer with mod ind  Short Term Goal 3: pt will ambualte 20 ft with LRAD and supv. Goal partially met 12/27/2022 Pateint demo 25 feet walkling with FWW min assist with back pain limiting upright gait and walking distances. Short Term Goal 4: pt will tolerate 10-15 reps of BLE seated/supine ther ex by 12/27. Goal met patient demo 15-30 reps LE therex in supine and sidelying as noted. Pain limits tolerance for RLE therex.   Patient Goals   Patient Goals : \"go home without rehab\"    Education educated in back care with gait and transefrs    [facility] [address] Inpatient Mobility:  [address] Mobility Inpatient   How much difficulty turning over in bed?: A Little  How much difficulty sitting down on / standing up from a chair with arms?: A Lot  How much difficulty moving from lying on back to sitting on side of bed?: A Lot  How much help from another person moving to and from a bed to a chair?: A Lot  How much help from another person needed to walk in hospital room?: A Little  How much help from another person for climbing 3-5 steps with a railing?: A Lot (estimated)  AM-PAC Inpatient Mobility Raw Score : 14  AM-PAC Inpatient T-Scale Score : 38.1  Mobility Inpatient CMS 0-100% Score: 61.29  Mobility Inpatient CMS G-Code Modifier : CL     Therapy Time   Individual Concurrent Group Co-treatment   Time In 0943         Time Out 1021         Minutes 38         Timed Code Treatment Minutes: [address], DEONNA

## 2022-12-28 NOTE — DISCHARGE SUMMARY
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 Hospital Course:   Fidel Sheehan is a 67 y.o. male who presented to the ED per recommendations of his PCP to be evaluated for rigors and fever ongoing for 1 week PTA. Patient's wife reports that he was recently discharged from [facility], and has remained confused with generalized weakness since returning home. Patient was discharged home with [place]apleton catheter due to LUTS. Patient's wife reports that he is unable to perform ADLs and is near full assist with transfer. She is no longer able to care for him in his present state. Upon arrival to the ED patient was initially tachycardic and tachypneic, but afebrile. Lactate level of 3.2 with left shifted leukocytosis and source of infection meets sepsis criteria. EKG ordered by admitting physician with results pending. CT scan notable for bibasilar atelectasis versus infiltrate, without other acute findings in abdomen and pelvis. Influenza A/B and SARS-CoV-2 testing performed with negative results.   Notable labs include: Left shifted leukocytosis 11.8, lactate 3.3, procalcitonin elevated 0.74, acute hyperglycemia 227, and nitrite positive UA with 2+ bacteria. Patient initially received a dosage of IV Rocephin and Zithromax per ED provider. Review of [facility] chart reveals patient with hospital admission less than 1 month ago, therefore he meets HCAP criteria; antibiotics changed for appropriate spectrum coverage    Pneumonia  - no evidence of sepsis  - suspect gram positive  - continue levaquin  - blood cultures NGTD     Debility  Essential tremors   - multifactorial from spinal stenosis, chronic back pain  - PT/OT  - recommend follow up with neurology as an outpatient    Constipation - resolved with enema      DMII  - well controlled  - continue lantus with SSI     HTN  - well controlled  - continue home verapamil     HLD  - continue statin     Bipolar disorder  - mood stable  - continue home regimen     Factor V leiden  - continue coumadin, pharmacy to dose      Physical Exam Performed:     /74   Pulse 67   Temp 98 °F (36.7 °C) (Oral)   Resp 16   Ht 6' 1\" (1.854 m)   Wt 200 lb 13.4 oz (91.1 kg)   SpO2 96%   BMI 26.50 kg/m²       General appearance: No apparent distress, appears stated age and cooperative. HEENT: Pupils equal, round, and reactive to light. Conjunctivae/corneas clear. Neck: Supple, with full range of motion. No jugular venous distention. Trachea midline. Respiratory:  Normal respiratory effort. Rhonchi bilaterally without Rales/Wheezes. Cardiovascular: Regular rate and rhythm with normal S1/S2 without murmurs, rubs or gallops. Abdomen: Soft, non-tender, non-distended with normal bowel sounds. Musculoskeletal: No clubbing, cyanosis or edema bilaterally. Full range of motion without deformity. Skin: Skin color, texture, turgor normal.  No rashes or lesions. Neurologic:  Neurovascularly intact without any focal sensory/motor deficits.  Cranial nerves: II-XII intact, grossly non-focal. Essential tremors   Psychiatric: Alert and oriented, thought content appropriate, normal insight  Capillary Refill: Brisk, 3 seconds, normal   Peripheral Pulses: +2 palpable, equal bilaterally       Labs: For convenience and continuity at follow-up the following most recent labs are provided:      CBC:    Lab Results   Component Value Date/Time    WBC 9.5 12/23/2022 05:40 AM    HGB 13.2 12/23/2022 05:40 AM    HCT 39.6 12/23/2022 05:40 AM     12/23/2022 05:40 AM       Renal:    Lab Results   Component Value Date/Time     12/23/2022 05:40 AM    K 4.2 12/23/2022 05:40 AM     12/23/2022 05:40 AM    CO2 24 12/23/2022 05:40 AM    BUN 15 12/23/2022 05:40 AM    CREATININE 1.0 12/23/2022 05:40 AM    CALCIUM 9.7 12/23/2022 05:40 AM    PHOS 3.2 07/17/2020 04:50 AM         Significant Diagnostic Studies    Radiology:   CT CHEST ABDOMEN PELVIS W CONTRAST Additional Contrast? None   Final Result      Bibasilar atelectasis. No definite evidence for pneumonia. Abdomen/Pelvis: This is compared with 08/03/2020      Liver: Liver is normal density. No enhancing masses. Normal enhancement of   the intrahepatic vasculature. Spleen:  Normal size. No enhancing masses      Pancreas: No enhancing masses. No ductal dilation. No adjacent fatty   stranding. Gallbladder no calcified stones or sludge. No pericholecystic fluid. No   wall thickening. Bile ducts: No biliary ductal dilation      Adrenals: The adrenal glands are unremarkable      Kidneys: Kidneys are normal in appearance. No hydronephrosis. No enhancing   masses. Norenal stones. Hypodense nodule consistent with a cyst on the left   kidney. There are 2..  They are unchanged. GI: No small bowel dilation. No colonic wall thickening. No large mass. The stomach is unremarkable in appearance. Stomach is well distended the   with a small posterior diverticulum. .. Mesentery: No enlarged lymphadenopathy. No free fluid. No free gas. Aorta: Aorta is of normal size. Negative for dissection.   IVC is unremarkable. Celiac axis and SMA are patent. Portal vein is patent. PELVIS      GI: No small bowel dilation. No colonic wall thickening. No enlarged   lymphadenopathy. No free fluid in the pelvis. : Bubble of gas in the bladder. Probable previous TURP. .  Calcifications   within the prostate. Streak artifact related to the right hip arthroplasty. Osseous: Osteoarthritic changes of the left hip. Osteoarthritic change of   the SI joints. Partial ankylosis on the right. Advanced facet arthropathy. Anterior spurring of the lower thoracic and upper lumbar spine. Epidural   stimulator is in place. IMPRESSION:   1. No findings for abscess in the abdomen or pelvis. Negative for   pyelonephritis   2. Bubble of gas in the bladder. Was the patient catheterized? CT HEAD WO CONTRAST   Final Result   No acute intracranial abnormality. XR CHEST PORTABLE   Final Result   Findings consistent with central and bibasilar bronchitis with foci of   bibasilar subsegmental pneumonia or atelectasis. No radiographic evidence of   pleural effusion in the frontal projection.   Otherwise, radiographically   nonacute portable chest.                Consults:     PHARMACY TO DOSE MEDICATION  IP CONSULT TO HOME CARE NEEDS  IP CONSULT TO HOME CARE NEEDS    [facility]     Condition at Discharge: Stable    Discharge Instructions/Follow-up:  PCP in 1 week  Urology as planned  Neurology referral       Code Status:  Prior     Activity: activity as tolerated    Diet: regular diet      Discharge Medications:     Discharge Medication List as of 12/27/2022  5:12 PM             Details   levoFLOXacin (LEVAQUIN) 750 MG tablet Take 1 tablet by mouth nightly for 3 days, Disp-3 tablet, R-0Normal                Details   polyethylene glycol (GLYCOLAX) 17 g packet Take 17 g by mouth daily as needed for Constipation, Disp-527 g, R-1Normal                Details   warfarin (COUMADIN) 5 MG tablet As directed by pharmacy, Disp-30 tablet, R-3DC to SNF      gabapentin (NEURONTIN) 300 MG capsule Take 1 capsule by mouth 3 times daily for 10 days. , Disp-30 capsule, R-0Print      !! insulin lispro (HUMALOG) 100 UNIT/ML SOLN injection vial Inject 0-16 Units into the skin 3 times daily (with meals)DC to SNF      !! insulin lispro (HUMALOG) 100 UNIT/ML SOLN injection vial Inject 0-4 Units into the skin nightlyDC to SNF      pantoprazole (PROTONIX) 40 MG tablet Take 1 tablet by mouth every morning (before breakfast), Disp-30 tablet, R-3DC to SNF      insulin glargine (BASAGLAR KWIKPEN) 100 UNIT/ML injection pen Inject 5 Units into the skin nightlyHistorical Med      sennosides-docusate sodium (SENOKOT-S) 8.6-50 MG tablet Take 2 tablets by mouth in the morning and at bedtime, Disp-60 tablet, R-1Print      !! lamoTRIgine (LAMICTAL) 25 MG tablet Take 25 mg by mouth daily Historical Med      !! lamoTRIgine (LAMICTAL) 25 MG tablet Take 50 mg by mouth at bedtime Historical Med      !! lithium 300 MG capsule Take 300 mg by mouth dailyHistorical Med      !! lithium 300 MG capsule Take 600 mg by mouth at bedtimeHistorical Med      traZODone (DESYREL) 100 MG tablet Take 200 mg by mouth nightly Historical Med      acetaminophen (TYLENOL) 500 MG tablet Take 1,000 mg by mouth nightly as needed for PainHistorical Med      Dulaglutide 3 MG/0.5ML SOPN Inject 3 mg into the skin once a week Takes on SundaysHistorical Med      rosuvastatin (CRESTOR) 20 MG tablet Take 20 mg by mouth at bedtime Historical Med      verapamil (CALAN SR) 120 MG extended release tablet Take 120 mg by mouth nightlyHistorical Med       !! - Potential duplicate medications found. Please discuss with provider. Time Spent on discharge: 39 minutes in the examination, evaluation, counseling and review of medications and discharge plan.       Signed:    Michelle Marin DO   12/28/2022      Thank you Purvi Shea, APRN - CNP for the opportunity to be involved in this patient's care. If you have any questions or concerns, please feel free to contact me at [phone].

## 2022-12-29 ENCOUNTER — ANTI-COAG VISIT (OUTPATIENT)
Dept: PHARMACY | Age: 72
End: 2022-12-29

## 2022-12-29 LAB — INR BLD: 3.3

## 2022-12-29 NOTE — PROGRESS NOTES
Pt was referred to clinic in October, and we assisted in a bridging calendar, then pt ended up falling and never coming into clinic. He was in the hospital, then [facility] until 12/1/22, He is currently on Warfarin 5mg daily. He is home now and requesting INRs to be done through [place] [id number] because he continues to fall. We will do [place] [id number] based off the referral from Dr. Naz Gloria. Efren Horne, PharmD 12/29/2022 12:46 PM       Rn calls in INR 3.3, he was just discharged from [facility] . He has one last dose of Levaquin today. Recommend to hold dose today then dec to Warfarin 5mg daily, except 2.5 mg Q Wed/Fri. Recheck Tue as clinic is closed Mon.

## 2023-01-01 ENCOUNTER — APPOINTMENT (OUTPATIENT)
Dept: GENERAL RADIOLOGY | Age: 73
DRG: 871 | End: 2023-01-01
Payer: MEDICARE

## 2023-01-01 ENCOUNTER — HOSPITAL ENCOUNTER (INPATIENT)
Age: 73
LOS: 2 days | DRG: 871 | End: 2023-01-15
Attending: STUDENT IN AN ORGANIZED HEALTH CARE EDUCATION/TRAINING PROGRAM | Admitting: INTERNAL MEDICINE
Payer: MEDICARE

## 2023-01-01 VITALS
BODY MASS INDEX: 24.69 KG/M2 | HEART RATE: 100 BPM | HEIGHT: 73 IN | RESPIRATION RATE: 24 BRPM | WEIGHT: 186.29 LBS | OXYGEN SATURATION: 98 % | TEMPERATURE: 99.7 F | DIASTOLIC BLOOD PRESSURE: 53 MMHG | SYSTOLIC BLOOD PRESSURE: 105 MMHG

## 2023-01-01 DIAGNOSIS — A41.9 SEPSIS, DUE TO UNSPECIFIED ORGANISM, UNSPECIFIED WHETHER ACUTE ORGAN DYSFUNCTION PRESENT (HCC): Primary | ICD-10-CM

## 2023-01-01 DIAGNOSIS — R74.8 ELEVATED LIPASE: ICD-10-CM

## 2023-01-01 DIAGNOSIS — R41.82 ALTERED MENTAL STATUS, UNSPECIFIED ALTERED MENTAL STATUS TYPE: ICD-10-CM

## 2023-01-01 DIAGNOSIS — R79.1 ELEVATED INR: ICD-10-CM

## 2023-01-01 LAB
A/G RATIO: 1.3 (ref 1.1–2.2)
ALBUMIN SERPL-MCNC: 4.9 G/DL (ref 3.4–5)
ALP BLD-CCNC: 130 U/L (ref 40–129)
ALT SERPL-CCNC: 23 U/L (ref 10–40)
ANION GAP SERPL CALCULATED.3IONS-SCNC: 11 MMOL/L (ref 3–16)
ANION GAP SERPL CALCULATED.3IONS-SCNC: 12 MMOL/L (ref 3–16)
ANION GAP SERPL CALCULATED.3IONS-SCNC: 13 MMOL/L (ref 3–16)
ANION GAP SERPL CALCULATED.3IONS-SCNC: 15 MMOL/L (ref 3–16)
ANION GAP SERPL CALCULATED.3IONS-SCNC: 16 MMOL/L (ref 3–16)
AST SERPL-CCNC: 29 U/L (ref 15–37)
BACTERIA: ABNORMAL /HPF
BASE EXCESS ARTERIAL: -3.3 MMOL/L (ref -3–3)
BASE EXCESS VENOUS: -3.2 MMOL/L (ref -3–3)
BASE EXCESS VENOUS: -6.9 MMOL/L (ref -3–3)
BASOPHILS ABSOLUTE: 0.1 K/UL (ref 0–0.2)
BASOPHILS ABSOLUTE: 0.1 K/UL (ref 0–0.2)
BASOPHILS RELATIVE PERCENT: 0.5 %
BASOPHILS RELATIVE PERCENT: 0.6 %
BILIRUB SERPL-MCNC: 0.7 MG/DL (ref 0–1)
BILIRUBIN URINE: NEGATIVE
BLOOD, URINE: ABNORMAL
BUN BLDV-MCNC: 32 MG/DL (ref 7–20)
BUN BLDV-MCNC: 33 MG/DL (ref 7–20)
BUN BLDV-MCNC: 34 MG/DL (ref 7–20)
BUN BLDV-MCNC: 34 MG/DL (ref 7–20)
BUN BLDV-MCNC: 44 MG/DL (ref 7–20)
CALCIUM SERPL-MCNC: 10.5 MG/DL (ref 8.3–10.6)
CALCIUM SERPL-MCNC: 10.6 MG/DL (ref 8.3–10.6)
CALCIUM SERPL-MCNC: 10.8 MG/DL (ref 8.3–10.6)
CARBOXYHEMOGLOBIN ARTERIAL: 0.9 % (ref 0–1.5)
CARBOXYHEMOGLOBIN: 1.7 % (ref 0–1.5)
CARBOXYHEMOGLOBIN: 1.8 % (ref 0–1.5)
CHLORIDE BLD-SCNC: 112 MMOL/L (ref 99–110)
CHLORIDE BLD-SCNC: 124 MMOL/L (ref 99–110)
CHLORIDE BLD-SCNC: 128 MMOL/L (ref 99–110)
CHLORIDE BLD-SCNC: 128 MMOL/L (ref 99–110)
CHLORIDE BLD-SCNC: 129 MMOL/L (ref 99–110)
CLARITY: CLEAR
CO2: 18 MMOL/L (ref 21–32)
CO2: 19 MMOL/L (ref 21–32)
CO2: 19 MMOL/L (ref 21–32)
CO2: 20 MMOL/L (ref 21–32)
CO2: 23 MMOL/L (ref 21–32)
COLOR: YELLOW
CREAT SERPL-MCNC: 1.3 MG/DL (ref 0.8–1.3)
CREAT SERPL-MCNC: 1.4 MG/DL (ref 0.8–1.3)
CREAT SERPL-MCNC: 1.5 MG/DL (ref 0.8–1.3)
CREAT SERPL-MCNC: 1.5 MG/DL (ref 0.8–1.3)
CREAT SERPL-MCNC: 1.6 MG/DL (ref 0.8–1.3)
EKG ATRIAL RATE: 109 BPM
EKG DIAGNOSIS: NORMAL
EKG P AXIS: 55 DEGREES
EKG P-R INTERVAL: 162 MS
EKG Q-T INTERVAL: 336 MS
EKG QRS DURATION: 92 MS
EKG QTC CALCULATION (BAZETT): 452 MS
EKG R AXIS: -64 DEGREES
EKG T AXIS: 93 DEGREES
EKG VENTRICULAR RATE: 109 BPM
EOSINOPHILS ABSOLUTE: 0 K/UL (ref 0–0.6)
EOSINOPHILS ABSOLUTE: 0.1 K/UL (ref 0–0.6)
EOSINOPHILS RELATIVE PERCENT: 0.2 %
EOSINOPHILS RELATIVE PERCENT: 0.8 %
GFR SERPL CREATININE-BSD FRML MDRD: 45 ML/MIN/{1.73_M2}
GFR SERPL CREATININE-BSD FRML MDRD: 49 ML/MIN/{1.73_M2}
GFR SERPL CREATININE-BSD FRML MDRD: 49 ML/MIN/{1.73_M2}
GFR SERPL CREATININE-BSD FRML MDRD: 53 ML/MIN/{1.73_M2}
GFR SERPL CREATININE-BSD FRML MDRD: 58 ML/MIN/{1.73_M2}
GLUCOSE BLD-MCNC: 196 MG/DL (ref 70–99)
GLUCOSE BLD-MCNC: 207 MG/DL (ref 70–99)
GLUCOSE BLD-MCNC: 235 MG/DL (ref 70–99)
GLUCOSE BLD-MCNC: 245 MG/DL (ref 70–99)
GLUCOSE BLD-MCNC: 289 MG/DL (ref 70–99)
GLUCOSE BLD-MCNC: 296 MG/DL (ref 70–99)
GLUCOSE BLD-MCNC: 325 MG/DL (ref 70–99)
GLUCOSE BLD-MCNC: 333 MG/DL (ref 70–99)
GLUCOSE BLD-MCNC: 340 MG/DL (ref 70–99)
GLUCOSE BLD-MCNC: 350 MG/DL (ref 70–99)
GLUCOSE BLD-MCNC: 359 MG/DL (ref 70–99)
GLUCOSE BLD-MCNC: 378 MG/DL (ref 70–99)
GLUCOSE BLD-MCNC: 379 MG/DL (ref 70–99)
GLUCOSE BLD-MCNC: 387 MG/DL (ref 70–99)
GLUCOSE BLD-MCNC: 414 MG/DL (ref 70–99)
GLUCOSE BLD-MCNC: 415 MG/DL (ref 70–99)
GLUCOSE BLD-MCNC: 425 MG/DL (ref 70–99)
GLUCOSE BLD-MCNC: 462 MG/DL (ref 70–99)
GLUCOSE URINE: 100 MG/DL
HCO3 ARTERIAL: 22.5 MMOL/L (ref 21–29)
HCO3 VENOUS: 17.3 MMOL/L (ref 23–29)
HCO3 VENOUS: 21.3 MMOL/L (ref 23–29)
HCT VFR BLD CALC: 44.2 % (ref 40.5–52.5)
HCT VFR BLD CALC: 49 % (ref 40.5–52.5)
HEMOGLOBIN, ART, EXTENDED: 15.2 G/DL (ref 13.5–17.5)
HEMOGLOBIN: 14.6 G/DL (ref 13.5–17.5)
HEMOGLOBIN: 16.2 G/DL (ref 13.5–17.5)
INFLUENZA A: NOT DETECTED
INFLUENZA B: NOT DETECTED
INR BLD: 3.73 (ref 0.87–1.14)
INR BLD: 4.04 (ref 0.87–1.14)
KETONES, URINE: ABNORMAL MG/DL
LACTIC ACID: 2.1 MMOL/L (ref 0.4–2)
LACTIC ACID: 3.1 MMOL/L (ref 0.4–2)
LACTIC ACID: 3.3 MMOL/L (ref 0.4–2)
LEUKOCYTE ESTERASE, URINE: ABNORMAL
LIPASE: 112 U/L (ref 13–60)
LITHIUM DOSE AMOUNT: NORMAL
LITHIUM DOSE AMOUNT: NORMAL
LITHIUM LEVEL: 1 MMOL/L (ref 0.6–1.2)
LYMPHOCYTES ABSOLUTE: 1.3 K/UL (ref 1–5.1)
LYMPHOCYTES ABSOLUTE: 1.6 K/UL (ref 1–5.1)
LYMPHOCYTES RELATIVE PERCENT: 10.4 %
LYMPHOCYTES RELATIVE PERCENT: 7 %
MCH RBC QN AUTO: 31.6 PG (ref 26–34)
MCH RBC QN AUTO: 31.9 PG (ref 26–34)
MCHC RBC AUTO-ENTMCNC: 33.1 G/DL (ref 31–36)
MCHC RBC AUTO-ENTMCNC: 33.1 G/DL (ref 31–36)
MCV RBC AUTO: 95.5 FL (ref 80–100)
MCV RBC AUTO: 96.5 FL (ref 80–100)
METHEMOGLOBIN ARTERIAL: 0.5 %
METHEMOGLOBIN VENOUS: 0.2 %
METHEMOGLOBIN VENOUS: 0.5 %
MICROSCOPIC EXAMINATION: YES
MONOCYTES ABSOLUTE: 1.1 K/UL (ref 0–1.3)
MONOCYTES ABSOLUTE: 1.3 K/UL (ref 0–1.3)
MONOCYTES RELATIVE PERCENT: 6.3 %
MONOCYTES RELATIVE PERCENT: 8.4 %
NEUTROPHILS ABSOLUTE: 12.3 K/UL (ref 1.7–7.7)
NEUTROPHILS ABSOLUTE: 15.3 K/UL (ref 1.7–7.7)
NEUTROPHILS RELATIVE PERCENT: 79.9 %
NEUTROPHILS RELATIVE PERCENT: 85.9 %
NITRITE, URINE: POSITIVE
O2 SAT, ARTERIAL: 91.4 %
O2 SAT, VEN: 80 %
O2 SAT, VEN: 90 %
O2 THERAPY: ABNORMAL
ORGANISM: ABNORMAL
PCO2 ARTERIAL: 43.1 MMHG (ref 35–45)
PCO2, VEN: 32.2 MMHG (ref 40–50)
PCO2, VEN: 36.9 MMHG (ref 40–50)
PDW BLD-RTO: 14.3 % (ref 12.4–15.4)
PDW BLD-RTO: 14.7 % (ref 12.4–15.4)
PERFORMED ON: ABNORMAL
PH ARTERIAL: 7.33 (ref 7.35–7.45)
PH UA: 5.5 (ref 5–8)
PH VENOUS: 7.35 (ref 7.35–7.45)
PH VENOUS: 7.38 (ref 7.35–7.45)
PLATELET # BLD: 244 K/UL (ref 135–450)
PLATELET # BLD: 345 K/UL (ref 135–450)
PMV BLD AUTO: 8.5 FL (ref 5–10.5)
PMV BLD AUTO: 8.5 FL (ref 5–10.5)
PO2 ARTERIAL: 61.2 MMHG (ref 75–108)
PO2, VEN: 40.3 MMHG (ref 25–40)
PO2, VEN: 57.3 MMHG (ref 25–40)
POTASSIUM REFLEX MAGNESIUM: 4.4 MMOL/L (ref 3.5–5.1)
POTASSIUM SERPL-SCNC: 3.7 MMOL/L (ref 3.5–5.1)
POTASSIUM SERPL-SCNC: 3.8 MMOL/L (ref 3.5–5.1)
POTASSIUM SERPL-SCNC: 4.1 MMOL/L (ref 3.5–5.1)
POTASSIUM SERPL-SCNC: 4.5 MMOL/L (ref 3.5–5.1)
PROTEIN UA: 100 MG/DL
PROTHROMBIN TIME: 37.3 SEC (ref 11.7–14.5)
PROTHROMBIN TIME: 39.7 SEC (ref 11.7–14.5)
RBC # BLD: 4.63 M/UL (ref 4.2–5.9)
RBC # BLD: 5.08 M/UL (ref 4.2–5.9)
RBC UA: ABNORMAL /HPF (ref 0–4)
SARS-COV-2 RNA, RT PCR: NOT DETECTED
SODIUM BLD-SCNC: 146 MMOL/L (ref 136–145)
SODIUM BLD-SCNC: 158 MMOL/L (ref 136–145)
SODIUM BLD-SCNC: 159 MMOL/L (ref 136–145)
SODIUM BLD-SCNC: 161 MMOL/L (ref 136–145)
SODIUM BLD-SCNC: 163 MMOL/L (ref 136–145)
SODIUM BLD-SCNC: 168 MMOL/L (ref 136–145)
SPECIFIC GRAVITY UA: >=1.03 (ref 1–1.03)
TCO2 ARTERIAL: 23.8 MMOL/L
TCO2 CALC VENOUS: 18 MMOL/L
TCO2 CALC VENOUS: 23 MMOL/L
TOTAL PROTEIN: 8.7 G/DL (ref 6.4–8.2)
TROPONIN: <0.01 NG/ML
URINE CULTURE, ROUTINE: ABNORMAL
URINE REFLEX TO CULTURE: YES
URINE TYPE: ABNORMAL
UROBILINOGEN, URINE: 0.2 E.U./DL
WBC # BLD: 15.4 K/UL (ref 4–11)
WBC # BLD: 17.8 K/UL (ref 4–11)
WBC UA: ABNORMAL /HPF (ref 0–5)

## 2023-01-01 PROCEDURE — 93005 ELECTROCARDIOGRAM TRACING: CPT | Performed by: STUDENT IN AN ORGANIZED HEALTH CARE EDUCATION/TRAINING PROGRAM

## 2023-01-01 PROCEDURE — 85025 COMPLETE CBC W/AUTO DIFF WBC: CPT

## 2023-01-01 PROCEDURE — 6360000002 HC RX W HCPCS: Performed by: INTERNAL MEDICINE

## 2023-01-01 PROCEDURE — 85610 PROTHROMBIN TIME: CPT

## 2023-01-01 PROCEDURE — 94640 AIRWAY INHALATION TREATMENT: CPT

## 2023-01-01 PROCEDURE — 87040 BLOOD CULTURE FOR BACTERIA: CPT

## 2023-01-01 PROCEDURE — 81001 URINALYSIS AUTO W/SCOPE: CPT

## 2023-01-01 PROCEDURE — 74018 RADEX ABDOMEN 1 VIEW: CPT

## 2023-01-01 PROCEDURE — 80048 BASIC METABOLIC PNL TOTAL CA: CPT

## 2023-01-01 PROCEDURE — 2580000003 HC RX 258: Performed by: INTERNAL MEDICINE

## 2023-01-01 PROCEDURE — 6360000002 HC RX W HCPCS: Performed by: STUDENT IN AN ORGANIZED HEALTH CARE EDUCATION/TRAINING PROGRAM

## 2023-01-01 PROCEDURE — 83690 ASSAY OF LIPASE: CPT

## 2023-01-01 PROCEDURE — 93010 ELECTROCARDIOGRAM REPORT: CPT | Performed by: INTERNAL MEDICINE

## 2023-01-01 PROCEDURE — 94761 N-INVAS EAR/PLS OXIMETRY MLT: CPT

## 2023-01-01 PROCEDURE — 6370000000 HC RX 637 (ALT 250 FOR IP): Performed by: INTERNAL MEDICINE

## 2023-01-01 PROCEDURE — 6370000000 HC RX 637 (ALT 250 FOR IP): Performed by: STUDENT IN AN ORGANIZED HEALTH CARE EDUCATION/TRAINING PROGRAM

## 2023-01-01 PROCEDURE — 87077 CULTURE AEROBIC IDENTIFY: CPT

## 2023-01-01 PROCEDURE — 84295 ASSAY OF SERUM SODIUM: CPT

## 2023-01-01 PROCEDURE — 80178 ASSAY OF LITHIUM: CPT

## 2023-01-01 PROCEDURE — 71045 X-RAY EXAM CHEST 1 VIEW: CPT

## 2023-01-01 PROCEDURE — 80053 COMPREHEN METABOLIC PANEL: CPT

## 2023-01-01 PROCEDURE — 2700000000 HC OXYGEN THERAPY PER DAY

## 2023-01-01 PROCEDURE — 82803 BLOOD GASES ANY COMBINATION: CPT

## 2023-01-01 PROCEDURE — 36415 COLL VENOUS BLD VENIPUNCTURE: CPT

## 2023-01-01 PROCEDURE — 83605 ASSAY OF LACTIC ACID: CPT

## 2023-01-01 PROCEDURE — 87186 SC STD MICRODIL/AGAR DIL: CPT

## 2023-01-01 PROCEDURE — 84484 ASSAY OF TROPONIN QUANT: CPT

## 2023-01-01 PROCEDURE — 87636 SARSCOV2 & INF A&B AMP PRB: CPT

## 2023-01-01 PROCEDURE — 1200000000 HC SEMI PRIVATE

## 2023-01-01 PROCEDURE — 87086 URINE CULTURE/COLONY COUNT: CPT

## 2023-01-01 PROCEDURE — 51702 INSERT TEMP BLADDER CATH: CPT

## 2023-01-01 PROCEDURE — 99285 EMERGENCY DEPT VISIT HI MDM: CPT

## 2023-01-01 PROCEDURE — 2580000003 HC RX 258: Performed by: STUDENT IN AN ORGANIZED HEALTH CARE EDUCATION/TRAINING PROGRAM

## 2023-01-01 RX ORDER — DEXTROSE MONOHYDRATE 100 MG/ML
INJECTION, SOLUTION INTRAVENOUS CONTINUOUS PRN
Status: DISCONTINUED | OUTPATIENT
Start: 2023-01-01 | End: 2023-01-01 | Stop reason: HOSPADM

## 2023-01-01 RX ORDER — LAMOTRIGINE 25 MG/1
25 TABLET ORAL DAILY
Status: DISCONTINUED | OUTPATIENT
Start: 2023-01-01 | End: 2023-01-01 | Stop reason: HOSPADM

## 2023-01-01 RX ORDER — PANTOPRAZOLE SODIUM 40 MG/1
40 TABLET, DELAYED RELEASE ORAL
Status: DISCONTINUED | OUTPATIENT
Start: 2023-01-01 | End: 2023-01-01 | Stop reason: HOSPADM

## 2023-01-01 RX ORDER — SODIUM CHLORIDE 0.9 % (FLUSH) 0.9 %
5-40 SYRINGE (ML) INJECTION PRN
Status: DISCONTINUED | OUTPATIENT
Start: 2023-01-01 | End: 2023-01-01 | Stop reason: HOSPADM

## 2023-01-01 RX ORDER — TRAZODONE HYDROCHLORIDE 50 MG/1
200 TABLET ORAL NIGHTLY
Status: DISCONTINUED | OUTPATIENT
Start: 2023-01-01 | End: 2023-01-01 | Stop reason: HOSPADM

## 2023-01-01 RX ORDER — IPRATROPIUM BROMIDE AND ALBUTEROL SULFATE 2.5; .5 MG/3ML; MG/3ML
1 SOLUTION RESPIRATORY (INHALATION) ONCE
Status: COMPLETED | OUTPATIENT
Start: 2023-01-01 | End: 2023-01-01

## 2023-01-01 RX ORDER — SODIUM CHLORIDE 9 MG/ML
INJECTION, SOLUTION INTRAVENOUS PRN
Status: DISCONTINUED | OUTPATIENT
Start: 2023-01-01 | End: 2023-01-01 | Stop reason: HOSPADM

## 2023-01-01 RX ORDER — SODIUM CHLORIDE 0.9 % (FLUSH) 0.9 %
5-40 SYRINGE (ML) INJECTION EVERY 12 HOURS SCHEDULED
Status: DISCONTINUED | OUTPATIENT
Start: 2023-01-01 | End: 2023-01-01 | Stop reason: HOSPADM

## 2023-01-01 RX ORDER — ROSUVASTATIN CALCIUM 10 MG/1
20 TABLET, COATED ORAL NIGHTLY
Status: DISCONTINUED | OUTPATIENT
Start: 2023-01-01 | End: 2023-01-01 | Stop reason: HOSPADM

## 2023-01-01 RX ORDER — ACETAMINOPHEN 650 MG/1
650 SUPPOSITORY RECTAL EVERY 6 HOURS PRN
Status: DISCONTINUED | OUTPATIENT
Start: 2023-01-01 | End: 2023-01-01 | Stop reason: HOSPADM

## 2023-01-01 RX ORDER — LITHIUM CARBONATE 150 MG/1
600 CAPSULE ORAL NIGHTLY
Status: DISCONTINUED | OUTPATIENT
Start: 2023-01-01 | End: 2023-01-01

## 2023-01-01 RX ORDER — SODIUM CHLORIDE, SODIUM LACTATE, POTASSIUM CHLORIDE, AND CALCIUM CHLORIDE .6; .31; .03; .02 G/100ML; G/100ML; G/100ML; G/100ML
30 INJECTION, SOLUTION INTRAVENOUS ONCE
Status: COMPLETED | OUTPATIENT
Start: 2023-01-01 | End: 2023-01-01

## 2023-01-01 RX ORDER — DESMOPRESSIN ACETATE 4 UG/ML
2 INJECTION, SOLUTION INTRAVENOUS; SUBCUTANEOUS ONCE
Status: COMPLETED | OUTPATIENT
Start: 2023-01-01 | End: 2023-01-01

## 2023-01-01 RX ORDER — DEXTROSE, SODIUM CHLORIDE, SODIUM LACTATE, POTASSIUM CHLORIDE, AND CALCIUM CHLORIDE 5; .6; .31; .03; .02 G/100ML; G/100ML; G/100ML; G/100ML; G/100ML
INJECTION, SOLUTION INTRAVENOUS CONTINUOUS
Status: DISCONTINUED | OUTPATIENT
Start: 2023-01-01 | End: 2023-01-01

## 2023-01-01 RX ORDER — 0.9 % SODIUM CHLORIDE 0.9 %
1500 INTRAVENOUS SOLUTION INTRAVENOUS ONCE
Status: COMPLETED | OUTPATIENT
Start: 2023-01-01 | End: 2023-01-01

## 2023-01-01 RX ORDER — ACETAMINOPHEN 325 MG/1
650 TABLET ORAL EVERY 6 HOURS PRN
Status: DISCONTINUED | OUTPATIENT
Start: 2023-01-01 | End: 2023-01-01 | Stop reason: HOSPADM

## 2023-01-01 RX ORDER — CASTOR OIL AND BALSAM, PERU 788; 87 MG/G; MG/G
OINTMENT TOPICAL 2 TIMES DAILY
COMMUNITY

## 2023-01-01 RX ORDER — DEXTROSE MONOHYDRATE 50 MG/ML
INJECTION, SOLUTION INTRAVENOUS CONTINUOUS
Status: DISCONTINUED | OUTPATIENT
Start: 2023-01-01 | End: 2023-01-01 | Stop reason: HOSPADM

## 2023-01-01 RX ORDER — INSULIN GLARGINE 100 [IU]/ML
5 INJECTION, SOLUTION SUBCUTANEOUS NIGHTLY
Status: DISCONTINUED | OUTPATIENT
Start: 2023-01-01 | End: 2023-01-01

## 2023-01-01 RX ORDER — ENOXAPARIN SODIUM 100 MG/ML
40 INJECTION SUBCUTANEOUS DAILY
Status: DISCONTINUED | OUTPATIENT
Start: 2023-01-01 | End: 2023-01-01 | Stop reason: HOSPADM

## 2023-01-01 RX ORDER — INSULIN LISPRO 100 [IU]/ML
0-16 INJECTION, SOLUTION INTRAVENOUS; SUBCUTANEOUS
Status: DISCONTINUED | OUTPATIENT
Start: 2023-01-01 | End: 2023-01-01

## 2023-01-01 RX ORDER — LITHIUM CARBONATE 150 MG/1
300 CAPSULE ORAL DAILY
Status: DISCONTINUED | OUTPATIENT
Start: 2023-01-01 | End: 2023-01-01

## 2023-01-01 RX ORDER — INSULIN LISPRO 100 [IU]/ML
0-4 INJECTION, SOLUTION INTRAVENOUS; SUBCUTANEOUS NIGHTLY
Status: DISCONTINUED | OUTPATIENT
Start: 2023-01-01 | End: 2023-01-01

## 2023-01-01 RX ORDER — INSULIN LISPRO 100 [IU]/ML
0-4 INJECTION, SOLUTION INTRAVENOUS; SUBCUTANEOUS EVERY 4 HOURS
Status: DISCONTINUED | OUTPATIENT
Start: 2023-01-01 | End: 2023-01-01

## 2023-01-01 RX ORDER — INSULIN LISPRO 100 [IU]/ML
0-16 INJECTION, SOLUTION INTRAVENOUS; SUBCUTANEOUS EVERY 4 HOURS
Status: DISCONTINUED | OUTPATIENT
Start: 2023-01-01 | End: 2023-01-01 | Stop reason: HOSPADM

## 2023-01-01 RX ORDER — DEXTROSE AND SODIUM CHLORIDE 5; .9 G/100ML; G/100ML
INJECTION, SOLUTION INTRAVENOUS CONTINUOUS
Status: DISCONTINUED | OUTPATIENT
Start: 2023-01-01 | End: 2023-01-01

## 2023-01-01 RX ORDER — INSULIN GLARGINE 100 [IU]/ML
10 INJECTION, SOLUTION SUBCUTANEOUS ONCE
Status: COMPLETED | OUTPATIENT
Start: 2023-01-01 | End: 2023-01-01

## 2023-01-01 RX ORDER — SENNA AND DOCUSATE SODIUM 50; 8.6 MG/1; MG/1
2 TABLET, FILM COATED ORAL 2 TIMES DAILY
Status: DISCONTINUED | OUTPATIENT
Start: 2023-01-01 | End: 2023-01-01 | Stop reason: HOSPADM

## 2023-01-01 RX ADMIN — SODIUM CHLORIDE 1500 ML: 9 INJECTION, SOLUTION INTRAVENOUS at 15:50

## 2023-01-01 RX ADMIN — INSULIN LISPRO 3 UNITS: 100 INJECTION, SOLUTION INTRAVENOUS; SUBCUTANEOUS at 21:41

## 2023-01-01 RX ADMIN — INSULIN LISPRO 1 UNITS: 100 INJECTION, SOLUTION INTRAVENOUS; SUBCUTANEOUS at 09:24

## 2023-01-01 RX ADMIN — INSULIN LISPRO 3 UNITS: 100 INJECTION, SOLUTION INTRAVENOUS; SUBCUTANEOUS at 02:30

## 2023-01-01 RX ADMIN — CEFTRIAXONE 2000 MG: 2 INJECTION, POWDER, FOR SOLUTION INTRAMUSCULAR; INTRAVENOUS at 21:23

## 2023-01-01 RX ADMIN — SODIUM CHLORIDE, POTASSIUM CHLORIDE, SODIUM LACTATE AND CALCIUM CHLORIDE 2676 ML: 600; 310; 30; 20 INJECTION, SOLUTION INTRAVENOUS at 09:37

## 2023-01-01 RX ADMIN — INSULIN LISPRO 16 UNITS: 100 INJECTION, SOLUTION INTRAVENOUS; SUBCUTANEOUS at 17:19

## 2023-01-01 RX ADMIN — NYSTATIN 500000 UNITS: 100000 SUSPENSION ORAL at 20:34

## 2023-01-01 RX ADMIN — PIPERACILLIN AND TAZOBACTAM 3375 MG: 3; .375 INJECTION, POWDER, LYOPHILIZED, FOR SOLUTION INTRAVENOUS at 10:11

## 2023-01-01 RX ADMIN — INSULIN LISPRO 8 UNITS: 100 INJECTION, SOLUTION INTRAVENOUS; SUBCUTANEOUS at 00:29

## 2023-01-01 RX ADMIN — DEXTROSE MONOHYDRATE: 50 INJECTION, SOLUTION INTRAVENOUS at 17:25

## 2023-01-01 RX ADMIN — IPRATROPIUM BROMIDE AND ALBUTEROL SULFATE 1 AMPULE: .5; 2.5 SOLUTION RESPIRATORY (INHALATION) at 09:28

## 2023-01-01 RX ADMIN — DEXTROSE MONOHYDRATE: 50 INJECTION, SOLUTION INTRAVENOUS at 00:24

## 2023-01-01 RX ADMIN — NYSTATIN 500000 UNITS: 100000 SUSPENSION ORAL at 15:06

## 2023-01-01 RX ADMIN — INSULIN LISPRO 4 UNITS: 100 INJECTION, SOLUTION INTRAVENOUS; SUBCUTANEOUS at 13:55

## 2023-01-01 RX ADMIN — DEXTROSE AND SODIUM CHLORIDE: 5; 900 INJECTION, SOLUTION INTRAVENOUS at 09:13

## 2023-01-01 RX ADMIN — DESMOPRESSIN ACETATE 2 MCG: 4 SOLUTION INTRAVENOUS at 17:33

## 2023-01-01 RX ADMIN — VANCOMYCIN HYDROCHLORIDE 1250 MG: 1 INJECTION, POWDER, LYOPHILIZED, FOR SOLUTION INTRAVENOUS at 15:06

## 2023-01-01 RX ADMIN — Medication 10 ML: at 22:10

## 2023-01-01 RX ADMIN — SODIUM CHLORIDE: 9 INJECTION, SOLUTION INTRAVENOUS at 21:20

## 2023-01-01 RX ADMIN — VANCOMYCIN HYDROCHLORIDE 1500 MG: 10 INJECTION, POWDER, LYOPHILIZED, FOR SOLUTION INTRAVENOUS at 12:51

## 2023-01-01 RX ADMIN — INSULIN LISPRO 3 UNITS: 100 INJECTION, SOLUTION INTRAVENOUS; SUBCUTANEOUS at 14:01

## 2023-01-01 RX ADMIN — INSULIN GLARGINE 10 UNITS: 100 INJECTION, SOLUTION SUBCUTANEOUS at 10:13

## 2023-01-01 RX ADMIN — Medication 10 ML: at 09:16

## 2023-01-01 RX ADMIN — INSULIN LISPRO 4 UNITS: 100 INJECTION, SOLUTION INTRAVENOUS; SUBCUTANEOUS at 18:25

## 2023-01-01 ASSESSMENT — PAIN SCALES - WONG BAKER
WONGBAKER_NUMERICALRESPONSE: 0

## 2023-01-01 ASSESSMENT — PAIN SCALES - GENERAL
PAINLEVEL_OUTOF10: 0
PAINLEVEL_OUTOF10: 0

## 2023-01-01 ASSESSMENT — PAIN - FUNCTIONAL ASSESSMENT: PAIN_FUNCTIONAL_ASSESSMENT: WONG-BAKER FACES

## 2023-01-13 PROBLEM — R65.20 SEVERE SEPSIS (HCC): Status: ACTIVE | Noted: 2023-01-01

## 2023-01-13 PROBLEM — A41.9 SEVERE SEPSIS (HCC): Status: ACTIVE | Noted: 2023-01-01

## 2023-01-13 NOTE — ED NOTES
Pt is more alert he is responding verbally with one word responses to his wife. [address] work to come in and assist wife with current needs.        Jess Sidhu, CK  01/13/23 0947

## 2023-01-13 NOTE — ACP (ADVANCE CARE PLANNING)
Advance Care Planning     General Advance Care Planning (ACP) Conversation    Date of Conversation: 1/13/2023  Conducted with: Patient with Decision Making Capacity   Healthcare Decision Maker: Next of Kin by law (only applies in absence of above) (name) wife    Healthcare Decision Maker:    Primary Decision Maker: Lisa South - Spouse - [phone]  Click here to [address] including selection of the Healthcare Decision Maker Relationship (ie \"Primary\"). Today we documented Decision Maker(s) consistent with Legal Next of Kin hierarchy.     Content/Action Overview:  Has NO ACP documents/care preferences - information provided, considering goals and options  Reviewed DNR/DNI and patient elects DNR order - referred to ACP Clinical Specialist & placed order  treatment goals, artificial nutrition, ventilation preferences, and resuscitation preferences       Length of Voluntary ACP Conversation in minutes:  20 minutes    GARIMA Blair

## 2023-01-13 NOTE — ED PROVIDER NOTES
Emergency Department Provider Note  Location: [facility] TELEMETRY  1/13/2023     Patient Identification  Amor Díaz is a 67 y.o. male    Chief Complaint  Altered Mental Status (Pt to ED via EMS from [facility] where he is at for UTI treatment. Pt arrives alert, non verbal, following some commands. Unknown what his normal mental status is or how long he has been altered. Pt is diabetic, glucose was 410 for EMS. Pt was given 1 albuterol treatment en route for rhonchi and SPO2 at 91%)      Mode of Arrival  EMS    HPI  (History provided by patient and EMS)  This is a 67 y.o. male with a PMH significant for asthma, bipolar 1, diabetes, liver disease, factor V Leyden mutation  presented today for altered mental status. He was brought in by EMS from [facility] where he is out for UTI treatment. Patient is alert, nonverbal and following some commands. Unknown what his normal mental status is or how long he has been altered. He received 1 albuterol treatment in route with an SPO2 of 91. Wife was bedside and reports that he has had worsening mentation over the past several days. He has chronic indwelling [place]. She denies any recent fever. I was unable to obtain further history from patient due to altered mental status. ROS  Review of Systems   All other systems reviewed and are negative. I have reviewed the following nursing documentation:  Allergies: Allergies   Allergen Reactions    No Known Allergies        Past medical history:  has a past medical history of Anxiety, Arthritis, Asthma, Bipolar 1 disorder ([address] Colitis, Depression, Diabetes ([address] Diabetes mellitus ([address] Encounter for imaging to screen for metal prior to MRI (06/08/2022), Factor V Leiden mutation ([facility] [address] Glaucoma, H/O bladder problems, High blood pressure, History of kidney problems, IBS (irritable bowel syndrome), Liver disease, Obesity, and Psychiatric problem.     Past surgical history:  has a past surgical history that includes back surgery; hip surgery; Eye surgery; Colonoscopy; cervical fusion; joint replacement (Right); other surgical history (06/06/2018); pr njx dx/ther sbst intrlmnr crv/thrc w/img gdn (Bilateral, 9/25/2018); pr njx dx/ther sbst intrlmnr crv/thrc w/img gdn (Bilateral, 10/16/2018); pr unlisted procedure nervous system (Bilateral, 11/12/2018); Appendectomy; and TURP (N/A, 6/9/2020). Home medications:   Prior to Admission medications    Medication Sig Start Date End Date Taking? Authorizing Provider   Insulin Lispro (ADMELOG SOLOSTAR SC) Inject into the skin   Yes Historical Provider, MD   apixaban (ELIQUIS) 5 MG TABS tablet Take 5 mg by mouth 2 times daily   Yes Historical Provider, MD   metFORMIN (GLUCOPHAGE) 500 MG tablet Take 500 mg by mouth 2 times daily (with meals)   Yes Historical Provider, MD Micaela Andrade Perry-Castor Oil (VENELEX) OINT ointment Apply topically 2 times daily   Yes Historical Provider, MD   polyethylene glycol (GLYCOLAX) 17 g packet Take 17 g by mouth daily as needed for Constipation 12/26/22 1/25/23  Ivan Yarbrough, DO   warfarin (COUMADIN) 5 MG tablet As directed by pharmacy  Patient not taking: Reported on 1/13/2023 11/22/22   Shakeel Fall MD   gabapentin (NEURONTIN) 300 MG capsule Take 1 capsule by mouth 3 times daily for 10 days.  11/22/22 1/13/23  Shakeel Fall MD   insulin lispro (HUMALOG) 100 UNIT/ML SOLN injection vial Inject 0-16 Units into the skin 3 times daily (with meals) 11/22/22   Shakeel Fall MD   insulin lispro (HUMALOG) 100 UNIT/ML SOLN injection vial Inject 0-4 Units into the skin nightly 11/22/22   Shakeel Fall MD   pantoprazole (PROTONIX) 40 MG tablet Take 1 tablet by mouth every morning (before breakfast)  Patient not taking: Reported on 1/13/2023 11/23/22   Shakeel Fall MD   insulin glargine [facility]) 100 UNIT/ML injection pen Inject 5 Units into the skin nightly 10/12/22   Historical Provider, MD   sennosides-docusate sodium (SENOKOT-S) 8.6-50 MG tablet Take 2 tablets by mouth in the morning and at bedtime 6/13/22   Gavin Wong MD   lamoTRIgine (LAMICTAL) 25 MG tablet Take 25 mg by mouth daily     Historical Provider, MD   lithium 300 MG capsule Take 300 mg by mouth daily    Historical Provider, MD   lithium 300 MG capsule Take 600 mg by mouth at bedtime    Historical Provider, MD   traZODone (DESYREL) 100 MG tablet Take 200 mg by mouth nightly     Historical Provider, MD   acetaminophen (TYLENOL) 500 MG tablet Take 1,000 mg by mouth nightly as needed for Pain    Historical Provider, MD   Dulaglutide 3 MG/0.5ML SOPN Inject 3 mg into the skin once a week Takes on Sundays    Historical Provider, MD   rosuvastatin (CRESTOR) 20 MG tablet Take 20 mg by mouth at bedtime   Patient not taking: Reported on 1/13/2023    Historical Provider, MD   verapamil (CALAN SR) 120 MG extended release tablet Take 120 mg by mouth nightly    Historical Provider, MD       Social history:  reports that he has never smoked. He has never used smokeless tobacco. He reports that he does not drink alcohol and does not use drugs. Family history:    Family History   Problem Relation Age of Onset    Alcohol Abuse Sister     Coronary Art Dis Mother     Obesity Mother     Osteoarthritis Mother     Parkinsonism Mother     Coronary Art Dis Father     Obesity Father        Exam  ED Triage Vitals [01/13/23 0803]   BP Temp Temp src Heart Rate Resp SpO2 Height Weight   -- -- -- (!) 110 21 (!) 85 % 6' 1\" (1.854 m) 200 lb (90.7 kg)     Physical Exam  Vitals and nursing note reviewed. Constitutional:       Appearance: He is ill-appearing. HENT:      Head: Normocephalic and atraumatic. Mouth/Throat:      Pharynx: Oropharynx is clear. Eyes:      Pupils: Pupils are equal, round, and reactive to light. Cardiovascular:      Rate and Rhythm: Regular rhythm. Tachycardia present. Heart sounds: No friction rub. No gallop.    Pulmonary:      Breath sounds: Wheezing and rhonchi present. Abdominal:      General: There is no distension. Palpations: Abdomen is soft. There is no mass. Musculoskeletal:         General: No swelling. Normal range of motion. Cervical back: Neck supple. No rigidity. Skin:     General: Skin is warm. Capillary Refill: Capillary refill takes less than 2 seconds. Neurological:      Mental Status: He is alert. He is disoriented. Cranial Nerves: No cranial nerve deficit.    Psychiatric:         Behavior: Behavior normal.           MDM/ED Course  ED Medication Orders (From admission, onward)      Start Ordered     Status Ordering Provider    01/14/23 1650 01/14/23 1551  desmopressin (DDAVP) injection 2 mcg  ONCE         Last MAR action: Given - by Graciela Meredith on 01/14/23 at [facility] [address], [facility]    01/13/23 1600 01/13/23 1542  0.9 % sodium chloride bolus  ONCE         Last MAR action: Stopped - by Dyke Moritz [facility]23 [facility]    01/13/23 1000 01/13/23 0946  insulin glargine (LANTUS) injection vial 10 Units  ONCE         Last MAR action: Given - by David Ferguson on 01/13/23 at [facility], [facility]    01/13/23 0930 01/13/23 0919  lactated ringers bolus  ONCE         Last MAR action: Stopped - by Dyke Moritz on 01/13/23 at [address], [facility]    01/13/23 0930 01/13/23 0920  vancomycin 1500 mg in dextrose 5% 300 mL IVPB  ONCE        Question:  Antimicrobial Indications  Answer:  Sepsis of Unknown Etiology    Last MAR action: Stopped - by Ambrosio Sam on 01/13/23 at [address] [facility], [facility]    01/13/23 0930 01/13/23 0920  piperacillin-tazobactam (ZOSYN) 3,375 mg in dextrose 5 % 50 mL IVPB (mini-bag)  ONCE        Question:  Antimicrobial Indications  Answer:  Sepsis of Unknown Etiology    Last MAR action: Stopped - by Dyke Moritz on 01/13/23 at [address], [facility]    01/13/23 0830 01/13/23 0820  ipratropium-albuterol (DUONEB) nebulizer solution 1 ampule  ONCE        Question: Initiate RT Bronchodilator Protocol  Answer:  Yes - ED Protocol    Last MAR action: Given - by Tucker Peterscandace on 01/13/23 at [id number]            EKG  Interpreted the EKG and notes sinus tachycardia with a rate of 109, normal QRS and QTc duration, nonspecific T wave changes when compared to prior on November 18, 2022    Radiology  XR ABDOMEN (KUB) (SINGLE AP VIEW)    Result Date: 1/14/2023  EXAMINATION: ONE SUPINE XRAY VIEW(S) OF THE ABDOMEN 1/14/2023 10:52 pm COMPARISON: Radiograph performed earlier the same day HISTORY: ORDERING SYSTEM PROVIDED HISTORY: NG placement. NG removed and replaced. TECHNOLOGIST PROVIDED HISTORY: Reason for exam:->NG placement. NG removed and replaced. Reason for Exam: new  NGT placement FINDINGS: Tip and side port of the enteric tube are below the GE junction with tip in the gastric body. Bowel gas pattern is nonobstructive. No radiopaque nephrolithiasis. Spinal stimulator is noted. Enteric tube in expected position. XR ABDOMEN (KUB) (SINGLE AP VIEW)    Result Date: 1/14/2023  EXAMINATION: ONE SUPINE XRAY VIEW(S) OF THE ABDOMEN 1/14/2023 9:06 pm COMPARISON: Abdominal radiograph obtained on the same day. HISTORY: ORDERING SYSTEM PROVIDED HISTORY: NG placement TECHNOLOGIST PROVIDED HISTORY: Reason for exam:->NG placement Reason for Exam: ng FINDINGS: Enteric tube is seen. Partially coiled portions of the enteric tube seen overlying the upper chest.  The tip and the side hole again noted to be above the level of the gastroesophageal junction and needs to be advanced by approximately 15 cm. Nonspecific bowel gas pattern is seen within the visualized upper abdomen. There is a neurotransmitter device with the tips overlying the lower thoracic spine. Opacity of left lung base, which may reflect underlying atelectasis. Correlate clinically with signs of pneumonia.      Partially coiled enteric tube with the tip in the side hole above the level of the gastroesophageal junction and needs to be advanced by approximately 15 cm. Left lung base opacity, which may reflect underlying atelectasis. Correlate clinically with signs of pneumonia. Labs  Results for orders placed or performed during the hospital encounter of 01/13/23   COVID-19 & Influenza Combo    Specimen: Nasopharyngeal Swab   Result Value Ref Range    SARS-CoV-2 RNA, RT PCR NOT DETECTED NOT DETECTED    INFLUENZA A NOT DETECTED NOT DETECTED    INFLUENZA B NOT DETECTED NOT DETECTED   Culture, Blood 2    Specimen: Blood   Result Value Ref Range    Culture, Blood 2       No Growth to date. Any change in status will be called.    Culture, Urine    Specimen: Urine, clean catch   Result Value Ref Range    Organism Staphylococcus haemolyticus (A)     Urine Culture, Routine 50,000 CFU/ml        Susceptibility    Staphylococcus haemolyticus - BACTERIAL SUSCEPTIBILITY PANEL BY RODRÍGUEZ     nitrofurantoin <=16 Sensitive mcg/mL     oxacillin >=4 Resistant mcg/mL     tetracycline <=1 Sensitive mcg/mL     trimethoprim-sulfamethoxazole <=10 Sensitive mcg/mL     vancomycin <=0.5 Sensitive mcg/mL   CBC with Auto Differential   Result Value Ref Range    WBC 17.8 (H) 4.0 - 11.0 K/uL    RBC 5.08 4.20 - 5.90 M/uL    Hemoglobin 16.2 13.5 - 17.5 g/dL    Hematocrit 49.0 40.5 - 52.5 %    MCV 96.5 80.0 - 100.0 fL    MCH 31.9 26.0 - 34.0 pg    MCHC 33.1 31.0 - 36.0 g/dL    RDW 14.7 12.4 - 15.4 %    Platelets 084 471 - 459 K/uL    MPV 8.5 5.0 - 10.5 fL    Neutrophils % 85.9 %    Lymphocytes % 7.0 %    Monocytes % 6.3 %    Eosinophils % 0.2 %    Basophils % 0.6 %    Neutrophils Absolute 15.3 (H) 1.7 - 7.7 K/uL    Lymphocytes Absolute 1.3 1.0 - 5.1 K/uL    Monocytes Absolute 1.1 0.0 - 1.3 K/uL    Eosinophils Absolute 0.0 0.0 - 0.6 K/uL    Basophils Absolute 0.1 0.0 - 0.2 K/uL   CMP w/ Reflex to MG   Result Value Ref Range    Sodium 146 (H) 136 - 145 mmol/L    Potassium reflex Magnesium 4.4 3.5 - 5.1 mmol/L    Chloride 112 (H) 99 - 110 mmol/L    CO2 18 (L) 21 - 32 mmol/L    Anion Gap 16 3 - 16    Glucose 462 (H) 70 - 99 mg/dL    BUN 44 (H) 7 - 20 mg/dL    Creatinine 1.6 (H) 0.8 - 1.3 mg/dL    Est, Glom Filt Rate 45 (A) >60    Calcium 10.6 8.3 - 10.6 mg/dL    Total Protein 8.7 (H) 6.4 - 8.2 g/dL    Albumin 4.9 3.4 - 5.0 g/dL    Albumin/Globulin Ratio 1.3 1.1 - 2.2    Total Bilirubin 0.7 0.0 - 1.0 mg/dL    Alkaline Phosphatase 130 (H) 40 - 129 U/L    ALT 23 10 - 40 U/L    AST 29 15 - 37 U/L   Lactic Acid   Result Value Ref Range    Lactic Acid 2.1 (H) 0.4 - 2.0 mmol/L   Troponin   Result Value Ref Range    Troponin <0.01 <0.01 ng/mL   Urinalysis with Reflex to Culture    Specimen: Urine   Result Value Ref Range    Color, UA Yellow Straw/Yellow    Clarity, UA Clear Clear    Glucose, Ur 100 (A) Negative mg/dL    Bilirubin Urine Negative Negative    Ketones, Urine TRACE (A) Negative mg/dL    Specific Gravity, UA >=1.030 1.005 - 1.030    Blood, Urine MODERATE (A) Negative    pH, UA 5.5 5.0 - 8.0    Protein,  (A) Negative mg/dL    Urobilinogen, Urine 0.2 <2.0 E.U./dL    Nitrite, Urine POSITIVE (A) Negative    Leukocyte Esterase, Urine SMALL (A) Negative    Microscopic Examination YES     Urine Type NotGiven     Urine Reflex to Culture Yes    Lipase   Result Value Ref Range    Lipase 112.0 (H) 13.0 - 60.0 U/L   Protime-INR   Result Value Ref Range    Protime 37.3 (H) 11.7 - 14.5 sec    INR 3.73 (H) 0.87 - 1.14   Blood gas, venous   Result Value Ref Range    pH, Mainor 7.349 (L) 7.350 - 7.450    pCO2, Mainor 32.2 (L) 40.0 - 50.0 mmHg    pO2, Mainor 57.3 (H) 25.0 - 40.0 mmHg    HCO3, Venous 17.3 (L) 23.0 - 29.0 mmol/L    Base Excess, Mainor -6.9 (L) -3.0 - 3.0 mmol/L    O2 Sat, Mainor 90 Not Established %    Carboxyhemoglobin 1.8 (H) 0.0 - 1.5 %    MetHgb, Mainor 0.2 <1.5 %    TC02 (Calc), Mainor 18 Not Established mmol/L    O2 Therapy Unknown    Microscopic Urinalysis   Result Value Ref Range    WBC, UA  (A) 0 - 5 /HPF    RBC, UA 3-4 0 - 4 /HPF    Bacteria, UA 3+ (A) None Seen /HPF   Lactic Acid   Result Value Ref Range    Lactic Acid 3.3 (H) 0.4 - 2.0 mmol/L   Lithium Level   Result Value Ref Range    Lithium Dose Amount Unknown    Lithium Level   Result Value Ref Range    Lithium Lvl 1.0 0.6 - 1.2 mmol/L    Lithium Dose Amount Unknown    Lactic Acid   Result Value Ref Range    Lactic Acid 3.1 (H) 0.4 - 2.0 mmol/L   CBC with Auto Differential   Result Value Ref Range    WBC 15.4 (H) 4.0 - 11.0 K/uL    RBC 4.63 4.20 - 5.90 M/uL    Hemoglobin 14.6 13.5 - 17.5 g/dL    Hematocrit 44.2 40.5 - 52.5 %    MCV 95.5 80.0 - 100.0 fL    MCH 31.6 26.0 - 34.0 pg    MCHC 33.1 31.0 - 36.0 g/dL    RDW 14.3 12.4 - 15.4 %    Platelets 854 320 - 740 K/uL    MPV 8.5 5.0 - 10.5 fL    Neutrophils % 79.9 %    Lymphocytes % 10.4 %    Monocytes % 8.4 %    Eosinophils % 0.8 %    Basophils % 0.5 %    Neutrophils Absolute 12.3 (H) 1.7 - 7.7 K/uL    Lymphocytes Absolute 1.6 1.0 - 5.1 K/uL    Monocytes Absolute 1.3 0.0 - 1.3 K/uL    Eosinophils Absolute 0.1 0.0 - 0.6 K/uL    Basophils Absolute 0.1 0.0 - 0.2 K/uL   Basic Metabolic Panel   Result Value Ref Range    Sodium 161 (HH) 136 - 145 mmol/L    Potassium 3.8 3.5 - 5.1 mmol/L    Chloride 128 (H) 99 - 110 mmol/L    CO2 20 (L) 21 - 32 mmol/L    Anion Gap 13 3 - 16    Glucose 245 (H) 70 - 99 mg/dL    BUN 34 (H) 7 - 20 mg/dL    Creatinine 1.3 0.8 - 1.3 mg/dL    Est, Glom Filt Rate 58 (A) >60    Calcium 10.5 8.3 - 10.6 mg/dL   Protime-INR   Result Value Ref Range    Protime 39.7 (H) 11.7 - 14.5 sec    INR 4.04 (H) 0.87 - 1.14   Sodium   Result Value Ref Range    Sodium 168 (HH) 136 - 145 mmol/L   Blood gas, arterial   Result Value Ref Range    pH, Arterial 7.335 (L) 7.350 - 7.450    pCO2, Arterial 43.1 35.0 - 45.0 mmHg    pO2, Arterial 61.2 (L) 75.0 - 108.0 mmHg    HCO3, Arterial 22.5 21.0 - 29.0 mmol/L    Base Excess, Arterial -3.3 (L) -3.0 - 3.0 mmol/L    Hemoglobin, Art, Extended 15.2 13.5 - 17.5 g/dL    O2 Sat, Arterial 91.4 (L) >92 %    Carboxyhgb, Arterial 0.9 0.0 - 1.5 %    Methemoglobin, Arterial 0.5 <1.5 %    TCO2, Arterial 23.8 Not Established mmol/L    O2 Therapy Unknown    Blood gas, venous   Result Value Ref Range    pH, Mainor 7.380 7.350 - 7.450    pCO2, Mainor 36.9 (L) 40.0 - 50.0 mmHg    pO2, Mainor 40.3 (H) 25.0 - 40.0 mmHg    HCO3, Venous 21.3 (L) 23.0 - 29.0 mmol/L    Base Excess, Mainor -3.2 (L) -3.0 - 3.0 mmol/L    O2 Sat, Mainor 80 Not Established %    Carboxyhemoglobin 1.7 (H) 0.0 - 1.5 %    MetHgb, Mainor 0.5 <1.5 %    TC02 (Calc), Mainor 23 Not Established mmol/L    O2 Therapy Unknown    Basic Metabolic Panel   Result Value Ref Range    Sodium 163 (HH) 136 - 145 mmol/L    Potassium 3.7 3.5 - 5.1 mmol/L    Chloride 129 (H) 99 - 110 mmol/L    CO2 23 21 - 32 mmol/L    Anion Gap 11 3 - 16    Glucose 415 (H) 70 - 99 mg/dL    BUN 32 (H) 7 - 20 mg/dL    Creatinine 1.4 (H) 0.8 - 1.3 mg/dL    Est, Glom Filt Rate 53 (A) >60    Calcium 10.5 8.3 - 10.6 mg/dL   Basic Metabolic Panel   Result Value Ref Range    Sodium 159 (H) 136 - 145 mmol/L    Potassium 4.5 3.5 - 5.1 mmol/L    Chloride 128 (H) 99 - 110 mmol/L    CO2 19 (L) 21 - 32 mmol/L    Anion Gap 12 3 - 16    Glucose 379 (H) 70 - 99 mg/dL    BUN 33 (H) 7 - 20 mg/dL    Creatinine 1.5 (H) 0.8 - 1.3 mg/dL    Est, Glom Filt Rate 49 (A) >60    Calcium 10.8 (H) 8.3 - 10.6 mg/dL   Basic Metabolic Panel   Result Value Ref Range    Sodium 158 (H) 136 - 145 mmol/L    Potassium 4.1 3.5 - 5.1 mmol/L    Chloride 124 (H) 99 - 110 mmol/L    CO2 19 (L) 21 - 32 mmol/L    Anion Gap 15 3 - 16    Glucose 414 (H) 70 - 99 mg/dL    BUN 34 (H) 7 - 20 mg/dL    Creatinine 1.5 (H) 0.8 - 1.3 mg/dL    Est, Glom Filt Rate 49 (A) >60    Calcium 10.5 8.3 - 10.6 mg/dL   POCT Glucose   Result Value Ref Range    POC Glucose 425 (H) 70 - 99 mg/dl    Performed on ACCU-CHEK    POCT Glucose   Result Value Ref Range    POC Glucose 387 (H) 70 - 99 mg/dl    Performed on ACCU-CHEK    POCT Glucose   Result Value Ref Range    POC Glucose 359 (H) 70 - 99 mg/dl Performed on ACCU-CHEK    POCT Glucose   Result Value Ref Range    POC Glucose 333 (H) 70 - 99 mg/dl    Performed on ACCU-CHEK    POCT Glucose   Result Value Ref Range    POC Glucose 325 (H) 70 - 99 mg/dl    Performed on ACCU-CHEK    POCT Glucose   Result Value Ref Range    POC Glucose 235 (H) 70 - 99 mg/dl    Performed on ACCU-CHEK    POCT Glucose   Result Value Ref Range    POC Glucose 196 (H) 70 - 99 mg/dl    Performed on ACCU-CHEK    POCT Glucose   Result Value Ref Range    POC Glucose 207 (H) 70 - 99 mg/dl    Performed on ACCU-CHEK    POCT Glucose   Result Value Ref Range    POC Glucose 340 (H) 70 - 99 mg/dl    Performed on ACCU-CHEK    POCT Glucose   Result Value Ref Range    POC Glucose 350 (H) 70 - 99 mg/dl    Performed on ACCU-CHEK    POCT Glucose   Result Value Ref Range    POC Glucose 289 (H) 70 - 99 mg/dl    Performed on ACCU-CHEK    POCT Glucose   Result Value Ref Range    POC Glucose 296 (H) 70 - 99 mg/dl    Performed on ACCU-CHEK    POCT Glucose   Result Value Ref Range    POC Glucose 378 (H) 70 - 99 mg/dl    Performed on ACCU-CHEK    EKG 12 Lead   Result Value Ref Range    Ventricular Rate 109 BPM    Atrial Rate 109 BPM    P-R Interval 162 ms    QRS Duration 92 ms    Q-T Interval 336 ms    QTc Calculation (Bazett) 452 ms    P Axis 55 degrees    R Axis -64 degrees    T Axis 93 degrees    Diagnosis       Sinus tachycardiaLeft axis deviationNonspecific ST and T wave abnormalityPoor R wave progressionAbnormal ECGWhen compared with ECG of 18-NOV-2022 20:02,Premature atrial complexes are no longer PresentVent. rate has increased BY  49 BPMNon-specific change in ST segment in Inferior leadsNonspecific T wave abnormality no longer evident in Anterior leadsNonspecific T wave abnormality now evident in Lateral leadsConfirmed by Amor Flores ([id number]) on 1/13/2023 5:28:14 PM           - Patient seen and evaluated in room 03.  67 y.o. male presented for altered mental status.   Patient presented normotensive, afebrile, heart rate of 110, respiratory rate of 21 and satting at 90% on 4 to 5 L of supplemental O2. He is not on oxygen at baseline. Given history and exam my differential diagnosis includes but is not limited to intracranial abnormality, ACS, CHF exacerbation, COPD, pneumonia, COVID-19, influenza, ongoing urosepsis, PE. He has no significant abdominal tenderness to suggest underlying acute surgical abdomen. I obtained labs and imaging studies as noted below. - Patient was placed on telemetry during his ED stay and no malignant dysrhythmia observed. - Pertinent old records reviewed. - Patient was given IV vancomycin, IV Zosyn in the ED. Upon reassessment, patient remained stable although continued altered mental status. - Diagnostic studies reviewed and interpreted by me   - EKG  sinus tachycardia with a rate of 109, normal QRS and QTc duration, nonspecific T wave changes when compared to prior on November 18, 2022     - CXR with no acute abnormalities. - Lab:   CBC with a leukocytosis to 17.8, no evidence of anemia, normal platelets  He had elevated PTT and INR  UA significant for infection with culture pending  Flu and influenza negative  CMP with elevated sodium to 146, high chloride at 111, low CO2 at 18, elevated BUN 44, creatinine of 1.6, GFR 45, hyperglycemic to 462, elevated alk phos to 130 within normal ALT, AST and bilirubin  Elevated lipase to 112  Troponin negative  Elevated lactic acid 2.1  Blood cultures pending      - I discussed the results with spouse/SO. We agreed to admit to the hospital for sepsis with urinary source. Patient was started on broad-spectrum antibiotics. Confirmed Limited x 4 therefore further intervention was not done at this time. I spoke with medicine service who accepted patient for admission. Clinical Impression:  1. Sepsis, due to unspecified organism, unspecified whether acute organ dysfunction present ([facility] [address]    2. Elevated lipase    3.  Elevated INR    4. Altered mental status, unspecified altered mental status type          Disposition:  Admit to telemetry in critical condition. Blood pressure (!) 105/53, pulse 100, temperature 99.7 °F (37.6 °C), temperature source Axillary, resp. rate 24, height 6' 1\" (1.854 m), weight 186 lb 4.6 oz (84.5 kg), SpO2 98 %. Patient was given scripts for the following medications. I counseled patient how to take these medications. Discharge Medication List as of 1/15/2023  6:59 AM          Disposition referral (if applicable):  No follow-up provider specified. Total critical care time is 35 minutes, which excludes separately billable procedures and updating family. Time spent is specifically for management of the presenting complaint and symptoms initially, direct bedside care, reevaluation, review of records, and consultation. There was a high probability of clinically significant life-threatening deterioration in the patient's condition, which required my urgent intervention. This chart was generated in part by using Dragon Dictation system and may contain errors related to that system including errors in grammar, punctuation, and spelling, as well as words and phrases that may be inappropriate. If there are any questions or concerns please feel free to contact the dictating provider for clarification.      Toni Whitley MD  [address] [facility]       Toni Whitley MD  01/15/23 2029

## 2023-01-13 NOTE — ED NOTES
Patient's wife Gurjit Fischer is wanting to sit down with Dr Nanette Escobar about the current situation and his future care.   Report called to room [address] [place] [address] St Man Tai RN  01/13/23 9520

## 2023-01-13 NOTE — PROGRESS NOTES
01/13/23 0931   Cough/Sputum   Sputum How Obtained Oral   Cough Productive;Weak;Congested;Moist   Frequency Frequent   Sputum Amount Large   Sputum Color Yellow   Tenacity Thick   Pt able to cough up large amounts of mucus but unable to clear it from his mouth, needs suctioned or will swallow it.  Large thick amounts of mucus suctioned from pt's mouth 2x

## 2023-01-13 NOTE — PROGRESS NOTES
Speech Language Pathology  Attempted Bedside Swallow Eval      SLP completed chart review and spoke with RN. RN reports that pt's wife is eager for evaluation to be completed. Pt came to ED from [facility] and apparently was on modified diet/thickened liquids there; wife reports that this is because pt \"was not eating enough,\" per [employer]. Pt able to awaken with mod verbal and tactile cues, and seems quite lethargic. When pt speaks, his voice is noted to be quite gurgling, wet in nature. SLP cued pt to cough, and he did not cough. SLP used Andrés Dub and placed it on back of pt's tongue. Very thick yellow, opaque secretions were suctioned from pt's tongue base. Pt then coughed lightly, and again his voice became very gurgly as if more secretions were now in his throat/airway. Pt does not follow commands to cough hard, so SLP used Andrés Dub and again gently suctioned a moderate amount of thick yellow secretions from pharynx. Pt attempted to grab Andrés Dub, and SLP stopped suctioning. At this point, pt falls back asleep and SLP is unable to awaken him again. Phlebotomist arrived to room to draw a stat lab, and pt slept through entire lab draw. SLP unable to complete eval at this time due to inconsistent alertness. SLP will re-attempt eval in AM on 01/14/23. Shaunna Cisneros, [address] FU[address][id number]  Speech-Language Pathologist

## 2023-01-13 NOTE — ACP (ADVANCE CARE PLANNING)
ADVANCED CARE PLANNING    Name:Artie Cruz       :  1950              MRN:  [id number]  Admission Date: 2023  8:01 AM  Date of Discussion: 2023      Purpose of Encounter: Advanced care planning in light of continued decline and repeat hospitalizations. Parties in attendance: :Jacquelin Ferguson DO, Family members: Wife. Decisional Capacity:No      Objective/Medical Story: Of known this patient from last admission. He has had a steady decline in his required more assistance. He is now with a chronic Stapleton as well. Unable to get up from chair according to wife. He is also been having more difficulty swallowing and having aspiration. Patient has been resistant to thickened liquids. Now patient is here for again another suspected UTI with a chronic Stapleton use. We had a long discussion about observing him for the next 24 hours with supportive cares. We talked about how patient would not want to be intubated or resuscitated. Therefore we elected for limited x4. We also discussed about how patient may need an NG tube for feeding on a temporary basis based on his progression during this hospital stay. But pending on speech evaluation we may have to consider PEG tube in the future considering that patient is already needing a thickened liquid at baseline. Wife states that patient would never want a PEG tube. .    Active Diagnoses: Active Hospital Problems    Diagnosis Date Noted    Severe sepsis ([facility] [address] [A41.9, R65.20] 2023     Priority: Medium       These active diagnoses are of sufficient risk that focused discussion on advance care planning is indicated to allow the patient to thoughtfully consider personal goals of care; and if situations arise that prevent the ability to personally give input, to ensure appropriate representation of their personal desires for different levels and aggressiveness of care.        Goals of Care Determinations: Patient wishes to focus on full treatment -DNR/DNI. Code Status: Currently patient wishes to be limited x4         Time Spent on Advanced Planning Documents: 32 mins. The following items were considered in medical decision making:  Independent review of images , Review / order clinical lab tests. Review / order radiology tests, Decision to obtain old records. Advanced Care Planning Documents:   Completed advances directives, advanced directives in chart. Electronically signed by Ada Butler DO on 1/13/2023 at 3:50 PM    Thank you FRANCISCO JAVIER Salazar CNP for the opportunity to be involved in this patient's care. If you have any questions or concerns, please feel free to contact me at [phone].

## 2023-01-13 NOTE — PROGRESS NOTES
Occupational Therapy  OT/PT orders received and appreciated. Spoke with RN Navin Coleman) requesting to hold pt at this time. Per RN, pt unable to follow commands and not appropriate for therapy evaluation. Will follow up with pt when able to participate in therapy evaluation.     Thank you,    Socorro Salcedo, OT  Dk Melo, PT

## 2023-01-13 NOTE — PROGRESS NOTES
4 Eyes Skin Assessment     The patient is being assess for   Admission    I agree that 2 RN's have performed a thorough Head to Toe Skin Assessment on the patient. ALL assessment sites listed below have been assessed. Areas assessed for pressure by both nurses:   [x]   Head, Face, and Ears   [x]   Shoulders, Back, and Chest, Abdomen  [x]   Arms, Elbows, and Hands   [x]   Coccyx, Sacrum, and Ischium  [x]   Legs, Feet, and Heels        Skin Assessed Under all Medical Devices by both nurses:  O2 device tubing and vasquez              All Mepilex Borders were peeled back and area peeked at by both nurses:  Yes  Please list where Mepilex Borders are located:  coccyx             **SHARE this note so that the co-signing nurse is able to place an eSignature**    Co-signer eSignature: Electronically signed by Umer Cooper RN on 1/13/23 at 1:45 PM EST    Does the Patient have Skin Breakdown related to pressure?   Yes LDA WOUND CARE was Initiated documentation include the Brenda-wound, Wound Assessment, Measurements, Dressing Treatment, Drainage, and Color\",     (Insert Photo here)         Dano Prevention initiated:  Yes   Wound Care Orders initiated:  No      WOC nurse consulted for Pressure Injury (Stage 3,4, Unstageable, DTI, NWPT, Complex wounds)and New or Established Ostomies:  No      Primary Nurse eSignature: Electronically signed by Vicente Langston RN on 1/13/23 at 1:27 PM EST

## 2023-01-13 NOTE — ACP (ADVANCE CARE PLANNING)
Advance Care Planning     General Advance Care Planning (ACP) Conversation    Date of Conversation: 1/13/2023  Conducted with:  Healthcare Decision Maker: Next of Kin by law (only applies in absence of above) (name) Wife, Renée Truong Decision Maker:    Primary Decision Maker: Jose Anne - Spouse - [phone]  Click here to complete [place] including selection of the Healthcare Decision Maker Relationship (ie \"Primary\"). Today we documented Decision Maker(s) consistent with Legal Next of Kin hierarchy. Content/Action Overview: Has ACP document(s) NOT on file - requested patient to provide  Reviewed DNR/DNI and patient elects DNR order - completed portable DNR form & placed order  treatment goals, benefit/burden of treatment options, artificial nutrition, ventilation preferences, hospitalization preferences, resuscitation preferences, end of life care preferences (vegetative state/imminent death), and hospice care    Spoke with wife at bedside. Patient is intermittently awake. He is not at oriented, but he is able to recognize his wife. Wife states that patient is not the man he used to be. She would rather have him be \"gone\" than to have to live like this. If his medical condition should worsen to the point of needing resuscitation, she prefers we switch goals to comfort. She would not want him intubated for the same reason and would instead want him to be kept comfortable. She would not want ACLS meds or defibrillation for him. I explained that I was going to change his code status to Limited x 4 Nos to reflect her wishes for us to continue to aggressively treat patient, but not to resuscitate. She and her brother (also at bedside) agree. Wife states she would be agreeable to an NG tube for feeding on a temporary basis, but would never agree to a PEG tube.       Length of Voluntary ACP Conversation in minutes:  20 minutes    Zaida Goff, APRN - CNP

## 2023-01-13 NOTE — CARE COORDINATION
Case Management Assessment  Initial Evaluation    Date/Time of Evaluation: 1/13/2023 11:52 AM  Assessment Completed by: GARIMA Lake    If patient is discharged prior to next notation, then this note serves as note for discharge by case management. Patient Name: Katherine Rubi                   YOB: 1950  Diagnosis: Severe sepsis ([facility] [address] [A41.9, R65.20]                   Date / Time: 1/13/2023  8:01 AM    Patient Admission Status: Inpatient   Readmission Risk (Low < 19, Mod (19-27), High > 27): Readmission Risk Score: 18    Current PCP: FRANCISCO JAVIER Garland CNP  PCP verified by CM? (P) Yes    Chart Reviewed: Yes      History Provided by: Spouse  Patient Orientation: Alert and Oriented, Person    Patient Cognition: Severely Impaired    Hospitalization in the last 30 days (Readmission):  No    If yes, Readmission Assessment in CM Navigator will be completed.     Advance Directives:      Code Status: DNR-CCA   Patient's Primary Decision Maker is: Legal Next of Kin    Primary Decision MakerRchela Anthony Spouse - [phone]    Discharge Planning:    Patient lives with: (P) Spouse/Significant Other Type of Home: (!) (P) [facility]  Primary Care Giver: Family  Patient Support Systems include: Spouse/Significant Other, Family Members   Current Financial resources: (P) Financial Counseling, Medicaid  Current community resources: (P) ECF/Home Care, Hospice  Current services prior to admission: (P) Luis Fernando Alvarado            Current DME:              Type of Home Care services:       ADLS  Prior functional level: (P) Assistance with the following:, Bathing, Dressing, Toileting, Mobility, Shopping, Cooking, Housework, Feeding  Current functional level: (P) Assistance with the following:, Bathing, Dressing, Toileting, Shopping, Mobility, Housework, Cooking, Feeding    PT AM-PAC:   /24  OT AM-PAC:   /24    Family can provide assistance at DC: (P) Yes  Would you like Case Management to discuss the discharge plan with any other family members/significant others, and if so, who? (P) Yes  Plans to Return to Present Housing: (P) Yes  Other Identified Issues/Barriers to RETURNING to current housing: SNF vs. hospice  Potential Assistance needed at discharge: (P) Extended Care Placement, Other (Comment)            Potential DME:    Patient expects to discharge to:    Plan for transportation at discharge:      Financial    Payor: Jarred Palafox / Plan: Libby Godwin PPO / Product Type: Medicare /     Does insurance require precert for SNF: Yes    Potential assistance Purchasing Medications: (P) No  Meds-to-Beds request:        Madeleine Brown [id number] [facility] - [address] [place] [address]  [address] [address]  Phone: [phone] Fax: [phone]      Notes:    Factors facilitating achievement of predicted outcomes: Family support, Home is wheelchair accessible, and Knowledge about rehab    Barriers to discharge: Cognitive deficit, Upper extremity weakness, Lower extremity weakness, and Medical complications    Additional Case Management Notes: Referred to patient for d/c planning. Spoke to patient and wife. Patient is a 67year old male admitted for sepsis. Patient currently at [facility]. Wife is not happy with care. Wife very tearful and overwhelmed. Patient with numerous medical conditions and complications. Wife is unsure of goals of care at this time. Discussed palliative care, wife agreeable. Family currently applying for medicaid, however, wife also overwhelmed by this process. Referral to financial counselor. Briefly discussed aggressive treatment vs. Comfort care. List of other SNF providers given and reviewed. Wife interested in [facility], referral made. MD updated. Case management to continue to follow.       The Plan for Transition of Care is related to the following treatment goals of Severe sepsis ([facility] [A41.9, G95.85]    IF APPLICABLE: The Patient and/or patient representative Brandon Staton and his family were provided with a choice of provider and agrees with the discharge plan. Freedom of choice list with basic dialogue that supports the patient's individualized plan of care/goals and shares the quality data associated with the providers was provided to:     Patient Representative Name:       The Patient and/or Patient Representative Agree with the Discharge Plan?       GARIMA Dickey, THANIA-S  Case Management Department  Ph: [phone] Fax: [phone]

## 2023-01-13 NOTE — CONSULTS
PALLIATIVE MEDICINE CONSULTATION     Patient name:Artie Cruz   VR    :1950  Room/Bed:0369/0369-01   LOS: 0 days         Date of consult:2023    Consult Information  Palliative Medicine Consult performed by: FRANCISCO JAVIER Greenwood CNP      Inpatient consult to Palliative Care  Consult performed by: FRANCISCO JAVIER Rios CNP  Consult ordered by: Chaz Serrano DO  Reason for consult: goals of care, family support       Number of admissions past 12 months: 4      ASSESSMENT/RECOMMENDATIONS     67 y.o. male from SNF with altered mental status and worsening physical debility      Symptom Management:  Goals of Care - Goals of care conversation initiated. Wife wants to remain aggressive and is hoping a reversible cause can be found for the AMS. Even if AMS improves, he will still need facility placement as she does not have the physical strength to manage him at home. She is hopeful he can work with therapy. She would be agreeable to short term artificial nutrition only. She does not want patient to linger and says if he doesn't start improving then she would likely want comfort care. Code status changed to Limited x 4 Nos  Debility- due to co-morbid conditions, including back pain. Currently at SNF following admission for UTI. Unable to care for self and wife can no longer care for him in the home as he is a total assist.  Will need permanent placement in a facility. Altered Mental status - encephalopathy from ? He is bipolar and was occasionally mildly confused. Since his previous admission with UTI and his placement at [facility] [place], his mental status has rapidly gone downhill. He refused to take thickened liquids so he just stopped eating and drinking. She fears his lithium/other psych drugs were not administered properly in the SNF. She fears he is in the midst of a major depressive episode.   Would like psych eval to rule out psychiatric cause for AMS.    Patient/Family Goals of Care :    1/13/23    Patient is not oriented or decisional.  Spoke with wife, Milton Martin, at the bedside. Patient did not participate in the conversation. He did occasionally reach out and touch Diandra and had unintelligible speech during our conversation. Allowed Diandra to vent her frustrations and provided her support. She is frustrated with the current medical situation and the lack of communication from his facility and from the prior doctors. She feels she has been well-informed since being in the hospital.  She is still processing what is going on because his mental decline seems to have happened so fast.  Her brother is trying to help her apply for Medicaid so patient can have LTC placement.    -discussed swallow eval and potential swallow issues and need for nutrition. Tried to explain the difference between routes for artificial nutrition, especially in the (potential) setting of chronic dysphagia. Wife would never want permanent feeding tube, but would be agreeable to temporary placement of NG if there was hope it could help him over-all improve. - she wants to 1) figure out what is going on with this mental status and fix it and then 2) start working with PT for his physical debility. - she would not want him resuscitated because it would just cause more issues than he already has. She would just want him made comfortable. Brother at the bedside also encouraging wife to make patient a DNR. Brother doesn't feel like patient is ever going to get better and wants Milton Martin to be realistic.    - after thorough work up (including psych +/- neurology) if patient isn't showing any improvement or no hope of improving she will like want to change goals to comfort.     Disposition/Discharge Plan:   - pending medical course and [address] [place] [address] referral placed to assist wife with questions about the ACP documents    Advance Directives:  Surrogate Decision Maker: Wife, Diandra  Code status:  Limited x 4 No       Palliative Performance Scale:     [] 60%  Amb reduced; Sig dz. Can't do hobbies/housework; Intake normal or reduced, Occasional assist; LOC full/confusion   [] 50%  Mainly sit/lie; Extensive disease. Mainly assist, Intake normal or reduced; Occasional assist; LOC full/confusion   [] 40%  Mainly in bed; Extensive disease; Mainly assist; Intake normal or reduced; Occasional assist; LOC full/confusion   [x] 30%  Bed bound, Extensive disease; Total care; Intake reduced; LOC full/confusion   [] 20%  Bed bound; Extensive disease; Total care; Intake minimal; Drowsy/coma   [] 10%  Bed bound; Extensive disease; Total care; Mouth care only; Drowsy/coma   []  0%   Death      Case discussed with: patient, family, floor RN, attending MD  Thank you for allowing us to participate in the care of this patient. HISTORY     CC: Altered mental status  HPI: The patient is a 67 y.o. male with a PMH significant for asthma, bipolar 1, diabetes, liver disease, factor V Leyden mutation  presented today for altered mental status. He was brought in by EMS from [facility] where he is out for UTI treatment. Patient is alert, nonverbal and following some commands. Unknown what his normal mental status is or how long he has been altered. He received 1 albuterol treatment in route with an SPO2 of 91. Palliative Medicine SymptomScreening/ROS:    Review of Systems   Unable to perform ROS: Mental status change       Patient unable to complete full ROS due to current cognitive status. Information that is obtained from nursing and chart. Home med list and hospital medications reviewed in chart as of 1/13/2023     EXAM     Vitals:    01/13/23 1220   BP: 122/74   Pulse: 91   Resp: 18   Temp: 98.1 °F (36.7 °C)   SpO2: 99%       Physical Examination:   Physical Exam  Vitals reviewed. Constitutional:       General: He is not in acute distress. Appearance: He is normal weight.  He is ill-appearing. Comments: Lying in bed, eyes closed   HENT:      Head: Normocephalic and atraumatic. Nose: Nose normal.      Mouth/Throat:      Mouth: Mucous membranes are dry. Eyes:      General: No scleral icterus. Right eye: No discharge. Left eye: No discharge. Cardiovascular:      Rate and Rhythm: Normal rate. Pulses: Normal pulses. Pulmonary:      Effort: Pulmonary effort is normal.   Musculoskeletal:         General: Normal range of motion. Cervical back: Normal range of motion and neck supple. Right lower leg: No edema. Left lower leg: No edema. Skin:     General: Skin is warm and dry. Coloration: Skin is pale. Neurological:      Mental Status: He is disoriented.            Current labs in the epic chart reviewed as of 1/13/2023   Review of previous notes, admits, labs, radiology and testing relevant to this consult done in this chart today 1/13/2023      Total time: 75 minutes  >50% of time spent counseling patient at bedside or POA/family member if applicable , reviewing information and discussing care, coordinating with care team  Signed By: Electronically signed by FRANCISCO JAVIER Damon CNP on 1/13/2023 at 3:22 PM  Palliative Medicine   [phone]    January 13, 2023

## 2023-01-13 NOTE — H&P
Hospital Medicine History & Physical      PCP: FRANCISCO JAVEIR Montelongo - CNP    Date of Admission: 1/13/2023    Date of Service: Pt seen/examined on 1/13/2023 and Admitted to Inpatient with expected LOS greater than two midnights due to medical therapy. Chief Complaint: Confusion      History Of Present Illness:        67 y.o. male who presented to [facility] with altered mental status. Patient arrived via EMS from [facility]. Unable to obtain history from patient due to mental status. Wife reports that patient has been having worsening mentation. Patient has a chronic Stapleton in ED his urinalysis showed possible infection. He also was found to be hypoxic. Placed on nasal cannula. Chest x-ray without any findings of consolidation. He was aspirating and has difficulty clearing secretions at this time. He was started on spectrum antibiotics for severe sepsis secondary to UTI. Past Medical History:          Diagnosis Date    Anxiety     Arthritis     Asthma     Bipolar 1 disorder ([facility] [address]     Colitis     Depression     Diabetes ([facility]     Diabetes mellitus ([facility] [address]     Encounter for imaging to screen for metal prior to MRI 06/08/2022    NOT MRI conditional Bin1 ATE model#SC-[id number], Lead: [id number] implanted 9/9/19 by Harjinder Cruz at [facility]. Lead is unsafe for MRI. Patient unable to have any MRI ever.     Factor V Leiden mutation ([facility] [address]     Glaucoma     H/O bladder problems     High blood pressure     History of kidney problems     IBS (irritable bowel syndrome)     Liver disease     hx of elevated LFT's    Obesity     Psychiatric problem        Past Surgical History:          Procedure Laterality Date    APPENDECTOMY      BACK SURGERY      CERVICAL FUSION      COLONOSCOPY      EYE SURGERY      HIP SURGERY      JOINT REPLACEMENT Right     THR    OTHER SURGICAL HISTORY  06/06/2018     right L4-5 hemilaminotomy, partial facetectomy, foraminotomy, reexploration and excision of synovial csyt,  Bilateral L2-3 and L3-4 hemilaminotomy, partial facetectomy, foraminotomy    AK NJX DX/THER SBST INTRLMNR CRV/THRC W/IMG GDN Bilateral 9/25/2018    BILATERAL LUMBAR THREE, LUMBAR FOUR, LUMBAR FIVE MEDIAL BRANCH BLOCK SITE CONFIRMED BY FLUOROSCOPY performed by Kay Cagle MD at [address] [facility] DX/THER SBST INTRLMNR CRV/THRC W/IMG GDN Bilateral 10/16/2018    BILATERAL LUMBAR THREE, FOUR, FIVE MEDIAL BRANCH BLOCK SITE CONFIRMED BY FLUOROSCOPY performed by Kay Cagle MD at [address] [facility] Bilateral 11/12/2018    BILATERAL LUMBAR THREE, LUMBAR FOUR, LUMBAR FIVE RADIOFREQUENCY ABLATION SITE CONFIRMED BY FLUOROSCOPY performed by Kay Cagle MD at [place] [address]    TUR N/A 6/9/2020    CYSTOSCOPY, TRANSURETHRAL RESECTION OF PROSTATE performed by Jonathan Mir MD at [place] [address]       Medications Prior to Admission:      Prior to Admission medications    Medication Sig Start Date End Date Taking? Authorizing Provider   Insulin Lispro (ADMELOG SOLOSTAR SC) Inject into the skin   Yes Historical Provider, MD   apixaban (ELIQUIS) 5 MG TABS tablet Take 5 mg by mouth 2 times daily   Yes Historical Provider, MD   metFORMIN (GLUCOPHAGE) 500 MG tablet Take 500 mg by mouth 2 times daily (with meals)   Yes Historical Provider, MD Joe Mcbride Peru-Castor Oil (VENELEX) OINT ointment Apply topically 2 times daily   Yes Historical Provider, MD   polyethylene glycol (GLYCOLAX) 17 g packet Take 17 g by mouth daily as needed for Constipation 12/26/22 1/25/23  Florentino Hurd,    warfarin (COUMADIN) 5 MG tablet As directed by pharmacy  Patient not taking: Reported on 1/13/2023 11/22/22   Memo Wang MD   gabapentin (NEURONTIN) 300 MG capsule Take 1 capsule by mouth 3 times daily for 10 days.  11/22/22 1/13/23  Memo Wang MD   insulin lispro (HUMALOG) 100 UNIT/ML SOLN injection vial Inject 0-16 Units into the skin 3 times daily (with meals) 11/22/22   Shakeel Fall MD   insulin lispro (HUMALOG) 100 UNIT/ML SOLN injection vial Inject 0-4 Units into the skin nightly 11/22/22   Shakeel Fall MD   pantoprazole (PROTONIX) 40 MG tablet Take 1 tablet by mouth every morning (before breakfast)  Patient not taking: Reported on 1/13/2023 11/23/22   Shakeel Fall MD   insulin glargine [facility]) 100 UNIT/ML injection pen Inject 5 Units into the skin nightly 10/12/22   Historical Provider, MD   sennosides-docusate sodium (SENOKOT-S) 8.6-50 MG tablet Take 2 tablets by mouth in the morning and at bedtime 6/13/22   Giovanni Scale, MD   lamoTRIgine (LAMICTAL) 25 MG tablet Take 25 mg by mouth daily     Historical Provider, MD   lithium 300 MG capsule Take 300 mg by mouth daily    Historical Provider, MD   lithium 300 MG capsule Take 600 mg by mouth at bedtime    Historical Provider, MD   traZODone (DESYREL) 100 MG tablet Take 200 mg by mouth nightly     Historical Provider, MD   acetaminophen (TYLENOL) 500 MG tablet Take 1,000 mg by mouth nightly as needed for Pain    Historical Provider, MD   Dulaglutide 3 MG/0.5ML SOPN Inject 3 mg into the skin once a week Takes on Sundays    Historical Provider, MD   rosuvastatin (CRESTOR) 20 MG tablet Take 20 mg by mouth at bedtime   Patient not taking: Reported on 1/13/2023    Historical Provider, MD   verapamil (CALAN SR) 120 MG extended release tablet Take 120 mg by mouth nightly    Historical Provider, MD       Allergies:  No known allergies    Social History:      The patient currently lives at [facility]    TOBACCO:   reports that he has never smoked. He has never used smokeless tobacco.  ETOH:   reports no history of alcohol use. E-cigarette/Vaping       Questions Responses    E-cigarette/Vaping Use Never User    Start Date     Passive Exposure     Quit Date     Counseling Given     Comments               Family History:       Reviewed and negative in regards to presenting illness/complaint. Problem Relation Age of Onset    Alcohol Abuse Sister     Coronary Art Dis Mother     Obesity Mother     Osteoarthritis Mother     Parkinsonism Mother     Coronary Art Dis Father     Obesity Father        REVIEW OF SYSTEMS COMPLETED:   Pertinent positives as noted in the HPI. All other systems reviewed and negative. PHYSICAL EXAM PERFORMED:    /74   Pulse 91   Temp 98.1 °F (36.7 °C) (Oral)   Resp 18   Ht 6' 1\" (1.854 m)   Wt 200 lb (90.7 kg)   SpO2 99%   BMI 26.39 kg/m²     General appearance:  No apparent distress, appears stated age. Confused  HEENT:  Normal cephalic, atraumatic without obvious deformity. Pupils equal, round, and reactive to light. Extra ocular muscles intact. Conjunctivae/corneas clear. Thrush in mouth  Neck: Supple, with full range of motion. No jugular venous distention. Trachea midline. Respiratory:  Normal respiratory effort. Coarse breath sounds throughout   cardiovascular: Tachycardic with normal S1/S2 without murmurs, rubs or gallops. Abdomen: Soft, non-tender, non-distended with normal bowel sounds. Musculoskeletal:  No clubbing, cyanosis or edema bilaterally. Full range of motion without deformity. Skin: Skin color, texture, turgor normal.  No rashes or lesions. Neurologic:  Neurovascularly intact without any focal sensory/motor deficits. Cranial nerves: II-XII intact, grossly non-focal.  Psychiatric:  Alert .   Nonverbal unable to answer questions  Capillary Refill: Brisk,3 seconds, normal  Peripheral Pulses: +2 palpable, equal bilaterally       Labs:     Recent Labs     01/13/23 0815   WBC 17.8*   HGB 16.2   HCT 49.0        Recent Labs     01/13/23 0815   *   K 4.4   *   CO2 18*   BUN 44*   CREATININE 1.6*   CALCIUM 10.6     Recent Labs     01/13/23 0815   AST 29   ALT 23   BILITOT 0.7   ALKPHOS 130*     Recent Labs     01/13/23 0815   INR 3.73*     Recent Labs     01/13/23 0815   TROPONINI <0.01       Urinalysis:      Lab Results Component Value Date/Time    NITRU POSITIVE 01/13/2023 08:43 AM    WBCUA  01/13/2023 08:43 AM    BACTERIA 3+ 01/13/2023 08:43 AM    RBCUA 3-4 01/13/2023 08:43 AM    BLOODU MODERATE 01/13/2023 08:43 AM    SPECGRAV >=1.030 01/13/2023 08:43 AM    GLUCOSEU 100 01/13/2023 08:43 AM    GLUCOSEU NEGATIVE 10/13/2010 12:10 PM       Radiology:     CXR: I have reviewed the CXR with the following interpretation: No acute cardiopulmonary findings  EKG:  I have reviewed the EKG with the following interpretation: Sinus tachycardia. Heart rate 109. QTc 452. Nonspecific ST and T wave abnormalities. XR CHEST PORTABLE   Final Result   No significant findings in the chest.             Consults:    IP CONSULT TO PALLIATIVE CARE  IP CONSULT TO SPIRITUAL SERVICES    ASSESSMENT:    Active Hospital Problems    Diagnosis Date Noted    Severe sepsis ([facility] [address] [A41.9, R65.20] 01/13/2023     Priority: Medium         PLAN:  UTI - admission U/A c/w acute cystitis. ,  With chronic Stapleton. Infection evidenced by U/A, Cx results and/or signs/sxs of clinical infection despite Cx results. Started on empiric vanc/zosyn on 1/13 pending Cx results. Changed to Ceftriaxone 2g dosing until blood culture results back. Sepsis - w/ Leukocytosis/Tachycardia/Elevated Lactate POArrival 2nd to above infection. Continue IVF as appropriate and monitor clinical response w/ ABX as written. Blood cultures pending     Encephalopathy - acute metabolic, 2nd to above. Will continue to follow clinical response w/ supportive care PRN. We will get lithium level as well. Do have concerns about bipolar medication and will consult psych to review home med list    Dysphagia -speech consulted and unable to complete eval at this time due to inconsistent alertness. They will reattempt eval tomorrow morning. Continue n.p.o. for now    Factor V Leiden -patient was on Eliquis but there was some difficulty obtaining so therefore he was put on warfarin.   For now we will do Lovenox therapeutic as patient is not able to take things by mouth. We will plan to resume Eliquis at discharge with coupon    Bipolar 1 -psych consulted    Type 2 diabetes -patient is NPO. Sliding scale n.p.o. . Hold home medication. Hypoglycemic protocol    Hypertension essential -hold verapamil at this time due to sepsis    Chronic pressure wounds -consult wound care    Thrush - Patient unable to do oral regimen at this time. Will await speech therapy recs in AM. May need to consider IV regimen       DVT Prophylaxis: Therapeutic Lovenox  Diet: Diet NPO  Code Status: Limited x4    PT/OT Eval Status: consulted    Dispo - > 2 midnights       Arcelia Burris DO    Thank you FRANCISCO JAVIER Sánchez CNP for the opportunity to be involved in this patient's care. If you have any questions or concerns please feel free to contact me at [phone].

## 2023-01-14 NOTE — PROGRESS NOTES
CH offered active and empathic listening to wife Emily Woodall who is overwhelmed with Pt's decline and ongoing medical complications. CH validated wife's feelings , nurtured hope and encouraged self care. The couple do not have children but wife has the support of her siblings. Pt is not alert and oriented, so it is not able to discuss and complete AD. [address] will remain available to offer support. 01/14/23 1300   Encounter Summary   Encounter Overview/Reason  Spiritual/Emotional Needs; Advance Care Planning   Service Provided For: Patient and family together   Referral/Consult From: Nurse   Support System Spouse; Family members   Last Encounter  01/14/23  (emotional support to wife who is overwhelmed)   Complexity of Encounter Moderate   Begin Time 1115   End Time  1230   Total Time Calculated 75 min   Spiritual/Emotional needs   Type Emotional Distress;Spiritual Support Discharged

## 2023-01-14 NOTE — CONSULTS
Consult placed    Who:WILLIE MARTELL  Date:1/14/2023,  Time:2:27 PM        Electronically signed by Boris Meckel on 1/14/2023 at 2:27 PM

## 2023-01-14 NOTE — PROGRESS NOTES
Hospitalist Progress Note      PCP: Tereza Beltran, APRN - CNP    Date of Admission: 1/13/2023    Chief Complaint: [address] Course:     67 y.o. male who presented to [facility] with altered mental status. Patient arrived via EMS from [facility]. Unable to obtain history from patient due to mental status. Wife reports that patient has been having worsening mentation. Patient has a chronic Stapleton in ED his urinalysis showed possible infection. He also was found to be hypoxic. Placed on nasal cannula. Chest x-ray without any findings of consolidation. He was aspirating and has difficulty clearing secretions at this time. He was started on spectrum antibiotics for severe sepsis secondary to UTI. Subjective: Patient laying in bed. Unable to follow commands.  Wakes up to name        Medications:  Reviewed    Infusion Medications    dextrose 5 % and 0.9 % NaCl 200 mL/hr at 01/14/23 0913    dextrose      sodium chloride Stopped (01/13/23 2209)     Scheduled Medications    cefTRIAXone (ROCEPHIN) IV  1,000 mg IntraVENous Q24H    sodium chloride flush  5-40 mL IntraVENous 2 times per day    enoxaparin  40 mg SubCUTAneous Daily    lamoTRIgine  25 mg Oral Daily    lithium  300 mg Oral Daily    lithium  600 mg Oral Nightly    pantoprazole  40 mg Oral QAM AC    rosuvastatin  20 mg Oral Nightly    sennosides-docusate sodium  2 tablet Oral BID    [Held by provider] traZODone  200 mg Oral Nightly    insulin lispro  0-4 Units SubCUTAneous Q4H     PRN Meds: glucose, dextrose bolus **OR** dextrose bolus, glucagon (rDNA), dextrose, sodium chloride flush, sodium chloride, acetaminophen **OR** acetaminophen      Intake/Output Summary (Last 24 hours) at 1/14/2023 1358  Last data filed at 1/14/2023 0916  Gross per 24 hour   Intake 86.69 ml   Output 2750 ml   Net -2663.31 ml       Physical Exam Performed:    /83   Pulse 85   Temp 98.3 °F (36.8 °C) (Oral)   Resp 16   Ht 6' 1\" (1.854 m)   Wt 186 lb 4.6 oz (84.5 kg)   SpO2 93%   BMI 24.58 kg/m²     General appearance:  No apparent distress, appears stated age. Confused  HEENT:  Normal cephalic, atraumatic without obvious deformity. Pupils equal, round, and reactive to light. Extra ocular muscles intact. Conjunctivae/corneas clear. Thrush in mouth  Neck: Supple, with full range of motion. No jugular venous distention. Trachea midline. Respiratory:  Normal respiratory effort. Coarse breath sounds throughout   cardiovascular: RRR with normal S1/S2 without murmurs, rubs or gallops. Abdomen: Soft, non-tender, non-distended with normal bowel sounds. Musculoskeletal:  No clubbing, cyanosis or edema bilaterally. Full range of motion without deformity. Skin: Skin color, texture, turgor normal.  No rashes or lesions. Neurologic:  Neurovascularly intact without any focal sensory/motor deficits. Cranial nerves: II-XII intact, grossly non-focal.  Psychiatric:  Alert . Nonverbal unable to answer questions.   Capillary Refill: Brisk,3 seconds, normal  Peripheral Pulses: +2 palpable, equal bilaterally       Labs:   Recent Labs     01/13/23  0815 01/14/23  0554   WBC 17.8* 15.4*   HGB 16.2 14.6   HCT 49.0 44.2    244     Recent Labs     01/13/23  0815 01/14/23  0555   * 161*   K 4.4 3.8   * 128*   CO2 18* 20*   BUN 44* 34*   CREATININE 1.6* 1.3   CALCIUM 10.6 10.5     Recent Labs     01/13/23  0815   AST 29   ALT 23   BILITOT 0.7   ALKPHOS 130*     Recent Labs     01/13/23  0815 01/14/23  0554   INR 3.73* 4.04*     Recent Labs     01/13/23  0815   TROPONINI <0.01       Urinalysis:      Lab Results   Component Value Date/Time    NITRU POSITIVE 01/13/2023 08:43 AM    WBCUA  01/13/2023 08:43 AM    BACTERIA 3+ 01/13/2023 08:43 AM    RBCUA 3-4 01/13/2023 08:43 AM    BLOODU MODERATE 01/13/2023 08:43 AM    SPECGRAV >=1.030 01/13/2023 08:43 AM    GLUCOSEU 100 01/13/2023 08:43 AM    GLUCOSEU NEGATIVE 10/13/2010 12:10 PM       Radiology:  XR CHEST PORTABLE   Final Result   No significant findings in the chest.             IP CONSULT TO PALLIATIVE CARE  IP CONSULT TO SPIRITUAL SERVICES  IP CONSULT TO PHARMACY    Assessment/Plan:    Active Hospital Problems    Diagnosis     Severe sepsis ([facility] [address] [A41.9, R65.20]      Priority: Medium     UTI - admission U/A c/w acute cystitis. ,  With chronic Stapleton. Infection evidenced by U/A, Cx results and/or signs/sxs of clinical infection despite Cx results. Started on empiric vanc/zosyn on 1/13 pending Cx results. Changed to vancomycin due to Staph haemolyticus until sensitivities completed      Sepsis - w/ Leukocytosis/Tachycardia/Elevated Lactate POArrival 2nd to above infection. Continue IVF as appropriate and monitor clinical response w/ ABX as written. Blood cultures NGTD     HyperNatremia - etiology clinically unable to determine. Follow serial labs on IVF. Reviewed and documented as above. Nephro consulted      Encephalopathy - acute metabolic, 2nd to above. Will continue to follow clinical response w/ supportive care PRN. We will get lithium level as well. Do have concerns about bipolar medication and will consult psych to review home med list     Dysphagia -speech consulted and unable to complete eval at this time due to inconsistent alertness. They will reattempt eval tomorrow morning. Continue n.p.o. for now     Factor V Leiden -patient was on Eliquis but there was some difficulty obtaining so therefore he was put on warfarin. For now we will do Lovenox therapeutic as patient is not able to take things by mouth. We will plan to resume Eliquis at discharge with edward     Bipolar 1 -psych consulted     Type 2 diabetes -patient is NPO. Sliding scale n.p.o. . Hold home medication. Hypoglycemic protocol     Hypertension essential -hold verapamil at this time due to sepsis     Chronic pressure wounds -consult wound care     Thrush - Patient unable to do oral regimen at this time.  Start nystatin swab    WALKER - patient noncompliant with CPAP at home     Discussed plan of care with wife today. Explained how we will observe him over the next 24 hours. However patient has had a steady decline and unfortunately repeat infections requiring hospitalizations for some time now.   will continue palliative care talks.     DVT Prophylaxis: Therapeutic lovenox  Diet: Diet NPO  Code Status: Limited  PT/OT Eval Status: consult    Dispo - > 2 midnights     Palliative care consulted     Appropriate for A1 Discharge Unit: Yancy Kim DO

## 2023-01-14 NOTE — PROGRESS NOTES
Wife at bedside, story consistent as previously noted. She doesn't understand why his mental status is so altered. Pt. Continues with 30 second periods of apnea during sleep. Wife made aware.

## 2023-01-14 NOTE — PROGRESS NOTES
Pt. Alert but not cooperative with orientation or head to toe assessment. Pt. Has 30 second periods of apnea when he sleeps. MD notified. IVF started per order, pt. Non-compliant with letting staff suction oral secretions or use of mouthswabs.

## 2023-01-14 NOTE — CONSULTS
Patient seen and examined, consult note dictated. Assessment and Plan:    1- NEVIN: Likely secondary to dehydration (sodium 168 meq/L), although a non oliguric ATN injury cannot be ruled out. His urinary output is well maintained and his serum creatinine is slowly trending down. - Continue volume expansion.  - Avoid all nephrotoxic agents at this time. - Maintain systolic blood pressure > 120 mmHg. 2- Hypernatremia: Secondary to decreased oral intake, although Lithium-induced diabetes insipidus cannot be ruled out; continue IV hypotonic fluids, start free water through NG tube and administer a single dose of DDAVP with serial labs. 3- HTN: Blood pressure within acceptable range.   4- Mental status changes: Management per primary team.

## 2023-01-14 NOTE — SIGNIFICANT EVENT
Patient with even greater decline this afternoon. He is even less responsive than prior. I did receive notification that his urine culture came back with staph hemolyticus. Changed antibiotic coverage to vancomycin. His oxygen saturations were 71% at bedside. He was put up to 15 L nasal cannula and came up to 86% and then placed on nonrebreather mask. Obtain a chest x-ray and again unchanged from prior and shows no acute process. Sodium now up to 168. He was on dextrose 5 normal saline at 200 mL/h. I have changed this to dextrose 5 . Nephrology was consulted. Also received notification from nurse for concerns for skin discoloration of right ankle. I went to go check and it shows skin breakdown as of its pressure ulcers. He had good pulses bilaterally in the feet. Patient is to be moved to PCU    I have updated wife on guarded prognosis over the next 24 hours.

## 2023-01-14 NOTE — CONSULTS
Pharmacy Note  Vancomycin Consult    Ginny Velazco is a 67 y.o. male started on Vancomycin for UTI X7 days ; consult received from Dr. Janice Ruiz  to manage therapy. Also receiving the following antibiotics:   - None at this time . Allergies:  No known allergies     Tmax: N/A    Recent Labs     01/13/23  0815 01/14/23  0555   CREATININE 1.6* 1.3       Recent Labs     01/13/23  0815 01/14/23  0554   WBC 17.8* 15.4*       Estimated Creatinine Clearance: 58 mL/min (based on SCr of 1.3 mg/dL). Intake/Output Summary (Last 24 hours) at 1/14/2023 1446  Last data filed at 1/14/2023 1411  Gross per 24 hour   Intake 86.69 ml   Output 4400 ml   Net -4313.31 ml       Wt Readings from Last 1 Encounters:   01/14/23 186 lb 4.6 oz (84.5 kg)         Body mass index is 24.58 kg/m². Culture Date      Source                       Results  1/13/23               Urine         Staph Haemolyticus               Loading dose (critically ill or in ICU, require dialysis or renal replacement therapy): Vancomycin 25 mg/kg IVPB x 1 (maximum 2500 mg). Maintenance dose: 15 mg/kg (maximum: 2000 mg/dose and 4500 mg/day) starting at the next dosing interval determined by renal function  Pulse dose: fluctuating renal function, NEVIN, ESRD   Goal Vancomycin trough: 10-15 mcg/mL or 15-20 mcg/mL   Goal Vancomycin AUC: 400-600     Assessment/Plan:  - Pt.received 1500mg X1 on 1/13  @12:51 pm  - Recommended vanc 1250mg q 24h starting now   - Will check level on 1/16 ~14:00  Predications :   - AUC24,ss: 462 mg/L.hr  - Probability of AUC24 > 400: 66 %  - Ctrough,ss: 13.9 mg/L      Thank you for the consult.

## 2023-01-14 NOTE — PROGRESS NOTES
Speech Language Pathology      Attempted to see pt for swallow evaluation. Pt woke to verbal stimulation, oriented x0. Pt able to stay awake most of time. Pt w/ wet coughing; pt gently swatted suction away when SLP attempted to use. SLP attempted to elicit swallow w/ tactile stimulation via spoon; pt did not attempt to swallow and stated \"no. \" Will re-attempt when pt able to participate. Renu Martin M.A.  Shawn Saucedo  Speech Language Pathologist

## 2023-01-14 NOTE — PLAN OF CARE
Problem: Discharge Planning  Goal: Discharge to home or other facility with appropriate resources  Outcome: Progressing     Problem: Pain  Goal: Verbalizes/displays adequate comfort level or baseline comfort level  Outcome: Progressing     Problem: Safety - Adult  Goal: Free from fall injury  Outcome: Progressing     Problem: Cardiovascular - Adult  Goal: Maintains optimal cardiac output and hemodynamic stability  Outcome: Progressing     Problem: Genitourinary - Adult  Goal: Absence of urinary retention  Outcome: Progressing     Problem: Genitourinary - Adult  Goal: Urinary catheter remains patent  Outcome: Progressing     Problem: Metabolic/Fluid and Electrolytes - Adult  Goal: Glucose maintained within prescribed range  Outcome: Progressing     Problem: Metabolic/Fluid and Electrolytes - Adult  Goal: Glucose maintained within prescribed range  Outcome: Progressing     Problem: Infection - Adult  Goal: Absence of infection during hospitalization  Outcome: Progressing     Problem: Infection - Adult  Goal: Absence of infection at discharge  Outcome: Progressing     Problem: Genitourinary - Adult  Goal: Urinary catheter remains patent  Outcome: Progressing     Problem: Genitourinary - Adult  Goal: Absence of urinary retention  Outcome: Progressing     Problem: Cardiovascular - Adult  Goal: Maintains optimal cardiac output and hemodynamic stability  Outcome: Progressing

## 2023-01-14 NOTE — PROGRESS NOTES
Physical/Occupational Therapy    PT/OT attempted to evaluate this patient. However the patient was not following commands this morning. PT/OT will re-attempt to evaluate this patient when he is medically stable. Thank you.      Ifrah Lu PT  Jose Hilliard OT

## 2023-01-15 NOTE — PROGRESS NOTES
Wife and brother left. They will contact the  home. Wife took all personal belongings. Checked pt's room with wife and brother to ensure nothing left. Escorted to ER to exit to their car. Post mortem care provided to pt. Placed in post mortem bag and tagged on toe and bag. Security called to [place] [facility].

## 2023-01-15 NOTE — SIGNIFICANT EVENT
Rapid response was called. On arrival    I personally evaluated the patient at the bedside. Patient was found to be without cardiac activity on monitors, absent brainstem reflexes, no pupillary response, pupils were fixed, dilated. Patient was areflexive with no spontaneous respirations or respiratory movements. No palpable pulses throughout. No response to painful stimuli. GCS 3. Time of death called   2:45 AM     Physical Exam:  VS: No palpable pulse, no blood pressure.   Gen: Pallor with no movement  HEENT: Pupils fixed and dilated  CVS: No heart sounds audible  Chest: No audible lung sounds  Neuro: Absent reflexes

## 2023-01-15 NOTE — PROGRESS NOTES
martin Rivera      offered the \"sacrament of the sick \" to Pt at wife's request and an [employer].  will offer ongoing support to Pt/fam. Ch collab. with RN and suggested a Palliative Care consult. 01/14/23 1933   Encounter Summary   Encounter Overview/Reason  Grief, Loss, and Adjustments   Service Provided For: Patient and family together   Referral/Consult From: Family   Support System Spouse; Family members   Last Encounter  01/14/23  (Offered the [place] V of the sick and [place] roman)   Complexity of Encounter High   Begin Time 1900   End Time  1938   Total Time Calculated 38 min   Spiritual/Emotional needs   Type Emotional Distress;Spiritual Support   Grief, Loss, and Adjustments   Type Anticipatory Grief

## 2023-01-15 NOTE — CONSULTS
[address] [address]                                  CONSULTATION    PATIENT NAME: Debo Severino                    :        1950  MED REC NO:   [id number]                          ROOM:       0207  ACCOUNT NO:   [de-identified]                           ADMIT DATE: 2023  PROVIDER:     Ian Ortiz MD    CONSULT DATE:  2023    REASON FOR CONSULTATION:  Acute kidney injury and hypernatremia. HISTORY OF PRESENT ILLNESS:  The patient is a 44-year-old  male  patient who was admitted to [facility] with mental status  changes. The patient lives at [facility] when he was reported to be  confused and disoriented. The patient has a history of bipolar disorder  on chronic lithium therapy. Upon presentation, he was noted to have a  possible urinary tract infection with a mild hypernatremia and a serum  sodium of 146 mEq/L. He was also noted to have an acute kidney injury  with a serum creatinine of 1.6. The patient had a decreased oral intake  overnight and his serum sodium has been a rapidly trending up to 168  mEq/L which prompted Nephrology consultation. PAST MEDICAL HISTORY:  1.  Bipolar disorder. 2.  Depression. 3.  Diabetes mellitus. 4.  Hypertension. PAST SURGICAL HISTORY:  1. Appendectomy. 2.  Colonoscopy. 3.  Total hip replacement. 4.  TURP. ALLERGIES:  The patient has no known allergies. SOCIAL HISTORY:  The patient does not smoke or drink alcohol. FAMILY HISTORY:  Significant for coronary artery disease,  osteoarthritis. REVIEW OF SYSTEMS:  That was unobtainable as the patient was somnolent  and confused and nonresponsive to exam.  Please note that his previous  past medical, surgical, social and family history were obtained by  review of previous medical records.     PHYSICAL EXAMINATION:  VITAL SIGNS:  Blood pressure 114/68, heart rate 105, respirations 18,  temperature 98.8 Fahrenheit. The patient is satting 98% on 15 L nasal  cannula. GENERAL APPEARANCE:  The patient is somnolent and lethargic,  nonresponsive to exam.  NECK:  Revealed midline trachea and nonpalpable thyroid. LUNGS:  Clear to anterior auscultation bilaterally, nonlabored  breathing. CARDIOVASCULAR EXAM:  Revealed S1 and S2, regular rate and rhythm. No  murmurs or rubs. No peripheral edema. ABDOMINAL EXAM:  Revealed soft abdomen. No organomegaly. SKIN:  Revealed no lesions or rashes. Warm to touch. LYMPHATICS:  Revealed no cervical or axillary adenopathies. ASSESSMENT AND PLAN:  1. Acute kidney injury likely secondary to dehydration (sodium 168  mEq/L), although a nonoliguric ATN injury cannot be ruled out. His  urine output is well-maintained and his serum creatinine is slowly  trending down. 2.  Hypernatremia secondary to decreased oral intake, although lithium  induced diabetes insipidus cannot be ruled out. We will continue IV  hypotonic fluids, start free water through NG tube and administer a  single dose of DDAVP with serial labs. 3.  Hypertension, blood pressure within acceptable range. 4.  Mental status changes management per primary team.    RECOMMENDATION:  1. Continue volume expansion. 2.  Avoid all nephrotoxic agents at this time. 3.  Maintain systolic blood pressure above 120 mmHg.         Eddi Zayas MD    D: 01/14/2023 16:57:31       T: 01/14/2023 17:01:00     [email or web address]  Job#: [id number]     Doc#: [id number]    CC:

## 2023-01-15 NOTE — DISCHARGE SUMMARY
Hospital Medicine Discharge Summary    Patient ID: Tessa Stroud      Patient's PCP: FRANCISCO JAVIER Mina CNP    Admitting Physician: Joey Arevalo DO  Discharge Physician: Joey Arevalo DO     Admit Date: 2023     Disposition:     Discharge Diagnoses: Active Hospital Problems    Diagnosis     Severe sepsis ([address] [A41.9, R65.20]      Priority: Medium       Date of Death:1/15/2023  Time of RGCRM:2769    Immediate Cause of Death:Severe sepsis secondary to acute cystitis   Underlying Conditions:Bipolar, T2dm, debility       Hospital Course:   67 y.o. male who presented to [facility] with altered mental status. Patient arrived via EMS from [facility]. Unable to obtain history from patient due to mental status. Wife reports that patient has been having worsening mentation. Patient has a chronic Stapleton in ED his urinalysis showed possible infection. He also was found to be hypoxic. Placed on nasal cannula. Chest x-ray without any findings of consolidation. He was aspirating and has difficulty clearing secretions at this time. He was started on spectrum antibiotics for severe sepsis secondary to UTI. UTI - admission U/A c/w acute cystitis. ,  With chronic Stapleton. Sepsis   Encephalopathy - acute metabolic  Dysphagia   Factor V Leiden   Bipolar 1 -  Type 2 diabetes  Hypertension essential    Chronic pressure wounds   Thrush        Consults:  IP CONSULT TO PALLIATIVE CARE  IP CONSULT TO SPIRITUAL SERVICES  IP CONSULT TO PHARMACY  IP CONSULT TO PSYCHIATRY  IP CONSULT TO NEPHROLOGY    Signed:  Joey Arevalo DO    1/15/2023    Thank you FRANCISCO JAVIER Mina CNP for the opportunity to be involved in this patient's care. If you have any questions or concerns, please feel free to contact me at [phone].

## 2023-01-15 NOTE — PROGRESS NOTES
Monitor alarmed for sinus marcelo and then slowed to asystole. Code status limited x4. No heart beat per ausculation. Pt not breathing. Dr. Fabian Granado arrived at bedside and pronounced pt at [phone]. Wife, Cruz Bergeron, contacted. Should arrive within 15 minutes.  Initiated death notification check list.

## 2023-01-15 NOTE — PROGRESS NOTES
Secure message to on-call NP after receiving direction to advance 15 more cm. I requested on-call to provide direction. 1020 Decision made to remove NG and reinsert. NG removed without complication. Pt tolerated well. Measured tip of nose to earlobe to xiphoid process and determined to insert to 65 cm. NG reinserted without complication to Rt nare to the 65cm line at edge of Rt nare. Able to hear air bolus without difficulty and NG returned brown gastric contents. Stat KUB ordered.

## 2023-01-15 NOTE — PROGRESS NOTES
NG at 50cm ulisses at edge of nare. Advanced 27cm to 77cm ulisses at edge of nare without complication as directed by radiologist. Confirmed placement with air bolus. No gastric contents during attempt to aspirate. Stat KUB ordered to confirm placement.

## 2023-01-15 NOTE — PROGRESS NOTES
NG advanced 19cm. Measured 19 additional cm to end at 96cm. Tape measure used to ulisses tube. Second RN, Chandu Ndiaye, confirmed accurate advancement to 96cm. Stat KUB ordered to confirm placement.

## 2023-01-15 NOTE — PROGRESS NOTES
Transferred to [facility] with security. Death Notifications paperwork given to security.

## 2023-01-15 NOTE — PROGRESS NOTES
Carli Vick        01/15/23 0345   Encounter Summary   Encounter Overview/Reason  Grief, Loss, and Adjustments   Service Provided For: Patient and family together   Referral/Consult From: Nurse   Support System Spouse; Family members   Last Encounter  01/15/23  (Grief support/prayers to the  family.)   Complexity of Encounter High   Begin Time 0310   End Time  0345   Total Time Calculated 35 min   Grief, Loss, and Adjustments   Type Death;Bereavement Care

## 2023-01-17 LAB
BLOOD CULTURE, ROUTINE: NORMAL
CULTURE, BLOOD 2: NORMAL

## 2023-01-25 NOTE — PROGRESS NOTES
Physician Progress Note      PATIENTDerral Reach  [facility] #:                  [id number]  :                       1950  ADMIT DATE:       2023 8:01 AM  DISCH DATE:        1/15/2023 6:54 AM  RESPONDING  PROVIDER #:        Carole Villanueva DO          QUERY TEXT:    Pt admitted and  during hospital admission diagnosed with UTI. Pt   noted to have chronic indwelling vasquez upon arrival to ED. If possible, please   document in the progress notes and discharge summary if you are evaluating   and / or treating any of the following: The medical record reflects the following:  Risk Factors: AMS, chronic vasquez catheter  Clinical Indicators: per H&P- Sepsis 2/2 UTI. ..with chronic vasquez  Treatment: IVF, Abx, supportive and comfort care measures    Thank you,  Arash Gaming RN, BSN  [email or web address]  Options provided:  -- Sepsis related to chronic vasquez catheter  -- Sepsis unrelated to chronic vasquez catheter  -- Other - I will add my own diagnosis  -- Disagree - Not applicable / Not valid  -- Disagree - Clinically unable to determine / Unknown  -- Refer to Clinical Documentation Reviewer    PROVIDER RESPONSE TEXT:    This patient has sepsis related to chronic vasquez catheter. Query created by: Enrike Desai on 2023 8:09 AM      QUERY TEXT:    Pt admitted with sepsis d/t UTI and  this admission. Pt noted to have   O2 sat of 71% with increasing O2 needs to 15L NC for O2 sat of 86% and then to   [facility] per significant event PN . If possible, please document in the   progress notes and discharge summary if you are evaluating and/or treating any   of the following: The medical record reflects the following:  Risk Factors: Sepsis d/t UTI, AMS  Clinical Indicators: per significant event code note  -\" His oxygen   saturations were 71% at bedside. He was put up to 15 L nasal cannula and came   up to 86% and then placed on nonrebreather mask. \"  per DCS- \" He also was found to be hypoxic. Placed on nasal cannula. Chest   x-ray without any findings of consolidation. He was aspirating and has   difficulty clearing secretions at this time\"  Treatment: O2, HFNC, NRBM, palliative care/comfort measures until patient   . Thank you,  Edgar De La Cruz RN, BSN  [email or web address]  Options provided:  -- Acute respiratory failure with hypoxia  -- Other - I will add my own diagnosis  -- Disagree - Not applicable / Not valid  -- Disagree - Clinically unable to determine / Unknown  -- Refer to Clinical Documentation Reviewer    PROVIDER RESPONSE TEXT:    This patient is in acute respiratory failure with hypoxia.     Query created by: Jannet Sanchez on 2023 8:18 AM      Electronically signed by:  Ian Walker DO 2023 12:01 PM

## 2024-04-30 NOTE — ED NOTES
760 @ 6690 68 y/o M pmh Chronic AFib, DM, COPD, CKD, PAD, and colon cancer admitted for hemodialysis. Pt s/p partial 5th ray amputation on 3/29/24 for osteomyelitis with residual OM. Pt recieving 6 weeks IV zosyn (through 5/10). Surgical site wound with dehiscence, no signs of acute infection.      Plan:   - C/w abx for residual OM previously recommended by ID (6 weeks zosyn through 5/10)  - x rays reviewed   - Surgical site cleansed with normal saline. Dressed with betadine soaked gauze, kerlix, and ACE.   - Pt should be Heel WBAT to RLE  - Pt will need wound care services set up for discharge  - Rest of care per primary team    Plan d/w Attending. Podiatry team will sign off, please re-consult with additional questions or concerns.     Discharge dressing instructions: Cleanse wound with normal sailine daily, pat dry with sterile guaze, apply  betadine soaked gauze to wound base, cover with dry sterile gauze, ABD pad, wrap with Kerlix and light ACE bandage.     Pt should follow-up in 1 week w/ any podiatrist at the following facility:   [facility] ([facility])  [facility] Location   [address], [address]  [place] [address]  [phone]; [phone]  [email or web address]

## (undated) DEVICE — CHLORAPREP 26ML ORANGE

## (undated) DEVICE — ALCOHOL RUBBING 16OZ 70% ISO

## (undated) DEVICE — CATHETER URETH BLLN 30CC 22FR SIL ELASTMR F 3 W SPEC M RND

## (undated) DEVICE — TOWEL OR BLUEE 16X26IN ST 8 PACK ORB08 16X26ORTWL

## (undated) DEVICE — STERILE POLYISOPRENE POWDER-FREE SURGICAL GLOVES: Brand: PROTEXIS

## (undated) DEVICE — SET IRRIG L94IN DIA0.281IN L BOR W/ 2 N VENT SPIK 2 ON/OFF

## (undated) DEVICE — CYSTO: Brand: MEDLINE INDUSTRIES, INC.

## (undated) DEVICE — UNIVERSAL BLOCK TRAY: Brand: MEDLINE INDUSTRIES, INC.

## (undated) DEVICE — BAG DRNGE 2000ML ROUNDED TEARDROP W/ ANTIREFLX CHMBR DISP

## (undated) DEVICE — EVACUATOR URO BLDR W/ ADPT UROVAC

## (undated) DEVICE — BAG DRNGE COMB PK

## (undated) DEVICE — SYRINGE,TOOMEY,IRRIGATION,70CC,STERILE: Brand: MEDLINE

## (undated) DEVICE — ADAPTER, OES PRO OUTER SHEATH TO ELLIK AND SYRINGE: Brand: OLYMPUS

## (undated) DEVICE — GAUZE,SPONGE,4"X4",16PLY,STRL,LF,10/TRAY: Brand: MEDLINE

## (undated) DEVICE — ELECTRODE ES WIDE FRONTLOADING SURF LOOP SUPERSECT

## (undated) DEVICE — SYRINGE MED 30ML STD CLR PLAS LUERLOCK TIP N CTRL DISP

## (undated) DEVICE — STERILE POLYISOPRENE POWDER-FREE SURGICAL GLOVES WITH EMOLLIENT COATING: Brand: PROTEXIS

## (undated) DEVICE — SOLUTION IRRIG 2000ML 0.9% SOD CHL USP UROMATIC PLAS CONT

## (undated) DEVICE — AVANOS* UNIVERSAL BLOCK TRAYS: Brand: AVANOS

## (undated) DEVICE — Z CONVERTED USE 2271043 CONTAINER SPEC COLL 4OZ SCR ON LID PEEL PCH